# Patient Record
Sex: FEMALE | Race: WHITE | NOT HISPANIC OR LATINO | Employment: FULL TIME
[De-identification: names, ages, dates, MRNs, and addresses within clinical notes are randomized per-mention and may not be internally consistent; named-entity substitution may affect disease eponyms.]

---

## 2017-07-01 ENCOUNTER — HEALTH MAINTENANCE LETTER (OUTPATIENT)
Age: 32
End: 2017-07-01

## 2018-10-01 ENCOUNTER — OFFICE VISIT (OUTPATIENT)
Dept: OBGYN | Facility: CLINIC | Age: 33
End: 2018-10-01
Attending: ADVANCED PRACTICE MIDWIFE
Payer: COMMERCIAL

## 2018-10-01 ENCOUNTER — RADIANT APPOINTMENT (OUTPATIENT)
Dept: ULTRASOUND IMAGING | Facility: CLINIC | Age: 33
End: 2018-10-01
Attending: ADVANCED PRACTICE MIDWIFE
Payer: COMMERCIAL

## 2018-10-01 VITALS
HEART RATE: 80 BPM | WEIGHT: 183 LBS | SYSTOLIC BLOOD PRESSURE: 133 MMHG | BODY MASS INDEX: 30.49 KG/M2 | DIASTOLIC BLOOD PRESSURE: 84 MMHG | HEIGHT: 65 IN

## 2018-10-01 DIAGNOSIS — O30.031 MONOCHORIONIC DIAMNIOTIC TWIN GESTATION IN FIRST TRIMESTER: Primary | ICD-10-CM

## 2018-10-01 DIAGNOSIS — Z12.4 SCREENING FOR MALIGNANT NEOPLASM OF CERVIX: ICD-10-CM

## 2018-10-01 DIAGNOSIS — Z23 NEED FOR PROPHYLACTIC VACCINATION AND INOCULATION AGAINST INFLUENZA: ICD-10-CM

## 2018-10-01 DIAGNOSIS — Z34.00 SUPERVISION OF NORMAL FIRST PREGNANCY, ANTEPARTUM: ICD-10-CM

## 2018-10-01 DIAGNOSIS — E66.811 OBESITY (BMI 30.0-34.9): ICD-10-CM

## 2018-10-01 LAB
ABO + RH BLD: NORMAL
ABO + RH BLD: NORMAL
BLD GP AB SCN SERPL QL: NORMAL
BLOOD BANK CMNT PATIENT-IMP: NORMAL
ERYTHROCYTE [DISTWIDTH] IN BLOOD BY AUTOMATED COUNT: 12.6 % (ref 10–15)
HCT VFR BLD AUTO: 36.7 % (ref 35–47)
HGB BLD-MCNC: 12.1 G/DL (ref 11.7–15.7)
MCH RBC QN AUTO: 30.1 PG (ref 26.5–33)
MCHC RBC AUTO-ENTMCNC: 33 G/DL (ref 31.5–36.5)
MCV RBC AUTO: 91 FL (ref 78–100)
PLATELET # BLD AUTO: 312 10E9/L (ref 150–450)
RBC # BLD AUTO: 4.02 10E12/L (ref 3.8–5.2)
SPECIMEN EXP DATE BLD: NORMAL
WBC # BLD AUTO: 8.3 10E9/L (ref 4–11)

## 2018-10-01 PROCEDURE — 87340 HEPATITIS B SURFACE AG IA: CPT | Performed by: ADVANCED PRACTICE MIDWIFE

## 2018-10-01 PROCEDURE — 86762 RUBELLA ANTIBODY: CPT | Performed by: ADVANCED PRACTICE MIDWIFE

## 2018-10-01 PROCEDURE — 87086 URINE CULTURE/COLONY COUNT: CPT | Performed by: ADVANCED PRACTICE MIDWIFE

## 2018-10-01 PROCEDURE — 87389 HIV-1 AG W/HIV-1&-2 AB AG IA: CPT | Performed by: ADVANCED PRACTICE MIDWIFE

## 2018-10-01 PROCEDURE — 36415 COLL VENOUS BLD VENIPUNCTURE: CPT | Performed by: ADVANCED PRACTICE MIDWIFE

## 2018-10-01 PROCEDURE — 76801 OB US < 14 WKS SINGLE FETUS: CPT

## 2018-10-01 PROCEDURE — 86780 TREPONEMA PALLIDUM: CPT | Performed by: ADVANCED PRACTICE MIDWIFE

## 2018-10-01 PROCEDURE — 85027 COMPLETE CBC AUTOMATED: CPT | Performed by: ADVANCED PRACTICE MIDWIFE

## 2018-10-01 PROCEDURE — 86850 RBC ANTIBODY SCREEN: CPT | Performed by: ADVANCED PRACTICE MIDWIFE

## 2018-10-01 PROCEDURE — 86901 BLOOD TYPING SEROLOGIC RH(D): CPT | Performed by: ADVANCED PRACTICE MIDWIFE

## 2018-10-01 PROCEDURE — G0463 HOSPITAL OUTPT CLINIC VISIT: HCPCS | Mod: 25,ZF

## 2018-10-01 PROCEDURE — 82306 VITAMIN D 25 HYDROXY: CPT | Performed by: ADVANCED PRACTICE MIDWIFE

## 2018-10-01 PROCEDURE — 86900 BLOOD TYPING SEROLOGIC ABO: CPT | Performed by: ADVANCED PRACTICE MIDWIFE

## 2018-10-01 ASSESSMENT — PAIN SCALES - GENERAL: PAINLEVEL: NO PAIN (0)

## 2018-10-01 NOTE — PROGRESS NOTES
[facility] OB Intake note  Subjective   -32 year old woman presents with  to clinic for initiation of OB care.  Patient's last menstrual period was 2018.    at 8w3d by Estimated Date of Delivery: May 10, 2019 based on LMP, US confirms.  Reviewed dating ultrasound. Pregnancy is planned. Surprise diagnosis of mono/di twins, no twins in immediate family.       -Symptoms since LMP include bowel changes, stools are more loose than previously but not bothersome to Gia.  Patient has tried these relief measures: diet modification, small frequent meals and increased fluids.    - Genetic/Infection questionnaire completed, risks include none identified. She believes her father may have a clotting disorder but is unsure of what that diagnosis is, she was tested for the same disorder and was negative. She will bring this information to her next appointment.    Have you traveled during the pregnancy?No  Have your sexual partner(s) travelled during the pregnancy?No    - Current Medications    Current Outpatient Prescriptions   Medication Sig Dispense Refill     Prenatal MV-Min-Fe Fum-FA-DHA (PRENATAL 1 PO) Take 2 tablets by mouth daily          -Co-morbids none   Past Medical History:   Diagnosis Date     NO ACTIVE PROBLEMS      - Risk for GDM -  Personal history of GDM, BMI>30, h/o prediabetes/glucose intolerance, first degree relative with GDM or DM     - The patient does h/o Pre Eclampsia, Current multi fetal gestation, Pre Gestational Diabetes (Type 1 or Type 2), chronic hypertension, renal disease, Autoimmune disease (systematic lupus erythematosus, antiphospholipid syndrome) so WILL start low dose aspirin (81mg) starting between 12 and 28 weeks to prevent preeclampsia.    - The patient  does not have a history of spontaneous  birth so  WILL NOT consider progesterone starting at 16-20 weeks and/or serial transvaginal cervical length ultrasounds from 16-24 weeks.     PERSONAL/SOCIAL HISTORY  ,  lives with their spouse & dog.  Employment: Full time.  Her job involves sedentary activity.    Objective  -VS: reviewed and within normal limits   -General appearance: no acute distress, patient is comfortable   NEUROLOGICAL/PSYCHIATRIC   - Orientated x3,   -Mood and affect: surprised but accepting of diagnosis of twins.     Assessment/Plan  Vale was seen today for prenatal care.    Diagnoses and all orders for this visit:    Monochorionic diamniotic twin gestation in first trimester  -     ABO/Rh type and screen  -     Hepatitis B surface antigen  -     CBC with platelets  -     HIV Antigen Antibody Combo  -     Rubella Antibody IgG Quantitative  -     Treponema Abs w Reflex to RPR and Titer  -     Urine Culture Aerobic Bacterial  -     Vitamin D Deficiency    Need for prophylactic vaccination and inoculation against influenza    Screening for malignant neoplasm of cervix: Had her Pap in Feb, NILM per pt will bring results.    32 year old  8w3d weeks of pregnancy with CHRISTINA of May 10, 2019 by LMP of Patient's last menstrual period was 2018.. Ultrasound confirms.   Outpatient Encounter Prescriptions as of 10/1/2018   Medication Sig Dispense Refill     Prenatal MV-Min-Fe Fum-FA-DHA (PRENATAL 1 PO) Take 2 tablets by mouth daily       [DISCONTINUED] ketoconazole (NIZORAL) 2 % cream Apply topically daily       [DISCONTINUED] ketoconazole (NIZORAL) 2 % shampoo Apply a small amount once a week       No facility-administered encounter medications on file as of 10/1/2018.       Orders Placed This Encounter   Procedures     Hepatitis B surface antigen     CBC with platelets     HIV Antigen Antibody Combo     Rubella Antibody IgG Quantitative     Treponema Abs w Reflex to RPR and Titer     Vitamin D Deficiency     ABO/Rh type and screen     Orders Placed This Encounter   Procedures     Hepatitis B surface antigen     CBC with platelets     HIV Antigen Antibody Combo     Rubella Antibody IgG Quantitative      Treponema Abs w Reflex to RPR and Titer     Vitamin D Deficiency     ABO/Rh type and screen     - Oriented to Practice, types of care, and how to reach a provider. Will see an MD for her NOB appt in order to discuss plan of care for mono/di pregnancy.   - Patient received 1st trimester new OB education packet complete with aide of [employer] Expect[employer] booklet including information on genetic screening test options.    - Patient would like to disucss options further at new OB visit which will be ordered at 1st OB exam.  - Patient was encouraged to continue prenatal vitamins as tolerated.    - Patient was sent to lab for routine OB labs.      - Pt informed of increased risk for GDM due to 30 BMI plan for NOB visit.  - Need to recommend low dose aspirin daily to prevent pre eclampsia (twins and high BMI), follow up at NOB visit.  -Pap in Feb 18 will bring results (NILM per pt).   - Pregnancy concerns to be addressed by provider at new OB exam include: plan of care for twin mono/di pregnancy, genetic screening, GCT, asa therapy.    Pt to RTO for NOB visit in 2 weeks and prn if questions or concerns  KHUSHI Casillas was seen today with both Janine Cat and myself. Janine Cat documented the visit and I agree with the findings and plan of care that is documented above. KIERAN Moran CNM, CNM Ann Louise Page, APRN CNM

## 2018-10-01 NOTE — LETTER
10/1/2018       RE: Vale Contreras  [address] [address]     Dear Colleague,    Thank you for referring your patient, Vale Contreras, to the [facility]. Please see a copy of my visit note below.    [facility] OB Intake note  Subjective   -32 year old woman presents with  to clinic for initiation of OB care.  Patient's last menstrual period was 2018.    at 8w3d by Estimated Date of Delivery: May 10, 2019 based on LMP, US confirms.  Reviewed dating ultrasound. Pregnancy is planned. Surprise diagnosis of mono/di twins, no twins in immediate family.       -Symptoms since LMP include bowel changes, stools are more loose than previously but not bothersome to Gia.  Patient has tried these relief measures: diet modification, small frequent meals and increased fluids.    - Genetic/Infection questionnaire completed, risks include none identified. She believes her father may have a clotting disorder but is unsure of what that diagnosis is, she was tested for the same disorder and was negative. She will bring this information to her next appointment.    Have you traveled during the pregnancy?No  Have your sexual partner(s) travelled during the pregnancy?No    - Current Medications    Current Outpatient Prescriptions   Medication Sig Dispense Refill     Prenatal MV-Min-Fe Fum-FA-DHA (PRENATAL 1 PO) Take 2 tablets by mouth daily          -Co-morbids none   Past Medical History:   Diagnosis Date     NO ACTIVE PROBLEMS      - Risk for GDM -  Personal history of GDM, BMI>30, h/o prediabetes/glucose intolerance, first degree relative with GDM or DM     - The patient does h/o Pre Eclampsia, Current multi fetal gestation, Pre Gestational Diabetes (Type 1 or Type 2), chronic hypertension, renal disease, Autoimmune disease (systematic lupus erythematosus, antiphospholipid syndrome) so WILL start low dose aspirin (81mg)  starting between 12 and 28 weeks to prevent preeclampsia.    - The patient  does not have a history of spontaneous  birth so  WILL NOT consider progesterone starting at 16-20 weeks and/or serial transvaginal cervical length ultrasounds from 16-24 weeks.     PERSONAL/SOCIAL HISTORY  , lives with their spouse & dog.  Employment: Full time.  Her job involves sedentary activity.    Objective  -VS: reviewed and within normal limits   -General appearance: no acute distress, patient is comfortable   NEUROLOGICAL/PSYCHIATRIC   - Orientated x3,   -Mood and affect: surprised but accepting of diagnosis of twins.     Assessment/Plan  Vale was seen today for prenatal care.    Diagnoses and all orders for this visit:    Monochorionic diamniotic twin gestation in first trimester  -     ABO/Rh type and screen  -     Hepatitis B surface antigen  -     CBC with platelets  -     HIV Antigen Antibody Combo  -     Rubella Antibody IgG Quantitative  -     Treponema Abs w Reflex to RPR and Titer  -     Urine Culture Aerobic Bacterial  -     Vitamin D Deficiency    Need for prophylactic vaccination and inoculation against influenza    Screening for malignant neoplasm of cervix: Had her Pap in Feb, NILM per pt will bring results.    32 year old  8w3d weeks of pregnancy with CHRISTINA of May 10, 2019 by LMP of Patient's last menstrual period was 2018.. Ultrasound confirms.   Outpatient Encounter Prescriptions as of 10/1/2018   Medication Sig Dispense Refill     Prenatal MV-Min-Fe Fum-FA-DHA (PRENATAL 1 PO) Take 2 tablets by mouth daily       [DISCONTINUED] ketoconazole (NIZORAL) 2 % cream Apply topically daily       [DISCONTINUED] ketoconazole (NIZORAL) 2 % shampoo Apply a small amount once a week       No facility-administered encounter medications on file as of 10/1/2018.       Orders Placed This Encounter   Procedures     Hepatitis B surface antigen     CBC with platelets     HIV Antigen Antibody Combo     Rubella  Antibody IgG Quantitative     Treponema Abs w Reflex to RPR and Titer     Vitamin D Deficiency     ABO/Rh type and screen     Orders Placed This Encounter   Procedures     Hepatitis B surface antigen     CBC with platelets     HIV Antigen Antibody Combo     Rubella Antibody IgG Quantitative     Treponema Abs w Reflex to RPR and Titer     Vitamin D Deficiency     ABO/Rh type and screen     - Oriented to Practice, types of care, and how to reach a provider. Will see an MD for her NOB appt in order to discuss plan of care for mono/di pregnancy.   - Patient received 1st trimester new OB education packet complete with aide of [employer] Expect[employer] Family booklet including information on genetic screening test options.    - Patient would like to disucss options further at new OB visit which will be ordered at 1st OB exam.  - Patient was encouraged to continue prenatal vitamins as tolerated.    - Patient was sent to lab for routine OB labs.      - Pt informed of increased risk for GDM due to 30 BMI plan for NOB visit.  - Need to recommend low dose aspirin daily to prevent pre eclampsia (twins and high BMI), follow up at NOB visit.  -Pap in Feb 18 will bring results (NILM per pt).   - Pregnancy concerns to be addressed by provider at new OB exam include: plan of care for twin mono/di pregnancy, genetic screening, GCT, asa therapy.    Pt to RTO for NOB visit in 2 weeks and prn if questions or concerns    HIREN Ennis APRN CNM              Again, thank you for allowing me to participate in the care of your patient.      Sincerely,    KIERAN Moran CNM

## 2018-10-01 NOTE — MR AVS SNAPSHOT
After Visit Summary   10/1/2018    Vale Contreras    MRN: [id number]           Patient Information     Date Of Birth          1985        Visit Information        Provider Department      10/1/2018 3:30 PM Hannah Arechiga APRN CNM [facility]        Today's Diagnoses     Monochorionic diamniotic twin gestation in first trimester    -  1    Need for prophylactic vaccination and inoculation against influenza        Screening for malignant neoplasm of cervix        BMI 30           Follow-ups after your visit        Follow-up notes from your care team     Return in about 2 weeks (around 10/15/2018) for NOB with residents, mono-di, NOB, 30min exam.      Your next 10 appointments already scheduled     Oct 18, 2018  3:30 PM CDT   NEW OB with KIERAN Cedeño CNM   [facility] ([facility])    [facility] [address]  [address]  [facility] [address] [phone]              Who to contact     Please call your clinic at [phone] to:    Ask questions about your health    Make or cancel appointments    Discuss your medicines    Learn about your test results    Speak to your doctor            Additional Information About Your Visit        MyChart Information     [employer] gives you secure access to your electronic health record. If you see a primary care provider, you can also send messages to your care team and make appointments. If you have questions, please call your primary care clinic.  If you do not have a primary care provider, please call [phone] and they will assist you.      [employer] is an electronic gateway that provides easy, online access to your medical records. With [employer], you can request a clinic appointment, read your test results, renew a prescription or communicate with your care team.     To access your existing account, please contact your [facility] or call [phone] for assistance.        Care EveryWhere ID     This is your Care EveryWhere ID. This could be used by other organizations to access your [place] medical records  [id number]        Your Vitals Were     Pulse Height Last Period BMI (Body Mass Index)          80 1.657 m (5' 5.24\") 08/03/2018 30.23 kg/m2         Blood Pressure from Last 3 Encounters:   10/01/18 133/84   07/25/16 112/72    Weight from Last 3 Encounters:   10/01/18 83 kg (183 lb)   07/25/16 77.1 kg (170 lb)              We Performed the Following     ABO/Rh type and screen     CBC with platelets     Hepatitis B surface antigen     HIV Antigen Antibody Combo     Rubella Antibody IgG Quantitative     Treponema Abs w Reflex to RPR and Titer     Urine Culture Aerobic Bacterial     Vitamin D Deficiency          Today's Medication Changes          These changes are accurate as of 10/1/18 11:59 PM.  If you have any questions, ask your nurse or doctor.               Stop taking these medicines if you haven't already. Please contact your care team if you have questions.     ketoconazole 2 % cream   Commonly known as:  NIZORAL   Stopped by:  Hannah Arechiga APRN CNM           ketoconazole 2 % shampoo   Commonly known as:  NIZORAL   Stopped by:  Hannah Arechiga APRN CNM                    Primary Care Provider    None Specified       No primary provider on file.        Equal Access to Services     [facility] : Hadii tee duran hadasho Sokiahali, waaxda luqadaha, qaybta kaalmada tabitha, luis manuel hayes. So [facility] [phone].    ATENCIÓN: Si habla español, tiene a reynolds disposición servicios gratuitos de asistencia lingüística. Llame al [phone].    We comply with applicable federal civil rights laws and [place] laws. We do not discriminate on the basis of race, color, national origin, age, disability, sex, sexual orientation, or gender identity.            Thank you!     Thank " you for choosing [facility]  for your care. Our goal is always to provide you with excellent care. Hearing back from our patients is one way we can continue to improve our services. Please take a few minutes to complete the written survey that you may receive in the mail after your visit with us. Thank you!             Your Updated Medication List - Protect others around you: Learn how to safely use, store and throw away your medicines at www.disposemymeds.org.          This list is accurate as of 10/1/18 11:59 PM.  Always use your most recent med list.                   Brand Name Dispense Instructions for use Diagnosis    PRENATAL 1 PO      Take 2 tablets by mouth daily

## 2018-10-01 NOTE — LETTER
Date:October 9, 2018      Patient was self referred, no letter generated. Do not send.        [facility] Information

## 2018-10-02 LAB
BACTERIA SPEC CULT: NORMAL
BACTERIA SPEC CULT: NORMAL
DEPRECATED CALCIDIOL+CALCIFEROL SERPL-MC: 19 UG/L (ref 20–75)
HBV SURFACE AG SERPL QL IA: NONREACTIVE
HIV 1+2 AB+HIV1 P24 AG SERPL QL IA: NONREACTIVE
Lab: NORMAL
RUBV IGG SERPL IA-ACNC: 10 IU/ML
SPECIMEN SOURCE: NORMAL
T PALLIDUM AB SER QL: NONREACTIVE

## 2018-10-04 ASSESSMENT — ENCOUNTER SYMPTOMS
DECREASED CONCENTRATION: 0
DEPRESSION: 0
INSOMNIA: 0
POSTURAL DYSPNEA: 0
COUGH: 1
SNORES LOUDLY: 1
COUGH DISTURBING SLEEP: 1
SHORTNESS OF BREATH: 0
DYSPNEA ON EXERTION: 0
PANIC: 0
NERVOUS/ANXIOUS: 1
SPUTUM PRODUCTION: 0
WHEEZING: 0
HEMOPTYSIS: 0

## 2018-10-04 ASSESSMENT — ANXIETY QUESTIONNAIRES
6. BECOMING EASILY ANNOYED OR IRRITABLE: SEVERAL DAYS
5. BEING SO RESTLESS THAT IT IS HARD TO SIT STILL: NOT AT ALL
4. TROUBLE RELAXING: SEVERAL DAYS
1. FEELING NERVOUS, ANXIOUS, OR ON EDGE: MORE THAN HALF THE DAYS
7. FEELING AFRAID AS IF SOMETHING AWFUL MIGHT HAPPEN: NOT AT ALL
3. WORRYING TOO MUCH ABOUT DIFFERENT THINGS: MORE THAN HALF THE DAYS
2. NOT BEING ABLE TO STOP OR CONTROL WORRYING: MORE THAN HALF THE DAYS
GAD7 TOTAL SCORE: 8
7. FEELING AFRAID AS IF SOMETHING AWFUL MIGHT HAPPEN: NOT AT ALL
GAD7 TOTAL SCORE: 8

## 2018-10-05 ASSESSMENT — ANXIETY QUESTIONNAIRES: GAD7 TOTAL SCORE: 8

## 2018-10-06 PROBLEM — E66.811 OBESITY (BMI 30.0-34.9): Status: ACTIVE | Noted: 2018-10-06

## 2018-10-18 ENCOUNTER — OFFICE VISIT (OUTPATIENT)
Dept: OBGYN | Facility: CLINIC | Age: 33
End: 2018-10-18
Attending: ADVANCED PRACTICE MIDWIFE
Payer: COMMERCIAL

## 2018-10-18 VITALS
SYSTOLIC BLOOD PRESSURE: 128 MMHG | HEART RATE: 102 BPM | WEIGHT: 185.4 LBS | HEIGHT: 66 IN | DIASTOLIC BLOOD PRESSURE: 85 MMHG | BODY MASS INDEX: 29.8 KG/M2

## 2018-10-18 DIAGNOSIS — Z36.82 ENCOUNTER FOR (NT) NUCHAL TRANSLUCENCY SCAN: ICD-10-CM

## 2018-10-18 DIAGNOSIS — O09.90 SUPERVISION OF HIGH RISK PREGNANCY, ANTEPARTUM: Primary | ICD-10-CM

## 2018-10-18 DIAGNOSIS — Z36.89 ENCOUNTER FOR FETAL ANATOMIC SURVEY: ICD-10-CM

## 2018-10-18 DIAGNOSIS — E66.811 OBESITY (BMI 30.0-34.9): ICD-10-CM

## 2018-10-18 DIAGNOSIS — O30.031 MONOCHORIONIC DIAMNIOTIC TWIN GESTATION IN FIRST TRIMESTER: ICD-10-CM

## 2018-10-18 DIAGNOSIS — Z23 NEED FOR PROPHYLACTIC VACCINATION AND INOCULATION AGAINST INFLUENZA: ICD-10-CM

## 2018-10-18 LAB — GLUCOSE 1H P 50 G GLC PO SERPL-MCNC: 87 MG/DL (ref 60–129)

## 2018-10-18 PROCEDURE — 86706 HEP B SURFACE ANTIBODY: CPT | Performed by: ADVANCED PRACTICE MIDWIFE

## 2018-10-18 PROCEDURE — 36415 COLL VENOUS BLD VENIPUNCTURE: CPT | Performed by: ADVANCED PRACTICE MIDWIFE

## 2018-10-18 PROCEDURE — 82950 GLUCOSE TEST: CPT | Performed by: ADVANCED PRACTICE MIDWIFE

## 2018-10-18 PROCEDURE — G0463 HOSPITAL OUTPT CLINIC VISIT: HCPCS | Mod: ZF

## 2018-10-18 PROCEDURE — 87591 N.GONORRHOEAE DNA AMP PROB: CPT | Performed by: ADVANCED PRACTICE MIDWIFE

## 2018-10-18 PROCEDURE — 87491 CHLMYD TRACH DNA AMP PROBE: CPT | Performed by: ADVANCED PRACTICE MIDWIFE

## 2018-10-18 ASSESSMENT — PAIN SCALES - GENERAL: PAINLEVEL: NO PAIN (0)

## 2018-10-18 NOTE — PROGRESS NOTES
SUBJECTIVE:   Vale is a 32 year old female who presents to clinic for a new OB visit with FOB.   at 10w6d with Estimated Date of Delivery: May 10, 2019 based on US confirms. Feels well. Has started started PNV.     She has not had bleeding since her LMP.   She has had mild nausea. Weight loss has not occurred.   This was a planned pregnancy.   FOB is involved,  Present and supportive     OTHER CONCERNS: Want to review their genetic screening options again.     ===========================================   ROS: 10 point ROS neg other than the symptoms noted above in the HPI.      PSYCHIATRIC:  Denies mood changes, difficulty concentrating, depression, anxiety, panic attacks or post partum depression.  Generalized anxiety   PHQ-9 score:  No flowsheet data found.  TIM-7 SCORE 10/4/2018   Total Score 8 (mild anxiety)   Total Score 8         Past History:  Her past medical history   Past Medical History:   Diagnosis Date     NO ACTIVE PROBLEMS    .   This is her first pregnancy  Since her last LMP she denies use of alcohol, tobacco and street drugs.  HISTORY:  Family History   Problem Relation Age of Onset     Hypertension Father      Breast Cancer Cousin      She has the BRCA1 gene. My father was tested and determined not to be a carrier.     Diabetes No family hx of      Social History     Social History     Marital status: Single     Spouse name: Bernardo Julien     Number of children: 0     Years of education: 18     Occupational History     [employer]     office work full time     Social History Main Topics     Smoking status: Never Smoker     Smokeless tobacco: Never Used     Alcohol use 0.0 oz/week      Comment: 8 drinks a week, stopped in pregnancy     Drug use: No     Sexual activity: Yes     Partners: Male     Birth control/ protection: None     Other Topics Concern     Parent/Sibling W/ Cabg, Mi Or Angioplasty Before 65f 55m? No     Social History Narrative     Current  "Outpatient Prescriptions   Medication Sig     VITAMIN D, CHOLECALCIFEROL, PO Take 5,000 Units by mouth daily     Prenatal MV-Min-Fe Fum-FA-DHA (PRENATAL 1 PO) Take 2 tablets by mouth daily     No current facility-administered medications for this visit.      Allergies   Allergen Reactions     Penicillins Unknown     Sulfa Drugs        ============================================  MEDICAL HISTORY  Past Medical History:   Diagnosis Date     NO ACTIVE PROBLEMS      Past Surgical History:   Procedure Laterality Date     ENT SURGERY      Tubes put in my ears as a small child       Obstetric History       T0      L0     SAB0   TAB0   Ectopic0   Multiple0   Live Births0       # Outcome Date GA Lbr Holland/2nd Weight Sex Delivery Anes PTL Lv   1 Current                     GYN History- no Abnormal Pap Smears                        Cervical procedures: no                        History of STI: no    I personally reviewed the past social/family/medical and surgical history on the date of service.   I reviewed lab work done at Intake visit with patient.    EXAM:  /85  Pulse 102  Ht 1.67 m (5' 5.75\")  Wt 84.1 kg (185 lb 6.4 oz)  LMP 2018  BMI 30.15 kg/m2   EXAM:  GENERAL:  Pleasant pregnant female, alert, cooperative and well groomed.  SKIN:  Warm and dry, without lesions or rashes  HEAD: Symmetrical features.  MOUTH:  Buccal mucosa pink, moist without lesions.  Teeth in good repair.    NECK:  Thyroid without enlargement and nodules.  Lymph nodes not palpable.   LUNGS:  Clear to auscultation.  BREAST:    No dominant, fixed or suspicious masses are noted.  No skin or nipple changes or axillary nodes.   Nipples everted.      HEART:  RRR without murmur.  ABDOMEN: Soft without masses , tenderness or organomegaly.  No CVA tenderness.  Uterus palpable at size greater to dates, c/w twin gestation.  No scars noted. +belly button ring well healed.  Fetal heart tones present x2    MUSCULOSKELETAL:  Full range " of motion  EXTREMITIES:  No edema. No significant varicosities.   PELVIC EXAM: No worrisome s/s, Pap up to date. Pt declines pelvic exam     GC/CHLAMYDIA CULTURE OBTAINED:YES    Pap NIL per pt last year no history of abnormal.   Record release in process.        ASSESSMENT:  32 year old , 10w6d weeks of pregnancy with CHRISTINA of May 10, 2019 by US confirms  Intrauterine pregnancy 10w6d size is larger than dates c/w twin gestation  Genetic Screening: First Trimester Screen    ICD-10-CM    1. Supervision of high risk pregnancy, antepartum O09.90 Chlamydia trachomatis PCR     Neisseria gonorrhoeae PCR     Glucose 1 Hour     Hepatitis B Surface Antibody   2. Need for prophylactic vaccination and inoculation against influenza Z23    3. BMI 30.0-30.9,adult Z68.30 Glucose 1 Hour   4. Obesity (BMI 30.0-34.9) E66.9    5. Monochorionic diamniotic twin gestation in first trimester O30.031 [facility]AL [facility] CTR REFERRAL-PREGNANCY   6. Encounter for (NT) nuchal translucency scan Z36.82 [facility] FETAL MED CTR REFERRAL-PREGNANCY   7. Encounter for fetal anatomic survey Z36.89 [facility] FETAL [facility] CTR REFERRAL-PREGNANCY       PLAN:  - Reviewed use of triage nurse line and contacting the on-call provider after hours for an urgent need such as fever, vagina bleeding, bladder or vaginal infection, rupture of membranes,  or term labor.    - Reviewed best evidence for: weight gain for her weight and height for pregnancy:  Based on pre-pregnancy  Body mass index is 30.15 kg/(m^2). RECOMMENDED WEIGHT GAIN: < 15 lbs.  -If BMI>=30, weight gain/exercise handout given and reviewed. BMI entered on problem list with plan for possible referrals and  testing  - Reviewed healthy diet and foods to avoid; exercise and activity during pregnancy; avoiding exposure to toxoplasmosis; and maintenance of a generally healthy lifestyle.   - Discussed the harms, benefits, side effects and alternative therapies for current prescribed and OTC medications.  -  Reviewed high risk for gestational diabetes, early 1 hour done today.  Hepatis B surface ANTIBODY added as not drawn with NOB labs, pt agreeable.   - Reviewed high risk for Pre Eclampsia d/t multiple gestation,  agreeable to starting 81mg aspirin daily after 12 weeks.    - All pt's and partner's  questions discussed and answered.  Pt verbalized understanding of and agreement to plan of care.     - Continue scheduled prenatal care and prn if questions or concerns    KIERAN Hayes CNM

## 2018-10-18 NOTE — MR AVS SNAPSHOT
After Visit Summary   10/18/2018    Vale Contreras    MRN: [id number]           Patient Information     Date Of Birth          1985        Visit Information        Provider Department      10/18/2018 3:30 PM Suad Rogers APRN CNM [facility]        Today's Diagnoses     Supervision of high risk pregnancy, antepartum    -  1    Need for prophylactic vaccination and inoculation against influenza        BMI 30.0-30.9,adult        Obesity (BMI 30.0-34.9)        Monochorionic diamniotic twin gestation in first trimester        Encounter for (NT) nuchal translucency scan        Encounter for fetal anatomic survey           Follow-ups after your visit        Additional Services     MAT FETAL MED CTR REFERRAL-PREGNANCY       Body mass index is 30.15 kg/(m^2).    >> Patient may proceed with recommendations for further testing as directed by the Maternal Fetal Medicine Specialist >>    >> If requesting Fetal Echo: MFM will determine appropriate location for exam due to indication.    >> If requesting Lung Maturity Amnio:  If results indicate fetal lung maturity, induction or C/S is recommended within 36 hours.  Please schedule accordingly.     Dear Patient:   Please be aware that coverage of these services is subject to the terms and limitations of your health insurance plan.  Call member services at your health plan with any benefit or coverage questions.      Please bring the following to your appointment:    >>  Any x-rays, CTs or MRIs which have been performed.  Contact the facility where they were done to arrange for  prior to your scheduled appointment.  Any new CT, MRI or other procedures ordered by your specialist must be performed at a [place] facility or coordinated by your clinic's referral office.  >>  List of current medications   >>  This referral request   >>  Any documents/labs given to you for this referral                  Follow-up notes from your  "care team     Will only call if abnormal Return in about 4 weeks (around 11/15/2018) for MAIA with MD.      Your next 10 appointments already scheduled     Nov 15, 2018  3:00 PM CST   RETURN OB with Alicia Myhre, DO   [facility] ([facility])    [facility] [address]  [address]  [address]  [facility] [id number] [phone]              Who to contact     Please call your clinic at [phone] to:    Ask questions about your health    Make or cancel appointments    Discuss your medicines    Learn about your test results    Speak to your doctor            Additional Information About Your Visit        deltaDNA Information     deltaDNA gives you secure access to your electronic health record. If you see a primary care provider, you can also send messages to your care team and make appointments. If you have questions, please call your primary care clinic.  If you do not have a primary care provider, please call [phone] and they will assist you.      deltaDNA is an electronic gateway that provides easy, online access to your medical records. With deltaDNA, you can request a clinic appointment, read your test results, renew a prescription or communicate with your care team.     To access your existing account, please contact your [facility] or call [phone] for assistance.        Care EveryWhere ID     This is your Care EveryWhere ID. This could be used by other organizations to access your [place] medical records  [id number]        Your Vitals Were     Pulse Height Last Period BMI (Body Mass Index)          102 1.67 m (5' 5.75\") 08/03/2018 30.15 kg/m2         Blood Pressure from Last 3 Encounters:   10/18/18 128/85   10/01/18 133/84   07/25/16 112/72    Weight from Last 3 Encounters:   10/18/18 84.1 kg (185 lb 6.4 oz)   10/01/18 83 kg (183 lb)   07/25/16 77.1 kg (170 lb)              We Performed the Following     Chlamydia " trachomatis PCR     Glucose 1 Hour     Hepatitis B Surface Antibody     MAT FETAL MED CTR REFERRAL-PREGNANCY     Neisseria gonorrhoeae PCR        Primary Care Provider    None Specified       No primary provider on file.        Equal Access to Services     PETER BRADSHAW : Kendra Gonzalez, arpit james, faustinomadhu galindochenchojosh shintuan, luis manuel alliein hayaan kipjerry ivey laColtonnusrat hayes. So [facility] [phone].    ATENCIÓN: Si habla español, tiene a reynolds disposición servicios gratuitos de asistencia lingüística. Llame al [phone].    We comply with applicable federal civil rights laws and [place] laws. We do not discriminate on the basis of race, color, national origin, age, disability, sex, sexual orientation, or gender identity.            Thank you!     Thank you for choosing [facility]  for your care. Our goal is always to provide you with excellent care. Hearing back from our patients is one way we can continue to improve our services. Please take a few minutes to complete the written survey that you may receive in the mail after your visit with us. Thank you!             Your Updated Medication List - Protect others around you: Learn how to safely use, store and throw away your medicines at www.disposemymeds.org.          This list is accurate as of 10/18/18 11:59 PM.  Always use your most recent med list.                   Brand Name Dispense Instructions for use Diagnosis    PRENATAL 1 PO      Take 2 tablets by mouth daily        VITAMIN D (CHOLECALCIFEROL) PO      Take 5,000 Units by mouth daily

## 2018-10-18 NOTE — LETTER
Date:October 23, 2018      Patient was self referred, no letter generated. Do not send.        [facility] Information

## 2018-10-18 NOTE — NURSING NOTE
Chief Complaint   Patient presents with     Prenatal Care   nob exam  10.5 weeks   Twins.    Last pap smear 5-2018  Fax will be coming wnl   [facility]- patient took care of getting records.

## 2018-10-18 NOTE — LETTER
10/18/2018       RE: Vale Contreras  [address] [facility] [facility] [address]     Dear Colleague,    Thank you for referring your patient, Vale Contreras, to the [facility] at [facility]. Please see a copy of my visit note below.      SUBJECTIVE:   Vale is a 32 year old female who presents to clinic for a new OB visit with FOB.   at 10w6d with Estimated Date of Delivery: May 10, 2019 based on US confirms. Feels well. Has started started PNV.     She has not had bleeding since her LMP.   She has had mild nausea. Weight loss has not occurred.   This was a planned pregnancy.   FOB is involved,  Present and supportive     OTHER CONCERNS: Want to review their genetic screening options again.     ===========================================   ROS: 10 point ROS neg other than the symptoms noted above in the HPI.      PSYCHIATRIC:  Denies mood changes, difficulty concentrating, depression, anxiety, panic attacks or post partum depression.  Generalized anxiety   PHQ-9 score:  No flowsheet data found.  TIM-7 SCORE 10/4/2018   Total Score 8 (mild anxiety)   Total Score 8         Past History:  Her past medical history   Past Medical History:   Diagnosis Date     NO ACTIVE PROBLEMS    .   This is her first pregnancy  Since her last LMP she denies use of alcohol, tobacco and street drugs.  HISTORY:  Family History   Problem Relation Age of Onset     Hypertension Father      Breast Cancer Cousin      She has the BRCA1 gene. My father was tested and determined not to be a carrier.     Diabetes No family hx of      Social History     Social History     Marital status: Single     Spouse name: Bernardo Julien     Number of children: 0     Years of education: 18     Occupational History     [employer]     office work full time     Social History Main Topics     Smoking status: Never Smoker     Smokeless tobacco: Never Used      "Alcohol use 0.0 oz/week      Comment: 8 drinks a week, stopped in pregnancy     Drug use: No     Sexual activity: Yes     Partners: Male     Birth control/ protection: None     Other Topics Concern     Parent/Sibling W/ Cabg, Mi Or Angioplasty Before 65f 55m? No     Social History Narrative     Current Outpatient Prescriptions   Medication Sig     VITAMIN D, CHOLECALCIFEROL, PO Take 5,000 Units by mouth daily     Prenatal MV-Min-Fe Fum-FA-DHA (PRENATAL 1 PO) Take 2 tablets by mouth daily     No current facility-administered medications for this visit.      Allergies   Allergen Reactions     Penicillins Unknown     Sulfa Drugs        ============================================  MEDICAL HISTORY  Past Medical History:   Diagnosis Date     NO ACTIVE PROBLEMS      Past Surgical History:   Procedure Laterality Date     ENT SURGERY      Tubes put in my ears as a small child       Obstetric History       T0      L0     SAB0   TAB0   Ectopic0   Multiple0   Live Births0       # Outcome Date GA Lbr Holland/2nd Weight Sex Delivery Anes PTL Lv   1 Current                     GYN History- no Abnormal Pap Smears                        Cervical procedures: no                        History of STI: no    I personally reviewed the past social/family/medical and surgical history on the date of service.   I reviewed lab work done at Intake visit with patient.    EXAM:  /85  Pulse 102  Ht 1.67 m (5' 5.75\")  Wt 84.1 kg (185 lb 6.4 oz)  LMP 2018  BMI 30.15 kg/m2   EXAM:  GENERAL:  Pleasant pregnant female, alert, cooperative and well groomed.  SKIN:  Warm and dry, without lesions or rashes  HEAD: Symmetrical features.  MOUTH:  Buccal mucosa pink, moist without lesions.  Teeth in good repair.    NECK:  Thyroid without enlargement and nodules.  Lymph nodes not palpable.   LUNGS:  Clear to auscultation.  BREAST:    No dominant, fixed or suspicious masses are noted.  No skin or nipple changes or axillary nodes.   " Nipples everted.      HEART:  RRR without murmur.  ABDOMEN: Soft without masses , tenderness or organomegaly.  No CVA tenderness.  Uterus palpable at size greater to dates, c/w twin gestation.  No scars noted. +belly button ring well healed.  Fetal heart tones present x2    MUSCULOSKELETAL:  Full range of motion  EXTREMITIES:  No edema. No significant varicosities.   PELVIC EXAM: No worrisome s/s, Pap up to date. Pt declines pelvic exam     GC/CHLAMYDIA CULTURE OBTAINED:YES    Pap NIL per pt last year no history of abnormal.   Record release in process.        ASSESSMENT:  32 year old , 10w6d weeks of pregnancy with CHRISTINA of May 10, 2019 by US confirms  Intrauterine pregnancy 10w6d size is larger than dates c/w twin gestation  Genetic Screening: First Trimester Screen    ICD-10-CM    1. Supervision of high risk pregnancy, antepartum O09.90 Chlamydia trachomatis PCR     Neisseria gonorrhoeae PCR     Glucose 1 Hour     Hepatitis B Surface Antibody   2. Need for prophylactic vaccination and inoculation against influenza Z23    3. BMI 30.0-30.9,adult Z68.30 Glucose 1 Hour   4. Obesity (BMI 30.0-34.9) E66.9    5. Monochorionic diamniotic twin gestation in first trimester O30.031 MAT FETAL MED CTR REFERRAL-PREGNANCY   6. Encounter for (NT) nuchal translucency scan Z36.82 MAT FETAL MED CTR REFERRAL-PREGNANCY   7. Encounter for fetal anatomic survey Z36.89 [facility] FETAL MED CTR REFERRAL-PREGNANCY       PLAN:  - Reviewed use of triage nurse line and contacting the on-call provider after hours for an urgent need such as fever, vagina bleeding, bladder or vaginal infection, rupture of membranes,  or term labor.    - Reviewed best evidence for: weight gain for her weight and height for pregnancy:  Based on pre-pregnancy  Body mass index is 30.15 kg/(m^2). RECOMMENDED WEIGHT GAIN: < 15 lbs.  -If BMI>=30, weight gain/exercise handout given and reviewed. BMI entered on problem list with plan for possible referrals and   testing  - Reviewed healthy diet and foods to avoid; exercise and activity during pregnancy; avoiding exposure to toxoplasmosis; and maintenance of a generally healthy lifestyle.   - Discussed the harms, benefits, side effects and alternative therapies for current prescribed and OTC medications.  - Reviewed high risk for gestational diabetes, early 1 hour done today.  Hepatis B surface ANTIBODY added as not drawn with NOB labs, pt agreeable.   - Reviewed high risk for Pre Eclampsia d/t multiple gestation,  agreeable to starting 81mg aspirin daily after 12 weeks.    - All pt's and partner's  questions discussed and answered.  Pt verbalized understanding of and agreement to plan of care.     - Continue scheduled prenatal care and prn if questions or concerns    KIERAN Hayes CNM           Again, thank you for allowing me to participate in the care of your patient.      Sincerely,    KIERAN Hayes CNM

## 2018-10-19 PROBLEM — O09.90 SUPERVISION OF HIGH RISK PREGNANCY, ANTEPARTUM: Status: ACTIVE | Noted: 2018-10-19

## 2018-10-19 LAB — HBV SURFACE AB SERPL IA-ACNC: 39 M[IU]/ML

## 2018-10-20 LAB
C TRACH DNA SPEC QL NAA+PROBE: NEGATIVE
N GONORRHOEA DNA SPEC QL NAA+PROBE: NEGATIVE
SPECIMEN SOURCE: NORMAL
SPECIMEN SOURCE: NORMAL

## 2018-10-24 ENCOUNTER — PRE VISIT (OUTPATIENT)
Dept: MATERNAL FETAL MEDICINE | Facility: CLINIC | Age: 33
End: 2018-10-24

## 2018-10-31 ENCOUNTER — OFFICE VISIT (OUTPATIENT)
Dept: MATERNAL FETAL MEDICINE | Facility: CLINIC | Age: 33
End: 2018-10-31
Attending: ADVANCED PRACTICE MIDWIFE
Payer: COMMERCIAL

## 2018-10-31 ENCOUNTER — APPOINTMENT (OUTPATIENT)
Dept: LAB | Facility: CLINIC | Age: 33
End: 2018-10-31
Attending: ADVANCED PRACTICE MIDWIFE
Payer: COMMERCIAL

## 2018-10-31 ENCOUNTER — HOSPITAL ENCOUNTER (OUTPATIENT)
Dept: ULTRASOUND IMAGING | Facility: CLINIC | Age: 33
Discharge: HOME | End: 2018-10-31
Attending: ADVANCED PRACTICE MIDWIFE | Admitting: OBSTETRICS & GYNECOLOGY
Payer: COMMERCIAL

## 2018-10-31 DIAGNOSIS — O26.90 PREGNANCY RELATED CONDITION, ANTEPARTUM: ICD-10-CM

## 2018-10-31 DIAGNOSIS — O09.511 SUPERVISION OF ELDERLY PRIMIGRAVIDA IN FIRST TRIMESTER: Primary | ICD-10-CM

## 2018-10-31 DIAGNOSIS — O09.511 SUPERVISION OF ELDERLY PRIMIGRAVIDA IN FIRST TRIMESTER: ICD-10-CM

## 2018-10-31 DIAGNOSIS — O30.032 MONOCHORIONIC DIAMNIOTIC TWIN GESTATION IN SECOND TRIMESTER: Primary | ICD-10-CM

## 2018-10-31 PROCEDURE — 96040 ZZH GENETIC COUNSELING, EACH 30 MINUTES: CPT | Mod: ZF | Performed by: GENETIC COUNSELOR, MS

## 2018-10-31 PROCEDURE — 36415 COLL VENOUS BLD VENIPUNCTURE: CPT | Performed by: OBSTETRICS & GYNECOLOGY

## 2018-10-31 PROCEDURE — 40000791 ZZHCL STATISTIC VERIFI PRENATAL TRISOMY 21,18,13: Performed by: OBSTETRICS & GYNECOLOGY

## 2018-10-31 PROCEDURE — 76813 OB US NUCHAL MEAS 1 GEST: CPT

## 2018-10-31 NOTE — MR AVS SNAPSHOT
After Visit Summary   10/31/2018    Vale Contreras    MRN: [id number]           Patient Information     Date Of Birth          1985        Visit Information        Provider Department      10/31/2018 8:45 AM Amparo Byrd GC [facility] [place]        Today's Diagnoses     Supervision of elderly primigravida in first trimester    -  1    Pregnancy related condition, antepartum           Follow-ups after your visit        Your next 10 appointments already scheduled     Nov 15, 2018  3:00 PM CST   RETURN OB with Alicia Myhre, DO   [facility] ([facility])    [place] [facility] [address]  [address]  [address]  [facility] [address]   [phone]            Nov 28, 2018  1:30 PM CST   MFM US OB COMPL 2/3 TRI TWINS with [id number]   [facility] [place] ([facility])    [address]  [facility] [id number] [phone]           Wear comfortable clothes and leave your valuables at home.            Nov 28, 2018  2:00 PM CST   Radiology MD with DEANGELO CASTILLO MD   [facility] [place] ([facility])    [address]  [facility] [address]   [phone]           Please arrive at the time given for your first appointment. This visit is used internally to schedule the physician's time during your ultrasound.            Dec 21, 2018  8:00 AM CST   MFM US COMPRE TWINS with [id number]   [facility] [place] ([facility])    [address]  [facility] [address] [phone]           Wear comfortable clothes and leave your valuables at home.            Dec 21, 2018  9:15 AM CST   Radiology MD with DEANGELO CASTILLO MD   [facility] [place] ([facility])     [address]  [place] [address]   [phone]           Please arrive at the time given for your first appointment. This visit is used internally to schedule the physician's time during your ultrasound.              Future tests that were ordered for you today     Open Future Orders        Priority Expected Expires Ordered    MFM US OB Complete 2/3 Tri Twins Routine  8/31/2019 10/31/2018            Who to contact     If you have questions or need follow up information about today's clinic visit or your schedule please contact [facility] directly at [phone].  Normal or non-critical lab and imaging results will be communicated to you by [employer], letter or phone within 4 business days after the clinic has received the results. If you do not hear from us within 7 days, please contact the clinic through [employer] or phone. If you have a critical or abnormal lab result, we will notify you by phone as soon as possible.  Submit refill requests through [employer] or call your pharmacy and they will forward the refill request to us. Please allow 3 business days for your refill to be completed.          Additional Information About Your Visit        MyChart Information     [employer] gives you secure access to your electronic health record. If you see a primary care provider, you can also send messages to your care team and make appointments. If you have questions, please call your primary care clinic.  If you do not have a primary care provider, please call [phone] and they will assist you.        Care EveryWhere ID     This is your Care EveryWhere ID. This could be used by other organizations to access your [place] medical records  [id number]        Your Vitals Were     Last Period                   08/03/2018            Blood Pressure from Last 3 Encounters:   10/18/18 128/85   10/01/18 133/84   07/25/16 112/72    Weight from Last 3 Encounters:   10/18/18 84.1 kg (185 lb 6.4 oz)    10/01/18 83 kg (183 lb)   07/25/16 77.1 kg (170 lb)              We Performed the Following     MFM Genetic Counseling        Primary Care Provider    None Specified       No primary provider on file.        Equal Access to Services     PETER BRADSHAW : Hadii aad ku hadindyamrit Carlos, wacarolynnda lujeanie, faustinota kacarlos lu, luis manuel alliein hayaafelix molinamadelinedeuce hayes. So [facility] [phone].    ATENCIÓN: Si habla español, tiene a reynolds disposición servicios gratuitos de asistencia lingüística. Llame al [phone].    We comply with applicable federal civil rights laws and [place] laws. We do not discriminate on the basis of race, color, national origin, age, disability, sex, sexual orientation, or gender identity.            Thank you!     Thank you for choosing [facility]  for your care. Our goal is always to provide you with excellent care. Hearing back from our patients is one way we can continue to improve our services. Please take a few minutes to complete the written survey that you may receive in the mail after your visit with us. Thank you!             Your Updated Medication List - Protect others around you: Learn how to safely use, store and throw away your medicines at www.disposemymeds.org.          This list is accurate as of 10/31/18 12:42 PM.  Always use your most recent med list.                   Brand Name Dispense Instructions for use Diagnosis    PRENATAL 1 PO      Take 2 tablets by mouth daily        VITAMIN D (CHOLECALCIFEROL) PO      Take 5,000 Units by mouth daily

## 2018-10-31 NOTE — PROGRESS NOTES
[facility]  Genetic Counseling Consult    Patient: Vale Contreras YOB: 1985   Date of Service: 10/31/18      Vale Contreras was seen at [facility] for genetic consultation to discuss the options for screening and testing for fetal chromosome abnormalities.  The indication for genetic counseling is advanced maternal age. Vale is pregnant with mono/di twins. She was unaccompanied to today's visit.       Impression/Plan:   1.  Vale had an ultrasound and blood draw for NIPT (Innatal test through [facility]). Results are expected within 7-10 days, and will be available in [facility].  We will contact her to discuss the results, and a copy will be forwarded to the office of the referring OB provider.    2.  Maternal serum AFP (single marker screen) is recommended after 15 weeks to screen for open neural tube defects. A quad screen should not be performed.    3.  An 18-20 week comprehensive ultrasound is standard of care for all women 35 or older at delivery.    Pregnancy History:   /Parity:    Age at Delivery: 33 year old  CHRISTINA: 5/10/2019, by Last Menstrual Period  Gestational Age: 12w5d    Vale is pregnant with monochorionic/diamniotic twins. No significant complications or exposures were reported in the current pregnancy.    Medical History:   Vale zhou reported medical history is not expected to impact pregnancy management or risks to fetal development.       Family History:   A three-generation pedigree was obtained, and is scanned under the  Media  tab.   The following significant findings were reported by Vale:    Vale's father has a history of several blood clots which lead to a diagnosis of Factor V Leiden. Per Vale's report, she pursued testing for Factor V approximately 5 years ago with normal/negative results.    Vale's paternal first cousin was diagnosed at a young age with Stage IV  breast cancer and was subsequently found to have a mutation in BRCA1. Through familial testing, her cousin's father (Vale's blood relative) was found to have the same BRCA1 mutation, therefore, Vale's father completed testing and was negative. Mutations in BRCA1 are known to cause an increased risk for female and male breast cancer, ovarian cancer, and prostate cancer. Per Vale's report, her father does not carry this familial mutation, therefore, he could not have passed this mutation onto her.     Otherwise, the reported family history is negative for multiple miscarriages, stillbirths, birth defects, mental retardation, known genetic conditions, and consanguinity.       Carrier Screening:   The patient reports that she and the father of the pregnancy have  ancestry:      Cystic fibrosis is an autosomal recessive genetic condition that occurs with increased frequency in individuals of  ancestry and carrier screening for this condition is available.  In addition,  screening in the [facility] includes cystic fibrosis.      Expanded carrier screening for mutations in a large panel of genes associated with autosomal recessive conditions including cystic fibrosis, spinal muscular atrophy, and others, is now available.      The patient was not certain about whether to pursue carrier screening today.  She was provided with a brochure about her testing options, and contact information if she has questions or wishes to pursue screening.       Risk Assessment for Chromosome Conditions:   We explained that the risk for fetal chromosome abnormalities increases with maternal age. We discussed specific features of common chromosome abnormalities, including Down syndrome, trisomy 13, trisomy 18, and sex chromosome trisomies.      - At age 33 at midtrimester, the risk to have a baby with Down syndrome is 1 in 274.     - At age 33 at midtrimester, the risk to have a baby with any chromosome  abnormality is 1 in 135.     Vale was counseled regarding the decreased sensitivity for non-invasive prenatal screening with twin pregnancies compared to berrios pregnancies.     Testing Options:   We discussed the following options:   First trimester screening    First trimester ultrasound with nuchal translucency and nasal bone assessments, maternal plasma hCG, ANA MARIA-A, and AFP measurement    Screens for fetal trisomy 21, trisomy 13, and trisomy 18    Cannot screen for open neural tube defects; maternal serum AFP after 15 weeks is recommended     Non-invasive Prenatal Testing (NIPT)    Maternal plasma cell-free DNA testing; first trimester ultrasound with nuchal translucency and nasal bone assessment is recommended, when appropriate    Screens for fetal trisomy 21, trisomy 13, trisomy 18, and sex chromosome aneuploidy    Cannot screen for open neural tube defects; maternal serum AFP after 15 weeks is recommended     Comprehensive (Level II) ultrasound: Detailed ultrasound performed between 18-22 weeks gestation to screen for major birth defects and markers for aneuploidy.      We reviewed the benefits and limitations of this testing.  Screening tests provide a risk assessment specific to the pregnancy for certain fetal chromosome abnormalities, but cannot definitively diagnose or exclude a fetal chromosome abnormality.  Follow-up genetic counseling and consideration of diagnostic testing is recommended with any abnormal screening result. Diagnostic tests carry inherent risks- including risk of miscarriage- that require careful consideration.  These tests can detect fetal chromosome abnormalities with greater than 99% certainty.  Results can be compromised by maternal cell contamination or mosaicism, and are limited by the resolution of cytogenetic G-banding technology.  There is no screening nor diagnostic test that can detect all forms of birth defects or mental disability.     It was a pleasure to be  involved with Vale zhou care. Face-to-face time of the meeting was 30 minutes.    Marcela Byrd MS, [facility]  Maternal Fetal Medicine  [facility]  Ph: [phone]  [email or web address]

## 2018-10-31 NOTE — MR AVS SNAPSHOT
After Visit Summary   10/31/2018    Vale Contreras    MRN: [id number]           Patient Information     Date Of Birth          1985        Visit Information        Provider Department      10/31/2018 10:00 AM Fady Valadez MD [facility] [place]        Today's Diagnoses     Monochorionic diamniotic twin gestation in second trimester    -  1       Follow-ups after your visit        Your next 10 appointments already scheduled     Oct 31, 2018 11:00 AM CDT   LAB with OP LAB UR   [facility] [place] ([facility])    [address] [place] [address]  [facility] [address]   [phone]           Please do not eat 10-12 hours before your appointment if you are coming in fasting for labs on lipids, cholesterol, or glucose (sugar). This does not apply to pregnant women. Water, hot tea and black coffee (with nothing added) are okay. Do not drink other fluids, diet soda or chew gum.            Nov 15, 2018  3:00 PM CST   RETURN OB with Alicia Myhre, DO   [facility] ([facility])    [place] [facility] [address]  [address]  [facility] [address]   [phone]            Nov 28, 2018  1:30 PM CST   MFM US OB COMPL 2/3 TRI TWINS with [id number]   [facility] [place] ([facility])    [address]  [facility] [address] [phone]           Wear comfortable clothes and leave your valuables at home.            Nov 28, 2018  2:00 PM CST   Radiology MD with UR MFISAÍAS WHITE   [facility] [place] ([facility])    [address]  [facility] [address]   [phone]           Please arrive at the time given for your first appointment. This visit is used internally to schedule the physician's time during your ultrasound.            Dec 21,  2018  8:00 AM CST   MFM US COMPRE TWINS with [id number]   [facility]tras[facility] [place] ([facility])    [address]  [facility] [id number] [phone]           Wear comfortable clothes and leave your valuables at home.            Dec 21, 2018  9:15 AM CST   Radiology MD with UR ANNA WHITE   [facility] - [facility])    [address]  [facility] [address]   [phone]           Please arrive at the time given for your first appointment. This visit is used internally to schedule the physician's time during your ultrasound.              Future tests that were ordered for you today     Open Future Orders        Priority Expected Expires Ordered    MFM US OB Complete 2/3 Tri Twins Routine  8/31/2019 10/31/2018            Who to contact     If you have questions or need follow up information about today's clinic visit or your schedule please contact [facility] - [place] directly at [phone].  Normal or non-critical lab and imaging results will be communicated to you by [employer] Mobilehart, letter or phone within 4 business days after the clinic has received the results. If you do not hear from us within 7 days, please contact the clinic through [email or web address] or phone. If you have a critical or abnormal lab result, we will notify you by phone as soon as possible.  Submit refill requests through [employer] or call your pharmacy and they will forward the refill request to us. Please allow 3 business days for your refill to be completed.          Additional Information About Your Visit        [employer] Mobile[employer] Engine Information     Kior gives you secure access to your electronic health record. If you see a primary care provider, you can also send messages to your care team and make appointments. If you have questions, please call your primary care clinic.  If you do not have a primary  care provider, please call [phone] and they will assist you.        Care EveryWhere ID     This is your Care EveryWhere ID. This could be used by other organizations to access your [facility] medical records  [id number]        Your Vitals Were     Last Period                   08/03/2018            Blood Pressure from Last 3 Encounters:   10/18/18 128/85   10/01/18 133/84   07/25/16 112/72    Weight from Last 3 Encounters:   10/18/18 84.1 kg (185 lb 6.4 oz)   10/01/18 83 kg (183 lb)   07/25/16 77.1 kg (170 lb)               Primary Care Provider    None Specified       No primary provider on file.        Equal Access to Services     PETER BRADSHAW : Kendra Gonzalez, arpit james, mark lu, luis manuel hayes. So [facility] [phone].    ATENCIÓN: Si habla español, tiene a reynolds disposición servicios gratuitos de asistencia lingüística. Llame al [phone].    We comply with applicable federal civil rights laws and [place] laws. We do not discriminate on the basis of race, color, national origin, age, disability, sex, sexual orientation, or gender identity.            Thank you!     Thank you for choosing [facility]  for your care. Our goal is always to provide you with excellent care. Hearing back from our patients is one way we can continue to improve our services. Please take a few minutes to complete the written survey that you may receive in the mail after your visit with us. Thank you!             Your Updated Medication List - Protect others around you: Learn how to safely use, store and throw away your medicines at www.disposemymeds.org.          This list is accurate as of 10/31/18 10:46 AM.  Always use your most recent med list.                   Brand Name Dispense Instructions for use Diagnosis    PRENATAL 1 PO      Take 2 tablets by mouth daily        VITAMIN D (CHOLECALCIFEROL) PO      Take 5,000 Units by mouth  daily

## 2018-11-06 ENCOUNTER — TELEPHONE (OUTPATIENT)
Dept: MATERNAL FETAL MEDICINE | Facility: CLINIC | Age: 33
End: 2018-11-06

## 2018-11-06 LAB — LAB SCANNED RESULT: NORMAL

## 2018-11-06 NOTE — TELEPHONE ENCOUNTER
"11/6/2018    I called Vale to discuss her normal \"Innatal\" cell-free fetal DNA screening results. Vale is pregnant with mono/di twins. She requested that these results be left in a detailed voice message. Results came back negative for chromosome abnormalities in chromosomes 21, 18, & 13, as well as the sex chromosomes.  These normal results suggest the likelihood of fetal Down syndrome, trisomy 13, or trisomy 18 in this pregnancy is very low. Results consistent with the presence of Y chromosome material (See scanned report under lab tab in epic). Due to the mono-di zygosity, it can be assumed that both babies are male. This information was left on Vale's voicemail per her request.    Discussed high detection rates for fetal Down syndrome, trisomies 13 and 18, and fetal sex chromosome abnormalities; however, not 100%. While this test is a highly accurate screen, it does not replace the diagnostic capabilities of standard prenatal diagnostic tests such as amniocentesis and CVS.     Plan:  A 2/3 complete ultrasound has been scheduled at [facility] on 11/28 and a level II ultrasound has been scheduled on 12/21.  MSAFP is the appropriate second trimester screening test for open neural tube defects; the maternal quad screen is not indicated.      Marcela Byrd MS, [facility]  Licensed Genetic Counselor  Phone: [phone]  Pager: [phone]  "

## 2018-11-15 ENCOUNTER — OFFICE VISIT (OUTPATIENT)
Dept: OBGYN | Facility: CLINIC | Age: 33
End: 2018-11-15
Payer: COMMERCIAL

## 2018-11-15 VITALS
HEART RATE: 112 BPM | SYSTOLIC BLOOD PRESSURE: 122 MMHG | HEIGHT: 65 IN | WEIGHT: 194.2 LBS | BODY MASS INDEX: 32.36 KG/M2 | DIASTOLIC BLOOD PRESSURE: 82 MMHG

## 2018-11-15 DIAGNOSIS — O30.032 MONOCHORIONIC DIAMNIOTIC TWIN GESTATION IN SECOND TRIMESTER: Primary | ICD-10-CM

## 2018-11-15 DIAGNOSIS — E66.811 OBESITY (BMI 30.0-34.9): ICD-10-CM

## 2018-11-15 DIAGNOSIS — O09.90 SUPERVISION OF HIGH RISK PREGNANCY, ANTEPARTUM: ICD-10-CM

## 2018-11-15 PROCEDURE — G0463 HOSPITAL OUTPT CLINIC VISIT: HCPCS | Mod: ZF

## 2018-11-15 NOTE — PROGRESS NOTES
"Subjective:      32 year old  at 14w6d presents for a routine prenatal appointment. She is accompanied by her .   Denies vaginal bleeding or leakage of fluid.  Denies contractions or cramping.   She complains of increased HA with pregnancy, occurring 2-3 times per week. They occur in the setting of stress but are not severe. She is staying well hydrated and denies any nausea or vomiting. She continues to walk regularly for exercise.  She is overall feeling well and feels well supported in her pregnancy. She is involved with several online support groups for moms of multiples. She and her  are excited and coping well with the thought of having twins.     She recently had normal Innatal cell free DNA screen. She has MFM appointments scheduled on 18 and Level II scheduled for 18     Objective:  Vitals:    11/15/18 1502   BP: 122/82   BP Location: Left arm   Patient Position: Chair   Pulse: 112   Weight: 88.1 kg (194 lb 3.2 oz)   Height: 1.651 m (5' 5\")     See OB flowsheet    Assessment/Plan:  33yo  at 14w6d with mono/di twin pregnancy presents for routine OB care.      Encounter Diagnoses   Name Primary?     Monochorionic diamniotic twin gestation in second trimester Yes     Supervision of high risk pregnancy, antepartum -  DENISA WHITE. TWINS      BMI 30      - Reviewed total weight gain, encouraged continued healthy diet and exercise.     - Reviewed importance of flu vaccine. Patient is vaccinated. Encouraged  to consider vaccination as well to decrease likelihood of illness in the household.    - Reviewed why/how to contact provider.    Patient education/orders or handouts today:  Adjusting to Second Trimester of Pregnancy handout, discussed upcoming prenatal visits including MFM monitoring for TTTS and Level II ultrasounds. Also discussed future routine OB visits including glucose screening.   Return to clinic in 4 weeks and prn if questions or concerns.     Patient discussed " with Dr. Terrell Alicia Myhre, DO  Obstetrics and Gyncology, PGY-2  November 15, 2018 , 3:42 PM       The Patient was seen in [facility] by MYHRE, ALICIA.  I reviewed the history & exam. Assessment and plan were jointly made.    Geeta Hall MD

## 2018-11-15 NOTE — PATIENT INSTRUCTIONS
It was a pleasure to meet you today!  Please keep scheduled maternal fetal medicine appointments.   Return for routine OB care in 4 weeks.   Adapting to Pregnancy: Second Trimester    Keep up the healthy habits you started in your first trimester. You might be a little more tired than normal. So plan your day wisely. Look at the tips below and choose the ones that suit your lifestyle.  If you have any questions, check with your healthcare provider.   If you work  If you can, adjust your work with your employer to fit your needs. Try these tips:    If you stand for long periods, find ways to do some tasks while sitting. Also, try to stand with 1 foot resting on a low stool or ledge. Shift your weight from foot to foot often. Wear low-heeled shoes.    If you sit, keep your knees level with your hips. Rest your feet on a firm surface. Sit tall with support for your low back.    If you work long hours, ask about adjusting your schedule. Try taking shorter breaks more often.  When you travel  The second trimester may be the best time for any travel. Talk to your healthcare provider about any special plans you may need to make. Always:    Wear a seat belt. Fasten the lap part under your belly. Wear the shoulder part also.    Take breaks often during long trips by car or plane. Move around to stretch your legs.    Drink plenty of fluids on flights. The air in plane cabins is very dry.    Avoid hot climates or high altitudes if you are not used to them.    Avoid places where the food and water might make you sick.    Make sure you are up-to-date on all immunizations, including the flu vaccine. This is especially important when traveling overseas.  Taking time to relax  Find time to rest and relax at work or at home:    Take short time-outs daily. Do relaxation exercises.    Breathe deeply during stressful times.    Try not to take on too much. Plan tasks for times when you have the most energy.    Take naps when you can. Or  just sit and relax.    After week 16, avoid lying on your back for more than a few minutes. Instead, lie on your side. Switch sides often.  Continuing as lovers  Unless your healthcare provider tells you otherwise, there is no reason to stop having sex now. Blood supply increases to the pelvic area in the second trimester. Because of this, sex might be more enjoyable. Try different positions and see what s best. Also, talk to your partner about any changes in desire. Spotting may happen after sex. Be sure to let your healthcare provider know if there is heavy bleeding.  Keeping your environment safe  You can still clean house and use scented products. Just take some simple precautions:    Wear gloves when using cleaning fluids.    Open windows to let in fresh air. Use a fan if you paint.    Avoid secondhand smoke.    Don t breathe fumes from nail polish, hair spray, cleansers, or other chemicals.  Date Last Reviewed: 1/1/2018 2000-2018 [employer] [facility]. [address] [place] [address], [place] 6000067. All rights reserved. This information is not intended as a substitute for professional medical care. Always follow your healthcare professional's instructions.

## 2018-11-15 NOTE — MR AVS SNAPSHOT
After Visit Summary   11/15/2018    Vale Contreras    MRN: [id number]           Patient Information     Date Of Birth          1985        Visit Information        Provider Department      11/15/2018 3:00 PM Myhre, Alicia, DO [facility]        Care Instructions    It was a pleasure to meet you today!  Please keep scheduled maternal fetal medicine appointments.   Return for routine OB care in 4 weeks.   Adapting to Pregnancy: Second Trimester    Keep up the healthy habits you started in your first trimester. You might be a little more tired than normal. So plan your day wisely. Look at the tips below and choose the ones that suit your lifestyle.  If you have any questions, check with your healthcare provider.   If you work  If you can, adjust your work with your employer to fit your needs. Try these tips:    If you stand for long periods, find ways to do some tasks while sitting. Also, try to stand with 1 foot resting on a low stool or ledge. Shift your weight from foot to foot often. Wear low-heeled shoes.    If you sit, keep your knees level with your hips. Rest your feet on a firm surface. Sit tall with support for your low back.    If you work long hours, ask about adjusting your schedule. Try taking shorter breaks more often.  When you travel  The second trimester may be the best time for any travel. Talk to your healthcare provider about any special plans you may need to make. Always:    Wear a seat belt. Fasten the lap part under your belly. Wear the shoulder part also.    Take breaks often during long trips by car or plane. Move around to stretch your legs.    Drink plenty of fluids on flights. The air in plane cabins is very dry.    Avoid hot climates or high altitudes if you are not used to them.    Avoid places where the food and water might make you sick.    Make sure you are up-to-date on all immunizations, including the flu vaccine. This is especially  important when traveling overseas.  Taking time to relax  Find time to rest and relax at work or at home:    Take short time-outs daily. Do relaxation exercises.    Breathe deeply during stressful times.    Try not to take on too much. Plan tasks for times when you have the most energy.    Take naps when you can. Or just sit and relax.    After week 16, avoid lying on your back for more than a few minutes. Instead, lie on your side. Switch sides often.  Continuing as lovers  Unless your healthcare provider tells you otherwise, there is no reason to stop having sex now. Blood supply increases to the pelvic area in the second trimester. Because of this, sex might be more enjoyable. Try different positions and see what s best. Also, talk to your partner about any changes in desire. Spotting may happen after sex. Be sure to let your healthcare provider know if there is heavy bleeding.  Keeping your environment safe  You can still clean house and use scented products. Just take some simple precautions:    Wear gloves when using cleaning fluids.    Open windows to let in fresh air. Use a fan if you paint.    Avoid secondhand smoke.    Don t breathe fumes from nail polish, hair spray, cleansers, or other chemicals.  Date Last Reviewed: 1/1/2018 2000-2018 [employer]. [address] [place] [address], [place] [address]. All rights reserved. This information is not intended as a substitute for professional medical care. Always follow your healthcare professional's instructions.                Follow-ups after your visit        Your next 10 appointments already scheduled     Nov 28, 2018  1:30 PM CST   MFM US OB COMPL 2/3 TRI TWINS with [id number]   [facility] [place] ([facility])    [address]  [facility] [id number] [phone]           Wear comfortable clothes and leave your valuables at home.            Nov 28, 2018  2:00 PM  BOB   Radiology MD with DEANGELO CASTILLO MD   [facility] [place] ([facility])    [address]  [facility] [address]   [phone]           Please arrive at the time given for your first appointment. This visit is used internally to schedule the physician's time during your ultrasound.            Dec 21, 2018  8:00 AM BOB CASTILLO US COMPRE TWINS with [id number]   [facility] [place] ([facility])    [address]  [facility] [address]   [phone]           Wear comfortable clothes and leave your valuables at home.            Dec 21, 2018  9:15 AM BOB   Radiology MD with DEANGELO CASTILLO MD   [facility] Medicine - [facility])    [address]  [facility] [address] [phone]           Please arrive at the time given for your first appointment. This visit is used internally to schedule the physician's time during your ultrasound.              Who to contact     Please call your clinic at [phone] to:    Ask questions about your health    Make or cancel appointments    Discuss your medicines    Learn about your test results    Speak to your doctor            Additional Information About Your Visit        [facility] Information     [facility] gives you secure access to your electronic health record. If you see a primary care provider, you can also send messages to your care team and make appointments. If you have questions, please call your primary care clinic.  If you do not have a primary care provider, please call [phone] and they will assist you.      [facility] is an electronic gateway that provides easy, online access to your medical records. With [facility], you can request a clinic appointment, read your test results, renew a prescription or communicate with your care team.     To access your existing account, please  "contact your [facility] or call [phone] for assistance.        Care EveryWhere ID     This is your Care EveryWhere ID. This could be used by other organizations to access your [place] medical records  [id number]        Your Vitals Were     Pulse Height Last Period BMI (Body Mass Index)          112 1.651 m (5' 5\") 08/03/2018 32.32 kg/m2         Blood Pressure from Last 3 Encounters:   11/15/18 122/82   10/18/18 128/85   10/01/18 133/84    Weight from Last 3 Encounters:   11/15/18 88.1 kg (194 lb 3.2 oz)   10/18/18 84.1 kg (185 lb 6.4 oz)   10/01/18 83 kg (183 lb)              Today, you had the following     No orders found for display       Primary Care Provider    None Specified       No primary provider on file.        Equal Access to Services     [facility]: Hadguillaume Gonzalez, arpit james, mark lu, luis manuel law . So [facility] [phone].    ATENCIÓN: Si habla español, tiene a reynolds disposición servicios gratuitos de asistencia lingüística. Blanco al [phone].    We comply with applicable federal civil rights laws and [place] laws. We do not discriminate on the basis of race, color, national origin, age, disability, sex, sexual orientation, or gender identity.            Thank you!     Thank you for choosing [facility]  for your care. Our goal is always to provide you with excellent care. Hearing back from our patients is one way we can continue to improve our services. Please take a few minutes to complete the written survey that you may receive in the mail after your visit with us. Thank you!             Your Updated Medication List - Protect others around you: Learn how to safely use, store and throw away your medicines at www.disposemymeds.org.          This list is accurate as of 11/15/18  3:26 PM.  Always use your most recent med list.                   Brand Name Dispense " Instructions for use Diagnosis    PRENATAL 1 PO      Take 2 tablets by mouth daily        VITAMIN D (CHOLECALCIFEROL) PO      Take 5,000 Units by mouth daily

## 2018-11-28 ENCOUNTER — OFFICE VISIT (OUTPATIENT)
Dept: MATERNAL FETAL MEDICINE | Facility: CLINIC | Age: 33
End: 2018-11-28
Attending: OBSTETRICS & GYNECOLOGY
Payer: COMMERCIAL

## 2018-11-28 ENCOUNTER — HOSPITAL ENCOUNTER (OUTPATIENT)
Dept: ULTRASOUND IMAGING | Facility: CLINIC | Age: 33
Discharge: HOME | End: 2018-11-28
Attending: OBSTETRICS & GYNECOLOGY | Admitting: OBSTETRICS & GYNECOLOGY
Payer: COMMERCIAL

## 2018-11-28 DIAGNOSIS — O30.032 MONOCHORIONIC DIAMNIOTIC TWIN GESTATION IN SECOND TRIMESTER: ICD-10-CM

## 2018-11-28 DIAGNOSIS — O30.032 MONOCHORIONIC DIAMNIOTIC TWIN GESTATION IN SECOND TRIMESTER: Primary | ICD-10-CM

## 2018-11-28 PROCEDURE — 76810 OB US >/= 14 WKS ADDL FETUS: CPT

## 2018-11-28 PROCEDURE — 76820 UMBILICAL ARTERY ECHO: CPT | Mod: 59 | Performed by: OBSTETRICS & GYNECOLOGY

## 2018-11-28 NOTE — PROGRESS NOTES
"Please see \"Imaging\" tab under \"Chart Review\" for details of today's US.    Ade Clinton, DO    "

## 2018-11-28 NOTE — MR AVS SNAPSHOT
After Visit Summary   11/28/2018    Vale Contreras    MRN: [id number]           Patient Information     Date Of Birth          1985        Visit Information        Provider Department      11/28/2018 2:00 PM Ade Clinton DO [facility] [place]        Today's Diagnoses     Monochorionic diamniotic twin gestation in second trimester    -  1       Follow-ups after your visit        Your next 10 appointments already scheduled     Dec 10, 2018  3:30 PM CST   RETURN OB with Cynthia Rivas MD   [facility] ([facility])    [place] [facility] [address]  [address]  [facility] [address]   [phone]            Dec 26, 2018  1:30 PM CST   MFM US COMPRE TWINS with [id number]   [facility] [place] ([facility])    [address]  [facility] [address] [phone]           Wear comfortable clothes and leave your valuables at home.            Dec 26, 2018  2:45 PM CST   Radiology MD with UR MFM MD   [facility] [place] ([facility])    [address]  [facility] [address]   [phone]           Please arrive at the time given for your first appointment. This visit is used internally to schedule the physician's time during your ultrasound.            Jan 09, 2019  1:15 PM CST   Ech Fetal Complete* with Urmfmusfet   [facility] [place] ([facility])    [address]  [facility] [address]   [phone]           Please check in at the [facility],  [address], [place] [address].  Park in the Red Ramp for easiest access.  Skyway access to the [facility] is located on the second level of the ramp.  Please call [phone] if you have  questions.            Jan 09, 2019  2:15 PM CST   Ech Fetal -Twin B Complete* with Urmfmusfet   [facility] ([facility])    [address]  [place] [address]   [phone]              Future tests that were ordered for you today     Open Future Orders        Priority Expected Expires Ordered    MFM US Comprehensive Twins F/U Routine 12/12/2018 9/28/2019 11/28/2018    Echo Fetal Complete-Peds Cardiology Routine 1/9/2019 11/28/2019 11/28/2018    Echo Fetal - Twin B Complete - Peds Cardiology Routine 1/9/2019 11/28/2019 11/28/2018            Who to contact     If you have questions or need follow up information about today's clinic visit or your schedule please contact [facility] directly at [phone].  Normal or non-critical lab and imaging results will be communicated to you by MyChart, letter or phone within 4 business days after the clinic has received the results. If you do not hear from us within 7 days, please contact the clinic through [employer] or phone. If you have a critical or abnormal lab result, we will notify you by phone as soon as possible.  Submit refill requests through [employer] or call your pharmacy and they will forward the refill request to us. Please allow 3 business days for your refill to be completed.          Additional Information About Your Visit        [employer] Information     [employer] gives you secure access to your electronic health record. If you see a primary care provider, you can also send messages to your care team and make appointments. If you have questions, please call your primary care clinic.  If you do not have a primary care provider, please call [phone] and they will assist you.        Care EveryWhere ID     This is your Care EveryWhere ID. This could be used by other organizations to access your [place] medical records  [id number]        Your Vitals Were      Last Period                   08/03/2018            Blood Pressure from Last 3 Encounters:   11/15/18 122/82   10/18/18 128/85   10/01/18 133/84    Weight from Last 3 Encounters:   11/15/18 88.1 kg (194 lb 3.2 oz)   10/18/18 84.1 kg (185 lb 6.4 oz)   10/01/18 83 kg (183 lb)               Primary Care Provider    None Specified       No primary provider on file.        Equal Access to Services     PETER BRADSHAW : Hadii tee rojaso Sokiahali, waaxda luqadaha, qaybta kaalmada adejerryyada, luis manuel law . So [facility] [phone].    ATENCIÓN: Si brindala janelle, tiene a reynolds disposición servicios gratuitos de asistencia lingüística. Llame al [phone].    We comply with applicable federal civil rights laws and [place] laws. We do not discriminate on the basis of race, color, national origin, age, disability, sex, sexual orientation, or gender identity.            Thank you!     Thank you for choosing [facility]  for your care. Our goal is always to provide you with excellent care. Hearing back from our patients is one way we can continue to improve our services. Please take a few minutes to complete the written survey that you may receive in the mail after your visit with us. Thank you!             Your Updated Medication List - Protect others around you: Learn how to safely use, store and throw away your medicines at www.disposemymeds.org.          This list is accurate as of 11/28/18  5:05 PM.  Always use your most recent med list.                   Brand Name Dispense Instructions for use Diagnosis    PRENATAL 1 PO      Take 2 tablets by mouth daily        VITAMIN D (CHOLECALCIFEROL) PO      Take 5,000 Units by mouth daily

## 2018-11-30 ENCOUNTER — AMBULATORY - HEALTHEAST (OUTPATIENT)
Dept: MATERNAL FETAL MEDICINE | Facility: HOSPITAL | Age: 33
End: 2018-11-30

## 2018-11-30 DIAGNOSIS — Z34.90 PRENATAL CARE: ICD-10-CM

## 2018-12-10 ENCOUNTER — OFFICE VISIT (OUTPATIENT)
Dept: OBGYN | Facility: CLINIC | Age: 33
End: 2018-12-10
Attending: OBSTETRICS & GYNECOLOGY
Payer: COMMERCIAL

## 2018-12-10 VITALS
SYSTOLIC BLOOD PRESSURE: 116 MMHG | BODY MASS INDEX: 33.52 KG/M2 | DIASTOLIC BLOOD PRESSURE: 74 MMHG | HEART RATE: 96 BPM | HEIGHT: 65 IN | WEIGHT: 201.2 LBS

## 2018-12-10 DIAGNOSIS — O30.032 MONOCHORIONIC DIAMNIOTIC TWIN GESTATION IN SECOND TRIMESTER: Primary | ICD-10-CM

## 2018-12-10 PROCEDURE — G0463 HOSPITAL OUTPT CLINIC VISIT: HCPCS | Mod: ZF

## 2018-12-10 ASSESSMENT — MIFFLIN-ST. JEOR: SCORE: 1608.52

## 2018-12-10 NOTE — NURSING NOTE
Chief Complaint   Patient presents with     Prenatal Care     18w3d       See SCOTT Gayle 12/10/2018

## 2018-12-10 NOTE — LETTER
"12/10/2018       RE: Vale Contreras  [address] [facility] [address]     Dear Colleague,    Thank you for referring your patient, Vale Contreras, to the [facility] at [facility]. Please see a copy of my visit note below.    Doing well. Has seen MFM.  Has ultrasound scheduled for TTTS monitoring and level 2/Echo scheduled.     Vitals:    12/10/18 1511   BP: 116/74   BP Location: Right arm   Patient Position: Chair   Pulse: 96   Weight: 91.3 kg (201 lb 3.2 oz)   Height: 1.651 m (5' 5\")     See flow    A/p: 32 yo  at 18+3 wks w/ mono/di twins.  Here for MAIA visit.     PNC: NIPT normal. Has MFM ultrasounds and ECHO scheduled as well as TTTS evals.     Early GCT normal. Plan repeat at 28 wks.     F/u here 4 wks.     Cynthia Rivas MD, FACOG  [facility]  OB/GYN    12/10/2018  7:15 PM          Again, thank you for allowing me to participate in the care of your patient.      Sincerely,    Cynthia Rivas MD      "

## 2018-12-10 NOTE — LETTER
Date:December 11, 2018      Patient was self referred, no letter generated. Do not send.        [facility] Information

## 2018-12-11 NOTE — PROGRESS NOTES
"Doing well. Has seen MFM.  Has ultrasound scheduled for TTTS monitoring and level 2/Echo scheduled.     Vitals:    12/10/18 1511   BP: 116/74   BP Location: Right arm   Patient Position: Chair   Pulse: 96   Weight: 91.3 kg (201 lb 3.2 oz)   Height: 1.651 m (5' 5\")     See flow    A/p: 32 yo  at 18+3 wks w/ mono/di twins.  Here for MAIA visit.     PNC: NIPT normal. Has MFM ultrasounds and ECHO scheduled as well as TTTS evals.     Early GCT normal. Plan repeat at 28 wks.     F/u here 4 wks.     Cynthia Rivas MD, FACOG  [facility]  OB/GYN    12/10/2018  7:15 PM        "

## 2018-12-12 ENCOUNTER — OFFICE VISIT - HEALTHEAST (OUTPATIENT)
Dept: MATERNAL FETAL MEDICINE | Facility: HOSPITAL | Age: 33
End: 2018-12-12

## 2018-12-12 ENCOUNTER — AMBULATORY - HEALTHEAST (OUTPATIENT)
Dept: MATERNAL FETAL MEDICINE | Facility: HOSPITAL | Age: 33
End: 2018-12-12

## 2018-12-12 ENCOUNTER — RECORDS - HEALTHEAST (OUTPATIENT)
Dept: ULTRASOUND IMAGING | Facility: HOSPITAL | Age: 33
End: 2018-12-12

## 2018-12-12 ENCOUNTER — RECORDS - HEALTHEAST (OUTPATIENT)
Dept: ADMINISTRATIVE | Facility: OTHER | Age: 33
End: 2018-12-12

## 2018-12-12 DIAGNOSIS — O30.032 MONOCHORIONIC DIAMNIOTIC TWIN GESTATION IN SECOND TRIMESTER: ICD-10-CM

## 2018-12-12 DIAGNOSIS — Z34.90 ENCOUNTER FOR SUPERVISION OF NORMAL PREGNANCY, UNSPECIFIED, UNSPECIFIED TRIMESTER: ICD-10-CM

## 2018-12-26 ENCOUNTER — AMBULATORY - HEALTHEAST (OUTPATIENT)
Dept: MATERNAL FETAL MEDICINE | Facility: HOSPITAL | Age: 33
End: 2018-12-26

## 2018-12-26 ENCOUNTER — OFFICE VISIT (OUTPATIENT)
Dept: MATERNAL FETAL MEDICINE | Facility: CLINIC | Age: 33
End: 2018-12-26
Attending: ADVANCED PRACTICE MIDWIFE
Payer: COMMERCIAL

## 2018-12-26 ENCOUNTER — HOSPITAL ENCOUNTER (OUTPATIENT)
Dept: ULTRASOUND IMAGING | Facility: CLINIC | Age: 33
Discharge: HOME | End: 2018-12-26
Attending: ADVANCED PRACTICE MIDWIFE | Admitting: OBSTETRICS & GYNECOLOGY
Payer: COMMERCIAL

## 2018-12-26 ENCOUNTER — TELEPHONE (OUTPATIENT)
Dept: MATERNAL FETAL MEDICINE | Facility: CLINIC | Age: 33
End: 2018-12-26

## 2018-12-26 DIAGNOSIS — O44.42 LOW LYING PLACENTA NOS OR WITHOUT HEMORRHAGE, SECOND TRIMESTER: ICD-10-CM

## 2018-12-26 DIAGNOSIS — O26.90 PREGNANCY RELATED CONDITION, ANTEPARTUM: ICD-10-CM

## 2018-12-26 DIAGNOSIS — O30.032 MONOCHORIONIC DIAMNIOTIC TWIN GESTATION IN SECOND TRIMESTER: Primary | ICD-10-CM

## 2018-12-26 DIAGNOSIS — Z34.90 PRENATAL CARE, ANTEPARTUM: ICD-10-CM

## 2018-12-26 DIAGNOSIS — O43.192 MARGINAL INSERTION OF UMBILICAL CORD AFFECTING MANAGEMENT OF MOTHER IN SECOND TRIMESTER: ICD-10-CM

## 2018-12-26 PROCEDURE — 76811 OB US DETAILED SNGL FETUS: CPT

## 2018-12-26 PROCEDURE — 76820 UMBILICAL ARTERY ECHO: CPT | Performed by: OBSTETRICS & GYNECOLOGY

## 2018-12-26 NOTE — PROGRESS NOTES
Please refer to ultrasound report under 'Imaging' Studies of 'Chart Review' tabs.    aFdy Valadez M.D.

## 2018-12-26 NOTE — TELEPHONE ENCOUNTER
Going to [facility] location for L2 F/U Twins.    ANNA Pa

## 2019-01-07 ENCOUNTER — TRANSFERRED RECORDS (OUTPATIENT)
Dept: HEALTH INFORMATION MANAGEMENT | Facility: CLINIC | Age: 34
End: 2019-01-07

## 2019-01-07 ENCOUNTER — RECORDS - HEALTHEAST (OUTPATIENT)
Dept: ADMINISTRATIVE | Facility: OTHER | Age: 34
End: 2019-01-07

## 2019-01-07 ENCOUNTER — RECORDS - HEALTHEAST (OUTPATIENT)
Dept: ULTRASOUND IMAGING | Facility: HOSPITAL | Age: 34
End: 2019-01-07

## 2019-01-07 ENCOUNTER — OFFICE VISIT - HEALTHEAST (OUTPATIENT)
Dept: MATERNAL FETAL MEDICINE | Facility: HOSPITAL | Age: 34
End: 2019-01-07

## 2019-01-07 DIAGNOSIS — O30.032 MONOCHORIONIC DIAMNIOTIC TWIN GESTATION IN SECOND TRIMESTER: ICD-10-CM

## 2019-01-07 DIAGNOSIS — Z34.90 ENCOUNTER FOR SUPERVISION OF NORMAL PREGNANCY, UNSPECIFIED, UNSPECIFIED TRIMESTER: ICD-10-CM

## 2019-01-09 ENCOUNTER — OFFICE VISIT (OUTPATIENT)
Dept: OBGYN | Facility: CLINIC | Age: 34
End: 2019-01-09
Attending: OBSTETRICS & GYNECOLOGY
Payer: COMMERCIAL

## 2019-01-09 ENCOUNTER — HOSPITAL ENCOUNTER (OUTPATIENT)
Dept: CARDIOLOGY | Facility: CLINIC | Age: 34
End: 2019-01-09
Attending: OBSTETRICS & GYNECOLOGY
Payer: COMMERCIAL

## 2019-01-09 ENCOUNTER — HOSPITAL ENCOUNTER (OUTPATIENT)
Dept: CARDIOLOGY | Facility: CLINIC | Age: 34
Discharge: HOME | End: 2019-01-09
Attending: OBSTETRICS & GYNECOLOGY | Admitting: OBSTETRICS & GYNECOLOGY
Payer: COMMERCIAL

## 2019-01-09 VITALS
SYSTOLIC BLOOD PRESSURE: 132 MMHG | DIASTOLIC BLOOD PRESSURE: 77 MMHG | HEART RATE: 86 BPM | WEIGHT: 212.2 LBS | HEIGHT: 65 IN | BODY MASS INDEX: 35.35 KG/M2

## 2019-01-09 DIAGNOSIS — O09.90 SUPERVISION OF HIGH RISK PREGNANCY, ANTEPARTUM: ICD-10-CM

## 2019-01-09 DIAGNOSIS — O30.032 MONOCHORIONIC DIAMNIOTIC TWIN GESTATION IN SECOND TRIMESTER: ICD-10-CM

## 2019-01-09 DIAGNOSIS — O30.032 MONOCHORIONIC DIAMNIOTIC TWIN GESTATION IN SECOND TRIMESTER: Primary | ICD-10-CM

## 2019-01-09 PROBLEM — O30.039 MONOCHORIONIC DIAMNIOTIC TWIN GESTATION: Status: ACTIVE | Noted: 2018-10-01

## 2019-01-09 PROCEDURE — 76825 ECHO EXAM OF FETAL HEART: CPT

## 2019-01-09 PROCEDURE — G0463 HOSPITAL OUTPT CLINIC VISIT: HCPCS | Mod: ZF

## 2019-01-09 RX ORDER — ASPIRIN 81 MG/1
81 TABLET, CHEWABLE ORAL DAILY
Status: ON HOLD | COMMUNITY
End: 2019-04-21

## 2019-01-09 ASSESSMENT — MIFFLIN-ST. JEOR: SCORE: 1658.41

## 2019-01-09 NOTE — LETTER
Date:January 14, 2019      Patient was self referred, no letter generated. Do not send.        [facility] Information

## 2019-01-09 NOTE — LETTER
"2019       RE: Vale Contreras  [address] [address]     Dear Colleague,    Thank you for referring your patient, Vale Contreras, to the [facility] at [facility]. Please see a copy of my visit note below.    SUBJECTIVE   Patient feeling well overall. Complains of mild GERD, relieved by conservative measures such as Tums, elevating head while sleeping, eating meals earlier in the day. Also has mild back pain worse when lying down, relieved with activity. Pain is well controlled without intervention. Patient also noting that multiple coworkers are sick with gastroenteritis, worried about transmission. Denies ctx/LOF/vb    OBJECTIVE   /77 (BP Location: Left arm, Patient Position: Chair)   Pulse 86   Ht 1.651 m (5' 5\")   Wt 96.3 kg (212 lb 3.2 oz)   LMP 2018   BMI 35.31 kg/m       See Ob Flowsheet    ASSESSMENT & PLAN   33 year old  with mono-di twins at 22w5d by LMP c/w 8w4d US, MAIA.    1. PNC: routine ob labs reviewed   Rh pos, Rubella immune. Early GCT normal, repeat planned at 28w  2. Mono-di twins: s/p level II US, fetal echo today within normal limits and no evidence of TTTS. Will return to [facility] in 2 weeks for growth US.  3. Counseled on hand hygiene, symptom management and importance of hydration if vomiting/diarrhea develop. Advised to call clinic or come to triage with fever/chills, contractions, inability to tolerate PO, or other worrisome symptoms.    RTC in 4 weeks    Aleah Quiles MD  Ob/Gyn Resident, PGY-1  19 4:35 PM     Patient was seen by the resident in S Clinic.  I reviewed the history & exam.  The patient's assessment and plan were made jointly.    Kalani Kaye MD MPH    Again, thank you for allowing me to participate in the care of your patient.      Sincerely,    John Kaye MD      "

## 2019-01-09 NOTE — CONSULTS
[facility] Fetal Consult Note    Patient:  Vale Contreras MRN:  [id number]   YOB: 1985 Age:  33 year old   Date of Visit:  2019 PCP:  No primary care provider on file.     Dear Dr. Rogers:    I had the pleasure of seeing Vale Contreras at the [facility] on 2019 in fetal cardiology consultation today. She presented today accompanied by her . As you know, she is a 33 year old  at 22w5d who presented for fetal echocardiogram today because of mono-di twins.    I performed and interpreted the fetal echocardiograms on both fetuses.      Fetus 1: Normal fetal cardiac anatomy, ventricular size and systolic function as well as normal heart rate and rhythm.  Fetus 2: Normal fetal cardiac anatomy, ventricular size and systolic function as well as normal heart rate and rhythm.  Both fetuses had normal Tei indeces and no concerning findings for twin-twin transfusion syndrome at this time.    The  findings on today's exam were discussed. The routine limitations of fetal echocardiography were also reviewed, namely the inability to rule out small defects of the atrial or ventricular septum, coarctation of the aorta, minor valve abnormalities, or partial anomalous pulmonary venous return.     No additional fetal echocardiograms required, unless new concerns arise.    I appreciate the opportunity of participating in Vale Contreras 's care.    This visit was separate from the performance and interpretation of the ultrasound. The majority of the time (>50%) was spent in counseling and coordination of care. I spent 20 minutes in face-to-face time reviewing the above considerations.    Georgina Matthews MD  [facility]  Pediatric Cardiology  [address] [place] [address], 5th floor, [place] [address]  Phone # [phone]  Pager # [phone]  Fax [phone]

## 2019-01-09 NOTE — PROGRESS NOTES
"SUBJECTIVE   Patient feeling well overall. Complains of mild GERD, relieved by conservative measures such as Tums, elevating head while sleeping, eating meals earlier in the day. Also has mild back pain worse when lying down, relieved with activity. Pain is well controlled without intervention. Patient also noting that multiple coworkers are sick with gastroenteritis, worried about transmission. Denies ctx/LOF/vb    OBJECTIVE   /77 (BP Location: Left arm, Patient Position: Chair)   Pulse 86   Ht 1.651 m (5' 5\")   Wt 96.3 kg (212 lb 3.2 oz)   LMP 2018   BMI 35.31 kg/m      See Ob Flowsheet    ASSESSMENT & PLAN   33 year old  with mono-di twins at 22w5d by LMP c/w 8w4d US, MAIA.    1. PNC: routine ob labs reviewed   Rh pos, Rubella immune. Early GCT normal, repeat planned at 28w  2. Mono-di twins: s/p level II US, fetal echo today within normal limits and no evidence of TTTS. Will return to [facility] in 2 weeks for growth US.  3. Counseled on hand hygiene, symptom management and importance of hydration if vomiting/diarrhea develop. Advised to call clinic or come to triage with fever/chills, contractions, inability to tolerate PO, or other worrisome symptoms.    RTC in 4 weeks    Aleah Quiles MD  Ob/Gyn Resident, PGY-1  19 4:35 PM     Patient was seen by the resident in [facility].  I reviewed the history & exam.  The patient's assessment and plan were made jointly.    Kalani Kaye MD MPH  "

## 2019-01-23 ENCOUNTER — RECORDS - HEALTHEAST (OUTPATIENT)
Dept: ADMINISTRATIVE | Facility: OTHER | Age: 34
End: 2019-01-23

## 2019-01-23 ENCOUNTER — RECORDS - HEALTHEAST (OUTPATIENT)
Dept: ULTRASOUND IMAGING | Facility: HOSPITAL | Age: 34
End: 2019-01-23

## 2019-01-23 ENCOUNTER — OFFICE VISIT - HEALTHEAST (OUTPATIENT)
Dept: MATERNAL FETAL MEDICINE | Facility: HOSPITAL | Age: 34
End: 2019-01-23

## 2019-01-23 DIAGNOSIS — O30.032 TWIN PREGNANCY, MONOCHORIONIC/DIAMNIOTIC, SECOND TRIMESTER: ICD-10-CM

## 2019-01-23 DIAGNOSIS — O30.032 MONOCHORIONIC DIAMNIOTIC TWIN GESTATION IN SECOND TRIMESTER: ICD-10-CM

## 2019-02-06 ENCOUNTER — RECORDS - HEALTHEAST (OUTPATIENT)
Dept: ADMINISTRATIVE | Facility: OTHER | Age: 34
End: 2019-02-06

## 2019-02-06 ENCOUNTER — OFFICE VISIT - HEALTHEAST (OUTPATIENT)
Dept: MATERNAL FETAL MEDICINE | Facility: HOSPITAL | Age: 34
End: 2019-02-06

## 2019-02-06 ENCOUNTER — RECORDS - HEALTHEAST (OUTPATIENT)
Dept: ULTRASOUND IMAGING | Facility: HOSPITAL | Age: 34
End: 2019-02-06

## 2019-02-06 DIAGNOSIS — O30.033 MONOCHORIONIC DIAMNIOTIC TWIN GESTATION IN THIRD TRIMESTER: ICD-10-CM

## 2019-02-06 DIAGNOSIS — O30.032 TWIN PREGNANCY, MONOCHORIONIC/DIAMNIOTIC, SECOND TRIMESTER: ICD-10-CM

## 2019-02-12 ENCOUNTER — OFFICE VISIT (OUTPATIENT)
Dept: OBGYN | Facility: CLINIC | Age: 34
End: 2019-02-12
Payer: COMMERCIAL

## 2019-02-12 VITALS
HEIGHT: 65 IN | HEART RATE: 83 BPM | WEIGHT: 225.6 LBS | DIASTOLIC BLOOD PRESSURE: 75 MMHG | SYSTOLIC BLOOD PRESSURE: 112 MMHG | BODY MASS INDEX: 37.59 KG/M2

## 2019-02-12 DIAGNOSIS — Z23 NEED FOR VACCINATION: ICD-10-CM

## 2019-02-12 DIAGNOSIS — O09.93 SUPERVISION OF HIGH RISK PREGNANCY IN THIRD TRIMESTER: Primary | ICD-10-CM

## 2019-02-12 LAB
ERYTHROCYTE [DISTWIDTH] IN BLOOD BY AUTOMATED COUNT: 13.8 % (ref 10–15)
GLUCOSE 1H P 50 G GLC PO SERPL-MCNC: 121 MG/DL (ref 60–129)
HCT VFR BLD AUTO: 27 % (ref 35–47)
HGB BLD-MCNC: 9 G/DL (ref 11.7–15.7)
MCH RBC QN AUTO: 31.5 PG (ref 26.5–33)
MCHC RBC AUTO-ENTMCNC: 33.3 G/DL (ref 31.5–36.5)
MCV RBC AUTO: 94 FL (ref 78–100)
PLATELET # BLD AUTO: 319 10E9/L (ref 150–450)
RBC # BLD AUTO: 2.86 10E12/L (ref 3.8–5.2)
WBC # BLD AUTO: 12 10E9/L (ref 4–11)

## 2019-02-12 PROCEDURE — 25000128 H RX IP 250 OP 636: Mod: ZF

## 2019-02-12 PROCEDURE — 90715 TDAP VACCINE 7 YRS/> IM: CPT | Mod: ZF

## 2019-02-12 PROCEDURE — 36415 COLL VENOUS BLD VENIPUNCTURE: CPT | Performed by: OBSTETRICS & GYNECOLOGY

## 2019-02-12 PROCEDURE — 82950 GLUCOSE TEST: CPT | Performed by: OBSTETRICS & GYNECOLOGY

## 2019-02-12 PROCEDURE — 0064U ANTB TP TOTAL&RPR IA QUAL: CPT | Performed by: OBSTETRICS & GYNECOLOGY

## 2019-02-12 PROCEDURE — G0463 HOSPITAL OUTPT CLINIC VISIT: HCPCS | Mod: 25

## 2019-02-12 PROCEDURE — 85027 COMPLETE CBC AUTOMATED: CPT | Performed by: OBSTETRICS & GYNECOLOGY

## 2019-02-12 PROCEDURE — 90471 IMMUNIZATION ADMIN: CPT | Mod: ZF

## 2019-02-12 ASSESSMENT — MIFFLIN-ST. JEOR: SCORE: 1719.19

## 2019-02-12 NOTE — PATIENT INSTRUCTIONS
Continue US at [facility] clinic   Return to clinic in 2 weeks for OB visit   Go to the lab for blood work

## 2019-02-12 NOTE — PROGRESS NOTES
"[facility]  Return OB Visit    S: Vale notes that she is feeling well.  Denies vaginal bleeding, vaginal discharge, LOF, contractions.  Reports good fetal movement.  Feeling more discomforts of pregnancy recently including back pain and pedal edema.  She does note increasing GERD, but has been managing this with tums.  Denies need for additional treatment.     O: /75 (BP Location: Left arm, Patient Position: Chair)   Pulse 83   Ht 1.651 m (5' 5\")   Wt 102.3 kg (225 lb 9.6 oz)   LMP 2018   BMI 37.54 kg/m    Weight gain: 42 lb    Gen: Well-appearing, NAD  Abd:  Gravid, non-tender, soft   Ext:  Trace LE edema    Fundal Height:  34 cm   FHR: 145, 150 bpm    A/P:  Vlae Contreras is a 33 year old  at 27w4d by LMP c/w 8w4d US, here for EOB visit.    1. Mono/di twin pregnancy:     - Continue TTTS checks, Q 3-4 weeks growth US with MFM, next scheduled 2/15/19. Last US AGA, no TTTS concerns.     - S/p fetal ECHO x2, normal.     - Plan weekly BPPs at 32 weeks or sooner as indicated.     - Delivery by 37 weeks or earlier as clinically indicated.       2. EOB:                - Contraception: likely POPs, considering options                     - Baby boys, considering pediatricians, planning circumcision                     - Mode of delivery: depends on position of twins, reviewed higher risk of  section given twins, open to whatever delivery mode is safest.                    - Counseled regarding postpartum depression, PHQ-9 =5.                       - Offered tour of L&D, information given to patient.     3.  PNC:     - PNL UTD, Rh positive, Rubella immune. Third tri CBC, RPR, GCT ordered today. Okay to receive results on MyChart.     - Immunizations: S/p Flu, Tdap given today.     - Genetics: S/p NIPT, L2US.     - GERD: PRN tums, declined zantac rx today.     - Feed: Breast     - BC: Likely POPs     Return to clinic in 2 weeks.     Staffed with Dr. Joan Crook MD  Ob/Gyn, " PGY-4  2/12/2019, 3:12 PM    I agree with note as above. The patient was seen in continuity clinic by the resident doctor.  Assessment and plan were jointly made.  Rachel Franco MD

## 2019-02-13 LAB — T PALLIDUM AB SER QL: NONREACTIVE

## 2019-02-14 DIAGNOSIS — D64.9 ANEMIA, UNSPECIFIED TYPE: Primary | ICD-10-CM

## 2019-02-14 RX ORDER — FERROUS SULFATE 325(65) MG
325 TABLET ORAL
Qty: 90 TABLET | Refills: 1 | Status: SHIPPED | OUTPATIENT
Start: 2019-02-14 | End: 2019-06-18

## 2019-02-20 ENCOUNTER — RECORDS - HEALTHEAST (OUTPATIENT)
Dept: ULTRASOUND IMAGING | Facility: HOSPITAL | Age: 34
End: 2019-02-20

## 2019-02-20 ENCOUNTER — OFFICE VISIT - HEALTHEAST (OUTPATIENT)
Dept: MATERNAL FETAL MEDICINE | Facility: HOSPITAL | Age: 34
End: 2019-02-20

## 2019-02-20 ENCOUNTER — RECORDS - HEALTHEAST (OUTPATIENT)
Dept: ADMINISTRATIVE | Facility: OTHER | Age: 34
End: 2019-02-20

## 2019-02-20 DIAGNOSIS — O30.039 MONOCHORIONIC DIAMNIOTIC TWIN PREGNANCY, ANTEPARTUM: ICD-10-CM

## 2019-02-20 DIAGNOSIS — O30.033 TWIN PREGNANCY, MONOCHORIONIC/DIAMNIOTIC, THIRD TRIMESTER: ICD-10-CM

## 2019-02-26 ENCOUNTER — OFFICE VISIT (OUTPATIENT)
Dept: OBGYN | Facility: CLINIC | Age: 34
End: 2019-02-26
Payer: COMMERCIAL

## 2019-02-26 VITALS
HEART RATE: 96 BPM | WEIGHT: 230 LBS | BODY MASS INDEX: 38.27 KG/M2 | DIASTOLIC BLOOD PRESSURE: 79 MMHG | SYSTOLIC BLOOD PRESSURE: 135 MMHG

## 2019-02-26 DIAGNOSIS — O09.90 SUPERVISION OF HIGH RISK PREGNANCY, ANTEPARTUM: Primary | ICD-10-CM

## 2019-02-26 PROCEDURE — G0463 HOSPITAL OUTPT CLINIC VISIT: HCPCS | Mod: ZF

## 2019-02-26 ASSESSMENT — PAIN SCALES - GENERAL: PAINLEVEL: NO PAIN (0)

## 2019-02-26 NOTE — NURSING NOTE
Chief Complaint   Patient presents with     Prenatal Care     MAIA 29 weeks 4 days   Hanane Tellez LPN

## 2019-02-26 NOTE — PROGRESS NOTES
[facility]  Return OB Visit    Subjective:   Vale Contreras is a 33 year old  female with a mono/di twin gestation at 29w4d by LMP c/w 8w4d US who is here for return OB visit.  Her pregnancy is complicated by obesity.    She states that she has been doing well. She has noted worsening in her edema and has recently needed to wear her 's shoes to accomodates her feet. The edema is responsive to elevation. She also has noted intermittent heartburn for which she is using tums and changing the timing of meals. She still declines additional medications.  She has noted some cramping pain, but denies any regular painful contractions. Confirms active fetal movement. She denies any headache, changes in vision, nausea/vomiting, chest pain, shortness of breath, vaginal discharge, vaginal bleeding, leakage of fluid, or other systemic symptoms.     Objective:  /79   Pulse 96   Wt 104.3 kg (230 lb)   LMP 2018   Breastfeeding? No   BMI 38.27 kg/m    Gen: Well-appearing, NAD    Fundal Height:  36  FHR: 140s/150s    Asssessment/Plan:  Vale Contreras is a 33 year old  female with a mono/di twin gestation at 29w4d by LMP c/w 8w4d US who is here for return OB visit.  Her pregnancy is complicated by obesity.    # PNC:   - PNL UTD, Rh positive, Rubella immune. Third tri CBC, RPR, GCT within normal limits.  - Immunizations: S/p Flu, Tdap.  - Genetics: S/p NIPT, L2US.   - GERD: PRN tums, declined zantac rx today.   - Labor/Postpartum plans:            - Infants: males, unsure about which pediatrician, planning for circumcisions            - Mode of delivery: Dependent on presentation. S/p review of delivery options depending on presenation, open to whatever delivery mode is safest.             - Feed: Breast, wishes to pump right away.            - Contraception: Considering POPs    # Mono/di twin pregnancy:   - Continue TTTS checks, Q 3-4 weeks growth US with MFM, getting down at [place], last US  on 2/20/19 - with normal amnionitic fluid but with slight increase around twin B. Repeat US on 2/27/19 to further assess.  - S/p fetal ECHO x2, normal.   - Plan weekly BPPs at 32 weeks or sooner as indicated.   - Delivery by 37 weeks or earlier as clinically indicated.     Staffed with Dr. Arik Montano MD  Ob/Gyn, PGY-4  2/26/2019, 3:51 PM    S Staff Note:  Patient was seen by Dr. Montano in Continuity of Care Clinic.  I reviewed the history & exam.  The patient's assessment and plan were made jointly.    Viola Elise MD  2/26/19

## 2019-02-27 ENCOUNTER — RECORDS - HEALTHEAST (OUTPATIENT)
Dept: ULTRASOUND IMAGING | Facility: HOSPITAL | Age: 34
End: 2019-02-27

## 2019-02-27 ENCOUNTER — OFFICE VISIT - HEALTHEAST (OUTPATIENT)
Dept: MATERNAL FETAL MEDICINE | Facility: HOSPITAL | Age: 34
End: 2019-02-27

## 2019-02-27 ENCOUNTER — TRANSCRIBE ORDERS (OUTPATIENT)
Dept: MATERNAL FETAL MEDICINE | Facility: CLINIC | Age: 34
End: 2019-02-27

## 2019-02-27 ENCOUNTER — RECORDS - HEALTHEAST (OUTPATIENT)
Dept: ADMINISTRATIVE | Facility: OTHER | Age: 34
End: 2019-02-27

## 2019-02-27 ENCOUNTER — COMMUNICATION - HEALTHEAST (OUTPATIENT)
Dept: MATERNAL FETAL MEDICINE | Facility: HOSPITAL | Age: 34
End: 2019-02-27

## 2019-02-27 DIAGNOSIS — O30.039 MONOCHORIONIC DIAMNIOTIC TWIN PREGNANCY, ANTEPARTUM: ICD-10-CM

## 2019-02-27 DIAGNOSIS — O26.90 PREGNANCY RELATED CONDITION, ANTEPARTUM: Primary | ICD-10-CM

## 2019-02-27 DIAGNOSIS — O30.039 TWIN PREGNANCY, MONOCHORIONIC/DIAMNIOTIC, UNSPECIFIED TRIMESTER: ICD-10-CM

## 2019-03-08 ENCOUNTER — OFFICE VISIT - HEALTHEAST (OUTPATIENT)
Dept: MATERNAL FETAL MEDICINE | Facility: HOSPITAL | Age: 34
End: 2019-03-08

## 2019-03-08 ENCOUNTER — RECORDS - HEALTHEAST (OUTPATIENT)
Dept: ULTRASOUND IMAGING | Facility: HOSPITAL | Age: 34
End: 2019-03-08

## 2019-03-08 ENCOUNTER — RECORDS - HEALTHEAST (OUTPATIENT)
Dept: ADMINISTRATIVE | Facility: OTHER | Age: 34
End: 2019-03-08

## 2019-03-08 DIAGNOSIS — O30.033 TWIN PREGNANCY, MONOCHORIONIC/DIAMNIOTIC, THIRD TRIMESTER: ICD-10-CM

## 2019-03-08 DIAGNOSIS — O30.039 MONOCHORIONIC DIAMNIOTIC TWIN PREGNANCY, ANTEPARTUM: ICD-10-CM

## 2019-03-14 ENCOUNTER — OFFICE VISIT (OUTPATIENT)
Dept: OBGYN | Facility: CLINIC | Age: 34
End: 2019-03-14
Attending: STUDENT IN AN ORGANIZED HEALTH CARE EDUCATION/TRAINING PROGRAM
Payer: COMMERCIAL

## 2019-03-14 VITALS
WEIGHT: 240.5 LBS | BODY MASS INDEX: 40.07 KG/M2 | SYSTOLIC BLOOD PRESSURE: 133 MMHG | HEIGHT: 65 IN | HEART RATE: 103 BPM | DIASTOLIC BLOOD PRESSURE: 80 MMHG

## 2019-03-14 DIAGNOSIS — K21.9 GASTROESOPHAGEAL REFLUX DISEASE WITHOUT ESOPHAGITIS: ICD-10-CM

## 2019-03-14 DIAGNOSIS — O09.93 SUPERVISION OF HIGH RISK PREGNANCY IN THIRD TRIMESTER: Primary | ICD-10-CM

## 2019-03-14 ASSESSMENT — MIFFLIN-ST. JEOR: SCORE: 1786.78

## 2019-03-14 NOTE — LETTER
Date:March 22, 2019      Patient was self referred, no letter generated. Do not send.        [facility] Health Information

## 2019-03-14 NOTE — PROGRESS NOTES
"[facility]  Return OB Visit    Subjective:   Vale Contreras is a 33 year old  female with a mono/di twin gestation at 31w6d by LMP c/w 8w4d US who is here for return OB visit.  Her pregnancy is complicated by obesity.    She states that she has been doing well. Edema is worse. She is using compression stockings. 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    She has noted some cramping pain, but denies any regular painful contractions. Confirms active fetal movement. She denies any headache, changes in vision, nausea/vomiting, chest pain, vaginal bleeding, leakage of fluid, or other systemic symptoms.     Objective:  /80   Pulse 103   Ht 1.651 m (5' 5\")   Wt 109.1 kg (240 lb 8 oz)   LMP 2018   BMI 40.02 kg/m    Gen: Well-appearing, NAD    FHR: 130s/140s    Asssessment/Plan:  Vale Contreras is a 33 year old  female with a mono/di twin gestation at 31w6d by LMP c/w 8w4d US who is here for return OB visit.  Her pregnancy is complicated by obesity.    # PNC:   - PNL UTD, Rh positive, Rubella immune. Third tri CBC, RPR, GCT within normal limits.  - Immunizations: S/p Flu, Tdap.  - Genetics: S/p NIPT, L2US.   - GERD: PRN tums, zantac rx sent today   - Labor/Postpartum plans:            - Infants: males, unsure about which pediatrician, planning for circumcisions            - Mode of delivery: Dependent on presentation. S/p review of delivery options depending on presenation, open to whatever delivery mode is safest.             - Feed: Breast, wishes to pump right away.            - Contraception: Considering POPs    # Mono/di twin pregnancy:   - Continue TTTS checks, Q 3-4 weeks growth US with MFM, getting down at [place], last US on 3/8/19 - slight increase in fluid Repeat US on 3/15/19 to further assess.  - S/p fetal ECHO x2, normal.   - Plan weekly BPPs  - Delivery by 37 weeks or earlier as clinically indicated.     Staffed with " Dr. Gurmeet Rich MD  Ob/Gyn, PGY-2    The Patient was seen in Resident Continuity Clinic by JILLIAN RICH.  I reviewed the history & exam. Assessment and plan were jointly made.    Rachel Levi MD

## 2019-03-14 NOTE — LETTER
"3/14/2019       RE: Vale Contreras  [address] [address]     Dear Colleague,    Thank you for referring your patient, Vale Contreras, to the [facility] at [facility]. Please see a copy of my visit note below.    [facility]  Return OB Visit    Subjective:   Vale Contreras is a 33 year old  female with a mono/di twin gestation at 31w6d by LMP c/w 8w4d US who is here for return OB visit.  Her pregnancy is complicated by obesity.    She states that she has been doing well. Edema is worse. She is using compression stockings. 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    She has noted some cramping pain, but denies any regular painful contractions. Confirms active fetal movement. She denies any headache, changes in vision, nausea/vomiting, chest pain, vaginal bleeding, leakage of fluid, or other systemic symptoms.     Objective:  /80   Pulse 103   Ht 1.651 m (5' 5\")   Wt 109.1 kg (240 lb 8 oz)   LMP 2018   BMI 40.02 kg/m     Gen: Well-appearing, NAD    FHR: 130s/140s    Asssessment/Plan:  Vale Contreras is a 33 year old  female with a mono/di twin gestation at 31w6d by LMP c/w 8w4d US who is here for return OB visit.  Her pregnancy is complicated by obesity.    # PNC:   - PNL UTD, Rh positive, Rubella immune. Third tri CBC, RPR, GCT within normal limits.  - Immunizations: S/p Flu, Tdap.  - Genetics: S/p NIPT, L2US.   - GERD: PRN tums, zantac rx sent today   - Labor/Postpartum plans:            - Infants: males, unsure about which pediatrician, planning for circumcisions            - Mode of delivery: Dependent on presentation. S/p review of delivery options depending on presenation, open to whatever delivery mode is safest.             - Feed: Breast, wishes to pump right away.            - Contraception: Considering POPs    # Mono/di twin " "pregnancy:   - Continue TTTS checks, Q 3-4 weeks growth US with MFM, getting down at [place], last US on 3/8/19 - slight increase in fluid Repeat US on 3/15/19 to further assess.  - S/p fetal ECHO x2, normal.   - Plan weekly BPPs  - Delivery by 37 weeks or earlier as clinically indicated.     Staffed with Dr. Gurmeet Rich MD  Ob/Gyn, PGY-2      [facility]  Return OB Visit    Subjective:   Vale Contreras is a 33 year old  female with a mono/di twin gestation at 31w6d by LMP c/w 8w4d US who is here for return OB visit.  Her pregnancy is complicated by obesity.    She states that she has been doing well. Edema is worse. She is using compression stockings. 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    She has noted some cramping pain, but denies any regular painful contractions. Confirms active fetal movement. She denies any headache, changes in vision, nausea/vomiting, chest pain, vaginal bleeding, leakage of fluid, or other systemic symptoms.     Objective:  /80   Pulse 103   Ht 1.651 m (5' 5\")   Wt 109.1 kg (240 lb 8 oz)   LMP 2018   BMI 40.02 kg/m     Gen: Well-appearing, NAD    FHR: 130s/140s    Asssessment/Plan:  Vale Contreras is a 33 year old  female with a mono/di twin gestation at 31w6d by LMP c/w 8w4d US who is here for return OB visit.  Her pregnancy is complicated by obesity.    # PNC:   - PNL UTD, Rh positive, Rubella immune. Third tri CBC, RPR, GCT within normal limits.  - Immunizations: S/p Flu, Tdap.  - Genetics: S/p NIPT, L2US.   - GERD: PRN tums, zantac rx sent today   - Labor/Postpartum plans:            - Infants: males, unsure about which pediatrician, planning for circumcisions            - Mode of delivery: Dependent on presentation. S/p review of delivery options depending on presenation, open to whatever delivery mode is safest.             - Feed: Breast, wishes to pump right away.            " - Contraception: Considering POPs    # Mono/di twin pregnancy:   - Continue TTTS checks, Q 3-4 weeks growth US with MFM, getting down at [place], last US on 3/8/19 - slight increase in fluid Repeat US on 3/15/19 to further assess.  - S/p fetal ECHO x2, normal.   - Plan weekly BPPs  - Delivery by 37 weeks or earlier as clinically indicated.     Staffed with Dr. Gurmeet Rich MD  Ob/Gyn, PGY-2    The Patient was seen in Resident Continuity Clinic by LUCI RICH.  I reviewed the history & exam. Assessment and plan were jointly made.    Rachel Levi MD        Again, thank you for allowing me to participate in the care of your patient.      Sincerely,    Luci Rich MD

## 2019-03-15 ENCOUNTER — OFFICE VISIT (OUTPATIENT)
Dept: MATERNAL FETAL MEDICINE | Facility: CLINIC | Age: 34
End: 2019-03-15
Attending: OBSTETRICS & GYNECOLOGY
Payer: COMMERCIAL

## 2019-03-15 ENCOUNTER — HOSPITAL ENCOUNTER (OUTPATIENT)
Dept: ULTRASOUND IMAGING | Facility: CLINIC | Age: 34
Discharge: HOME | End: 2019-03-15
Attending: OBSTETRICS & GYNECOLOGY | Admitting: OBSTETRICS & GYNECOLOGY
Payer: COMMERCIAL

## 2019-03-15 DIAGNOSIS — O26.90 PREGNANCY RELATED CONDITION, ANTEPARTUM: ICD-10-CM

## 2019-03-15 DIAGNOSIS — O30.033 MONOCHORIONIC DIAMNIOTIC TWIN GESTATION IN THIRD TRIMESTER: Primary | ICD-10-CM

## 2019-03-15 PROCEDURE — 76816 OB US FOLLOW-UP PER FETUS: CPT | Mod: 59

## 2019-03-15 PROCEDURE — 76819 FETAL BIOPHYS PROFIL W/O NST: CPT | Performed by: OBSTETRICS & GYNECOLOGY

## 2019-03-15 NOTE — PROGRESS NOTES
"Please see \"Imaging\" tab under \"Chart Review\" for details of today's visit.    Audrey Xavier    "

## 2019-03-22 ENCOUNTER — RECORDS - HEALTHEAST (OUTPATIENT)
Dept: ULTRASOUND IMAGING | Facility: HOSPITAL | Age: 34
End: 2019-03-22

## 2019-03-22 ENCOUNTER — OFFICE VISIT - HEALTHEAST (OUTPATIENT)
Dept: MATERNAL FETAL MEDICINE | Facility: HOSPITAL | Age: 34
End: 2019-03-22

## 2019-03-22 ENCOUNTER — RECORDS - HEALTHEAST (OUTPATIENT)
Dept: ADMINISTRATIVE | Facility: OTHER | Age: 34
End: 2019-03-22

## 2019-03-22 DIAGNOSIS — O30.039 TWIN PREGNANCY, MONOCHORIONIC/DIAMNIOTIC, UNSPECIFIED TRIMESTER: ICD-10-CM

## 2019-03-22 DIAGNOSIS — O30.033 MONOCHORIONIC DIAMNIOTIC TWIN GESTATION IN THIRD TRIMESTER: ICD-10-CM

## 2019-03-28 ENCOUNTER — OFFICE VISIT (OUTPATIENT)
Dept: OBGYN | Facility: CLINIC | Age: 34
End: 2019-03-28
Payer: COMMERCIAL

## 2019-03-28 VITALS
SYSTOLIC BLOOD PRESSURE: 129 MMHG | HEART RATE: 103 BPM | DIASTOLIC BLOOD PRESSURE: 83 MMHG | WEIGHT: 243 LBS | HEIGHT: 65 IN | BODY MASS INDEX: 40.48 KG/M2

## 2019-03-28 DIAGNOSIS — O30.033 MONOCHORIONIC DIAMNIOTIC TWIN GESTATION IN THIRD TRIMESTER: Primary | ICD-10-CM

## 2019-03-28 PROCEDURE — 87653 STREP B DNA AMP PROBE: CPT | Performed by: OBSTETRICS & GYNECOLOGY

## 2019-03-28 PROCEDURE — G0463 HOSPITAL OUTPT CLINIC VISIT: HCPCS | Mod: ZF

## 2019-03-28 ASSESSMENT — MIFFLIN-ST. JEOR: SCORE: 1798.12

## 2019-03-28 NOTE — PROGRESS NOTES
"[facility]  Return OB Visit    Subjective:   Vale Contreras is a 33 year old  female with a mono/di twin gestation at 33w6d by LMP c/w 8w4d US who is here for return OB visit.  Her pregnancy is complicated by obesity.    Vale is doing really well today, notes that her heartburn is much improved with zantac and tums since last time. Her edema is still bad, she is trying to wear her compression socks, but doesn't like them at work. She tries to wear them all weekend and tries to keep her feet up.    She has noted minimalcramping pain, but denies any regular painful contractions. Confirms active fetal movement. She denies any headache, changes in vision, nausea/vomiting, chest pain, vaginal bleeding, leakage of fluid, or other systemic symptoms.     Objective:  /83 (BP Location: Right arm, Patient Position: Sitting, Cuff Size: Adult Large)   Pulse 103   Ht 1.651 m (5' 5\")   Wt 110.2 kg (243 lb)   LMP 2018   BMI 40.44 kg/m    Gen: Well-appearing, NAD    FHR: 131-137bpm (maternal LUQ) / 142-145bpm (maternal RLQ)    Asssessment/Plan:  Vale Contreras is a 33 year old  female with a mono/di twin gestation at 33w6d by LMP c/w 8w4d US who is here for return OB visit.  Her pregnancy is complicated by obesity.    # PNC:   - PNL UTD, Rh positive, Rubella immune. Third tri CBC, RPR, GCT within normal limits.  - Immunizations: S/p Flu, Tdap.  - Genetics: S/p NIPT, L2US.   - GERD: PRN tums, zantac rx sent today   - Labor/Postpartum plans:            - Infants: males, unsure about which pediatrician, planning for circumcisions            - Mode of delivery: Dependent on presentation. S/p review of delivery options depending on presenation, open to whatever delivery mode is safest.             - Feed: Breast, wishes to pump right away.            - Contraception: Considering POPs    # Mono/di twin pregnancy:   - Continue TTTS checks, Q 3-4 weeks growth US with MF, getting done at [place]" US on 3/2/19 - BPP x2 8/8, ASH, UA dopplers wnl x2, EFW with minimal twin discordance  - S/p fetal ECHO x2, normal.   - Plan weekly BPPs  - Delivery by 37 weeks or earlier as clinically indicated.     Staffed with Dr. Hall. Plan for MFM follow up weekly until delivery    Sabrina Camargo MD  Obstetrics and Gynecology, PGY-2  March 28, 2019 , 3:12 PM       The Patient was seen in Resident Continuity Clinic by SABRINA CAMARGO.  I reviewed the history & exam. Assessment and plan were jointly made.    Geeta Hall MD

## 2019-03-29 ENCOUNTER — RECORDS - HEALTHEAST (OUTPATIENT)
Dept: ADMINISTRATIVE | Facility: OTHER | Age: 34
End: 2019-03-29

## 2019-03-29 ENCOUNTER — OFFICE VISIT - HEALTHEAST (OUTPATIENT)
Dept: MATERNAL FETAL MEDICINE | Facility: HOSPITAL | Age: 34
End: 2019-03-29

## 2019-03-29 ENCOUNTER — RECORDS - HEALTHEAST (OUTPATIENT)
Dept: ULTRASOUND IMAGING | Facility: HOSPITAL | Age: 34
End: 2019-03-29

## 2019-03-29 DIAGNOSIS — O30.033 MONOCHORIONIC DIAMNIOTIC TWIN GESTATION IN THIRD TRIMESTER: ICD-10-CM

## 2019-03-29 DIAGNOSIS — O30.039 TWIN PREGNANCY, MONOCHORIONIC/DIAMNIOTIC, UNSPECIFIED TRIMESTER: ICD-10-CM

## 2019-03-29 LAB
GP B STREP DNA SPEC QL NAA+PROBE: NEGATIVE
SPECIMEN SOURCE: NORMAL

## 2019-04-03 ENCOUNTER — OFFICE VISIT (OUTPATIENT)
Dept: OBGYN | Facility: CLINIC | Age: 34
End: 2019-04-03
Attending: OBSTETRICS & GYNECOLOGY
Payer: COMMERCIAL

## 2019-04-03 VITALS
WEIGHT: 250.1 LBS | HEART RATE: 108 BPM | DIASTOLIC BLOOD PRESSURE: 83 MMHG | SYSTOLIC BLOOD PRESSURE: 128 MMHG | BODY MASS INDEX: 41.67 KG/M2 | HEIGHT: 65 IN

## 2019-04-03 DIAGNOSIS — O09.93 HIGH-RISK PREGNANCY IN THIRD TRIMESTER: Primary | ICD-10-CM

## 2019-04-03 DIAGNOSIS — O30.033 MONOCHORIONIC DIAMNIOTIC TWIN GESTATION IN THIRD TRIMESTER: ICD-10-CM

## 2019-04-03 PROCEDURE — G0463 HOSPITAL OUTPT CLINIC VISIT: HCPCS | Mod: ZF

## 2019-04-03 RX ORDER — LIDOCAINE 40 MG/G
CREAM TOPICAL
Status: CANCELLED | OUTPATIENT
Start: 2019-04-03

## 2019-04-03 RX ORDER — CEFAZOLIN SODIUM 1 G/3ML
1 INJECTION, POWDER, FOR SOLUTION INTRAMUSCULAR; INTRAVENOUS SEE ADMIN INSTRUCTIONS
Status: CANCELLED | OUTPATIENT
Start: 2019-04-03

## 2019-04-03 RX ORDER — SODIUM CHLORIDE, SODIUM LACTATE, POTASSIUM CHLORIDE, CALCIUM CHLORIDE 600; 310; 30; 20 MG/100ML; MG/100ML; MG/100ML; MG/100ML
INJECTION, SOLUTION INTRAVENOUS CONTINUOUS
Status: CANCELLED | OUTPATIENT
Start: 2019-04-03

## 2019-04-03 RX ORDER — CITRIC ACID/SODIUM CITRATE 334-500MG
30 SOLUTION, ORAL ORAL
Status: CANCELLED | OUTPATIENT
Start: 2019-04-03

## 2019-04-03 RX ORDER — CEFAZOLIN SODIUM 2 G/100ML
2 INJECTION, SOLUTION INTRAVENOUS
Status: CANCELLED | OUTPATIENT
Start: 2019-04-03

## 2019-04-03 ASSESSMENT — MIFFLIN-ST. JEOR: SCORE: 1830.33

## 2019-04-03 NOTE — LETTER
"4/3/2019       RE: Vale Contreras  [address] [address]     Dear Colleague,    Thank you for referring your patient, Vale Contreras, to the [facility] at [facility]. Please see a copy of my visit note below.    Doing well. Occasional cramping, low back pain. No leaking fluid, vaginal bleeding. Good movement x 2.     O: /83 (BP Location: Left arm, Patient Position: Chair)   Pulse 108   Ht 1.651 m (5' 5\")   Wt 113.4 kg (250 lb 1.6 oz)   LMP 2018   BMI 41.62 kg/m     Cvx: Closed, no presenting part    A/P: 33 year old  @34w5d    ctx: No cervical change, reviewed PTL precautions  Non cephalic twin A: Recommend scheduling primary CS, agrees- plan for 37wks  Continue weekly BPP  RTC 1 week    Carie Arevalo MD          Again, thank you for allowing me to participate in the care of your patient.      Sincerely,    Carie Arevalo MD      "

## 2019-04-03 NOTE — LETTER
Date:April 8, 2019      Provider requested that no letter be sent. Do not send.       [facility] Health Information

## 2019-04-04 ENCOUNTER — TELEPHONE (OUTPATIENT)
Dept: OBGYN | Facility: CLINIC | Age: 34
End: 2019-04-04

## 2019-04-04 NOTE — TELEPHONE ENCOUNTER
Confirmed c/section date, time and location with patient  4/19/19 with arrival time at 8:30a.m with nothing to eat eight hours before scheduled surgery time and clear liquids up to two hours before scheduled c/section time, informed patient that letter will be mailed out.     to complete the following fields:            CHECKLIST     Google Calendar : Yes     Resident notified:Not Applicable     Clinic schedule blocked:  Not Applicable    Patient notified:Yes      Pre op information sent: Yes     Given to patient over the phone.Yes    Comments:

## 2019-04-08 ENCOUNTER — OFFICE VISIT - HEALTHEAST (OUTPATIENT)
Dept: MATERNAL FETAL MEDICINE | Facility: HOSPITAL | Age: 34
End: 2019-04-08

## 2019-04-08 ENCOUNTER — RECORDS - HEALTHEAST (OUTPATIENT)
Dept: ULTRASOUND IMAGING | Facility: HOSPITAL | Age: 34
End: 2019-04-08

## 2019-04-08 ENCOUNTER — RECORDS - HEALTHEAST (OUTPATIENT)
Dept: ADMINISTRATIVE | Facility: OTHER | Age: 34
End: 2019-04-08

## 2019-04-08 DIAGNOSIS — O30.033 TWIN PREGNANCY, MONOCHORIONIC/DIAMNIOTIC, THIRD TRIMESTER: ICD-10-CM

## 2019-04-08 DIAGNOSIS — O30.033 MONOCHORIONIC DIAMNIOTIC TWIN GESTATION IN THIRD TRIMESTER: ICD-10-CM

## 2019-04-08 NOTE — PROGRESS NOTES
"Doing well. Occasional cramping, low back pain. No leaking fluid, vaginal bleeding. Good movement x 2.     O: /83 (BP Location: Left arm, Patient Position: Chair)   Pulse 108   Ht 1.651 m (5' 5\")   Wt 113.4 kg (250 lb 1.6 oz)   LMP 2018   BMI 41.62 kg/m    Cvx: Closed, no presenting part    A/P: 33 year old  @34w5d    ctx: No cervical change, reviewed PTL precautions  Non cephalic twin A: Recommend scheduling primary CS, agrees- plan for 37wks  Continue weekly BPP  RTC 1 week    Carie Arevalo MD        "

## 2019-04-11 ENCOUNTER — OFFICE VISIT (OUTPATIENT)
Dept: OBGYN | Facility: CLINIC | Age: 34
End: 2019-04-11
Attending: OBSTETRICS & GYNECOLOGY
Payer: COMMERCIAL

## 2019-04-11 VITALS
HEART RATE: 82 BPM | DIASTOLIC BLOOD PRESSURE: 84 MMHG | BODY MASS INDEX: 42.99 KG/M2 | WEIGHT: 258 LBS | HEIGHT: 65 IN | SYSTOLIC BLOOD PRESSURE: 124 MMHG

## 2019-04-11 DIAGNOSIS — K21.9 GASTROESOPHAGEAL REFLUX DISEASE WITHOUT ESOPHAGITIS: ICD-10-CM

## 2019-04-11 ASSESSMENT — PAIN SCALES - GENERAL: PAINLEVEL: NO PAIN (0)

## 2019-04-11 ASSESSMENT — MIFFLIN-ST. JEOR: SCORE: 1866.16

## 2019-04-11 NOTE — PROGRESS NOTES
preg complicated by:  Patient Active Problem List   Diagnosis     Monochorionic diamniotic twin gestation     BMI 30     Supervision of high risk pregnancy, antepartum -  DENISA WHITE. TWINS     Feels well. Ready for delivery next week via . Reviewed preop plans. Recommend take Zantac in am of surgery hold all other meds.   Geeta Hall

## 2019-04-11 NOTE — NURSING NOTE
Chief Complaint   Patient presents with     Prenatal Care     MAIA 35 weeks and 6 days   Hanane Tellez LPN

## 2019-04-11 NOTE — LETTER
2019       RE: Vale Contreras  [address] [address]     Dear Colleague,    Thank you for referring your patient, Vale Contreras, to the [facility] at [facility]. Please see a copy of my visit note below.    preg complicated by:  Patient Active Problem List   Diagnosis     Monochorionic diamniotic twin gestation     BMI 30     Supervision of high risk pregnancy, antepartum -  WHSHEA WHITE. TWINS     Feels well. Ready for delivery next week via . Reviewed preop plans. Recommend take Zantac in am of surgery hold all other meds.       Geeta Hall

## 2019-04-15 ENCOUNTER — RECORDS - HEALTHEAST (OUTPATIENT)
Dept: ULTRASOUND IMAGING | Facility: HOSPITAL | Age: 34
End: 2019-04-15

## 2019-04-15 ENCOUNTER — OFFICE VISIT - HEALTHEAST (OUTPATIENT)
Dept: MATERNAL FETAL MEDICINE | Facility: HOSPITAL | Age: 34
End: 2019-04-15

## 2019-04-15 ENCOUNTER — RECORDS - HEALTHEAST (OUTPATIENT)
Dept: ADMINISTRATIVE | Facility: OTHER | Age: 34
End: 2019-04-15

## 2019-04-15 DIAGNOSIS — O30.033 MONOCHORIONIC DIAMNIOTIC TWIN GESTATION IN THIRD TRIMESTER: ICD-10-CM

## 2019-04-15 DIAGNOSIS — O30.033 TWIN PREGNANCY, MONOCHORIONIC/DIAMNIOTIC, THIRD TRIMESTER: ICD-10-CM

## 2019-04-17 ENCOUNTER — RECORDS - HEALTHEAST (OUTPATIENT)
Dept: ULTRASOUND IMAGING | Facility: HOSPITAL | Age: 34
End: 2019-04-17

## 2019-04-17 ENCOUNTER — RECORDS - HEALTHEAST (OUTPATIENT)
Dept: ADMINISTRATIVE | Facility: OTHER | Age: 34
End: 2019-04-17

## 2019-04-17 ENCOUNTER — OFFICE VISIT - HEALTHEAST (OUTPATIENT)
Dept: MATERNAL FETAL MEDICINE | Facility: HOSPITAL | Age: 34
End: 2019-04-17

## 2019-04-17 DIAGNOSIS — O30.033 MONOCHORIONIC DIAMNIOTIC TWIN GESTATION IN THIRD TRIMESTER: ICD-10-CM

## 2019-04-17 DIAGNOSIS — O30.033 TWIN PREGNANCY, MONOCHORIONIC/DIAMNIOTIC, THIRD TRIMESTER: ICD-10-CM

## 2019-04-18 ENCOUNTER — OFFICE VISIT (OUTPATIENT)
Dept: OBGYN | Facility: CLINIC | Age: 34
End: 2019-04-18
Attending: OBSTETRICS & GYNECOLOGY
Payer: COMMERCIAL

## 2019-04-18 ENCOUNTER — ANESTHESIA EVENT (OUTPATIENT)
Dept: OBGYN | Facility: CLINIC | Age: 34
End: 2019-04-18
Payer: COMMERCIAL

## 2019-04-18 ENCOUNTER — TELEPHONE (OUTPATIENT)
Dept: OBGYN | Facility: CLINIC | Age: 34
End: 2019-04-18

## 2019-04-18 VITALS
SYSTOLIC BLOOD PRESSURE: 136 MMHG | HEIGHT: 65 IN | HEART RATE: 106 BPM | DIASTOLIC BLOOD PRESSURE: 85 MMHG | BODY MASS INDEX: 43.05 KG/M2 | WEIGHT: 258.4 LBS

## 2019-04-18 DIAGNOSIS — Z34.90 PRENATAL CARE: Primary | ICD-10-CM

## 2019-04-18 DIAGNOSIS — Z34.90 PRENATAL CARE: ICD-10-CM

## 2019-04-18 DIAGNOSIS — O09.93 HIGH-RISK PREGNANCY IN THIRD TRIMESTER: Primary | ICD-10-CM

## 2019-04-18 DIAGNOSIS — O30.033 MONOCHORIONIC DIAMNIOTIC TWIN GESTATION IN THIRD TRIMESTER: ICD-10-CM

## 2019-04-18 LAB
AMPHETAMINES UR QL SCN: NEGATIVE
CANNABINOIDS UR QL: NEGATIVE
COCAINE UR QL: NEGATIVE
ERYTHROCYTE [DISTWIDTH] IN BLOOD BY AUTOMATED COUNT: 15.2 % (ref 10–15)
HCT VFR BLD AUTO: 27.8 % (ref 35–47)
HGB BLD-MCNC: 9.3 G/DL (ref 11.7–15.7)
MCH RBC QN AUTO: 31.8 PG (ref 26.5–33)
MCHC RBC AUTO-ENTMCNC: 33.5 G/DL (ref 31.5–36.5)
MCV RBC AUTO: 95 FL (ref 78–100)
OPIATES UR QL SCN: NEGATIVE
PCP UR QL SCN: NEGATIVE
PLATELET # BLD AUTO: 245 10E9/L (ref 150–450)
RBC # BLD AUTO: 2.92 10E12/L (ref 3.8–5.2)
WBC # BLD AUTO: 8.7 10E9/L (ref 4–11)

## 2019-04-18 PROCEDURE — 86901 BLOOD TYPING SEROLOGIC RH(D): CPT | Performed by: OBSTETRICS & GYNECOLOGY

## 2019-04-18 PROCEDURE — 36415 COLL VENOUS BLD VENIPUNCTURE: CPT | Performed by: OBSTETRICS & GYNECOLOGY

## 2019-04-18 PROCEDURE — 86923 COMPATIBILITY TEST ELECTRIC: CPT | Performed by: OBSTETRICS & GYNECOLOGY

## 2019-04-18 PROCEDURE — 86900 BLOOD TYPING SEROLOGIC ABO: CPT | Performed by: OBSTETRICS & GYNECOLOGY

## 2019-04-18 PROCEDURE — G0463 HOSPITAL OUTPT CLINIC VISIT: HCPCS

## 2019-04-18 PROCEDURE — 86850 RBC ANTIBODY SCREEN: CPT | Performed by: OBSTETRICS & GYNECOLOGY

## 2019-04-18 PROCEDURE — 85027 COMPLETE CBC AUTOMATED: CPT | Performed by: OBSTETRICS & GYNECOLOGY

## 2019-04-18 PROCEDURE — 80307 DRUG TEST PRSMV CHEM ANLYZR: CPT | Performed by: OBSTETRICS & GYNECOLOGY

## 2019-04-18 ASSESSMENT — MIFFLIN-ST. JEOR: SCORE: 1867.97

## 2019-04-18 NOTE — LETTER
Date:April 22, 2019      Patient was self referred, no letter generated. Do not send.        [facility] Health Information

## 2019-04-18 NOTE — LETTER
"2019       RE: Vale Contreras  [address] [address]     Dear Colleague,    Thank you for referring your patient, Vale Contreras, to the [facility] at [facility]. Please see a copy of my visit note below.    Subjective:     Vale is a 33 year old female,  at 36w6d, with monochorionic diamniotic twin gestation, who presents for a routine prenatal appointment.  scheduled for tomorrow at 10:30 AM. Denies vaginal bleeding,  leakage of fluid, or change in vaginal discharge. Denies contractions. + fetal movement x 2. No HA, visual changes, RUQ or epigastric pain.     Patient concerns: No specific concerns. Feeling well overall. Ready for  tomorrow morning    Current Outpatient Medications   Medication     aspirin (ASA) 81 MG chewable tablet     ferrous sulfate (FEROSUL) 325 (65 Fe) MG tablet     Prenatal MV-Min-Fe Fum-FA-DHA (PRENATAL 1 PO)     ranitidine (ZANTAC) 150 MG tablet     VITAMIN D, CHOLECALCIFEROL, PO     No current facility-administered medications for this visit.        Objective:  Vitals:    19 1326   BP: 136/85   Pulse: 106   Weight: 117.2 kg (258 lb 6.4 oz)   Height: 1.651 m (5' 5\")   See OB flowsheet    Assessment/Plan  Vale is a 33 year old female,  at 36w6d, with monochorionic diamniotic twin gestation, who presents for a routine prenatal appointment.  scheduled for tomorrow at 10:30 AM.       - GBS screening: Previously obtained.  - Reviewed CS details for am, labs today  - Reviewed why/how to contact provider if headache/visual changes/RUQ or epigastric pain, decreased fetal movement, vaginal bleeding, leakage of fluid.  -  scheduled for tomorrow morning at 10:30 AM  - Discussed discontinuing all medications other than Ranitidine. Can take Ranitidine tomorrow morning prior to surgery.       Renee ATKINS, MS3, am acting as a scribe for Dr." Irina.     Renee Mathews, MS3    I, Dr. Arevalo, personally performed the services described in this documentation, as scribed by Luci Mathews in my presence, and it is both accurate and complete.   Carie Arevalo MD         Again, thank you for allowing me to participate in the care of your patient.      Sincerely,    Carie Arevalo MD

## 2019-04-18 NOTE — PROGRESS NOTES
"Subjective:     Vale is a 33 year old female,  at 36w6d, with monochorionic diamniotic twin gestation, who presents for a routine prenatal appointment.  scheduled for tomorrow at 10:30 AM. Denies vaginal bleeding,  leakage of fluid, or change in vaginal discharge. Denies contractions. + fetal movement x 2. No HA, visual changes, RUQ or epigastric pain.     Patient concerns: No specific concerns. Feeling well overall. Ready for  tomorrow morning    Current Outpatient Medications   Medication     aspirin (ASA) 81 MG chewable tablet     ferrous sulfate (FEROSUL) 325 (65 Fe) MG tablet     Prenatal MV-Min-Fe Fum-FA-DHA (PRENATAL 1 PO)     ranitidine (ZANTAC) 150 MG tablet     VITAMIN D, CHOLECALCIFEROL, PO     No current facility-administered medications for this visit.        Objective:  Vitals:    19 1326   BP: 136/85   Pulse: 106   Weight: 117.2 kg (258 lb 6.4 oz)   Height: 1.651 m (5' 5\")   See OB flowsheet    Assessment/Plan  Vale is a 33 year old female,  at 36w6d, with monochorionic diamniotic twin gestation, who presents for a routine prenatal appointment.  scheduled for tomorrow at 10:30 AM.       - GBS screening: Previously obtained.  - Reviewed CS details for am, labs today  - Reviewed why/how to contact provider if headache/visual changes/RUQ or epigastric pain, decreased fetal movement, vaginal bleeding, leakage of fluid.  -  scheduled for tomorrow morning at 10:30 AM  - Discussed discontinuing all medications other than Ranitidine. Can take Ranitidine tomorrow morning prior to surgery.       I, Renee Mathews, MS3, am acting as a scribe for Dr. Arevalo.     Renee Mathews MS3    IDr. Arevalo, personally performed the services described in this documentation, as scribed by Luci Mathews in my presence, and it is both accurate and complete.   Carie Arevalo MD       "

## 2019-04-19 ENCOUNTER — HOSPITAL ENCOUNTER (INPATIENT)
Facility: CLINIC | Age: 34
LOS: 3 days | Discharge: HOME HEALTH | End: 2019-04-22
Attending: OBSTETRICS & GYNECOLOGY | Admitting: OBSTETRICS & GYNECOLOGY
Payer: COMMERCIAL

## 2019-04-19 ENCOUNTER — ANESTHESIA (OUTPATIENT)
Dept: OBGYN | Facility: CLINIC | Age: 34
End: 2019-04-19
Payer: COMMERCIAL

## 2019-04-19 LAB
ABO + RH BLD: NORMAL
ABO + RH BLD: NORMAL
BLD GP AB SCN SERPL QL: NORMAL
BLD PROD TYP BPU: NORMAL
BLOOD BANK CMNT PATIENT-IMP: NORMAL
NUM BPU REQUESTED: 2
SPECIMEN EXP DATE BLD: NORMAL

## 2019-04-19 PROCEDURE — 88307 TISSUE EXAM BY PATHOLOGIST: CPT | Performed by: STUDENT IN AN ORGANIZED HEALTH CARE EDUCATION/TRAINING PROGRAM

## 2019-04-19 PROCEDURE — 25000128 H RX IP 250 OP 636: Performed by: ANESTHESIOLOGY

## 2019-04-19 PROCEDURE — C1765 ADHESION BARRIER: HCPCS | Performed by: OBSTETRICS & GYNECOLOGY

## 2019-04-19 PROCEDURE — 71000015 ZZH RECOVERY PHASE 1 LEVEL 2 EA ADDTL HR: Performed by: OBSTETRICS & GYNECOLOGY

## 2019-04-19 PROCEDURE — 12000001 ZZH R&B MED SURG/OB UMMC

## 2019-04-19 PROCEDURE — 25000128 H RX IP 250 OP 636: Performed by: STUDENT IN AN ORGANIZED HEALTH CARE EDUCATION/TRAINING PROGRAM

## 2019-04-19 PROCEDURE — 40000170 ZZH STATISTIC PRE-PROCEDURE ASSESSMENT II: Performed by: OBSTETRICS & GYNECOLOGY

## 2019-04-19 PROCEDURE — 36415 COLL VENOUS BLD VENIPUNCTURE: CPT | Performed by: STUDENT IN AN ORGANIZED HEALTH CARE EDUCATION/TRAINING PROGRAM

## 2019-04-19 PROCEDURE — 84460 ALANINE AMINO (ALT) (SGPT): CPT | Performed by: STUDENT IN AN ORGANIZED HEALTH CARE EDUCATION/TRAINING PROGRAM

## 2019-04-19 PROCEDURE — 71000014 ZZH RECOVERY PHASE 1 LEVEL 2 FIRST HR: Performed by: OBSTETRICS & GYNECOLOGY

## 2019-04-19 PROCEDURE — C9290 INJ, BUPIVACAINE LIPOSOME: HCPCS | Performed by: ANESTHESIOLOGY

## 2019-04-19 PROCEDURE — 82565 ASSAY OF CREATININE: CPT | Performed by: STUDENT IN AN ORGANIZED HEALTH CARE EDUCATION/TRAINING PROGRAM

## 2019-04-19 PROCEDURE — 37000009 ZZH ANESTHESIA TECHNICAL FEE, EACH ADDTL 15 MIN: Performed by: OBSTETRICS & GYNECOLOGY

## 2019-04-19 PROCEDURE — 37000008 ZZH ANESTHESIA TECHNICAL FEE, 1ST 30 MIN: Performed by: OBSTETRICS & GYNECOLOGY

## 2019-04-19 PROCEDURE — 25000125 ZZHC RX 250: Performed by: STUDENT IN AN ORGANIZED HEALTH CARE EDUCATION/TRAINING PROGRAM

## 2019-04-19 PROCEDURE — 85027 COMPLETE CBC AUTOMATED: CPT | Performed by: STUDENT IN AN ORGANIZED HEALTH CARE EDUCATION/TRAINING PROGRAM

## 2019-04-19 PROCEDURE — 36000059 ZZH SURGERY LEVEL 3 EA 15 ADDTL MIN UMMC: Performed by: OBSTETRICS & GYNECOLOGY

## 2019-04-19 PROCEDURE — 25800030 ZZH RX IP 258 OP 636: Performed by: STUDENT IN AN ORGANIZED HEALTH CARE EDUCATION/TRAINING PROGRAM

## 2019-04-19 PROCEDURE — 84450 TRANSFERASE (AST) (SGOT): CPT | Performed by: STUDENT IN AN ORGANIZED HEALTH CARE EDUCATION/TRAINING PROGRAM

## 2019-04-19 PROCEDURE — 36000057 ZZH SURGERY LEVEL 3 1ST 30 MIN - UMMC: Performed by: OBSTETRICS & GYNECOLOGY

## 2019-04-19 PROCEDURE — 27210794 ZZH OR GENERAL SUPPLY STERILE: Performed by: OBSTETRICS & GYNECOLOGY

## 2019-04-19 PROCEDURE — 88307 TISSUE EXAM BY PATHOLOGIST: CPT | Mod: 26,76 | Performed by: STUDENT IN AN ORGANIZED HEALTH CARE EDUCATION/TRAINING PROGRAM

## 2019-04-19 PROCEDURE — 25000125 ZZHC RX 250

## 2019-04-19 PROCEDURE — 25000132 ZZH RX MED GY IP 250 OP 250 PS 637: Performed by: STUDENT IN AN ORGANIZED HEALTH CARE EDUCATION/TRAINING PROGRAM

## 2019-04-19 RX ORDER — BISACODYL 10 MG
10 SUPPOSITORY, RECTAL RECTAL DAILY PRN
Status: DISCONTINUED | OUTPATIENT
Start: 2019-04-21 | End: 2019-04-22 | Stop reason: HOSPADM

## 2019-04-19 RX ORDER — ONDANSETRON 2 MG/ML
INJECTION INTRAMUSCULAR; INTRAVENOUS PRN
Status: DISCONTINUED | OUTPATIENT
Start: 2019-04-19 | End: 2019-04-19

## 2019-04-19 RX ORDER — OXYTOCIN/0.9 % SODIUM CHLORIDE 30/500 ML
340 PLASTIC BAG, INJECTION (ML) INTRAVENOUS CONTINUOUS PRN
Status: DISCONTINUED | OUTPATIENT
Start: 2019-04-19 | End: 2019-04-22 | Stop reason: HOSPADM

## 2019-04-19 RX ORDER — CITRIC ACID/SODIUM CITRATE 334-500MG
30 SOLUTION, ORAL ORAL
Status: DISCONTINUED | OUTPATIENT
Start: 2019-04-19 | End: 2019-04-19 | Stop reason: HOSPADM

## 2019-04-19 RX ORDER — CLINDAMYCIN PHOSPHATE 900 MG/50ML
INJECTION, SOLUTION INTRAVENOUS
Status: COMPLETED
Start: 2019-04-19 | End: 2019-04-19

## 2019-04-19 RX ORDER — KETOROLAC TROMETHAMINE 30 MG/ML
30 INJECTION, SOLUTION INTRAMUSCULAR; INTRAVENOUS EVERY 6 HOURS
Status: DISPENSED | OUTPATIENT
Start: 2019-04-19 | End: 2019-04-20

## 2019-04-19 RX ORDER — LIDOCAINE 40 MG/G
CREAM TOPICAL
Status: DISCONTINUED | OUTPATIENT
Start: 2019-04-19 | End: 2019-04-19 | Stop reason: HOSPADM

## 2019-04-19 RX ORDER — OXYTOCIN 10 [USP'U]/ML
10 INJECTION, SOLUTION INTRAMUSCULAR; INTRAVENOUS
Status: DISCONTINUED | OUTPATIENT
Start: 2019-04-19 | End: 2019-04-22 | Stop reason: HOSPADM

## 2019-04-19 RX ORDER — HYDROCORTISONE 2.5 %
CREAM (GRAM) TOPICAL 3 TIMES DAILY PRN
Status: DISCONTINUED | OUTPATIENT
Start: 2019-04-19 | End: 2019-04-22 | Stop reason: HOSPADM

## 2019-04-19 RX ORDER — OXYTOCIN/0.9 % SODIUM CHLORIDE 30/500 ML
100 PLASTIC BAG, INJECTION (ML) INTRAVENOUS CONTINUOUS
Status: DISCONTINUED | OUTPATIENT
Start: 2019-04-19 | End: 2019-04-22 | Stop reason: HOSPADM

## 2019-04-19 RX ORDER — DIPHENHYDRAMINE HCL 25 MG
25 CAPSULE ORAL EVERY 6 HOURS PRN
Status: DISCONTINUED | OUTPATIENT
Start: 2019-04-19 | End: 2019-04-22 | Stop reason: HOSPADM

## 2019-04-19 RX ORDER — FENTANYL CITRATE 50 UG/ML
INJECTION, SOLUTION INTRAMUSCULAR; INTRAVENOUS PRN
Status: DISCONTINUED | OUTPATIENT
Start: 2019-04-19 | End: 2019-04-19

## 2019-04-19 RX ORDER — SODIUM CHLORIDE, SODIUM LACTATE, POTASSIUM CHLORIDE, CALCIUM CHLORIDE 600; 310; 30; 20 MG/100ML; MG/100ML; MG/100ML; MG/100ML
INJECTION, SOLUTION INTRAVENOUS CONTINUOUS PRN
Status: DISCONTINUED | OUTPATIENT
Start: 2019-04-19 | End: 2019-04-19

## 2019-04-19 RX ORDER — MORPHINE SULFATE 1 MG/ML
150 INJECTION, SOLUTION EPIDURAL; INTRATHECAL; INTRAVENOUS ONCE
Status: DISCONTINUED | OUTPATIENT
Start: 2019-04-19 | End: 2019-04-22 | Stop reason: HOSPADM

## 2019-04-19 RX ORDER — NALOXONE HYDROCHLORIDE 0.4 MG/ML
.1-.4 INJECTION, SOLUTION INTRAMUSCULAR; INTRAVENOUS; SUBCUTANEOUS
Status: DISCONTINUED | OUTPATIENT
Start: 2019-04-19 | End: 2019-04-22 | Stop reason: HOSPADM

## 2019-04-19 RX ORDER — ONDANSETRON 2 MG/ML
4 INJECTION INTRAMUSCULAR; INTRAVENOUS EVERY 6 HOURS PRN
Status: DISCONTINUED | OUTPATIENT
Start: 2019-04-19 | End: 2019-04-22 | Stop reason: HOSPADM

## 2019-04-19 RX ORDER — BUPIVACAINE HYDROCHLORIDE 2.5 MG/ML
INJECTION, SOLUTION EPIDURAL; INFILTRATION; INTRACAUDAL PRN
Status: DISCONTINUED | OUTPATIENT
Start: 2019-04-19 | End: 2019-04-19

## 2019-04-19 RX ORDER — METHYLERGONOVINE MALEATE 0.2 MG/ML
200 INJECTION INTRAVENOUS
Status: DISCONTINUED | OUTPATIENT
Start: 2019-04-19 | End: 2019-04-22 | Stop reason: HOSPADM

## 2019-04-19 RX ORDER — SIMETHICONE 80 MG
80 TABLET,CHEWABLE ORAL 3 TIMES DAILY
Status: DISCONTINUED | OUTPATIENT
Start: 2019-04-19 | End: 2019-04-22 | Stop reason: HOSPADM

## 2019-04-19 RX ORDER — LANOLIN 100 %
OINTMENT (GRAM) TOPICAL
Status: DISCONTINUED | OUTPATIENT
Start: 2019-04-19 | End: 2019-04-22 | Stop reason: HOSPADM

## 2019-04-19 RX ORDER — CITRIC ACID/SODIUM CITRATE 334-500MG
SOLUTION, ORAL ORAL
Status: DISCONTINUED
Start: 2019-04-19 | End: 2019-04-19 | Stop reason: HOSPADM

## 2019-04-19 RX ORDER — OXYTOCIN/0.9 % SODIUM CHLORIDE 30/500 ML
PLASTIC BAG, INJECTION (ML) INTRAVENOUS
Status: DISCONTINUED
Start: 2019-04-19 | End: 2019-04-19 | Stop reason: HOSPADM

## 2019-04-19 RX ORDER — SODIUM CHLORIDE, SODIUM LACTATE, POTASSIUM CHLORIDE, CALCIUM CHLORIDE 600; 310; 30; 20 MG/100ML; MG/100ML; MG/100ML; MG/100ML
INJECTION, SOLUTION INTRAVENOUS CONTINUOUS
Status: DISCONTINUED | OUTPATIENT
Start: 2019-04-19 | End: 2019-04-19 | Stop reason: HOSPADM

## 2019-04-19 RX ORDER — ACETAMINOPHEN 325 MG/1
650 TABLET ORAL EVERY 4 HOURS PRN
Status: DISCONTINUED | OUTPATIENT
Start: 2019-04-22 | End: 2019-04-22 | Stop reason: HOSPADM

## 2019-04-19 RX ORDER — OXYCODONE HYDROCHLORIDE 5 MG/1
5-10 TABLET ORAL
Status: DISCONTINUED | OUTPATIENT
Start: 2019-04-19 | End: 2019-04-22 | Stop reason: HOSPADM

## 2019-04-19 RX ORDER — OXYTOCIN/0.9 % SODIUM CHLORIDE 30/500 ML
PLASTIC BAG, INJECTION (ML) INTRAVENOUS CONTINUOUS PRN
Status: DISCONTINUED | OUTPATIENT
Start: 2019-04-19 | End: 2019-04-19

## 2019-04-19 RX ORDER — CEFAZOLIN SODIUM 1 G/3ML
1 INJECTION, POWDER, FOR SOLUTION INTRAMUSCULAR; INTRAVENOUS SEE ADMIN INSTRUCTIONS
Status: DISCONTINUED | OUTPATIENT
Start: 2019-04-19 | End: 2019-04-19 | Stop reason: HOSPADM

## 2019-04-19 RX ORDER — DEXTROSE, SODIUM CHLORIDE, SODIUM LACTATE, POTASSIUM CHLORIDE, AND CALCIUM CHLORIDE 5; .6; .31; .03; .02 G/100ML; G/100ML; G/100ML; G/100ML; G/100ML
INJECTION, SOLUTION INTRAVENOUS CONTINUOUS
Status: DISCONTINUED | OUTPATIENT
Start: 2019-04-19 | End: 2019-04-22 | Stop reason: HOSPADM

## 2019-04-19 RX ORDER — AMOXICILLIN 250 MG
2 CAPSULE ORAL 2 TIMES DAILY
Status: DISCONTINUED | OUTPATIENT
Start: 2019-04-19 | End: 2019-04-22 | Stop reason: HOSPADM

## 2019-04-19 RX ORDER — MISOPROSTOL 200 UG/1
400 TABLET ORAL
Status: DISCONTINUED | OUTPATIENT
Start: 2019-04-19 | End: 2019-04-22 | Stop reason: HOSPADM

## 2019-04-19 RX ORDER — EPHEDRINE SULFATE 50 MG/ML
5 INJECTION, SOLUTION INTRAMUSCULAR; INTRAVENOUS; SUBCUTANEOUS
Status: DISCONTINUED | OUTPATIENT
Start: 2019-04-19 | End: 2019-04-22 | Stop reason: HOSPADM

## 2019-04-19 RX ORDER — MISOPROSTOL 100 UG/1
TABLET ORAL PRN
Status: DISCONTINUED | OUTPATIENT
Start: 2019-04-19 | End: 2019-04-19

## 2019-04-19 RX ORDER — METHYLERGONOVINE MALEATE 0.2 MG/ML
INJECTION INTRAVENOUS PRN
Status: DISCONTINUED | OUTPATIENT
Start: 2019-04-19 | End: 2019-04-19

## 2019-04-19 RX ORDER — LIDOCAINE 40 MG/G
CREAM TOPICAL
Status: DISCONTINUED | OUTPATIENT
Start: 2019-04-19 | End: 2019-04-22 | Stop reason: HOSPADM

## 2019-04-19 RX ORDER — PROCHLORPERAZINE 25 MG
25 SUPPOSITORY, RECTAL RECTAL EVERY 12 HOURS PRN
Status: DISCONTINUED | OUTPATIENT
Start: 2019-04-19 | End: 2019-04-22 | Stop reason: HOSPADM

## 2019-04-19 RX ORDER — CARBOPROST TROMETHAMINE 250 UG/ML
250 INJECTION, SOLUTION INTRAMUSCULAR
Status: DISCONTINUED | OUTPATIENT
Start: 2019-04-19 | End: 2019-04-22 | Stop reason: HOSPADM

## 2019-04-19 RX ORDER — KETOROLAC TROMETHAMINE 30 MG/ML
INJECTION, SOLUTION INTRAMUSCULAR; INTRAVENOUS PRN
Status: DISCONTINUED | OUTPATIENT
Start: 2019-04-19 | End: 2019-04-19

## 2019-04-19 RX ORDER — SODIUM CHLORIDE 9 MG/ML
INJECTION, SOLUTION INTRAVENOUS CONTINUOUS PRN
Status: DISCONTINUED | OUTPATIENT
Start: 2019-04-19 | End: 2019-04-19

## 2019-04-19 RX ORDER — NALBUPHINE HYDROCHLORIDE 10 MG/ML
2.5-5 INJECTION, SOLUTION INTRAMUSCULAR; INTRAVENOUS; SUBCUTANEOUS EVERY 6 HOURS PRN
Status: DISCONTINUED | OUTPATIENT
Start: 2019-04-19 | End: 2019-04-22 | Stop reason: HOSPADM

## 2019-04-19 RX ORDER — METHYLERGONOVINE MALEATE 0.2 MG/ML
INJECTION INTRAVENOUS
Status: DISCONTINUED
Start: 2019-04-19 | End: 2019-04-19 | Stop reason: HOSPADM

## 2019-04-19 RX ORDER — ACETAMINOPHEN 325 MG/1
975 TABLET ORAL EVERY 8 HOURS
Status: DISCONTINUED | OUTPATIENT
Start: 2019-04-19 | End: 2019-04-22 | Stop reason: HOSPADM

## 2019-04-19 RX ORDER — AMOXICILLIN 250 MG
1 CAPSULE ORAL 2 TIMES DAILY
Status: DISCONTINUED | OUTPATIENT
Start: 2019-04-19 | End: 2019-04-22 | Stop reason: HOSPADM

## 2019-04-19 RX ORDER — CEFAZOLIN SODIUM 2 G/100ML
2 INJECTION, SOLUTION INTRAVENOUS
Status: DISCONTINUED | OUTPATIENT
Start: 2019-04-19 | End: 2019-04-19 | Stop reason: HOSPADM

## 2019-04-19 RX ORDER — MORPHINE SULFATE 1 MG/ML
INJECTION, SOLUTION EPIDURAL; INTRATHECAL; INTRAVENOUS PRN
Status: DISCONTINUED | OUTPATIENT
Start: 2019-04-19 | End: 2019-04-19

## 2019-04-19 RX ORDER — CLINDAMYCIN PHOSPHATE 900 MG/50ML
900 INJECTION, SOLUTION INTRAVENOUS ONCE
Status: COMPLETED | OUTPATIENT
Start: 2019-04-19 | End: 2019-04-19

## 2019-04-19 RX ORDER — OXYTOCIN/0.9 % SODIUM CHLORIDE 30/500 ML
PLASTIC BAG, INJECTION (ML) INTRAVENOUS
Status: COMPLETED
Start: 2019-04-19 | End: 2019-04-19

## 2019-04-19 RX ORDER — METOCLOPRAMIDE HYDROCHLORIDE 5 MG/ML
10 INJECTION INTRAMUSCULAR; INTRAVENOUS EVERY 6 HOURS PRN
Status: DISCONTINUED | OUTPATIENT
Start: 2019-04-19 | End: 2019-04-22 | Stop reason: HOSPADM

## 2019-04-19 RX ORDER — CARBOPROST TROMETHAMINE 250 UG/ML
INJECTION, SOLUTION INTRAMUSCULAR
Status: DISCONTINUED
Start: 2019-04-19 | End: 2019-04-19 | Stop reason: HOSPADM

## 2019-04-19 RX ORDER — DIPHENHYDRAMINE HYDROCHLORIDE 50 MG/ML
25 INJECTION INTRAMUSCULAR; INTRAVENOUS EVERY 6 HOURS PRN
Status: DISCONTINUED | OUTPATIENT
Start: 2019-04-19 | End: 2019-04-22 | Stop reason: HOSPADM

## 2019-04-19 RX ORDER — BUPIVACAINE HYDROCHLORIDE 7.5 MG/ML
INJECTION, SOLUTION INTRASPINAL PRN
Status: DISCONTINUED | OUTPATIENT
Start: 2019-04-19 | End: 2019-04-19

## 2019-04-19 RX ORDER — CARBOPROST TROMETHAMINE 250 UG/ML
INJECTION, SOLUTION INTRAMUSCULAR PRN
Status: DISCONTINUED | OUTPATIENT
Start: 2019-04-19 | End: 2019-04-19

## 2019-04-19 RX ADMIN — MISOPROSTOL 400 MCG: 100 TABLET ORAL at 16:55

## 2019-04-19 RX ADMIN — OXYTOCIN-SODIUM CHLORIDE 0.9% IV SOLN 30 UNIT/500ML 30 UNITS: 30-0.9/5 SOLUTION at 19:19

## 2019-04-19 RX ADMIN — METHYLERGONOVINE MALEATE 200 MCG: 0.2 INJECTION INTRAMUSCULAR; INTRAVENOUS at 16:55

## 2019-04-19 RX ADMIN — CLINDAMYCIN PHOSPHATE 900 MG: 18 INJECTION, SOLUTION INTRAVENOUS at 16:39

## 2019-04-19 RX ADMIN — SODIUM CHLORIDE, POTASSIUM CHLORIDE, SODIUM LACTATE AND CALCIUM CHLORIDE: 600; 310; 30; 20 INJECTION, SOLUTION INTRAVENOUS at 16:05

## 2019-04-19 RX ADMIN — PHENYLEPHRINE HYDROCHLORIDE 200 MCG: 10 INJECTION, SOLUTION INTRAMUSCULAR; INTRAVENOUS; SUBCUTANEOUS at 16:21

## 2019-04-19 RX ADMIN — GENTAMICIN SULFATE 400 MG: 40 INJECTION, SOLUTION INTRAMUSCULAR; INTRAVENOUS at 16:50

## 2019-04-19 RX ADMIN — ONDANSETRON 4 MG: 2 INJECTION INTRAMUSCULAR; INTRAVENOUS at 16:49

## 2019-04-19 RX ADMIN — FENTANYL CITRATE 10 MCG: 50 INJECTION, SOLUTION INTRAMUSCULAR; INTRAVENOUS at 16:23

## 2019-04-19 RX ADMIN — KETOROLAC TROMETHAMINE 30 MG: 30 INJECTION, SOLUTION INTRAMUSCULAR at 17:16

## 2019-04-19 RX ADMIN — CARBOPROST TROMETHAMINE 250 MCG: 250 INJECTION, SOLUTION INTRAMUSCULAR at 16:55

## 2019-04-19 RX ADMIN — SODIUM CHLORIDE, SODIUM LACTATE, POTASSIUM CHLORIDE, CALCIUM CHLORIDE, AND DEXTROSE MONOHYDRATE: 600; 310; 30; 20; 5 INJECTION, SOLUTION INTRAVENOUS at 23:51

## 2019-04-19 RX ADMIN — BUPIVACAINE HYDROCHLORIDE 20 ML: 2.5 INJECTION, SOLUTION EPIDURAL; INFILTRATION; INTRACAUDAL at 17:44

## 2019-04-19 RX ADMIN — BUPIVACAINE HYDROCHLORIDE IN DEXTROSE 1.6 ML: 7.5 INJECTION, SOLUTION SUBARACHNOID at 16:20

## 2019-04-19 RX ADMIN — PHENYLEPHRINE HYDROCHLORIDE 200 MCG: 10 INJECTION, SOLUTION INTRAMUSCULAR; INTRAVENOUS; SUBCUTANEOUS at 16:34

## 2019-04-19 RX ADMIN — ONDANSETRON 4 MG: 2 INJECTION INTRAMUSCULAR; INTRAVENOUS at 17:02

## 2019-04-19 RX ADMIN — ONDANSETRON 4 MG: 2 INJECTION INTRAMUSCULAR; INTRAVENOUS at 22:45

## 2019-04-19 RX ADMIN — MORPHINE SULFATE 0.15 MG: 1 INJECTION, SOLUTION EPIDURAL; INTRATHECAL; INTRAVENOUS at 16:20

## 2019-04-19 RX ADMIN — SODIUM CHLORIDE, POTASSIUM CHLORIDE, SODIUM LACTATE AND CALCIUM CHLORIDE 1000 ML: 600; 310; 30; 20 INJECTION, SOLUTION INTRAVENOUS at 10:47

## 2019-04-19 RX ADMIN — OXYTOCIN-SODIUM CHLORIDE 0.9% IV SOLN 30 UNIT/500ML 600 ML/HR: 30-0.9/5 SOLUTION at 16:48

## 2019-04-19 RX ADMIN — PHENYLEPHRINE HYDROCHLORIDE 0.5 MCG/KG/MIN: 10 INJECTION, SOLUTION INTRAMUSCULAR; INTRAVENOUS; SUBCUTANEOUS at 16:18

## 2019-04-19 RX ADMIN — SODIUM CHLORIDE: 9 INJECTION, SOLUTION INTRAVENOUS at 17:16

## 2019-04-19 RX ADMIN — BUPIVACAINE 20 ML: 13.3 INJECTION, SUSPENSION, LIPOSOMAL INFILTRATION at 17:42

## 2019-04-19 ASSESSMENT — MIFFLIN-ST. JEOR: SCORE: 1866.16

## 2019-04-19 ASSESSMENT — ENCOUNTER SYMPTOMS: SEIZURES: 0

## 2019-04-19 NOTE — ANESTHESIA PROCEDURE NOTES
Spinal/LP Procedure Note    Spinal Block  Staff:     Anesthesiologist:  Quirino Aguilar MD    Resident/CRNA:  Juan Arizmendi MD    Spinal/LP performed by resident/CRNA in presence of a teaching physician.    Location: OB and In OR BEFORE Induction  Procedure Start/Stop Times:      4/19/2019 4:17 PM     4/19/2019 4:20 PM    patient identified, IV checked, site marked, risks and benefits discussed, informed consent, monitors and equipment checked, pre-op evaluation, at physician/surgeon's request and post-op pain management      Correct Patient: Yes      Correct Position: Yes      Correct Site: Yes      Correct Procedure: Yes      Correct Laterality:  N/A    Site Marked:  N/A  Procedure:     Procedure:  Intrathecal    ASA:  2    Diagnosis:  Twin Pregnancy    Position:  Sitting    Sterile Prep: chloraprep      Insertion site:  L4-5    Approach:  Midline    Needle Type:  Enrico    Needle gauge (G):  25    Local Skin Infiltration:  1% lidocaine    amount (ml):  2    Needle Length (in):  3.5    Introducer used: Yes      Introducer gauge:  20 G    Attempts:  1    Redirects:  1    CSF:  Clear    Paresthesias:  No    Time injected:  16:20  Assessment/Narrative:     Sensory Level:  T4

## 2019-04-19 NOTE — ANESTHESIA POSTPROCEDURE EVALUATION
Anesthesia POST Procedure Evaluation    Patient: Vale Contreras   MRN:     [id number] Gender:   female   Age:    33 year old :      1985        Preoperative Diagnosis: Twins   Procedure(s):  Primary  Section   Postop Comments: No value filed.       Anesthesia Type:  Regional, MAC  No value filed.    Reportable Event: NO     PAIN: Uncomplicated   Sign Out status: Comfortable, Well controlled pain     PONV: No PONV   Sign Out status:  No Nausea or Vomiting     Neuro/Psych: Uneventful perioperative course   Sign Out Status: Preoperative baseline; Age appropriate mentation     Airway/Resp.: Uneventful perioperative course   Sign Out Status: Non labored breathing, age appropriate RR; Resp. Status within EXPECTED Parameters     CV: Uneventful perioperative course   Sign Out status: Appropriate BP and perfusion indices; Appropriate HR/Rhythm     Disposition:   Sign Out in:  Labor and Delivery  Disposition:  Labor and Delivery  Recovery Course: Uneventful  Follow-Up: Not required     Comments/Narrative:  Patient doing well post-operatively.  No significant issues.  Hemodynamically stable, pain well controlled, nausea well controlled.  Stable for discharge from the PACU             Last Anesthesia Record Vitals:  CRNA VITALS  2019 1656 - 2019 1756      2019             Pulse:  76    SpO2:  97 %          Last PACU Vitals:  Vitals Value Taken Time   /82 2019  6:55 PM   Temp 36.7  C (98  F) 2019  5:39 PM   Pulse 77 2019  6:55 PM   Resp 19 2019  6:57 PM   SpO2 100 % 2019  6:57 PM   Temp src     NIBP     Pulse     SpO2     Resp     Temp     Ht Rate     Temp 2     Vitals shown include unvalidated device data.      Electronically Signed By: Quirino Aguilar MD, 2019, 6:57 PM

## 2019-04-19 NOTE — ANESTHESIA PROCEDURE NOTES
Peripheral Nerve Block Procedure Note    Location: PACU  Procedure Start/Stop TImes:      2019 5:40 PM     2019 5:47 PM    patient identified, IV checked, site marked, risks and benefits discussed, informed consent, monitors and equipment checked, pre-op evaluation, at physician/surgeon's request and post-op pain management      Correct Patient: Yes      Correct Position: Yes      Correct Site: Yes      Correct Procedure: Yes      Correct Laterality:  N/A    Site Marked:  N/A  Procedure details:     Procedure:  TAP    ASA:  1    Diagnosis:  Post     Laterality:  Bilateral    Position:  Supine    Sterile Prep: chloraprep, mask and sterile gloves      Needle:  Other    Needle gauge:  22    Needle length (mm):  110    Ultrasound: Yes      Ultrasound used to identify targeted nerve, plexus, or vascular structure and placed a needle adjacent to it      Permanent Image entered into patiient's record      Abnormal pain on injection: No      Blood Aspirated: No      Paresthesias:  No    Bleeding at site: No      Bolus via:  Needle    Infusion Method:  Single Shot    Blood aspirated via catheter: No    Assessment/Narrative:      Bilateral TAP block, uncomplicated

## 2019-04-19 NOTE — H&P
History and Physical     Vale Contreras MRN# [id number]   YOB: 1985 Age: 33 year old      Date of Admission: 2019    Primary care provider: No Ref-Primary, Physician      Assessment and Plan:     33 year old  with monochorionic/diamniotic twin gestation at 37w0d by 8w4d US, here for scheduled  section for fetal malpresentation. Pregnancy is notable for advanced maternal age, obesity, and anemia.     # PNC  Primary  indicated for mono/di twin gestation with fetal malpresentation. She did have one isolated severe range blood pressure at a 33w6d clinic visit, but otherwise no evidence suggesting pre-eclampsia. Has been on a daily baby aspirin.  - Scheduled  section; no indication for emergent delivery  - Rh +, Rubella immune, GCT normal, GBS negative, Hgb 9.3, Plts 245  - Other prenatal labs wnl  - Imaging: BSUS breech/transverse presentation, 3/15/19 US - transverse/transverse presentation, low-lying posterior placenta (no previa), normal volume amniotic fluid  - Up to date Tdap vaccine  - Pap last 2018 wnl  - Contraception: TBD, wants to eventually restart OCP  - Feeding: Plans to pump     # FWB:   Fetus 1: Cat 1 tracing, breech by BSUS. Fetus 2: Cat 1 tracing, transverse by BSUS. No indication for urgent/emergenct delivery.   - Continuous Fetal Monitoring  - Intrauterine resuscitative measures prn    # Anemia  Likely iron deficiency in the setting of pregnancy, though formal iron studies have not been obtained. Taking iron supplement. Asymptomatic. Hgb 9.3.  - Closely monitor blood loss and postop Hgb  - Continue iron  - Type and screen prior to surgery          HPI:     Vale Contreras is a 33 year old  with monochorionic/diamniotic twin gestation at 37w0d by 8w4d US, here for scheduled  section for fetal malpresentation.    From Vale's perspective, this pregnancy has been going well besides lower extremity swelling, wrist pain, back pain, and  heartburn which have all been unchanged recently. She has not had any significant fever, chest pain, shortness of breath, nausea, constipation, and has recently been in her usual state of health, though her seasonal allergies are flaring.     She reports good fetal movement from both fetuses. Denies gush of fluid, vaginal bleeding, vaginal discharge or discomfort. She has had sporadic mild lower abdominal cramping, this occurs 2-3 times a day and typically lasts only seconds. Also reports some mild RUQ discomfort, but states it is felt very superficially in her ribs, and attributes it to fetus #2 moving around frequently in that area. Otherwise denies any headaches, vision changes, or epigastric pain.    She has had anemia with this pregnancy, which she does not believe was ever a problem for her prior. She has been taking an iron supplement for this. Last Hgb was 9.3 on .    Patient has been NPO since 1830 on .    OB History:    OB History    Para Term  AB Living   1 0 0 0 0 0   SAB TAB Ectopic Multiple Live Births   0 0 0 0 0      # Outcome Date GA Lbr Holland/2nd Weight Sex Delivery Anes PTL Lv   1 Current               Prenatal Lab Results:  Lab Results   Component Value Date    ABO A 2019    RH Pos 2019    AS Neg 2019    HEPBANG Nonreactive 10/01/2018    CHPCRT Negative 10/18/2018    GCPCRT Negative 10/18/2018    HGB 9.3 (L) 2019     GBS Status:   Lab Results   Component Value Date    GBS Negative 2019                Past Medical History:     Past Medical History:   Diagnosis Date     NO ACTIVE PROBLEMS           Past Surgical History:     Past Surgical History:   Procedure Laterality Date     ENT SURGERY      Tubes put in my ears as a small child          Social History:     Social History     Tobacco Use     Smoking status: Never Smoker     Smokeless tobacco: Never Used   Substance Use Topics     Alcohol use: Yes     Alcohol/week: 0.0 oz     Comment: 8  "drinks a week, stopped in pregnancy          Family History:     Family History   Problem Relation Age of Onset     Hypertension Father      Breast Cancer Cousin         She has the BRCA1 gene. My father was tested and determined not to be a carrier.     Diabetes No family hx of           Immunizations:     Immunization History   Administered Date(s) Administered     Historical DTP/aP 01/01/1984, 05/01/1984, 07/01/1984, 07/01/1985, 07/11/1988     MMR 05/01/1985, 08/13/1996     Polio, Unspecified  01/01/1984, 05/01/1984, 07/01/1984, 07/11/1989     TDAP Vaccine (Boostrix) 02/12/2019     Td (Adult), Adsorbed 08/13/1996          Allergies:     Allergies   Allergen Reactions     Penicillins Unknown     Sulfa Drugs           Medications:     Medications Prior to Admission   Medication Sig Dispense Refill Last Dose     aspirin (ASA) 81 MG chewable tablet Take 81 mg by mouth daily   Taking     ferrous sulfate (FEROSUL) 325 (65 Fe) MG tablet Take 1 tablet (325 mg) by mouth daily (with breakfast) 90 tablet 1 Taking     Prenatal MV-Min-Fe Fum-FA-DHA (PRENATAL 1 PO) Take 2 tablets by mouth daily   Taking     ranitidine (ZANTAC) 150 MG tablet Take 1 tablet (150 mg) by mouth 2 times daily 30 tablet 3      VITAMIN D, CHOLECALCIFEROL, PO Take 5,000 Units by mouth daily   Taking          Review of Systems & Physical Exam:     The Review of Systems is negative other than noted in the HPI      Ht 1.651 m (5' 5\")   Wt 117 kg (258 lb)   LMP 08/03/2018   BMI 42.93 kg/m    Gen: Awake, alert, comfortably resting in bed. NAD.  CV: RRR, nl S1/S2, no murmurs appreciated.  Lungs: CTAB, non-labored breathing on room air.  Abd: Gravid, non-tender, non-distended.  Ext: Trace, non-pitting peripheral extremity edema; warm and well perfused.    Presentation: Breech and transverse by BSUS.    FHT:  Monitoring External  FHT:    Fetus 1: Baseline  140 bpm; moderatee variability; + accels present; no decelerations   Fetus 2: Baseline  130 bpm; " moderate variability; + accels present; no decelerations  TOCO 0 contractions in 10 minutes         Data:     CBC RESULTS:   Recent Labs   Lab Test 04/18/19  1302   WBC 8.7   RBC 2.92*   HGB 9.3*   HCT 27.8*   MCV 95   MCH 31.8   MCHC 33.5   RDW 15.2*          Quirino Muhammad, MS3    Resident/Fellow Attestation   I, Bandar Flores, was present with the medical student who participated in the service and in the documentation of the note.  I have verified the history and personally performed the physical exam and medical decision making.  I agree with the assessment and plan of care as documented in the note.  I have reviewed the note and made changes as necessary.    Bandar Flores MD  Obstetrics & Gynecology, PGY2    [facility] staff:  Appreciate note by Dr. Flores.  I have seen and examined the patient without the resident. I have reviewed, edited, and agree with the note.        Cynthia Rivas MD, FACOG  4/19/2019  3:15 PM

## 2019-04-19 NOTE — DISCHARGE SUMMARY
[facility] Discharge Summary    Vale Contreras MRN# [id number]   Age: 33 year old YOB: 1985     Date of Admission:  2019  Date of Discharge::  2019  Admitting Physician:  Cynthia Rivas MD  Discharge Physician:  Viola Elise MD          Admission Diagnoses:   - Monochorionic/diamniotic twin gestation at 37w0d  - Fetal malpresentation  - Anemia  - AMA  - Obesity          Discharge Diagnosis:     - Postpartum s/p PLTCS  - Same as above  - Gestational Hypertension          Procedures:     Procedure(s): Primary low transverse  section with double layer closure via Pfannenstiel skin incision  Spinal Anesthesia            Medications Prior to Admission:     Medications Prior to Admission   Medication Sig Dispense Refill Last Dose     ferrous sulfate (FEROSUL) 325 (65 Fe) MG tablet Take 1 tablet (325 mg) by mouth daily (with breakfast) 90 tablet 1 Taking     Prenatal MV-Min-Fe Fum-FA-DHA (PRENATAL 1 PO) Take 2 tablets by mouth daily   Taking     ranitidine (ZANTAC) 150 MG tablet Take 1 tablet (150 mg) by mouth 2 times daily 30 tablet 3      VITAMIN D, CHOLECALCIFEROL, PO Take 5,000 Units by mouth daily   Taking     [DISCONTINUED] aspirin (ASA) 81 MG chewable tablet Take 81 mg by mouth daily   Taking          Discharge Medications:        Review of your medicines      START taking      Dose / Directions   acetaminophen 325 MG tablet  Commonly known as:  TYLENOL      Dose:  650 mg  Take 2 tablets (650 mg) by mouth every 6 hours as needed for mild pain Start after Delivery.  Quantity:  100 tablet  Refills:  0     ibuprofen 600 MG tablet  Commonly known as:  ADVIL/MOTRIN      Dose:  600 mg  Take 1 tablet (600 mg) by mouth every 6 hours as needed for moderate pain Start after delivery  Quantity:  60 tablet  Refills:  0     oxyCODONE 5 MG tablet  Commonly known as:  ROXICODONE      Dose:  5 mg  Take 1 tablet (5 mg) by mouth every 6 hours as needed for pain  Quantity:  5  tablet  Refills:  0     senna-docusate 8.6-50 MG tablet  Commonly known as:  SENOKOT-S/PERICOLACE      Dose:  1 tablet  Take 1 tablet by mouth daily Start after delivery.  Quantity:  100 tablet  Refills:  0        CONTINUE these medicines which have NOT CHANGED      Dose / Directions   ferrous sulfate 325 (65 Fe) MG tablet  Commonly known as:  FEROSUL  Used for:  Anemia, unspecified type      Dose:  325 mg  Take 1 tablet (325 mg) by mouth daily (with breakfast)  Quantity:  90 tablet  Refills:  1     PRENATAL 1 PO      Dose:  2 tablet  Take 2 tablets by mouth daily  Refills:  0     ranitidine 150 MG tablet  Commonly known as:  ZANTAC  Used for:  Gastroesophageal reflux disease without esophagitis      Dose:  150 mg  Take 1 tablet (150 mg) by mouth 2 times daily  Quantity:  30 tablet  Refills:  3     VITAMIN D (CHOLECALCIFEROL) PO      Dose:  5000 Units  Take 5,000 Units by mouth daily  Refills:  0        STOP taking    aspirin 81 MG chewable tablet  Commonly known as:  ASA              Where to get your medicines      [facility] [address] [place] [address] AVE [place] AT [place] [address]  [address] [place] [address] AVE [facility] [address]    Phone:  [phone]     acetaminophen 325 MG tablet    ibuprofen 600 MG tablet    senna-docusate 8.6-50 MG tablet     Some of these will need a paper prescription and others can be bought over the counter. Ask your nurse if you have questions.    Bring a paper prescription for each of these medications    oxyCODONE 5 MG tablet            Consultations:   Anesthesiology          Brief Admission History:   Vale Contreras is a 33 year old now , admitted on 19 for scheduled  section for monochorionic/diamniotic twin gestation at 37w0d with fetal malpresentation (breech/transverse). Her pregnancy was complicated by obesity, advanced maternal age, and anemia. On admission she reports good fetal movement from both fetuses.  Denies gush of fluid, vaginal bleeding, vaginal discharge or discomfort. She has had sporadic mild lower abdominal cramping, this occurs 2-3 times a day and typically lasts only seconds. Also reports some mild RUQ discomfort, but states it is felt very superficially in her ribs, and attributes it to fetus #2 moving around frequently in that area. Otherwise denies any headaches, vision changes, or epigastric pain.    Please see admission H&P for further details.       Intraoperative course   The procedure was uncomplicated.   mL.  See operative report for details.    Operative Findings:  - Viable male infant A delivered at 16:44 on April 19, 2019 with APGARs 9 and 8. Weight 6lb 15oz.   - Viable male infant B delivered at 16:46 on April 19, 2019 with APGARs 9 and 8. Weight 6lb 6oz.  - Cord gasses: Not sent  - Clear amniotic fluid, placenta normal appearing and delivered intact.  - Uterine atony requiring hemabate, methergine, and increased rate of pitocin  - Normal uterus, tubes, and ovaries.   - No adhesions.   - Surgical sites noted to be hemostatic at the end of case.   - Mild vulvar lichen sclerosis        Postpartum Course   The patient's hospital course was unremarkable.  She recovered as anticipated and experienced no post-operative complications. On discharge, her pain was well controlled. Vaginal bleeding was appropriate for postpartum period.  Voiding without difficulty.  Ambulating well and tolerating a normal diet without nausea or emesis. No fever or significant wound drainage. Breastfeeding well. Infant is stable. Passing flatus. She was discharged on post-partum day #3. She planned to use POPs for contraception. Rhogam was not indicated. She was asymptomatic from her hemoglobin of 7.3.    Post-partum hemoglobin:   Hemoglobin   Date Value Ref Range Status   04/20/2019 7.3 (L) 11.7 - 15.7 g/dL Final          Discharge Instructions and Follow-Up:     Discharge diet: Regular   Discharge activity: No  lifting greater than 20 lbs, pushing, pulling, or other strenuous activity for 6 weeks. Pelvic rest for 6 weeks including no sexual intercourse, tampons, or douching. No driving until you can slam on the brakes without pain or while on narcotic pain medications.    Discharge follow-up: Follow up with primary OB for routine postpartum visit in 6 weeks and blood pressure follow-up in 1 week.   Wound care: Keep incision clean and dry           Discharge Disposition:     Discharged to home      Bandar Flores MD  Obstetrics & Gynecology, PGY2      Viola Elise MD  4/22/2019

## 2019-04-19 NOTE — ANESTHESIA CARE TRANSFER NOTE
Patient: Vale Contreras    Procedure(s):  Primary  Section    Diagnosis: Twins  Diagnosis Additional Information: No value filed.    Anesthesia Type:   No value filed.     Note:  Airway :Room Air  Patient transferred to:PACU  Handoff Report: Identifed the Patient, Identified the Reponsible Provider, Reviewed the pertinent medical history, Discussed the surgical course, Reviewed Intra-OP anesthesia mangement and issues during anesthesia, Set expectations for post-procedure period and Allowed opportunity for questions and acknowledgement of understanding      Vitals: (Last set prior to Anesthesia Care Transfer)    CRNA VITALS  2019 1656 - 2019 1729      2019             Pulse:  76    SpO2:  97 %                Electronically Signed By: Juan Arizmendi MD  2019  5:29 PM

## 2019-04-19 NOTE — ANESTHESIA PREPROCEDURE EVALUATION
Anesthesia Pre-Procedure Evaluation    Patient: Vale Contreras   MRN:     [id number] Gender:   female   Age:    33 year old :      1985        Preoperative Diagnosis: Twins   Procedure(s):  Primary  Section     Past Medical History:   Diagnosis Date     NO ACTIVE PROBLEMS       Past Surgical History:   Procedure Laterality Date     ENT SURGERY      Tubes put in my ears as a small child          Anesthesia Evaluation     . Pt has had prior anesthetic. Type: General (ear tubes)    History of anesthetic complications   - PONV  nausea with nitrous at dentist      ROS/MED HX    ENT/Pulmonary: Comment: Allergies, seasonal     (-) asthma and recent URI   Neurologic:  - neg neurologic ROS    (-) seizures   Cardiovascular:  - neg cardiovascular ROS       METS/Exercise Tolerance:  >4 METS   Hematologic:     (+) Anemia, -     (-) history of blood clots   Musculoskeletal:  - neg musculoskeletal ROS       GI/Hepatic:     (+) GERD Asymptomatic on medication,       Renal/Genitourinary:  - ROS Renal section negative       Endo:     (+) Obesity, .      Psychiatric:  - neg psychiatric ROS       Infectious Disease:  - neg infectious disease ROS       Malignancy:      - no malignancy   Other: Comment:  at 37w0d, twins   (+) Possibly pregnant                        PHYSICAL EXAM:   Mental Status/Neuro: A/A/O   Airway: Facies: Feasible  Mallampati: III  Mouth/Opening: Full  TM distance: > 6 cm  Neck ROM: Full   Respiratory: Auscultation: CTAB     Resp. Rate: Normal     Resp. Effort: Normal      CV: Rhythm: Regular  Rate: Age appropriate  Heart: Normal Sounds   Comments:      Dental: Normal                  Lab Results   Component Value Date    WBC 8.7 2019    HGB 9.3 (L) 2019    HCT 27.8 (L) 2019     2019       Preop Vitals  BP Readings from Last 3 Encounters:   19 136/85   19 124/84   19 128/83    Pulse Readings from Last 3 Encounters:   19 106   19 82  "  04/03/19 108      Resp Readings from Last 3 Encounters:   07/25/16 16    SpO2 Readings from Last 3 Encounters:   No data found for SpO2      Temp Readings from Last 1 Encounters:   07/25/16 36.7  C (98.1  F) (Oral)    Ht Readings from Last 1 Encounters:   04/18/19 1.651 m (5' 5\")      Wt Readings from Last 1 Encounters:   04/18/19 117.2 kg (258 lb 6.4 oz)    Estimated body mass index is 43 kg/m  as calculated from the following:    Height as of 4/18/19: 1.651 m (5' 5\").    Weight as of 4/18/19: 117.2 kg (258 lb 6.4 oz).     LDA:            Assessment:   ASA SCORE: 2    NPO Status: Increased aspiration risk   Documentation: H&P complete; Preop Testing complete; Consents complete   Proceeding: Proceed without further delay  Tobacco Use:  NO Active use of Tobacco/UNKNOWN Tobacco use status     Plan:   Anes. Type:  Regional; MAC     RA Details:  Labor/OB Procedure; SS     RA-Location/Type: Spinal   Pre-Induction: None     Drips/Meds-Preparation: Oxytocin   Induction:  Not applicable   Airway: Native Airway   Access/Monitoring: PIV   Maintenance: N/a   Emergence: Not Applicable   Logistics: Observation/Admission     Postop Pain/Sedation Strategy:  Standard-Options: Block SS; Exparel     PONV Management:  Adult Risk Factors: Female, Non-Smoker  Prevention: NO Volatile     CONSENT: Direct conversation   Plan and risks discussed with: Patient; Spouse   Blood Products: Consented (ALL Blood Products)       Comments for Plan/Consent:  Discussed risks of anesthesia including nausea, vomiting, sore throat, dental damage, neurologic complications, itching, headache, cardiopulmonary complications, blood transfusion, and serious complications.                           Juan Arizmendi MD  "

## 2019-04-19 NOTE — BRIEF OP NOTE
Brief Operative Note    Name: Vale Contreras  MRN: [id number]  : 1985  Date of Surgery: 2019    Pre-operative Diagnosis:  IUP @ 37w0d  Monochorionic/diamniotic twin gestation  Advanced maternal age  Obesity  Anemia    Post-operative Diagnosis:  Delivery s/p PLTCS  Same as above    Procedure(s):   Primary low transverse  section with double layer uterine closure via Pfannenstiel skin incision.    Surgeon:  Cynthia Rivas MD    Assistants:  Bandar Flores MD, PGY2  Quirino Muhammad, MS3     Anesthesia: Spinal  QBL: 746 cc  UOP: 50 mL clear urine  Fluids: 1,000 mL crystalloid  Additional Medications: 900mg clindamycin, 40mg gentamicin given intraoperatively  Specimens: Placenta, cord blood  Complications: None apparent    Findings:   - Viable male infant A delivered at 16:44 on 2019 with APGARs 9 and 8. Weight 6lb 15oz.   - Viable male infant B delivered at 16:46 on 2019 with APGARs 9 and 8. Weight 6lb 6oz.  - Cord gasses: Not sent  - Clear amniotic fluid, placenta normal appearing and delivered intact.  - Normal uterus, tubes, and ovaries.   - No adhesions.   - Surgical sites noted to be hemostatic at the end of case.   - Mild vulvar lichen sclerosis     Quirino Muhammad, MS3    Bandar Flores MD  Obstetrics & Gynecology, PGY2

## 2019-04-20 LAB
ALT SERPL W P-5'-P-CCNC: 19 U/L (ref 0–50)
AST SERPL W P-5'-P-CCNC: 30 U/L (ref 0–45)
CREAT SERPL-MCNC: 0.84 MG/DL (ref 0.52–1.04)
ERYTHROCYTE [DISTWIDTH] IN BLOOD BY AUTOMATED COUNT: 15.6 % (ref 10–15)
GFR SERPL CREATININE-BSD FRML MDRD: 90 ML/MIN/{1.73_M2}
HCT VFR BLD AUTO: 24.7 % (ref 35–47)
HGB BLD-MCNC: 7.3 G/DL (ref 11.7–15.7)
HGB BLD-MCNC: 8.1 G/DL (ref 11.7–15.7)
MCH RBC QN AUTO: 32.3 PG (ref 26.5–33)
MCHC RBC AUTO-ENTMCNC: 32.8 G/DL (ref 31.5–36.5)
MCV RBC AUTO: 98 FL (ref 78–100)
PLATELET # BLD AUTO: 208 10E9/L (ref 150–450)
RBC # BLD AUTO: 2.51 10E12/L (ref 3.8–5.2)
WBC # BLD AUTO: 13.5 10E9/L (ref 4–11)

## 2019-04-20 PROCEDURE — 85018 HEMOGLOBIN: CPT | Performed by: STUDENT IN AN ORGANIZED HEALTH CARE EDUCATION/TRAINING PROGRAM

## 2019-04-20 PROCEDURE — 25000128 H RX IP 250 OP 636: Performed by: STUDENT IN AN ORGANIZED HEALTH CARE EDUCATION/TRAINING PROGRAM

## 2019-04-20 PROCEDURE — 12000001 ZZH R&B MED SURG/OB UMMC

## 2019-04-20 PROCEDURE — 36415 COLL VENOUS BLD VENIPUNCTURE: CPT | Performed by: STUDENT IN AN ORGANIZED HEALTH CARE EDUCATION/TRAINING PROGRAM

## 2019-04-20 PROCEDURE — 25000132 ZZH RX MED GY IP 250 OP 250 PS 637: Performed by: STUDENT IN AN ORGANIZED HEALTH CARE EDUCATION/TRAINING PROGRAM

## 2019-04-20 RX ORDER — IBUPROFEN 600 MG/1
600 TABLET, FILM COATED ORAL EVERY 6 HOURS PRN
Status: DISCONTINUED | OUTPATIENT
Start: 2019-04-20 | End: 2019-04-22 | Stop reason: HOSPADM

## 2019-04-20 RX ADMIN — RANITIDINE 150 MG: 150 TABLET ORAL at 21:42

## 2019-04-20 RX ADMIN — KETOROLAC TROMETHAMINE 30 MG: 30 INJECTION, SOLUTION INTRAMUSCULAR at 08:30

## 2019-04-20 RX ADMIN — SIMETHICONE CHEW TAB 80 MG 80 MG: 80 TABLET ORAL at 21:42

## 2019-04-20 RX ADMIN — IBUPROFEN 600 MG: 600 TABLET ORAL at 21:42

## 2019-04-20 RX ADMIN — ACETAMINOPHEN 975 MG: 325 TABLET, FILM COATED ORAL at 01:41

## 2019-04-20 RX ADMIN — KETOROLAC TROMETHAMINE 30 MG: 30 INJECTION, SOLUTION INTRAMUSCULAR at 14:00

## 2019-04-20 RX ADMIN — SIMETHICONE CHEW TAB 80 MG 80 MG: 80 TABLET ORAL at 14:58

## 2019-04-20 RX ADMIN — SENNOSIDES AND DOCUSATE SODIUM 2 TABLET: 8.6; 5 TABLET ORAL at 09:14

## 2019-04-20 RX ADMIN — SIMETHICONE CHEW TAB 80 MG 80 MG: 80 TABLET ORAL at 08:30

## 2019-04-20 RX ADMIN — SENNOSIDES AND DOCUSATE SODIUM 2 TABLET: 8.6; 5 TABLET ORAL at 21:42

## 2019-04-20 RX ADMIN — ACETAMINOPHEN 975 MG: 325 TABLET, FILM COATED ORAL at 10:06

## 2019-04-20 RX ADMIN — KETOROLAC TROMETHAMINE 30 MG: 30 INJECTION, SOLUTION INTRAMUSCULAR at 01:41

## 2019-04-20 RX ADMIN — SODIUM CHLORIDE, SODIUM LACTATE, POTASSIUM CHLORIDE, CALCIUM CHLORIDE, AND DEXTROSE MONOHYDRATE: 600; 310; 30; 20; 5 INJECTION, SOLUTION INTRAVENOUS at 08:30

## 2019-04-20 RX ADMIN — RANITIDINE 150 MG: 150 TABLET ORAL at 09:14

## 2019-04-20 RX ADMIN — ACETAMINOPHEN 975 MG: 325 TABLET, FILM COATED ORAL at 17:54

## 2019-04-20 NOTE — PLAN OF CARE
VSS. BPs remain in moderate range. Denies HA, vision changes or epigastric pain. Pt c/o intermittent nausea overnight - gave zofran with some stated relief. Delayed tylenol r/t nausea/vomiting. Pt tolerating water/juice. IV infusing D5LR. Adequate urine output in rodriguez after starting D5LR. Plan to march at bedside this AM. Pt denying pain. Pumping for newborns in NICU. Will continue to monitor.

## 2019-04-20 NOTE — LACTATION NOTE
"D:  I met with Whitney; Josafat and Rogelio are their 1st babies.  She is normally in good health, takes no medications, and has no history of breast/chest surgery or trauma.  She has already started to pump.   I:  I gave her a folder of introductory materials and went over pumping guidelines.  I reviewed physiology of colostrum and milk production, pumping guidelines, and I gave her a log and encouraged her to use it.   I explained how to access the videos \"Hand Expression\" and \"Maximizing Milk Production\"; as well as other helpful books and websites.   We discussed hands-on pumping techniques and usefulness of a hands-free pumping bra.  We discussed skin to skin holding and how to reach your breastfeeding goals.  We talked about medications during breastfeeding.  She verbalized understanding via teachback.  I advised her to call her insurance company about pump coverage.    A:  Mom has information she needs to initiate her supply.   P:  Will continue to provide lactation support.  Holli Hong, RNC, IBCLC         "

## 2019-04-20 NOTE — OP NOTE
[facility]  Operative Note    Name: Vale Contreras  MRN: [id number]  : 1985  Date of Surgery: 2019     Pre-operative Diagnosis:  IUP @ 37w0d  Monochorionic/diamniotic twin gestation  Advanced maternal age  Obesity  Anemia  AMA     Post-operative Diagnosis:  Delivery s/p PLTCS  Same as above     Procedure(s):   Primary low transverse  section with double layer uterine closure via Pfannenstiel skin incision.     Surgeon:  Cynthia Rivas MD     Assistants:  Bandar Flores MD, PGY2  Quirino Muhammad MS3      Anesthesia: Spinal  QBL: 746 cc  UOP: 50 mL clear urine  Fluids: 1,000 mL crystalloid  Additional Medications: 900mg clindamycin, 40mg gentamicin given intraoperatively  Specimens: Placenta, cord blood  Complications: None apparent     Findings:   - Viable male infant A delivered at 16:44 on 2019 with APGARs 9 and 8. Weight 6lb 15oz.   - Viable male infant B delivered at 16:46 on 2019 with APGARs 9 and 8. Weight 6lb 6oz.  - Cord gasses: Not sent  - Clear amniotic fluid, placenta normal appearing and delivered intact.  - Uterine atony requiring hemabate, methergine, and increased rate of pitocin  - Normal uterus, tubes, and ovaries.   - No adhesions.   - Surgical sites noted to be hemostatic at the end of case.   - Mild vulvar lichen sclerosis appearance of vulva    Indications: Vale Contreras is a 33 year old now  who presented at 37w0d for scheduled primary  section due to monochorionic diamniotic twin pregnancy. Risks and benefits were reviewed and the patient consented for the procedure.    Procedure Details: The patient was taken back to the operating room where she underwent spinal anesthesia. She was prepped and draped in the usual sterile fashion in the dorsal supine position with a leftward tilt. A safety patient time out was performed. Clindamycin and Gentamycin were administered due to penicillin allergy.  A Pfannenstiel skin incision was made with the scalpel and carried through the underlying layer to the fascia. The fascia was incised in the midline and extended laterally with Carlos scissors. The superior aspect of the fascial incision was grasped with kocher clamps, elevated and the underlying rectus muscles dissected off with a combination of blunt and sharp dissection. Attention was then turned to the inferior aspect of the incision, which in a similar fashion was grasped, tented up and the rectus muscle dissected off the fascia. The rectus muscles were  in the midline. The peritoneum was identified and entered bluntly. This was extended with blunt dissection and electrocautery was used to take down peritoneal bands. The bladder blade was inserted. The lower uterine segment was incised in a transverse fashion with the scalpel. The incision was extended digitally. The bladder blade was removed. Twin A was noted to be breech and was delivered atraumatically with standard breech maneuvers. After delayed cord clamping, the infant was handed off to the awaiting NICU staff. Twin B was noted to be cephalic and was delivered atraumatically. After delayed cord clamping, the infant was handed off to the awaiting NICU staff  A segment of both cords was taken for cord gases. The placenta was removed with gentle traction on the cord and fundal message. The uterus was exteriorized and cleared of all clots and debris. The uterine incision was repaired with 0-Vicryl in a running locked fashion. A second layer of 0-monocryl was used to imbricate the incision. Uterine atony was controlled with fundal massage, pitocin, methergine, and hemabate. The posterior cul-de-sac was cleared of clots and debris. The uterus was returned to the abdomen and noted to be hemostatic. The gutters were cleared of clots. A kocher clamp was used to elevated the fascia superiorly and inferiorly and good hemostasis was noted. The fascia was  closed with 0-vicryl in a running fashion. The subcutaneous tissue was irrigated and areas of bleeding were controlled with cautery. The skin was closed with 4-0 monocryl. The patient tolerated the procedure well and was taken to the recovery room in stable condition. All lap, instrument, and sharps counts were correct times two. Dr. Rivas was scrubbed and present for the procedure.     Av Flores MD  4/19/2019 9:10 PM     Staff:  I was scrubbed and present for entire case and agree w/ above note.    Cynthia Rivas MD

## 2019-04-20 NOTE — PROVIDER NOTIFICATION
04/19/19 1973   Provider Notification   Provider Name/Title Dr. Chatman   Method of Notification Phone     Notified provider of elevated BPs (in moderate range - 140s/80s). Provider to order labwork.

## 2019-04-20 NOTE — PLAN OF CARE
Data: Vital signs within normal limits. Postpartum checks within normal limits - see flow record. Patient eating and drinking normally. Patient able to empty bladder independently and is due to void after Quinn removal. No apparent signs of infection. Incision healing well, covered with dressing. Patient performing self cares and is visiting twins in NICU.  Action: Patient medicated during the shift for pain. See MAR. Patient reassessed within 1 hour after each medication and pain was improved - patient stated she was comfortable. Patient education done about postpartum cares, pumping, hand expression, discharge paperwork. See flow record.  Response: Positive attachment behaviors observed with twins. Support persons partner Bernardo present.   Plan: Anticipate discharge on POD 2-3.

## 2019-04-20 NOTE — PLAN OF CARE
Afebrile. VSS. Lungs clear. Fundus firm with scant lochia. Quinn patent.  Pt is comfortable and denies pain or discomfort at this time. Pt was able to pump and is excited to visit her babies in the NICU.

## 2019-04-20 NOTE — PLAN OF CARE
Patient arrived to [facility] unit via zoom cart at 2055 ,with belongings, accompanied by spouse/ significant other. Stopped at NICU on way to unit. Received report from ROMERO Sharp . Unit and room orientation started. Call light given; no concerns present at this time. Continue with plan of care.

## 2019-04-20 NOTE — PROGRESS NOTES
[facility]   Post-partum Note    Name:  Vale Contreras  MRN: [id number]    S: Patient is doing well this morning.  Pain is controlled. Lochia minimal. Voiding spontaneously. Passing flatus. Tolerating PO without nausea or vomiting. Ambulating without dizziness. She is working on pumping/breastfeeding. Plans POPs for contraception. Denies HA, vision changes, chest pain, or shortness of breath. She states the twins are still in NICU.     O:   Patient Vitals for the past 24 hrs:   BP Temp Temp src Pulse Heart Rate Resp SpO2   19 0615 142/87 98  F (36.7  C) Oral -- 110 -- --   19 2315 131/77 -- -- 82 -- 18 --   19 1700 147/87 98  F (36.7  C) Oral 84 -- 18 99 %   19 1400 132/89 97.8  F (36.6  C) Oral 78 78 18 98 %     Gen:  Resting comfortably, NAD  CV:  Regular rate, well perfused  Pulm:  Breathing comfortably on room air  Abd:  Soft, appropriately ttp, slightly distended. Fundus 1 cm below umbilicus, firm and non-tender.  Incision: Steri strips in place, small amount of dried blood. No surrounding erythema or drainage.  Ext:  non-tender, 2+ LE edema b/l    I/O last 3 completed shifts:  In: 2801.25 [P.O.:1520; I.V.:1281.25]  Out: 1100 [Urine:1100]    Hgb:   Hemoglobin   Date Value Ref Range Status   2019 7.3 (L) 11.7 - 15.7 g/dL Final     Assessment/Plan:  Vale Contreras is a 33 year old  on POD#2 s/p PLTCS for Mono/Di Twins. Pregnancy complicated by Gestational HTN. Doing well post-operatively.     #Gestational HTN  - No signs/symptoms of worsening preeclampsia  - Blood pressures normal to mild range overnight    # Routine postpartum management  Pain: Well-controlled will Tylenol and Ibuprofen. Oxycodone PRN.  Hgb: 9.3 >  > 7.3, currently asymptomatic. Vital signs stable. Discharge home with PO Fe.  GI:  Scheduled bowel regimen ordered.   :  S/p Quinn, voiding spontaneously. No issues   Rh: Positive, Rhogam not indicated  Rubella:  Immune  Feed: Breastfeeding  BC: POP    Dispo: Anticipate discharge home POD#3.    Jacque Anderson MD  Ob/Gyn, PGY-1  04/21/19 8:56 AM    [facility] staff:  Appreciate note by Dr. Anderson.  I have seen and examined the patient without the resident. I have reviewed, edited, and agree with the note.        Cynthia Rivas MD, FACOG  4/21/2019  10:18 AM

## 2019-04-20 NOTE — PLAN OF CARE
Data: Vale Contreras transferred to 7123 via zoom cart at 2010. She first visited her twins in the NICU and arrived to [facility] at 2055.   Action: Receiving unit notified of transfer: Yes. Patient and family notified of room change. Report given to Katelin at 2100. Belongings sent to receiving unit. Accompanied by Registered Nurse. Oriented patient to surroundings. Call light within reach. Response: Patient tolerated transfer and is stable.

## 2019-04-20 NOTE — PROGRESS NOTES
"[facility]   Post-partum Note    Name:  Vale Contreras  MRN: [id number]    S: Patient is doing well this morning.  Pain is controlled. She is tolerating water, she has not tried eating yet. She did have some nausea yesterday, but this has improved. She has ordered breakfast this morning. She has stood on the side of bed, and did not have dizziness. Has not tried ambulating. Quinn is still in place.  Passing flatus. Lochia moderate.  Breast feeding. Plans POPs for contraception. Denies HA, vision changes, chest pain, or shortness of breath.    O:   Patient Vitals for the past 24 hrs:   BP Temp Temp src Pulse Heart Rate Resp SpO2 Height Weight   04/20/19 0545 128/78 98.4  F (36.9  C) Oral 85 -- 18 100 % -- --   04/20/19 0140 143/80 98.3  F (36.8  C) Oral 90 -- 18 99 % -- --   04/20/19 0030 140/83 98.4  F (36.9  C) Oral 80 -- 18 99 % -- --   04/19/19 2344 149/81 98.1  F (36.7  C) Oral 86 -- 18 98 % -- --   04/19/19 2230 149/85 98.6  F (37  C) Oral 85 -- 18 99 % -- --   04/19/19 2130 141/79 98.7  F (37.1  C) Oral 90 -- 18 99 % -- --   04/19/19 2100 140/83 98.4  F (36.9  C) Oral 95 -- 18 98 % -- --   04/19/19 1955 116/61 -- -- 86 92 18 100 % -- --   04/19/19 1940 114/77 99.1  F (37.3  C) Oral 92 -- 17 100 % -- --   04/19/19 1925 125/72 -- -- 88 82 17 100 % -- --   04/19/19 1910 135/77 -- -- 81 78 14 100 % -- --   04/19/19 1900 130/82 -- -- -- 82 (!) 37 100 % -- --   04/19/19 1840 109/75 -- -- 82 85 18 100 % -- --   04/19/19 1837 (!) 106/92 -- -- 88 85 15 100 % -- --   04/19/19 1830 -- -- -- -- 94 (!) 32 100 % -- --   04/19/19 1815 (!) 108/91 -- -- -- 90 13 100 % -- --   04/19/19 1810 (!) 108/91 -- -- 84 84 27 100 % -- --   04/19/19 1800 121/77 -- -- -- 79 16 100 % -- --   04/19/19 1755 121/77 -- -- 77 84 17 100 % -- --   04/19/19 1745 (!) 114/98 -- -- -- 82 13 99 % -- --   04/19/19 1739 (!) 114/98 98  F (36.7  C) Oral -- -- -- -- -- --   04/19/19 0900 -- -- -- -- -- -- -- 1.651 m (5' 5\") " 117 kg (258 lb)     Gen:  Resting comfortably, NAD  CV:  Regular rate, well perfused  Pulm:  Breathing comfortably on room air  Abd:  Soft, appropriately ttp, slightly distended. Fundus at umbilicus, firm and non-tender.  Incision: Bandage removed this morning. Steri strips in place, small amount of dried blood. No surrounding erythema or drainage.  Ext:  non-tender, 2+ LE edema b/l    I/O last 3 completed shifts:  In: 1685 [P.O.:120; I.V.:1565]  Out: 1789 [Urine:475; Emesis/NG output:300; Blood:1014]    Hgb:   Hemoglobin   Date Value Ref Range Status   2019 7.3 (L) 11.7 - 15.7 g/dL Final     Assessment/Plan:  Vale Contreras is a 33 year old  on POD#1 s/p PLTCS for Mono/Di Twins. Pregnancy complicated by Gestational HTN. Doing well post-operatively.     #Gestational HTN  - No signs/symptoms of worsening preeclampsia  - Blood pressures normal to low mild range overnight  - UOP approximately 29 ml/hr. Continue to monitor    # Routine postpartum management  Pain: Well-controlled this morning. Transition to PO medications today.  Hgb: 9.3 >  > 7.3, currently asymptomatic. Vital signs stable. Discharge home with PO Fe.  GI:  Scheduled bowel regimen ordered.   :  Quinn in place, remove today.   Rh: Positive, Rhogam not indicated  Rubella: Immune  Feed: Breastfeeding  BC: POP    Dispo: Anticipate discharge home POD#2-3.    Jacque Anderson MD  Ob/Gyn, PGY-1  2019, 8:23 AM      [facility] staff:  Appreciate note by Dr. Anderson.  I have seen and examined the patient without the resident. I have reviewed, edited, and agree with the note.    My findings are:  Patient feeling better. Ate and tolerated breakfast well. Pain controlled. Quinn still in place, plan to discontinue and work on voiding trial and ambulation today.   Viola Elise MD  2019  10:01 AM

## 2019-04-21 PROCEDURE — 12000001 ZZH R&B MED SURG/OB UMMC

## 2019-04-21 PROCEDURE — 25000132 ZZH RX MED GY IP 250 OP 250 PS 637: Performed by: STUDENT IN AN ORGANIZED HEALTH CARE EDUCATION/TRAINING PROGRAM

## 2019-04-21 RX ORDER — AMOXICILLIN 250 MG
1 CAPSULE ORAL DAILY
Qty: 100 TABLET | Refills: 0 | Status: SHIPPED | OUTPATIENT
Start: 2019-04-21 | End: 2019-04-22

## 2019-04-21 RX ORDER — OXYCODONE HYDROCHLORIDE 5 MG/1
5 TABLET ORAL EVERY 6 HOURS PRN
Qty: 5 TABLET | Refills: 0 | Status: SHIPPED | OUTPATIENT
Start: 2019-04-21 | End: 2019-06-18

## 2019-04-21 RX ORDER — IBUPROFEN 600 MG/1
600 TABLET, FILM COATED ORAL EVERY 6 HOURS PRN
Qty: 60 TABLET | Refills: 0 | Status: SHIPPED | OUTPATIENT
Start: 2019-04-21 | End: 2019-04-22

## 2019-04-21 RX ORDER — ACETAMINOPHEN 325 MG/1
650 TABLET ORAL EVERY 6 HOURS PRN
Qty: 100 TABLET | Refills: 0 | Status: SHIPPED | OUTPATIENT
Start: 2019-04-21 | End: 2019-04-22

## 2019-04-21 RX ADMIN — IBUPROFEN 600 MG: 600 TABLET ORAL at 23:40

## 2019-04-21 RX ADMIN — ACETAMINOPHEN 975 MG: 325 TABLET, FILM COATED ORAL at 10:41

## 2019-04-21 RX ADMIN — SIMETHICONE CHEW TAB 80 MG 80 MG: 80 TABLET ORAL at 10:41

## 2019-04-21 RX ADMIN — RANITIDINE 150 MG: 150 TABLET ORAL at 10:40

## 2019-04-21 RX ADMIN — ACETAMINOPHEN 975 MG: 325 TABLET, FILM COATED ORAL at 02:11

## 2019-04-21 RX ADMIN — IBUPROFEN 600 MG: 600 TABLET ORAL at 06:03

## 2019-04-21 RX ADMIN — SENNOSIDES AND DOCUSATE SODIUM 2 TABLET: 8.6; 5 TABLET ORAL at 19:43

## 2019-04-21 RX ADMIN — IBUPROFEN 600 MG: 600 TABLET ORAL at 17:26

## 2019-04-21 RX ADMIN — SENNOSIDES AND DOCUSATE SODIUM 2 TABLET: 8.6; 5 TABLET ORAL at 10:40

## 2019-04-21 RX ADMIN — RANITIDINE 150 MG: 150 TABLET ORAL at 19:42

## 2019-04-21 RX ADMIN — IBUPROFEN 600 MG: 600 TABLET ORAL at 11:51

## 2019-04-21 RX ADMIN — SIMETHICONE CHEW TAB 80 MG 80 MG: 80 TABLET ORAL at 19:45

## 2019-04-21 RX ADMIN — ACETAMINOPHEN 975 MG: 325 TABLET, FILM COATED ORAL at 19:40

## 2019-04-21 NOTE — LACTATION NOTE
This note was copied from a baby's chart.  D:  I met with Vale and Rogelio; she was kangaroo'ing both babies at the same time.  I:  I asked if she had any questions or concerns with pumping and she did not; she stated the nurses have helped with hand expression and she's starting to get some gtts.  Will discharge probably tomorrow and she will call insurance company about pump coverage.  A:  Working on supply.  P:  Will continue to provide lactation support.    Holli Hong, RNC, IBCLC

## 2019-04-21 NOTE — PLAN OF CARE
VS stable. Fundus firm, slightly off to the right.  Scant amount of rubra. Voiding normally, BM yesterday x 2. Pain well controled by Tylenol and ibuprofen. She is pumping independently.

## 2019-04-21 NOTE — PLAN OF CARE
Patient's blood pressures continue at baseline of 140/80. Patient rested well throughout the night. Patient refusing to pump through the night but did pump frequently throughout the day. No concerns at this time

## 2019-04-21 NOTE — PROVIDER NOTIFICATION
04/20/19 2123   Provider Notification   Provider Name/Title Dr. Beltran   Method of Notification Electronic Page   Request Evaluate - Remote   Notification Reason Other (Comment)     Requested an order for some ibuprofen since toradol is now complete

## 2019-04-22 VITALS
TEMPERATURE: 97.6 F | OXYGEN SATURATION: 99 % | WEIGHT: 258 LBS | HEART RATE: 90 BPM | RESPIRATION RATE: 16 BRPM | SYSTOLIC BLOOD PRESSURE: 137 MMHG | DIASTOLIC BLOOD PRESSURE: 80 MMHG | HEIGHT: 65 IN | BODY MASS INDEX: 42.99 KG/M2

## 2019-04-22 LAB
BLD PROD TYP BPU: NORMAL
BLD PROD TYP BPU: NORMAL
BLD UNIT ID BPU: 0
BLD UNIT ID BPU: 0
BLOOD PRODUCT CODE: NORMAL
BLOOD PRODUCT CODE: NORMAL
BPU ID: NORMAL
BPU ID: NORMAL
TRANSFUSION STATUS PATIENT QL: NORMAL

## 2019-04-22 PROCEDURE — 25000132 ZZH RX MED GY IP 250 OP 250 PS 637: Performed by: STUDENT IN AN ORGANIZED HEALTH CARE EDUCATION/TRAINING PROGRAM

## 2019-04-22 RX ORDER — AMOXICILLIN 250 MG
1 CAPSULE ORAL DAILY
Qty: 100 TABLET | Refills: 0 | Status: SHIPPED | OUTPATIENT
Start: 2019-04-22 | End: 2019-06-18

## 2019-04-22 RX ORDER — ACETAMINOPHEN 325 MG/1
650 TABLET ORAL EVERY 6 HOURS PRN
Qty: 100 TABLET | Refills: 0 | Status: SHIPPED | OUTPATIENT
Start: 2019-04-22 | End: 2020-03-02

## 2019-04-22 RX ORDER — IBUPROFEN 600 MG/1
600 TABLET, FILM COATED ORAL EVERY 6 HOURS PRN
Qty: 60 TABLET | Refills: 0 | Status: SHIPPED | OUTPATIENT
Start: 2019-04-22 | End: 2020-03-02

## 2019-04-22 RX ADMIN — ACETAMINOPHEN 975 MG: 325 TABLET, FILM COATED ORAL at 13:23

## 2019-04-22 RX ADMIN — RANITIDINE 150 MG: 150 TABLET ORAL at 07:33

## 2019-04-22 RX ADMIN — IBUPROFEN 600 MG: 600 TABLET ORAL at 04:30

## 2019-04-22 RX ADMIN — SIMETHICONE CHEW TAB 80 MG 80 MG: 80 TABLET ORAL at 07:32

## 2019-04-22 RX ADMIN — ACETAMINOPHEN 975 MG: 325 TABLET, FILM COATED ORAL at 04:31

## 2019-04-22 RX ADMIN — SENNOSIDES AND DOCUSATE SODIUM 2 TABLET: 8.6; 5 TABLET ORAL at 07:33

## 2019-04-22 RX ADMIN — IBUPROFEN 600 MG: 600 TABLET ORAL at 10:50

## 2019-04-22 RX ADMIN — SIMETHICONE CHEW TAB 80 MG 80 MG: 80 TABLET ORAL at 15:02

## 2019-04-22 NOTE — LACTATION NOTE
Resource RN was checking in on pt. Vale expressed that she was having pain with pumping. RN asked to assist with pumping. Pt has scabbed damage at the base of both nipples. The left side scab is off center. Gave Vale hydrogels and did education. Pt asked about not being able to pump breast milk. Reassured pt that she needs to keep pumping or hand expressing 8-12 times in 24 hours, around every three hours. Vale did not tolerate pumping at 1830 or 2130, but RN assisted to hand express. HE education, demonstration and teach back done. Pt had been using 28mm flanges, which were exchanged for the 24mm. Pt will now pump with the 24mm flanges as she is able to. Talked with pt about not increasing suction to discomfort. Spoke with bedside RN about plan going forward. NICU lactation should check in in the AM. Continue to monitor/assess and assist as needed.

## 2019-04-22 NOTE — PLAN OF CARE
Vss, postpartum assessment WDL. Taking tylenol and ibuprofen for pain control. Ambulating in hallway. Using breast pump and doing hand expression. Using hydrogel pads on nipples for redness and being tender, no visible cracks at this time. Visiting infant's in NICU. Discharge and home med instructions discussed with patient, all questions answered. Patient knows to follow up with provider in one week for BP check and 6 week for pp check. Patient will be discharged to [facility] status this afternoon.

## 2019-04-22 NOTE — PLAN OF CARE
Patient vital signs stable excluding one elevated blood pressure of 150/84. Denies symptoms of preeclampsia. Most recent /80.  Patient has moderate +3 dependent edema. Patient pain controlled with ibuprofen and tylenol. Patient reports gas pain and taking simethicone. Had a bowel movement today and taking senna. Voiding without difficulty. Bleeding scant, fundus firm. Incision open to air with no drainage. Lactation consulted during shift to address sore nipples and pumping plan. Hydrogels, hand expression, and gentle pumping plan continued over shift. Supported by significant other at bedside.

## 2019-04-22 NOTE — PROGRESS NOTES
"[facility]   Post-partum Note    Name:  Vale Contreras  MRN: [id number]    S: Patient is doing well this morning.  Pain is controlled. Lochia minimal. Voiding spontaneously. Passing flatus. Tolerating PO without nausea or vomiting. Ambulating without dizziness. She is working on pumping/breastfeeding. Plans POPs for contraception. Denies HA, vision changes, chest pain, or shortness of breath. She states the twins are still in NICU.     O:   /80   Pulse 90   Temp 97.9  F (36.6  C) (Oral)   Resp 16   Ht 1.651 m (5' 5\")   Wt 117 kg (258 lb)   LMP 2018   SpO2 99%   Breastfeeding? Unknown   BMI 42.93 kg/m      Gen:  Resting comfortably, NAD  CV:  Regular rate, well perfused  Pulm:  Breathing comfortably on room air  Abd:  Soft, appropriately ttp, slightly distended. Fundus 1 cm below umbilicus, firm and non-tender.  Incision: Steri strips in place, small amount of dried blood. No surrounding erythema or drainage.  Ext:  non-tender, 2+ LE edema b/l    No intake/output data recorded.    Hgb:   Hemoglobin   Date Value Ref Range Status   2019 7.3 (L) 11.7 - 15.7 g/dL Final     Assessment/Plan:  Vale Contreras is a 33 year old  on POD#3 s/p PLTCS for Mono/Di Twins. Pregnancy complicated by Gestational HTN. Doing well post-operatively.     #Gestational HTN  - No signs/symptoms of worsening preeclampsia  - Blood pressures normal to mild range overnight  - Patient to check BPs at home once discharged    # Routine postpartum management  Pain: Well-controlled will Tylenol and Ibuprofen. Oxycodone PRN.  Hgb: 9.3 >  > 7.3, currently asymptomatic. Vital signs stable. Discharge home with PO Fe.  GI:  Scheduled bowel regimen ordered.   :  S/p Quinn, voiding spontaneously. No issues   Rh: Positive, Rhogam not indicated  Rubella: Immune  Feed: Breastfeeding  BC: POP    Dispo: Anticipate discharge home POD#3.    Bandar Flores MD  Obstetrics & Gynecology, " PGY2    [facility] staff:  Appreciate note by Dr. Flores.  I have seen and examined the patient without the resident. I have reviewed, edited, and agree with the note.    My findings are:  Patient feeling well, meeting goals for discharge and hopeful to transition today to boarding room as infant still in NICU. Asx, BPs normal to mild range, discussed precautions and recommended f/u in 1 week for BP check. Patient reports also has BP cuff at home and will check at home. Parameters given to RTC or call if SBP >160 or DBP >110 or symptoms.  Viola Elise MD  4/22/2019  8:16 AM

## 2019-04-27 ENCOUNTER — LACTATION ENCOUNTER (OUTPATIENT)
Age: 34
End: 2019-04-27

## 2019-04-27 NOTE — LACTATION NOTE
"This note was copied from a baby's chart.  Discharge Instructions for Rogelio Maurice, and Vale:    Make sure each baby is eating at least 8 times a day, has at least 6-8 wet diapers in 24 hours, and 4 stools in 24 hours, to show adequate intake.      Birth Control and Other Medications: Avoid hormonal birth control for as long as possible and until your milk supply is well established, as it may impact your supply.  Some women also find decongestants and antihistamines may impact supply.  Always get a second opinion from a lactation consultant if told to \"pump and dump\" when starting a new medication; most medications are compatible.    Paced Bottlefeeding: Continue to use paced bottlefeeding techniques, even when your baby is bigger and stronger, to prevent overeating. A bottlefeeding should take 20-30 minutes, just like an adult's meal. You may need to show caregivers (, grandparents, etc) this technique.  Please let us know if you'd like information on direct breastfeeding in the future; remember this is always an option if you decide to resume it.    Outpatient lactation resources:   [facility]   [phone]  Breastfeeding Connection at [facility]  [phone]   Breastfeeding Connection at [facility]   [phone]  [facility] Lactation Services    [phone]  [facility]       [phone]  [facility]      [phone]  [facility]      [phone]  [facility]      [phone]    [facility]       [phone]    Baby[facility]afes (www.babycafeusa.org):  [facility] [place] (Wed 12:30-2:30)     [phone].  [facility] [place] (Thurs 12:30-2:30)    [phone].  [facility] [place] (Tuesday 9:30-11:30)   [phone].  [facility] [place] (Wednesdays (1:30-3p)    [phone].  [facility] [place] (Mondays 12n-2p) [phone].  [facility]/ " Emir (Wed 12:30p-2:30p)   [phone].  [facility] [place] (Wednesdays 10a-12n    [phone].  [facility] (Mondays 10a-12n)    [phone].  [facility] [place] (Tuesday 10a-12n)    [phone].    Other Walk-In Lactaton Help:  [facility]/ Destiny Hickman (Tues/Wed)   [phone]  [facility] (Thurs 2:30-3:30)   [phone]  [employer] Baby Weigh In (various times and locations)  [email or web address]            [facility] Lactation Support:  [facility] Outpatient Lactation Clinics Phone: [phone]  Locations: [facility], [facility], [facility] clinics  Clinic hours: Monday - Friday 8 am to 4 pm - Closed all major holidays.  Phone calls answered: Monday - Friday between 9 am and 2 pm.  Phone calls after hours: Leave a message and your call will be returned the next business  day. You can also talk with a [facility] Care Connection Triage Nurse by calling [phone].   [facility]: home nurse visit for mother band baby: [phone]    Other Resources:  WIC (call for eligibility information)     [phone]    La Leche [employer] International   www.Veedali.org  4-072-6-LA-LECHE (890-717-5864)    Office on Women's Health National Breastfeeding Help Line  8am to 5pm, English and Singaporean 1-381.899.4363 option 1  https://www.womenshealth.gov/breastfeeding/   International Breastfeeding [place] ([place])-- http://ibconline.ca/  [employer]-- up to date lactation information: www.Comenta.TV ([place]  Drugs and lactation database:  https://toxnet.nlm.nih.gov/newtoxnet/lactmed.htm   The [facility] Call Center is available to answer questions about the use of medications during pregnancy and while breastfeeding. [phone] www.AdWired     Madiha Kay RNC, IBCLC/ Devi Winters RNC, IBCLC/ Holli Hong RNC, IBCLC [phone]

## 2019-05-01 ENCOUNTER — OFFICE VISIT (OUTPATIENT)
Dept: OBGYN | Facility: CLINIC | Age: 34
End: 2019-05-01
Attending: OBSTETRICS & GYNECOLOGY
Payer: COMMERCIAL

## 2019-05-01 VITALS
SYSTOLIC BLOOD PRESSURE: 142 MMHG | WEIGHT: 220.6 LBS | HEART RATE: 77 BPM | HEIGHT: 65 IN | BODY MASS INDEX: 36.75 KG/M2 | DIASTOLIC BLOOD PRESSURE: 93 MMHG

## 2019-05-01 DIAGNOSIS — I10 BENIGN ESSENTIAL HYPERTENSION: Primary | ICD-10-CM

## 2019-05-01 PROCEDURE — 40000269 ZZH STATISTIC NO CHARGE FACILITY FEE: Mod: ZF

## 2019-05-01 ASSESSMENT — MIFFLIN-ST. JEOR: SCORE: 1696.52

## 2019-05-01 NOTE — LETTER
5/1/2019       RE: Vale Contreras  [address] [address]     Dear Colleague,    Thank you for referring your patient, Vale Contreras, to the [facility]. Please see a copy of my visit note below.    Chief Complaint   Patient presents with     Allied Health Visit       Again, thank you for allowing me to participate in the care of your patient.      Sincerely,    S Nurse

## 2019-05-01 NOTE — LETTER
Date:June 6, 2019      Patient was self referred, no letter generated. Do not send.        [facility] Health Information

## 2019-05-01 NOTE — LETTER
5/1/2019    RE: Vale Contreras  [address] [facility] [address] [facility] [address]       Chief Complaint   Patient presents with     Allied Health Visit       [facility] Nurse

## 2019-05-03 LAB — COPATH REPORT: NORMAL

## 2019-05-17 ENCOUNTER — OFFICE VISIT (OUTPATIENT)
Dept: MIDWIFE SERVICES | Facility: CLINIC | Age: 34
End: 2019-05-17
Payer: COMMERCIAL

## 2019-05-17 VITALS
DIASTOLIC BLOOD PRESSURE: 92 MMHG | BODY MASS INDEX: 34.61 KG/M2 | WEIGHT: 208 LBS | SYSTOLIC BLOOD PRESSURE: 140 MMHG | HEART RATE: 89 BPM

## 2019-05-17 DIAGNOSIS — Z30.011 ENCOUNTER FOR INITIAL PRESCRIPTION OF CONTRACEPTIVE PILLS: Primary | ICD-10-CM

## 2019-05-17 PROCEDURE — 99202 OFFICE O/P NEW SF 15 MIN: CPT | Performed by: ADVANCED PRACTICE MIDWIFE

## 2019-05-17 RX ORDER — NORGESTIMATE AND ETHINYL ESTRADIOL 7DAYSX3 28
1 KIT ORAL DAILY
Qty: 84 TABLET | Refills: 3 | Status: SHIPPED | OUTPATIENT
Start: 2019-05-17 | End: 2020-04-15

## 2019-05-17 NOTE — PROGRESS NOTES
S;  Patient here for birth control consultation.  Would like to return to birth control pills.   Patient had   Section delivery of twin boys on 19 and is now 4 weeks postpartum.  The Twins were in NICU x 7 days due to feeding and breathing issues but recovered nicely.  Patient did not breastfeed and is not breastfeeding.  Patient would like to return to trinessa/orthotricyclen due to liking it several  Years ago. Patient denies any issues with this birth control pill.    O; BP (!) 140/92   Pulse 89   Wt 94.3 kg (208 lb)   LMP 2018   BMI 34.61 kg/m    Upon clinic BP was elevated patient states this happens often and repeat BP was 114/72    A; birth control counseling  4 weeks postpartum from C/S for twins    P;  Patient requests birth control.  Birth control instructions, knows when to take if missed pills and ACHES reviewed.  Birth control  pills faxed to preferred pharmacy.  Total time in face to face discussion with patient that was more than 50% spent on counseling and coordination of care of birth control was 20 minutes.  Prescription for ortho tricyclen sent to preferred pharmacy.  Patient has had  home helping her with twins and enc. To take naps daily now that he is returning to work.  Patient should return to clinic in 4 weeks for a 8 week postpartum visit, pt may see CNm at this clinic if she prefers but would llike to see her in 4 weeks to assess BP. Pt will start birth control pills this , May 19, 2019.    Bridgette Pringle CNM

## 2019-06-18 ENCOUNTER — PRENATAL OFFICE VISIT (OUTPATIENT)
Dept: MIDWIFE SERVICES | Facility: CLINIC | Age: 34
End: 2019-06-18
Payer: COMMERCIAL

## 2019-06-18 VITALS
HEART RATE: 91 BPM | HEIGHT: 64 IN | BODY MASS INDEX: 35 KG/M2 | DIASTOLIC BLOOD PRESSURE: 85 MMHG | SYSTOLIC BLOOD PRESSURE: 135 MMHG | WEIGHT: 205 LBS | OXYGEN SATURATION: 98 %

## 2019-06-18 RX ORDER — SERTRALINE HYDROCHLORIDE 25 MG/1
25 TABLET, FILM COATED ORAL DAILY
Qty: 30 TABLET | Refills: 0 | Status: SHIPPED | OUTPATIENT
Start: 2019-06-18 | End: 2019-07-10

## 2019-06-18 ASSESSMENT — PATIENT HEALTH QUESTIONNAIRE - PHQ9
5. POOR APPETITE OR OVEREATING: SEVERAL DAYS
SUM OF ALL RESPONSES TO PHQ QUESTIONS 1-9: 9

## 2019-06-18 ASSESSMENT — MIFFLIN-ST. JEOR: SCORE: 1609.87

## 2019-06-18 ASSESSMENT — ANXIETY QUESTIONNAIRES
6. BECOMING EASILY ANNOYED OR IRRITABLE: SEVERAL DAYS
3. WORRYING TOO MUCH ABOUT DIFFERENT THINGS: SEVERAL DAYS
5. BEING SO RESTLESS THAT IT IS HARD TO SIT STILL: NOT AT ALL
7. FEELING AFRAID AS IF SOMETHING AWFUL MIGHT HAPPEN: NOT AT ALL
2. NOT BEING ABLE TO STOP OR CONTROL WORRYING: SEVERAL DAYS
GAD7 TOTAL SCORE: 5
1. FEELING NERVOUS, ANXIOUS, OR ON EDGE: SEVERAL DAYS

## 2019-06-18 NOTE — PROGRESS NOTES
"SUBJECTIVE:   Vale Contreras is here for her 6-week postpartum checkup.     HPI: Vale had a C/S with the [facility] MD's for twins about 8 weeks ago. She feels that her postpartum recovery has been good, with the exception of her mood. She felt ok during pregnancy, immediate PP felt ok, but around 4 weeks PP started to feel more depression symptoms including feeling like shes \"in a fog, having a hard time accessing my happy\", feeling like \"I'm not bonding with them\" and does not feel like herself most of the time. Denies anxiety, sleeping difficulty except for frequent waking to care for babies, thoughts of self harm or hurting the babies, not wanting to get out of bed, etc. She is able to care for herself and her babies, just does not feel like herself. She saw Bridgette Pringle CNM a few weeks ago to start OCP's. She had her first period with the birth control pills last week, and had a bit of spotting again this week. She denies fever, foul smelling discharge, abdominal tenderness, and feels that her incision has healed well.     DELIVERY DATE: 4/19/19  Planned C/S for twins of a viable twin boys, weight 6 pounds 6 oz. And 6 pounds 15oz, with no complications.  FEEDING METHOD:Bottlefed    CONTRACEPTION PLANNED: oral contraceptives  She  has had intercourse since delivery and complains of No discomfort.  HX OF DEPRESSION: No, but currently with some symptoms (see HPI)  HX OF ABUSE:No  OTHER HPI: She has no signs of infection, bleeding or other complications. Bleeding stopped, abdominal incision healing well.         EXAM:  /85   Pulse 91   Ht 1.626 m (5' 4\")   Wt 93 kg (205 lb)   LMP 06/11/2019   SpO2 98%   Breastfeeding? No   BMI 35.19 kg/m    GENERAL APPEARANCE: healthy, alert and no distress  NECK: thyroid normal to palpation  RESP: lungs clear to auscultation - no rales, rhonchi or wheezes  CV: regular rates and rhythm, normal S1 S2, no S3 or S4 and no murmur, click or rub -  ABDOMEN: soft, " nontender, diastasis recti closing-about 2 fingerbreadths  PSYCH: mentation appears normal and affect normal/bright    ASSESSMENT:   Normal postpartum exam after C/S for twins.  (Z39.2) Routine postpartum follow-up  (primary encounter diagnosis)    (O99.345,  F53.0) Postpartum depression  Plan: sertraline (ZOLOFT) 25 MG tablet  Vale will start with dose of 25mg, if no improvement in a week, she will increase dose to 50mg.  return to care in 2 weeks, or sooner if symptoms worsen.  Gave her [facility] [place] card, and encouraged her to utilize that resource. They can help her find a therapist, or help for immediate concerns. She is welcome to call our clinic 24 hours a day, and access our midwifery team.  Call 911 or go to ED if she has thoughts of self harm or hurting her children.     PLAN:  Birth Control as ordered. Fertility reviewed.   Encourage Kegals and abdominal exercise. Slow, steady weight loss.  Continue a multi vitamin supplement, especially if breastfeeding.  Pap smear was not obtained.  GC/CHLAMYDIA CULTURE OBTAINED:NO   Post partum Hgb was not obtained.    Nader Hartley CNM

## 2019-06-19 ASSESSMENT — ANXIETY QUESTIONNAIRES: GAD7 TOTAL SCORE: 5

## 2019-07-10 ENCOUNTER — OFFICE VISIT (OUTPATIENT)
Dept: MIDWIFE SERVICES | Facility: CLINIC | Age: 34
End: 2019-07-10
Payer: COMMERCIAL

## 2019-07-10 VITALS
SYSTOLIC BLOOD PRESSURE: 120 MMHG | WEIGHT: 206.4 LBS | BODY MASS INDEX: 35.24 KG/M2 | HEART RATE: 80 BPM | HEIGHT: 64 IN | DIASTOLIC BLOOD PRESSURE: 78 MMHG | OXYGEN SATURATION: 97 %

## 2019-07-10 DIAGNOSIS — Z87.59 HISTORY OF POSTPARTUM DEPRESSION: Primary | ICD-10-CM

## 2019-07-10 DIAGNOSIS — Z86.59 HISTORY OF POSTPARTUM DEPRESSION: Primary | ICD-10-CM

## 2019-07-10 PROCEDURE — 99212 OFFICE O/P EST SF 10 MIN: CPT | Performed by: ADVANCED PRACTICE MIDWIFE

## 2019-07-10 ASSESSMENT — ANXIETY QUESTIONNAIRES
1. FEELING NERVOUS, ANXIOUS, OR ON EDGE: NOT AT ALL
5. BEING SO RESTLESS THAT IT IS HARD TO SIT STILL: NOT AT ALL
GAD7 TOTAL SCORE: 1
2. NOT BEING ABLE TO STOP OR CONTROL WORRYING: NOT AT ALL
3. WORRYING TOO MUCH ABOUT DIFFERENT THINGS: NOT AT ALL
6. BECOMING EASILY ANNOYED OR IRRITABLE: SEVERAL DAYS
7. FEELING AFRAID AS IF SOMETHING AWFUL MIGHT HAPPEN: NOT AT ALL

## 2019-07-10 ASSESSMENT — PATIENT HEALTH QUESTIONNAIRE - PHQ9
SUM OF ALL RESPONSES TO PHQ QUESTIONS 1-9: 2
5. POOR APPETITE OR OVEREATING: NOT AT ALL

## 2019-07-10 ASSESSMENT — MIFFLIN-ST. JEOR: SCORE: 1616.22

## 2019-07-11 ASSESSMENT — ANXIETY QUESTIONNAIRES: GAD7 TOTAL SCORE: 1

## 2019-10-04 ENCOUNTER — HEALTH MAINTENANCE LETTER (OUTPATIENT)
Age: 34
End: 2019-10-04

## 2020-02-28 ASSESSMENT — ANXIETY QUESTIONNAIRES
7. FEELING AFRAID AS IF SOMETHING AWFUL MIGHT HAPPEN: NOT AT ALL
5. BEING SO RESTLESS THAT IT IS HARD TO SIT STILL: NOT AT ALL
7. FEELING AFRAID AS IF SOMETHING AWFUL MIGHT HAPPEN: NOT AT ALL
GAD7 TOTAL SCORE: 4
1. FEELING NERVOUS, ANXIOUS, OR ON EDGE: NOT AT ALL
3. WORRYING TOO MUCH ABOUT DIFFERENT THINGS: SEVERAL DAYS
6. BECOMING EASILY ANNOYED OR IRRITABLE: SEVERAL DAYS
4. TROUBLE RELAXING: SEVERAL DAYS
2. NOT BEING ABLE TO STOP OR CONTROL WORRYING: SEVERAL DAYS
GAD7 TOTAL SCORE: 4

## 2020-02-28 ASSESSMENT — ENCOUNTER SYMPTOMS
MUSCLE CRAMPS: 1
NECK PAIN: 0
ARTHRALGIAS: 0
MYALGIAS: 1
JOINT SWELLING: 0
BACK PAIN: 0
STIFFNESS: 0
MUSCLE WEAKNESS: 0

## 2020-02-29 ASSESSMENT — ANXIETY QUESTIONNAIRES: GAD7 TOTAL SCORE: 4

## 2020-03-02 ENCOUNTER — OFFICE VISIT (OUTPATIENT)
Dept: OBGYN | Facility: CLINIC | Age: 35
End: 2020-03-02
Attending: OBSTETRICS & GYNECOLOGY
Payer: COMMERCIAL

## 2020-03-02 VITALS
WEIGHT: 196.4 LBS | HEART RATE: 79 BPM | DIASTOLIC BLOOD PRESSURE: 92 MMHG | BODY MASS INDEX: 33.53 KG/M2 | HEIGHT: 64 IN | SYSTOLIC BLOOD PRESSURE: 139 MMHG

## 2020-03-02 DIAGNOSIS — Z00.00 VISIT FOR PREVENTIVE HEALTH EXAMINATION: Primary | ICD-10-CM

## 2020-03-02 PROCEDURE — G0463 HOSPITAL OUTPT CLINIC VISIT: HCPCS | Mod: ZF

## 2020-03-02 ASSESSMENT — ENCOUNTER SYMPTOMS
POLYDIPSIA: 0
SINUS CONGESTION: 0
TINGLING: 0
JOINT SWELLING: 0
CHILLS: 0
RECTAL BLEEDING: 0
FATIGUE: 0
WEIGHT LOSS: 0
POOR WOUND HEALING: 0
NECK MASS: 0
DISTURBANCES IN COORDINATION: 0
SLEEP DISTURBANCES DUE TO BREATHING: 0
MYALGIAS: 1
WHEEZING: 0
PARALYSIS: 0
NERVOUS/ANXIOUS: 0
EYE REDNESS: 0
DYSURIA: 0
CLAUDICATION: 0
INCREASED ENERGY: 0
SMELL DISTURBANCE: 0
DOUBLE VISION: 0
HEMATURIA: 0
HEADACHES: 0
SPUTUM PRODUCTION: 0
DIFFICULTY URINATING: 0
DIARRHEA: 0
HEMOPTYSIS: 0
DIZZINESS: 0
INSOMNIA: 0
BLOOD IN STOOL: 0
HALLUCINATIONS: 0
SWOLLEN GLANDS: 0
EYE IRRITATION: 0
TASTE DISTURBANCE: 0
HOT FLASHES: 0
SORE THROAT: 0
LEG SWELLING: 0
SNORES LOUDLY: 0
JAUNDICE: 0
FLANK PAIN: 0
ORTHOPNEA: 0
FEVER: 0
ARTHRALGIAS: 0
COUGH: 0
STIFFNESS: 0
NIGHT SWEATS: 0
NUMBNESS: 0
RECTAL PAIN: 0
CONSTIPATION: 0
SINUS PAIN: 0
BREAST PAIN: 0
TROUBLE SWALLOWING: 0
LEG PAIN: 0
POLYPHAGIA: 0
ABDOMINAL PAIN: 0
ALTERED TEMPERATURE REGULATION: 0
SYNCOPE: 0
HEARTBURN: 0
MEMORY LOSS: 0
BACK PAIN: 0
POSTURAL DYSPNEA: 0
SKIN CHANGES: 0
WEIGHT GAIN: 0
LIGHT-HEADEDNESS: 0
HYPOTENSION: 0
LOSS OF CONSCIOUSNESS: 0
EXTREMITY NUMBNESS: 0
DECREASED APPETITE: 0
HOARSE VOICE: 0
NAIL CHANGES: 0
VOMITING: 0
TREMORS: 0
DECREASED CONCENTRATION: 0
BOWEL INCONTINENCE: 0
BREAST MASS: 0
DECREASED LIBIDO: 0
MUSCLE WEAKNESS: 0
BLOATING: 0
WEAKNESS: 0
HYPERTENSION: 0
PALPITATIONS: 0
EYE PAIN: 0
COUGH DISTURBING SLEEP: 0
EYE WATERING: 0
RESPIRATORY PAIN: 0
PANIC: 0
SPEECH CHANGE: 0
SHORTNESS OF BREATH: 0
EXERCISE INTOLERANCE: 0
SEIZURES: 0
BRUISES/BLEEDS EASILY: 0
TACHYCARDIA: 0
NAUSEA: 0
MUSCLE CRAMPS: 1
DEPRESSION: 0
NECK PAIN: 0
DYSPNEA ON EXERTION: 0

## 2020-03-02 ASSESSMENT — ANXIETY QUESTIONNAIRES
1. FEELING NERVOUS, ANXIOUS, OR ON EDGE: SEVERAL DAYS
6. BECOMING EASILY ANNOYED OR IRRITABLE: SEVERAL DAYS
GAD7 TOTAL SCORE: 4
3. WORRYING TOO MUCH ABOUT DIFFERENT THINGS: SEVERAL DAYS
5. BEING SO RESTLESS THAT IT IS HARD TO SIT STILL: NOT AT ALL
7. FEELING AFRAID AS IF SOMETHING AWFUL MIGHT HAPPEN: NOT AT ALL
2. NOT BEING ABLE TO STOP OR CONTROL WORRYING: SEVERAL DAYS

## 2020-03-02 ASSESSMENT — PATIENT HEALTH QUESTIONNAIRE - PHQ9
SUM OF ALL RESPONSES TO PHQ QUESTIONS 1-9: 2
5. POOR APPETITE OR OVEREATING: NOT AT ALL

## 2020-03-02 ASSESSMENT — MIFFLIN-ST. JEOR: SCORE: 1565.86

## 2020-03-02 NOTE — PATIENT INSTRUCTIONS

## 2020-03-02 NOTE — LETTER
3/2/2020       RE: Vale Contreras  [address] [facility] [address]     Dear Colleague,    Thank you for referring your patient, Vale Contreras, to [facility] Please see a copy of my visit note below.      Progress Note    SUBJECTIVE:  Vale Contreras is an 34 year old, , who requests an Annual Preventive Exam. Concerns today include 1 month of lower abdomen cramping. She describes it as a dull constant pain that feels like period cramps. It is localized to her lower abdomen and at times wraps around to her back side. They are bothersome but not bad enough to take medication for it. She doesn't recall anything that makes the pain better or worse. Denies flank pain, constipation, dysuria, and hematuria. Patient thinks it might be associated to her back pain from picking up her twins all day. She has also tried to be more physically active.  She is on Ortho Tri-Cyclen BCP's.   She has a cousin wit (+)BRCA. Her father and Vale were tested and are negative carriers.     Menstrual History:  Menstrual History 2019 3/2/2020 3/2/2020   LAST MENSTRUAL PERIOD 2019 -   Menarche Age - - -   Period Cycle (Days) - - 28   Period Duration (Days) - - 3-4   Method of Contraception - - Combined oral contraceptive   Period Pattern - - Regular   Menstrual Flow - - Light   Dysmenorrhea - - -   PMS Symptoms - - -   Reviewed Today - - Yes       Last PAP: 2018, patient had it done at [facility] and does not recall if co-testing with HPV was done. Will work on getting those records.   History of abnormal Pap smear: NO - age 30- 65 PAP every 3 years recommended    Mammogram current: not applicable    HISTORY:  norgestim-eth estrad triphasic (ORTHO TRI-CYCLEN, 28,) 0.18/0.215/0.25 MG-35 MCG tablet, Take 1 tablet by mouth daily  VITAMIN D, CHOLECALCIFEROL, PO, Take 5,000 Units by mouth  daily    No current facility-administered medications on file prior to visit.     Allergies   Allergen Reactions     Penicillins Unknown     Sulfa Drugs      Immunization History   Administered Date(s) Administered     Historical DTP/aP 1984, 1984, 1984, 1985, 1988     MMR 1985, 1996     Polio, Unspecified  1984, 1984, 1984, 1989     TDAP Vaccine (Boostrix) 2019     Td (Adult), Adsorbed 1996       OB History    Para Term  AB Living   1 1 1 0 0 2   SAB TAB Ectopic Multiple Live Births   0 0 0 1 2     Past Medical History:   Diagnosis Date     NO ACTIVE PROBLEMS      Past Surgical History:   Procedure Laterality Date      SECTION N/A 2019    Procedure: Primary  Section;  Surgeon: Cynthia Rivas MD;  Location:  [facility]     ENT SURGERY      Tubes put in my ears as a small child     Family History   Problem Relation Age of Onset     Hypertension Father         Factor V, I have been tested (negative)     Breast Cancer Cousin         She has the BRCA1 gene. My father was tested and determined not to be a carrier.     Diabetes No family hx of      Social History     Socioeconomic History     Marital status: Single     Spouse name: Bernardo Julien     Number of children: 0     Years of education: 18     Highest education level: None   Occupational History     Occupation:      Employer: [employer]     Comment: office work full time   Social Needs     Financial resource strain: None     Food insecurity:     Worry: None     Inability: None     Transportation needs:     Medical: None     Non-medical: None   Tobacco Use     Smoking status: Never Smoker     Smokeless tobacco: Never Used   Substance and Sexual Activity     Alcohol use: Yes     Alcohol/week: 0.0 standard drinks     Comment: 8 drinks a week, stopped in pregnancy     Drug use: No     Sexual activity: Yes     Partners: Male      Birth control/protection: Pill   Lifestyle     Physical activity:     Days per week: None     Minutes per session: None     Stress: None   Relationships     Social connections:     Talks on phone: None     Gets together: None     Attends Sabianism service: None     Active member of club or organization: None     Attends meetings of clubs or organizations: None     Relationship status: None     Intimate partner violence:     Fear of current or ex partner: None     Emotionally abused: None     Physically abused: None     Forced sexual activity: None   Other Topics Concern     Parent/sibling w/ CABG, MI or angioplasty before 65F 55M? No   Social History Narrative     None       Review of Systems     Constitutional:  Negative for fever, chills, weight loss, weight gain, fatigue, decreased appetite, night sweats, recent stressors, height gain, height loss, post-operative complications, incisional pain, hallucinations, increased energy, hyperactivity and confused.   HENT:  Negative for ear pain, hearing loss, tinnitus, nosebleeds, trouble swallowing, hoarse voice, mouth sores, sore throat, ear discharge, tooth pain, gum tenderness, taste disturbance, smell disturbance, hearing aid, bleeding gums, dry mouth, sinus pain, sinus congestion and neck mass.    Eyes:  Negative for double vision, pain, redness, eye pain, decreased vision, eye watering, eye bulging, eye dryness, flashing lights, spots, floaters, strabismus, tunnel vision, jaundice and eye irritation.   Respiratory:   Negative for cough, hemoptysis, sputum production, shortness of breath, wheezing, sleep disturbances due to breathing, snores loudly, respiratory pain, dyspnea on exertion, cough disturbing sleep and postural dyspnea.    Cardiovascular:  Negative for chest pain, dyspnea on exertion, palpitations, orthopnea, claudication, leg swelling, fingers/toes turn blue, hypertension, hypotension, syncope, history of heart murmur, chest pain on exertion, chest  pain at rest, pacemaker, few scattered varicosities, leg pain, sleep disturbances due to breathing, tachycardia, light-headedness, exercise intolerance and edema.   Gastrointestinal:  Negative for heartburn, nausea, vomiting, abdominal pain, diarrhea, constipation, blood in stool, melena, rectal pain, bloating, hemorrhoids, bowel incontinence, jaundice, rectal bleeding, coffee ground emesis and change in stool.   Genitourinary:  Negative for bladder incontinence, dysuria, urgency, hematuria, flank pain, vaginal discharge, difficulty urinating, genital sores, dyspareunia, decreased libido, nocturia, voiding less frequently, arousal difficulty, abnormal vaginal bleeding, excessive menstruation, menstrual changes, hot flashes, vaginal dryness and postmenopausal bleeding.   Musculoskeletal:  Positive for myalgias and muscle cramps. Negative for back pain, joint swelling, arthralgias, stiffness, neck pain, bone pain, muscle weakness and fracture.   Skin:  Negative for nail changes, itching, poor wound healing, rash, hair changes, skin changes, acne, warts, poor wound healing, scarring, flaky skin, Raynaud's phenomenon, sensitivity to sunlight and skin thickening.   Neurological:  Negative for dizziness, tingling, tremors, speech change, seizures, loss of consciousness, weakness, light-headedness, numbness, headaches, disturbances in coordination, extremity numbness, memory loss, difficulty walking and paralysis.   Endo/Heme:  Negative for anemia, swollen glands and bruises/bleeds easily.   Psychiatric/Behavioral:  Negative for depression, hallucinations, memory loss, decreased concentration, mood swings and panic attacks.    Breast:  Negative for breast discharge, breast mass, breast pain and nipple retraction.   Endocrine:  Negative for altered temperature regulation, polyphagia, polydipsia, unwanted hair growth and change in facial hair.      Answers for HPI/ROS submitted by the patient on 2/28/2020   TIM 7 TOTAL  "SCORE: 4  PHQ-9 SCORE 2019 7/10/2019 3/2/2020   PHQ-9 Total Score 9 2 2     TIM-7 SCORE 7/10/2019 2020 3/2/2020   Total Score - 4 (minimal anxiety) -   Total Score 1 4 4       EXAM:  Blood pressure (!) 139/92, pulse 79, height 1.626 m (5' 4\"), weight 89.1 kg (196 lb 6.4 oz), last menstrual period 2020, not currently breastfeeding. Body mass index is 33.71 kg/m .  General - pleasant female in no acute distress.  Skin - no suspicious lesions or rashes  EENT-  euthyroid with out palpable nodules  Neck - supple without lymphadenopathy.  Lungs - clear to auscultation bilaterally.  Heart - regular rate and rhythm without murmur.  Abdomen - soft, nontender, nondistended, no masses or organomegaly noted. No flank tenderness.  incision is healing well.   Musculoskeletal - no gross deformities. Moving all extremities appropriately.   Neurological - normal mental status.    Breast Exam:  Breast: Without visible skin changes. No dimpling or lesions seen.   Breasts supple, non-tender with palpation, no dominant mass, nodularity, or nipple discharge noted bilaterally.     Pelvic Exam:  EG/BUS: Normal genital architecture without lesions, erythema or abnormal secretions. Normal genital architecture without lesions, erythema or abnormal secretions Bartholin's, Urethra, [place]'s normal   Urethral meatus: normal   Urethra: no masses, tenderness, or scarring   Bladder: no masses or tenderness   Vagina: moist, mild erythema around cervical os opening, rugae with creamy, white and odorless  secretions  Cervix: no lesions, closed, moist  Uterus: anteverted,  and small, smooth, firm, mobile w/o pain  Adnexa: Within normal limits and No masses, nodularity, tenderness  Rectum:anus normal     ASSESSMENT & PLAN:  Vale Contreras is an 34 year old,  who presents for an annual exam and with concerns for lower abdomen cramping. Patient reports that it could be associated with her back pain and increased " exercise/acitivty. Her exam is benign.  Likely a musculoskeletal etiology as patient reports no associated symptoms, and pain is associated with back pain. Advised patient on ways she can minimize back strain with her kids, and during exercise. Will have her follow-up with PCP if the pain continues or it gets worse.  Continue BCP's.   Additional teaching done at this visit regarding self breast exam and exercise. Return to clinic in one year.          Will Olivera, MS3  3/2/2020    I have seen and examined the patient with the Medical Student. I have reviewed, edited, and agree with the note.   My findings are: as above    Henry Cabral M.D.

## 2020-03-02 NOTE — PROGRESS NOTES
Progress Note    SUBJECTIVE:  Vale Contreras is an 34 year old, , who requests an Annual Preventive Exam. Concerns today include 1 month of lower abdomen cramping. She describes it as a dull constant pain that feels like period cramps. It is localized to her lower abdomen and at times wraps around to her back side. They are bothersome but not bad enough to take medication for it. She doesn't recall anything that makes the pain better or worse. Denies flank pain, constipation, dysuria, and hematuria. Patient thinks it might be associated to her back pain from picking up her twins all day. She has also tried to be more physically active.  She is on Ortho Tri-Cyclen BCP's.   She has a cousin wit (+)BRCA. Her father and Vale were tested and are negative carriers.     Menstrual History:  Menstrual History 2019 3/2/2020 3/2/2020   LAST MENSTRUAL PERIOD 2019 -   Menarche Age - - -   Period Cycle (Days) - - 28   Period Duration (Days) - - 3-4   Method of Contraception - - Combined oral contraceptive   Period Pattern - - Regular   Menstrual Flow - - Light   Dysmenorrhea - - -   PMS Symptoms - - -   Reviewed Today - - Yes       Last PAP: 2018, patient had it done at [facility] and does not recall if co-testing with HPV was done. Will work on getting those records.   History of abnormal Pap smear: NO - age 30- 65 PAP every 3 years recommended    Mammogram current: not applicable    HISTORY:  norgestim-eth estrad triphasic (ORTHO TRI-CYCLEN, 28,) 0.18/0.215/0.25 MG-35 MCG tablet, Take 1 tablet by mouth daily  VITAMIN D, CHOLECALCIFEROL, PO, Take 5,000 Units by mouth daily    No current facility-administered medications on file prior to visit.     Allergies   Allergen Reactions     Penicillins Unknown     Sulfa Drugs      Immunization History   Administered Date(s) Administered     Historical DTP/aP 1984, 1984, 1984, 1985, 1988     MMR  1985, 1996     Polio, Unspecified  1984, 1984, 1984, 1989     TDAP Vaccine (Boostrix) 2019     Td (Adult), Adsorbed 1996       OB History    Para Term  AB Living   1 1 1 0 0 2   SAB TAB Ectopic Multiple Live Births   0 0 0 1 2     Past Medical History:   Diagnosis Date     NO ACTIVE PROBLEMS      Past Surgical History:   Procedure Laterality Date      SECTION N/A 2019    Procedure: Primary  Section;  Surgeon: Cynthia Rivas MD;  Location:  [facility]     ENT SURGERY      Tubes put in my ears as a small child     Family History   Problem Relation Age of Onset     Hypertension Father         Factor V, I have been tested (negative)     Breast Cancer Cousin         She has the BRCA1 gene. My father was tested and determined not to be a carrier.     Diabetes No family hx of      Social History     Socioeconomic History     Marital status: Single     Spouse name: Bernardo Julien     Number of children: 0     Years of education: 18     Highest education level: None   Occupational History     Occupation:      Employer: [employer]     Comment: office work full time   Social Needs     Financial resource strain: None     Food insecurity:     Worry: None     Inability: None     Transportation needs:     Medical: None     Non-medical: None   Tobacco Use     Smoking status: Never Smoker     Smokeless tobacco: Never Used   Substance and Sexual Activity     Alcohol use: Yes     Alcohol/week: 0.0 standard drinks     Comment: 8 drinks a week, stopped in pregnancy     Drug use: No     Sexual activity: Yes     Partners: Male     Birth control/protection: Pill   Lifestyle     Physical activity:     Days per week: None     Minutes per session: None     Stress: None   Relationships     Social connections:     Talks on phone: None     Gets together: None     Attends Restoration service: None     Active member of club or  organization: None     Attends meetings of clubs or organizations: None     Relationship status: None     Intimate partner violence:     Fear of current or ex partner: None     Emotionally abused: None     Physically abused: None     Forced sexual activity: None   Other Topics Concern     Parent/sibling w/ CABG, MI or angioplasty before 65F 55M? No   Social History Narrative     None       Review of Systems     Constitutional:  Negative for fever, chills, weight loss, weight gain, fatigue, decreased appetite, night sweats, recent stressors, height gain, height loss, post-operative complications, incisional pain, hallucinations, increased energy, hyperactivity and confused.   HENT:  Negative for ear pain, hearing loss, tinnitus, nosebleeds, trouble swallowing, hoarse voice, mouth sores, sore throat, ear discharge, tooth pain, gum tenderness, taste disturbance, smell disturbance, hearing aid, bleeding gums, dry mouth, sinus pain, sinus congestion and neck mass.    Eyes:  Negative for double vision, pain, redness, eye pain, decreased vision, eye watering, eye bulging, eye dryness, flashing lights, spots, floaters, strabismus, tunnel vision, jaundice and eye irritation.   Respiratory:   Negative for cough, hemoptysis, sputum production, shortness of breath, wheezing, sleep disturbances due to breathing, snores loudly, respiratory pain, dyspnea on exertion, cough disturbing sleep and postural dyspnea.    Cardiovascular:  Negative for chest pain, dyspnea on exertion, palpitations, orthopnea, claudication, leg swelling, fingers/toes turn blue, hypertension, hypotension, syncope, history of heart murmur, chest pain on exertion, chest pain at rest, pacemaker, few scattered varicosities, leg pain, sleep disturbances due to breathing, tachycardia, light-headedness, exercise intolerance and edema.   Gastrointestinal:  Negative for heartburn, nausea, vomiting, abdominal pain, diarrhea, constipation, blood in stool, melena,  "rectal pain, bloating, hemorrhoids, bowel incontinence, jaundice, rectal bleeding, coffee ground emesis and change in stool.   Genitourinary:  Negative for bladder incontinence, dysuria, urgency, hematuria, flank pain, vaginal discharge, difficulty urinating, genital sores, dyspareunia, decreased libido, nocturia, voiding less frequently, arousal difficulty, abnormal vaginal bleeding, excessive menstruation, menstrual changes, hot flashes, vaginal dryness and postmenopausal bleeding.   Musculoskeletal:  Positive for myalgias and muscle cramps. Negative for back pain, joint swelling, arthralgias, stiffness, neck pain, bone pain, muscle weakness and fracture.   Skin:  Negative for nail changes, itching, poor wound healing, rash, hair changes, skin changes, acne, warts, poor wound healing, scarring, flaky skin, Raynaud's phenomenon, sensitivity to sunlight and skin thickening.   Neurological:  Negative for dizziness, tingling, tremors, speech change, seizures, loss of consciousness, weakness, light-headedness, numbness, headaches, disturbances in coordination, extremity numbness, memory loss, difficulty walking and paralysis.   Endo/Heme:  Negative for anemia, swollen glands and bruises/bleeds easily.   Psychiatric/Behavioral:  Negative for depression, hallucinations, memory loss, decreased concentration, mood swings and panic attacks.    Breast:  Negative for breast discharge, breast mass, breast pain and nipple retraction.   Endocrine:  Negative for altered temperature regulation, polyphagia, polydipsia, unwanted hair growth and change in facial hair.      Answers for HPI/ROS submitted by the patient on 2/28/2020   TIM 7 TOTAL SCORE: 4  PHQ-9 SCORE 6/18/2019 7/10/2019 3/2/2020   PHQ-9 Total Score 9 2 2     TIM-7 SCORE 7/10/2019 2/28/2020 3/2/2020   Total Score - 4 (minimal anxiety) -   Total Score 1 4 4       EXAM:  Blood pressure (!) 139/92, pulse 79, height 1.626 m (5' 4\"), weight 89.1 kg (196 lb 6.4 oz), last " menstrual period 2020, not currently breastfeeding. Body mass index is 33.71 kg/m .  General - pleasant female in no acute distress.  Skin - no suspicious lesions or rashes  EENT-  euthyroid with out palpable nodules  Neck - supple without lymphadenopathy.  Lungs - clear to auscultation bilaterally.  Heart - regular rate and rhythm without murmur.  Abdomen - soft, nontender, nondistended, no masses or organomegaly noted. No flank tenderness.  incision is healing well.   Musculoskeletal - no gross deformities. Moving all extremities appropriately.   Neurological - normal mental status.    Breast Exam:  Breast: Without visible skin changes. No dimpling or lesions seen.   Breasts supple, non-tender with palpation, no dominant mass, nodularity, or nipple discharge noted bilaterally.     Pelvic Exam:  EG/BUS: Normal genital architecture without lesions, erythema or abnormal secretions. Normal genital architecture without lesions, erythema or abnormal secretions Bartholin's, Urethra, [place]'s normal   Urethral meatus: normal   Urethra: no masses, tenderness, or scarring   Bladder: no masses or tenderness   Vagina: moist, mild erythema around cervical os opening, rugae with creamy, white and odorless  secretions  Cervix: no lesions, closed, moist  Uterus: anteverted,  and small, smooth, firm, mobile w/o pain  Adnexa: Within normal limits and No masses, nodularity, tenderness  Rectum:anus normal     ASSESSMENT & PLAN:  Vale Contreras is an 34 year old,  who presents for an annual exam and with concerns for lower abdomen cramping. Patient reports that it could be associated with her back pain and increased exercise/acitivty. Her exam is benign.  Likely a musculoskeletal etiology as patient reports no associated symptoms, and pain is associated with back pain. Advised patient on ways she can minimize back strain with her kids, and during exercise. Will have her follow-up with PCP if the pain continues  or it gets worse.  Continue BCP's.   Additional teaching done at this visit regarding self breast exam and exercise. Return to clinic in one year.          Will Olivera, MS3  3/2/2020    I have seen and examined the patient with the Medical Student. I have reviewed, edited, and agree with the note.   My findings are: as above  Henry Cabral M.D.

## 2020-04-14 ENCOUNTER — MYC MEDICAL ADVICE (OUTPATIENT)
Dept: OBGYN | Facility: CLINIC | Age: 35
End: 2020-04-14

## 2020-04-14 DIAGNOSIS — Z30.011 ENCOUNTER FOR INITIAL PRESCRIPTION OF CONTRACEPTIVE PILLS: ICD-10-CM

## 2020-04-14 RX ORDER — NORGESTIMATE AND ETHINYL ESTRADIOL 7DAYSX3 28
KIT ORAL
Qty: 84 TABLET | Refills: 3 | OUTPATIENT
Start: 2020-04-14

## 2020-04-14 NOTE — TELEPHONE ENCOUNTER
Patient was last seen at [facility]. Request should be sent to Henry Cabral MD. Denied request.   Lakisha Orellana RN-BSN

## 2020-04-15 RX ORDER — NORGESTIMATE AND ETHINYL ESTRADIOL 7DAYSX3 28
1 KIT ORAL DAILY
Qty: 84 TABLET | Refills: 3 | Status: SHIPPED | OUTPATIENT
Start: 2020-04-15 | End: 2021-03-07

## 2020-11-08 ENCOUNTER — HEALTH MAINTENANCE LETTER (OUTPATIENT)
Age: 35
End: 2020-11-08

## 2021-02-18 ASSESSMENT — ANXIETY QUESTIONNAIRES
2. NOT BEING ABLE TO STOP OR CONTROL WORRYING: SEVERAL DAYS
6. BECOMING EASILY ANNOYED OR IRRITABLE: SEVERAL DAYS
3. WORRYING TOO MUCH ABOUT DIFFERENT THINGS: SEVERAL DAYS
5. BEING SO RESTLESS THAT IT IS HARD TO SIT STILL: NOT AT ALL
4. TROUBLE RELAXING: SEVERAL DAYS
7. FEELING AFRAID AS IF SOMETHING AWFUL MIGHT HAPPEN: NOT AT ALL
GAD7 TOTAL SCORE: 6
1. FEELING NERVOUS, ANXIOUS, OR ON EDGE: MORE THAN HALF THE DAYS

## 2021-02-25 ASSESSMENT — ANXIETY QUESTIONNAIRES
3. WORRYING TOO MUCH ABOUT DIFFERENT THINGS: SEVERAL DAYS
6. BECOMING EASILY ANNOYED OR IRRITABLE: SEVERAL DAYS
7. FEELING AFRAID AS IF SOMETHING AWFUL MIGHT HAPPEN: NOT AT ALL
2. NOT BEING ABLE TO STOP OR CONTROL WORRYING: SEVERAL DAYS
1. FEELING NERVOUS, ANXIOUS, OR ON EDGE: MORE THAN HALF THE DAYS
4. TROUBLE RELAXING: SEVERAL DAYS
5. BEING SO RESTLESS THAT IT IS HARD TO SIT STILL: NOT AT ALL
GAD7 TOTAL SCORE: 6
GAD7 TOTAL SCORE: 6
7. FEELING AFRAID AS IF SOMETHING AWFUL MIGHT HAPPEN: NOT AT ALL

## 2021-03-04 ENCOUNTER — OFFICE VISIT (OUTPATIENT)
Dept: OBGYN | Facility: CLINIC | Age: 36
End: 2021-03-04
Attending: ADVANCED PRACTICE MIDWIFE
Payer: COMMERCIAL

## 2021-03-04 VITALS
DIASTOLIC BLOOD PRESSURE: 84 MMHG | HEIGHT: 67 IN | SYSTOLIC BLOOD PRESSURE: 112 MMHG | BODY MASS INDEX: 23.46 KG/M2 | HEART RATE: 67 BPM | WEIGHT: 149.5 LBS

## 2021-03-04 DIAGNOSIS — Z30.011 ENCOUNTER FOR INITIAL PRESCRIPTION OF CONTRACEPTIVE PILLS: ICD-10-CM

## 2021-03-04 DIAGNOSIS — Z00.00 VISIT FOR PREVENTIVE HEALTH EXAMINATION: Primary | ICD-10-CM

## 2021-03-04 DIAGNOSIS — E66.811 OBESITY (BMI 30.0-34.9): ICD-10-CM

## 2021-03-04 PROCEDURE — 99395 PREV VISIT EST AGE 18-39: CPT | Performed by: ADVANCED PRACTICE MIDWIFE

## 2021-03-04 PROCEDURE — G0463 HOSPITAL OUTPT CLINIC VISIT: HCPCS

## 2021-03-04 ASSESSMENT — PATIENT HEALTH QUESTIONNAIRE - PHQ9
SUM OF ALL RESPONSES TO PHQ QUESTIONS 1-9: 2
5. POOR APPETITE OR OVEREATING: NOT AT ALL

## 2021-03-04 ASSESSMENT — ANXIETY QUESTIONNAIRES
2. NOT BEING ABLE TO STOP OR CONTROL WORRYING: SEVERAL DAYS
6. BECOMING EASILY ANNOYED OR IRRITABLE: SEVERAL DAYS
3. WORRYING TOO MUCH ABOUT DIFFERENT THINGS: SEVERAL DAYS
1. FEELING NERVOUS, ANXIOUS, OR ON EDGE: SEVERAL DAYS
5. BEING SO RESTLESS THAT IT IS HARD TO SIT STILL: NOT AT ALL

## 2021-03-04 ASSESSMENT — MIFFLIN-ST. JEOR
SCORE: 1395.88
SCORE: 1560.66

## 2021-03-04 ASSESSMENT — PAIN SCALES - GENERAL
PAINLEVEL: NO PAIN (0)
PAINLEVEL: NO PAIN (0)

## 2021-03-04 NOTE — PATIENT INSTRUCTIONS

## 2021-03-04 NOTE — PROGRESS NOTES
Progress Note    SUBJECTIVE:  Vale Contreras is an 35 year old, , who requests an Annual Preventive Exam, and birth control refill.  Patient is alert and oriented mood is normal. Denies any pain, no vaginal bleeding, discharge. No headache reported. Overall patient feels healthy, no concerns with this visit. Patient has twin boys who are 2 years old, and reports having good support system at home to help with balancing work and kids. Patient works from home.        Concerns today include:     Menstrual History:  Menstrual History 3/4/2021 3/4/2021 3/4/2021   LAST MENSTRUAL PERIOD 2021 - -   Menarche Age - [age]   Period Cycle (Days) - 28 28   Period Duration (Days) - 3-4 5   Method of Contraception - Combined oral contraceptive -   Period Pattern - Regular Regular   Menstrual Flow - Moderate Moderate   Dysmenorrhea - Mild Severe   PMS Symptoms - Cramping Cramping;Other (Comment);Nausea;Mood Changes   Reviewed Today - Yes Yes       Last  PAP smear: 2018  History of abnormal Pap smear: NO - age 30- 65 PAP every 3 years recommended    Last No results found for: HPV16  Last No results found for: HPV18  Last No results found for: HRHPV    Mammogram current: not applicable  Last Mammogram:   No results found.     Last Colonoscopy:  No results found for this or any previous visit.      HISTORY:  VITAMIN D, CHOLECALCIFEROL, PO, Take 5,000 Units by mouth daily    No current facility-administered medications on file prior to visit.     Allergies   Allergen Reactions     Penicillins Unknown     Sulfa Drugs      Immunization History   Administered Date(s) Administered     Historical DTP/aP 1984, 1984, 1984, 1985, 1988     MMR 1985, 1996     Polio, Unspecified  1984, 1984, 1984, 1989     TDAP Vaccine (Boostrix) 2019     Td (Adult), Adsorbed 1996       OB History    Para Term  AB Living   1 1 1 0 0 2   SAB TAB Ectopic  Multiple Live Births   0 0 0 1 2     Past Medical History:   Diagnosis Date     Gestational hypertension 2019    developed postpartum, no meds     NO ACTIVE PROBLEMS      Past Surgical History:   Procedure Laterality Date      SECTION N/A 2019    Procedure: Primary  Section;  Surgeon: Cynthia Rivas MD;  Location:  [facility]D     ENT SURGERY      Tubes put in my ears as a small child     Family History   Problem Relation Age of Onset     Hypertension Father         Factor V, I have been tested (negative)     Breast Cancer Cousin         She has the BRCA1 gene. My father was tested and determined not to be a carrier.     Diabetes No family hx of      Social History     Socioeconomic History     Marital status: Single     Spouse name: Bernardo Julien     Number of children: 0     Years of education: 18     Highest education level: None   Occupational History     Occupation:      Employer: [employer]     Comment: office work full time   Social Needs     Financial resource strain: None     Food insecurity     Worry: None     Inability: None     Transportation needs     Medical: None     Non-medical: None   Tobacco Use     Smoking status: Never Smoker     Smokeless tobacco: Never Used   Substance and Sexual Activity     Alcohol use: Yes     Alcohol/week: 0.0 standard drinks     Comment: 8 drinks a week, stopped in pregnancy     Drug use: No     Sexual activity: Yes     Partners: Male     Birth control/protection: Pill   Lifestyle     Physical activity     Days per week: None     Minutes per session: None     Stress: None   Relationships     Social connections     Talks on phone: None     Gets together: None     Attends Roman Catholic service: None     Active member of club or organization: None     Attends meetings of clubs or organizations: None     Relationship status: None     Intimate partner violence     Fear of current or ex partner: None     Emotionally abused:  "None     Physically abused: None     Forced sexual activity: None   Other Topics Concern     Parent/sibling w/ CABG, MI or angioplasty before 65F 55M? No   Social History Narrative    How much exercise per week? 3-6 days a week    How much calcium per day? In foods dairy       How much caffeine per day? 2 cups    How much vitamin D per day? Foods and sunlight    Do you/your family wear seatbelts?  Yes    Do you/your family use safety helmets? yes    Do you/your family use sunscreen? Yes    Do you/your family keep firearms in the home? No    Do you/your family have a smoke detector(s)? Yes    [facility]       ROS  [unfilled]  PHQ-9 SCORE 7/10/2019 3/2/2020 3/4/2021   PHQ-9 Total Score 2 2 2     TIM-7 SCORE 2/18/2021 2/18/2021 2/25/2021   Total Score 6 (mild anxiety) 6 (mild anxiety) 6 (mild anxiety)   Total Score 6 6 6         EXAM:  Blood pressure 112/84, pulse 67, height 1.702 m (5' 7.01\"), weight 67.8 kg (149 lb 8 oz), last menstrual period 02/17/2021, not currently breastfeeding. Body mass index is 23.41 kg/m .  General - pleasant female in no acute distress.  Skin - no suspicious lesions or rashes  Neck - supple without lymphadenopathy, no tenderness, pain, or enlargement noted.  Lungs - clear to auscultation bilaterally,  No CVA.  Heart - regular rate and rhythm without murmur.  Abdomen - soft, nontender, nondistended, no masses or organomegaly noted.  Breast Exam:  Breast: Without visible skin changes. No dimpling or lesions seen.   Breasts supple, non-tender with palpation, no dominant mass, nodularity, or nipple discharge noted bilaterally. Axillary nodes negative.        ASSESSMENT:  Encounter Diagnoses   Name Primary?     Encounter for initial prescription of contraceptive pills      Visit for preventive health examination Yes     BMI 30         PLAN:   No orders of the defined types were placed in this encounter.    Orders Placed This Encounter   Medications     norgestim-eth estrad triphasic " (ORTHO TRI-CYCLEN, 28,) 0.18/0.215/0.25 MG-35 MCG tablet     Sig: Take 1 tablet by mouth daily     Dispense:  84 tablet     Refill:  3       Additional teaching done at this visit regarding self breast exam, exercise, birth control, mental health and weight/diet. Discussed with the patient about her  normal pap-smear, and the patient is not presenting with any symptoms that will require pelvic and bimanual exam, this visit the exam will be deferred.    Return to clinic in one year.  Follow-up as needed.      I, ANOOP Ruggiero -Student am serving as a scribe; to document services personally performed by  Hannah Arechiga CNM, based on data collection and the provider's statements to me.     ANOOP Ruggiero Student.    I agree with the PFSH and ROS as completed by the student, except for changes made by me. The remainder of the encounter was performed by me and scribed by the student. The scribed note accurately reflects my personal services and decisions made by me.  Hannah Arechiga DNP, HIREN, APRN  Answers for HPI/ROS submitted by the patient on 2/25/2021   TIM 7 TOTAL SCORE: 6

## 2021-03-04 NOTE — LETTER
3/4/2021       RE: Vale Contreras  [address] [facility] [address]     Dear Colleague,    Thank you for referring your patient, Vale Contreras, to the [facility] [place] at [facility]. Please see a copy of my visit note below.        Progress Note    SUBJECTIVE:  Vale Contreras is an 35 year old, , who requests an Annual Preventive Exam, and birth control refill.  Patient is alert and oriented mood is normal. Denies any pain, no vaginal bleeding, discharge. No headache reported. Overall patient feels healthy, no concerns with this visit. Patient has twin boys who are 2 years old, and reports having good support system at home to help with balancing work and kids. Patient works from home.        Concerns today include:     Menstrual History:  Menstrual History 3/4/2021 3/4/2021 3/4/2021   LAST MENSTRUAL PERIOD 2021 - -   Menarche Age - [age]   Period Cycle (Days) - 28 28   Period Duration (Days) - 3-4 5   Method of Contraception - Combined oral contraceptive -   Period Pattern - Regular Regular   Menstrual Flow - Moderate Moderate   Dysmenorrhea - Mild Severe   PMS Symptoms - Cramping Cramping;Other (Comment);Nausea;Mood Changes   Reviewed Today - Yes Yes       Last  PAP smear: 2018  History of abnormal Pap smear: NO - age 30- 65 PAP every 3 years recommended    Last No results found for: HPV16  Last No results found for: HPV18  Last No results found for: HRHPV    Mammogram current: not applicable  Last Mammogram:   No results found.     Last Colonoscopy:  No results found for this or any previous visit.      HISTORY:  VITAMIN D, CHOLECALCIFEROL, PO, Take 5,000 Units by mouth daily    No current facility-administered medications on file prior to visit.     Allergies   Allergen Reactions     Penicillins Unknown     Sulfa Drugs      Immunization History   Administered Date(s) Administered     Historical DTP/aP  1984, 1984, 1984, 1985, 1988     MMR 1985, 1996     Polio, Unspecified  1984, 1984, 1984, 1989     TDAP Vaccine (Boostrix) 2019     Td (Adult), Adsorbed 1996       OB History    Para Term  AB Living   1 1 1 0 0 2   SAB TAB Ectopic Multiple Live Births   0 0 0 1 2     Past Medical History:   Diagnosis Date     Gestational hypertension 2019    developed postpartum, no meds     NO ACTIVE PROBLEMS      Past Surgical History:   Procedure Laterality Date      SECTION N/A 2019    Procedure: Primary  Section;  Surgeon: Cynthia Rivas MD;  [facility]     ENT SURGERY      Tubes put in my ears as a small child     Family History   Problem Relation Age of Onset     Hypertension Father         Factor V, I have been tested (negative)     Breast Cancer Cousin         She has the BRCA1 gene. My father was tested and determined not to be a carrier.     Diabetes No family hx of      Social History     Socioeconomic History     Marital status: Single     Spouse name: Bernardo Julien     Number of children: 0     Years of education: 18     Highest education level: None   Occupational History     Occupation:      Employer: [employer]     Comment: office work full time   Social Needs     Financial resource strain: None     Food insecurity     Worry: None     Inability: None     Transportation needs     Medical: None     Non-medical: None   Tobacco Use     Smoking status: Never Smoker     Smokeless tobacco: Never Used   Substance and Sexual Activity     Alcohol use: Yes     Alcohol/week: 0.0 standard drinks     Comment: 8 drinks a week, stopped in pregnancy     Drug use: No     Sexual activity: Yes     Partners: Male     Birth control/protection: Pill   Lifestyle     Physical activity     Days per week: None     Minutes per session: None     Stress: None   Relationships     Social  "connections     Talks on phone: None     Gets together: None     Attends [employer]ist service: None     Active member of club or organization: None     Attends meetings of clubs or organizations: None     Relationship status: None     Intimate partner violence     Fear of current or ex partner: None     Emotionally abused: None     Physically abused: None     Forced sexual activity: None   Other Topics Concern     Parent/sibling w/ CABG, MI or angioplasty before 65F 55M? No   Social History Narrative    How much exercise per week? 3-6 days a week    How much calcium per day? In foods dairy       How much caffeine per day? 2 cups    How much vitamin D per day? Foods and sunlight    Do you/your family wear seatbelts?  Yes    Do you/your family use safety helmets? yes    Do you/your family use sunscreen? Yes    Do you/your family keep firearms in the home? No    Do you/your family have a smoke detector(s)? Yes    [facility]       ROS  [unfilled]  PHQ-9 SCORE 7/10/2019 3/2/2020 3/4/2021   PHQ-9 Total Score 2 2 2     TIM-7 SCORE 2/18/2021 2/18/2021 2/25/2021   Total Score 6 (mild anxiety) 6 (mild anxiety) 6 (mild anxiety)   Total Score 6 6 6         EXAM:  Blood pressure 112/84, pulse 67, height 1.702 m (5' 7.01\"), weight 67.8 kg (149 lb 8 oz), last menstrual period 02/17/2021, not currently breastfeeding. Body mass index is 23.41 kg/m .  General - pleasant female in no acute distress.  Skin - no suspicious lesions or rashes  Neck - supple without lymphadenopathy, no tenderness, pain, or enlargement noted.  Lungs - clear to auscultation bilaterally,  No CVA.  Heart - regular rate and rhythm without murmur.  Abdomen - soft, nontender, nondistended, no masses or organomegaly noted.  Breast Exam:  Breast: Without visible skin changes. No dimpling or lesions seen.   Breasts supple, non-tender with palpation, no dominant mass, nodularity, or nipple discharge noted bilaterally. Axillary nodes negative.  "       ASSESSMENT:  Encounter Diagnoses   Name Primary?     Encounter for initial prescription of contraceptive pills      Visit for preventive health examination Yes     BMI 30         PLAN:   No orders of the defined types were placed in this encounter.    Orders Placed This Encounter   Medications     norgestim-eth estrad triphasic (ORTHO TRI-CYCLEN, 28,) 0.18/0.215/0.25 MG-35 MCG tablet     Sig: Take 1 tablet by mouth daily     Dispense:  84 tablet     Refill:  3       Additional teaching done at this visit regarding self breast exam, exercise, birth control, mental health and weight/diet. Discussed with the patient about her  normal pap-smear, and the patient is not presenting with any symptoms that will require pelvic and bimanual exam, this visit the exam will be deferred.    Return to clinic in one year.  Follow-up as needed.      I, ANOOP Ruggiero -Student am serving as a scribe; to document services personally performed by  Hannah Arechiga CNM, based on data collection and the provider's statements to me.     ANOOP Ruggiero Student.    I agree with the PFSH and ROS as completed by the student, except for changes made by me. The remainder of the encounter was performed by me and scribed by the student. The scribed note accurately reflects my personal services and decisions made by me.  Hannah Arechiga DNP, HIREN, APRN  Answers for HPI/ROS submitted by the patient on 2/25/2021   TIM 7 TOTAL SCORE: 6

## 2021-03-07 RX ORDER — NORGESTIMATE AND ETHINYL ESTRADIOL 7DAYSX3 28
1 KIT ORAL DAILY
Qty: 84 TABLET | Refills: 3 | Status: SHIPPED | OUTPATIENT
Start: 2021-03-07 | End: 2022-02-11

## 2021-05-26 NOTE — PROGRESS NOTES
"Please see \"Imaging\" tab under \"Chart Review\" for details of today's visit.    Audrey aXvier        "

## 2021-05-27 NOTE — PROGRESS NOTES
"Please see \"Imaging\" tab under Chart Review for full report.  This ultrasound was performed in the [facility], and may be located under Care Everywhere.    Karla Knight MD  Maternal Fetal Medicine    "

## 2021-05-27 NOTE — PROGRESS NOTES
"Please see \"Imaging\" tab under Chart Review for full report.  This ultrasound was performed in the [facility], and may be located under Care Everywhere.    Karla Knight MD  Maternal Fetal Medicine    "

## 2021-05-27 NOTE — PROGRESS NOTES
"Please see \"Imaging\" tab under Chart Review for full report.  This ultrasound was performed in the [facility], and may be located under Care Everywhere.    Karla Knight MD  Maternal Fetal Medicine    "

## 2021-06-22 NOTE — PROGRESS NOTES
"Please see \"Imaging\" tab under Chart Review for full report.  This ultrasound was performed in the [facility], and may be located under Care Everywhere.    Karla Knight MD  Maternal Fetal Medicine    "

## 2021-06-23 NOTE — PROGRESS NOTES
"Please see \"Imaging\" tab under Chart Review for full report.  This ultrasound was performed in the [facility], and may be located under Care Everywhere.    Karla Knight MD  Maternal Fetal Medicine    "

## 2021-06-24 NOTE — PROGRESS NOTES
"Please see \"Imaging\" tab under Chart Review for full report.  This ultrasound was performed in the [facility], and may be located under Care Everywhere.    Karla Knight MD  Maternal Fetal Medicine    "

## 2021-06-24 NOTE — TELEPHONE ENCOUNTER
Patient going to [facility] location for L2 F/U on 3/15.    ANNA Cr

## 2021-09-11 ENCOUNTER — HEALTH MAINTENANCE LETTER (OUTPATIENT)
Age: 36
End: 2021-09-11

## 2022-02-11 ENCOUNTER — TELEPHONE (OUTPATIENT)
Dept: OBGYN | Facility: CLINIC | Age: 37
End: 2022-02-11
Payer: COMMERCIAL

## 2022-02-11 DIAGNOSIS — Z30.011 ENCOUNTER FOR INITIAL PRESCRIPTION OF CONTRACEPTIVE PILLS: ICD-10-CM

## 2022-02-11 RX ORDER — NORGESTIMATE AND ETHINYL ESTRADIOL 7DAYSX3 28
1 KIT ORAL DAILY
Qty: 84 TABLET | Refills: 1 | Status: SHIPPED | OUTPATIENT
Start: 2022-02-11 | End: 2022-03-04

## 2022-02-11 NOTE — TELEPHONE ENCOUNTER
[facility] Call Center    Phone Message    May a detailed message be left on voicemail: yes     Reason for Call: Medication Refill Request    Has the patient contacted the pharmacy for the refill? Yes   Name of medication being requested: norgestim-eth estrad triphasic (ORTHO TRI-CYCLEN, 28,) 0.18/0.215/0.25 MG-35 MCG tablet   Provider who prescribed the medication: Hannah Arechiga  Pharmacy: [id number] - [place], [place] - [address] AT [facility]  Date medication is needed: ASAP   Pt states that she will be out of her birth control before her appointment on 3/4/22 and is wondering if she can get it refilled prior to her appointment. Please call pt once order is sent to pharmacy. Thanks!      Action Taken: Message routed to:  Other: [facility]    Travel Screening: Not Applicable

## 2022-02-17 PROBLEM — O09.90 SUPERVISION OF HIGH RISK PREGNANCY, ANTEPARTUM: Status: RESOLVED | Noted: 2018-10-19 | Resolved: 2021-03-04

## 2022-02-17 PROBLEM — O30.039 MONOCHORIONIC DIAMNIOTIC TWIN GESTATION: Status: RESOLVED | Noted: 2018-10-01 | Resolved: 2021-03-04

## 2022-03-01 ASSESSMENT — ANXIETY QUESTIONNAIRES
4. TROUBLE RELAXING: NOT AT ALL
5. BEING SO RESTLESS THAT IT IS HARD TO SIT STILL: SEVERAL DAYS
GAD7 TOTAL SCORE: 3
7. FEELING AFRAID AS IF SOMETHING AWFUL MIGHT HAPPEN: NOT AT ALL
3. WORRYING TOO MUCH ABOUT DIFFERENT THINGS: SEVERAL DAYS
7. FEELING AFRAID AS IF SOMETHING AWFUL MIGHT HAPPEN: NOT AT ALL
1. FEELING NERVOUS, ANXIOUS, OR ON EDGE: NOT AT ALL
6. BECOMING EASILY ANNOYED OR IRRITABLE: SEVERAL DAYS
2. NOT BEING ABLE TO STOP OR CONTROL WORRYING: NOT AT ALL
GAD7 TOTAL SCORE: 3

## 2022-03-02 ASSESSMENT — ANXIETY QUESTIONNAIRES: GAD7 TOTAL SCORE: 3

## 2022-03-03 NOTE — PROGRESS NOTES
Progress Note  SUBJECTIVE:  Vale Contreras is an 36 year old, , who requests an Annual Preventive Exam.     Concerns today include:   1. Refill on MABEL:  Would like to stick with current contraceptive method. Was given a different pill type when she recently picked up her prescription from the pharmacy, so hasn't taken pills for the last week.  Desires reassurance that it is the same medication.  Aware that she has had intermittently elevated blood pressures and that the pill can increase blood pressure.   is planning on getting vasectomy this year so will be stopping after he completes his follow-up. Does not desire future pregnancy. No SE from pills.     2. Wanting a pap and estimates last pap was 2018, patient reports no history of abnormal pap.     Social History: Twin boys - Rogelio and Arben - are 3 years old and will be going to  in the fall. Works for [employer].     Mental Health: Feeling supported at home by mom and . Mental health has been good.     Exercise: no formal exercise, active with kids.     Diet: not following any specific diet; healthy relationship with food, and no changes in diet. Intake of calcium and vegetables with meals.     Menstrual History: No issues with periods, light cramping. Light to moderate and manageable bleeding.    Menstrual History 3/4/2021 3/4/2021 3/4/2022   LAST MENSTRUAL PERIOD - - 2022   Menarche Age [age] -   Period Cycle (Days) 28 28 -   Period Duration (Days) 3-4 5 -   Method of Contraception Combined oral contraceptive - -   Period Pattern Regular Regular -   Menstrual Flow Moderate Moderate -   Dysmenorrhea Mild Severe -   PMS Symptoms Cramping Cramping;Other (Comment);Nausea;Mood Changes -   Reviewed Today Yes Yes -     Last: 2018   History of abnormal Pap smear: No history of abnormal pap smear.     Mammogram current: not applicable  - Cousin (dads brother's daughter)  was diagnosed with BRACA 1 in  30s, pt did genetic testing and is BRCA negative    Last Colonoscopy: not applicable     Lipid screening: pt completed the biometric screening for [facility], results not available yet.     HISTORY:  VITAMIN D, CHOLECALCIFEROL, PO, Take 5,000 Units by mouth daily    No current facility-administered medications on file prior to visit.    Allergies   Allergen Reactions     Penicillins Unknown     Sulfa Drugs Unknown     Immunization History   Administered Date(s) Administered     Historical DTP/aP 1984, 1984, 1984, 1985, 1988     MMR 1985, 1996     Polio, Unspecified  1984, 1984, 1984, 1989     TDAP Vaccine (Boostrix) 2019     Td (Adult), Adsorbed 1996       OB History    Para Term  AB Living   1 1 1 0 0 2   SAB IAB Ectopic Multiple Live Births   0 0 0 1 2     Past Medical History:   Diagnosis Date     Gestational hypertension 2019    developed postpartum, no meds     NO ACTIVE PROBLEMS      Past Surgical History:   Procedure Laterality Date      SECTION N/A 2019    Procedure: Primary  Section;  Surgeon: Cynthia Rivas MD;  [facility]     ENT SURGERY      Tubes put in my ears as a small child     Family History   Problem Relation Age of Onset     Hypertension Father         Factor V, I have been tested (negative)     Breast Cancer Cousin         She has the BRCA1 gene. My father was tested and determined not to be a carrier.     Diabetes No family hx of      Social History     Socioeconomic History     Marital status: Single     Spouse name: Bernardo Julien     Number of children: 0     Years of education: 18     Highest education level: Not on file   Occupational History     Occupation:      Employer: [employer]     Comment: office work full time   Tobacco Use     Smoking status: Never Smoker     Smokeless tobacco: Never Used   Substance and Sexual Activity      "Alcohol use: Yes     Alcohol/week: 0.0 standard drinks     Comment: 8 drinks a week, stopped in pregnancy     Drug use: No     Sexual activity: Yes     Partners: Male     Birth control/protection: Pill   Other Topics Concern     Parent/sibling w/ CABG, MI or angioplasty before 65F 55M? No   Social History Narrative    How much exercise per week? 3-6 days a week    How much calcium per day? In foods dairy       How much caffeine per day? 2 cups    How much vitamin D per day? Foods and sunlight    Do you/your family wear seatbelts?  Yes    Do you/your family use safety helmets? yes    Do you/your family use sunscreen? Yes    Do you/your family keep firearms in the home? No    Do you/your family have a smoke detector(s)? Yes    Reviewed [facility] 3-4-21     ROS  ROS: 10 point ROS neg other than the symptoms noted above in the HPI.    PHQ-9 SCORE 7/10/2019 3/2/2020 3/4/2021   PHQ-9 Total Score 2 2 2     TIM-7 SCORE 2/18/2021 2/25/2021 3/1/2022   Total Score 6 (mild anxiety) 6 (mild anxiety) 3 (minimal anxiety)   Total Score 6 6 3     Answers for HPI/ROS submitted by the patient on 3/1/2022  TIM 7 TOTAL SCORE: 3    EXAM:  /84   Pulse 77   Ht 1.638 m (5' 4.5\")   Wt 89.4 kg (197 lb)   LMP 02/23/2022   Breastfeeding No   BMI 33.29 kg/m    General - pleasant female in no acute distress.  Skin - no suspicious lesions or rashes  EENT-   euthyroid with out palpable nodules  Neck - supple without lymphadenopathy.  Lungs - clear to auscultation bilaterally.  Heart - regular rate and rhythm without murmur.  Abdomen - soft, nontender, nondistended, no masses or organomegaly noted.  Musculoskeletal - no gross deformities.  Neurological - normal strength, sensation, and mental status.    Breast Exam:  Breast: Without visible skin changes. No dimpling or lesions seen.  Breasts supple, non-tender with palpation, no dominant mass, nodularity, or nipple discharge noted bilaterally. Axillary nodes negative.     Pelvic " Exam:  EG/BUS: Normal genital architecture without lesions, erythema or abnormal secretions; Bartholin's, Urethra, Trenton's normal   Urethral meatus: normal   Urethra: no masses, tenderness, or scarring   Bladder: no masses or tenderness   Vagina: moist, pink, rugae with creamy, white and odorless secretions  Cervix: no lesions and pink, moist, closed, reddened tissue noted surrounding cervical os consistent with ectropion of cervix.   Rectum: anus normal     ASSESSMENT:     Encounter Diagnoses   Name Primary?     Encounter for initial prescription of contraceptive pills      Well woman exam with routine gynecological exam Yes     Screening for human papillomavirus      Screening for cervical cancer      PLAN:   1. Pap collected today and patient counseled that results will come to [facility].  2. Refill of MABEL given and patient education provided to return to clinic if side effects are bothersome. Provided reassurance that the new pill type she picked up was a different type of generic than the one she previously was taking, but that they have the same hormonal make-up.  Educated on elevated BP today and healthy lifestyle measures. Recommended stopping COCs after  has vasectomy and completes follow-up.   3. Education on preventitive screening provided and patient verbalizes understanding.  Colonoscopy at age 43; Mammogram at age 38. Pt completed preventive health labs today as part of biometric screening; recommended she reach out to provider with results if they are abnormal.     Additional teaching done at this visit regarding self breast exam, exercise, birth control, mental health and weight/diet.    Return to clinic in one year.  Follow-up as needed.  IDominick, completed the PFSH and ROS. I then acted as a scribe for ANOOP Rivera, for the remainder of the visit.  BSN, RN, NP DNP Student     I agree with the PFSH and ROS as completed by the ANOOP Student, except for changes made by me.  The  remainder of the encounter was performed by me and scribed by the ANOOP Student.  The scribed note accurately reflects my personal services and decisions made by me.  Rachel Chowdary, DNP, APRN, CINTIANP

## 2022-03-04 ENCOUNTER — OFFICE VISIT (OUTPATIENT)
Dept: OBGYN | Facility: CLINIC | Age: 37
End: 2022-03-04
Attending: NURSE PRACTITIONER
Payer: COMMERCIAL

## 2022-03-04 VITALS
WEIGHT: 197 LBS | BODY MASS INDEX: 32.82 KG/M2 | HEIGHT: 65 IN | SYSTOLIC BLOOD PRESSURE: 135 MMHG | DIASTOLIC BLOOD PRESSURE: 84 MMHG | HEART RATE: 77 BPM

## 2022-03-04 DIAGNOSIS — Z12.4 SCREENING FOR CERVICAL CANCER: ICD-10-CM

## 2022-03-04 DIAGNOSIS — Z01.419 WELL WOMAN EXAM WITH ROUTINE GYNECOLOGICAL EXAM: Primary | ICD-10-CM

## 2022-03-04 DIAGNOSIS — Z30.011 ENCOUNTER FOR INITIAL PRESCRIPTION OF CONTRACEPTIVE PILLS: ICD-10-CM

## 2022-03-04 DIAGNOSIS — Z11.51 SCREENING FOR HUMAN PAPILLOMAVIRUS: ICD-10-CM

## 2022-03-04 PROCEDURE — G0463 HOSPITAL OUTPT CLINIC VISIT: HCPCS

## 2022-03-04 PROCEDURE — 87624 HPV HI-RISK TYP POOLED RSLT: CPT | Performed by: NURSE PRACTITIONER

## 2022-03-04 PROCEDURE — 99395 PREV VISIT EST AGE 18-39: CPT | Performed by: NURSE PRACTITIONER

## 2022-03-04 PROCEDURE — G0145 SCR C/V CYTO,THINLAYER,RESCR: HCPCS | Performed by: NURSE PRACTITIONER

## 2022-03-04 PROCEDURE — G0124 SCREEN C/V THIN LAYER BY MD: HCPCS | Performed by: PATHOLOGY

## 2022-03-04 RX ORDER — NORGESTIMATE AND ETHINYL ESTRADIOL 7DAYSX3 28
1 KIT ORAL DAILY
Qty: 84 TABLET | Refills: 3 | Status: SHIPPED | OUTPATIENT
Start: 2022-03-04 | End: 2023-02-21

## 2022-03-04 ASSESSMENT — PAIN SCALES - GENERAL: PAINLEVEL: NO PAIN (0)

## 2022-03-04 NOTE — LETTER
Date:March 4, 2022      Provider requested that no letter be sent. Do not send.       [facility]

## 2022-03-04 NOTE — LETTER
3/4/2022       RE: Vale Cnotreras  [address] [facility] [address]     Dear Colleague,    Thank you for referring your patient, Vale Contreras, to the [facility] [place] at [facility]. Please see a copy of my visit note below.     Progress Note  SUBJECTIVE:  Vale Contreras is an 36 year old, , who requests an Annual Preventive Exam.     Concerns today include:   1. Refill on MABEL:  Would like to stick with current contraceptive method. Was given a different pill type when she recently picked up her prescription from the pharmacy, so hasn't taken pills for the last week.  Desires reassurance that it is the same medication.  Aware that she has had intermittently elevated blood pressures and that the pill can increase blood pressure.   is planning on getting vasectomy this year so will be stopping after he completes his follow-up. Does not desire future pregnancy. No SE from pills.     2. Wanting a pap and estimates last pap was , patient reports no history of abnormal pap.     Social History: Twin boys - Rogelio and Arben - are 3 years old and will be going to  in the fall. Works for [employer].     Mental Health: Feeling supported at home by mom and . Mental health has been good.     Exercise: no formal exercise, active with kids.     Diet: not following any specific diet; healthy relationship with food, and no changes in diet. Intake of calcium and vegetables with meals.     Menstrual History: No issues with periods, light cramping. Light to moderate and manageable bleeding.    Menstrual History 3/4/2021 3/4/2021 3/4/2022   LAST MENSTRUAL PERIOD - - 2022   Menarche Age [age] -   Period Cycle (Days) 28 28 -   Period Duration (Days) 3-4 5 -   Method of Contraception Combined oral contraceptive - -   Period Pattern Regular Regular -   Menstrual Flow Moderate Moderate -    Dysmenorrhea Mild Severe -   PMS Symptoms Cramping Cramping;Other (Comment);Nausea;Mood Changes -   Reviewed Today Yes Yes -     Last: 2018   History of abnormal Pap smear: No history of abnormal pap smear.     Mammogram current: not applicable  - Cousin (dads brother's daughter)  was diagnosed with BRACA 1 in 30s, pt did genetic testing and is BRCA negative    Last Colonoscopy: not applicable     Lipid screening: pt completed the biometric screening for [facility], results not available yet.     HISTORY:  VITAMIN D, CHOLECALCIFEROL, PO, Take 5,000 Units by mouth daily    No current facility-administered medications on file prior to visit.    Allergies   Allergen Reactions     Penicillins Unknown     Sulfa Drugs Unknown     Immunization History   Administered Date(s) Administered     Historical DTP/aP 1984, 1984, 1984, 1985, 1988     MMR 1985, 1996     Polio, Unspecified  1984, 1984, 1984, 1989     TDAP Vaccine (Boostrix) 2019     Td (Adult), Adsorbed 1996       OB History    Para Term  AB Living   1 1 1 0 0 2   SAB IAB Ectopic Multiple Live Births   0 0 0 1 2     Past Medical History:   Diagnosis Date     Gestational hypertension 2019    developed postpartum, no meds     NO ACTIVE PROBLEMS      Past Surgical History:   Procedure Laterality Date      SECTION N/A 2019    Procedure: Primary  Section;  Surgeon: Cynthia Rivas MD;  Location: [facility]     ENT SURGERY      Tubes put in my ears as a small child     Family History   Problem Relation Age of Onset     Hypertension Father         Factor V, I have been tested (negative)     Breast Cancer Cousin         She has the BRCA1 gene. My father was tested and determined not to be a carrier.     Diabetes No family hx of      Social History     Socioeconomic History     Marital status: Single     Spouse name: Bernardo Julien     Number of children: 0  "    Years of education: 18     Highest education level: Not on file   Occupational History     Occupation:      Employer: [employer]     Comment: office work full time   Tobacco Use     Smoking status: Never Smoker     Smokeless tobacco: Never Used   Substance and Sexual Activity     Alcohol use: Yes     Alcohol/week: 0.0 standard drinks     Comment: 8 drinks a week, stopped in pregnancy     Drug use: No     Sexual activity: Yes     Partners: Male     Birth control/protection: Pill   Other Topics Concern     Parent/sibling w/ CABG, MI or angioplasty before 65F 55M? No   Social History Narrative    How much exercise per week? 3-6 days a week    How much calcium per day? In foods dairy       How much caffeine per day? 2 cups    How much vitamin D per day? Foods and sunlight    Do you/your family wear seatbelts?  Yes    Do you/your family use safety helmets? yes    Do you/your family use sunscreen? Yes    Do you/your family keep firearms in the home? No    Do you/your family have a smoke detector(s)? Yes    [facility]     ROS  ROS: 10 point ROS neg other than the symptoms noted above in the HPI.    PHQ-9 SCORE 7/10/2019 3/2/2020 3/4/2021   PHQ-9 Total Score 2 2 2     TIM-7 SCORE 2/18/2021 2/25/2021 3/1/2022   Total Score 6 (mild anxiety) 6 (mild anxiety) 3 (minimal anxiety)   Total Score 6 6 3     Answers for HPI/ROS submitted by the patient on 3/1/2022  TIM 7 TOTAL SCORE: 3    EXAM:  /84   Pulse 77   Ht 1.638 m (5' 4.5\")   Wt 89.4 kg (197 lb)   LMP 02/23/2022   Breastfeeding No   BMI 33.29 kg/m    General - pleasant female in no acute distress.  Skin - no suspicious lesions or rashes  EENT-   euthyroid with out palpable nodules  Neck - supple without lymphadenopathy.  Lungs - clear to auscultation bilaterally.  Heart - regular rate and rhythm without murmur.  Abdomen - soft, nontender, nondistended, no masses or organomegaly noted.  Musculoskeletal - no gross " deformities.  Neurological - normal strength, sensation, and mental status.    Breast Exam:  Breast: Without visible skin changes. No dimpling or lesions seen.  Breasts supple, non-tender with palpation, no dominant mass, nodularity, or nipple discharge noted bilaterally. Axillary nodes negative.     Pelvic Exam:  EG/BUS: Normal genital architecture without lesions, erythema or abnormal secretions; Bartholin's, Urethra, Fannett's normal   Urethral meatus: normal   Urethra: no masses, tenderness, or scarring   Bladder: no masses or tenderness   Vagina: moist, pink, rugae with creamy, white and odorless secretions  Cervix: no lesions and pink, moist, closed, reddened tissue noted surrounding cervical os consistent with ectropion of cervix.   Rectum: anus normal     ASSESSMENT:     Encounter Diagnoses   Name Primary?     Encounter for initial prescription of contraceptive pills      Well woman exam with routine gynecological exam Yes     Screening for human papillomavirus      Screening for cervical cancer      PLAN:   1. Pap collected today and patient [facility]ed [facility]  2. Refill of MABEL given and patient education provided to return to clinic if side effects are bothersome. Provided reassurance that the new pill type she picked up was a different type of generic than the one she previously was taking, but that they have the same hormonal make-up.  Educated on elevated BP today and healthy lifestyle measures. Recommended stopping COCs after  has vasectomy and completes follow-up.   3. Education on preventitive screening provided and patient verbalizes understanding.  Colonoscopy at age 43; Mammogram at age 38. Pt completed preventive health labs today as part of biometric screening; recommended she reach out to provider with results if they are abnormal.     Additional teaching done at this visit regarding self breast exam, exercise, birth control, mental health and  weight/diet.    Return to clinic in one year.  Follow-up as needed.  I, Dominick Maradiaga, completed the PFSH and ROS. I then acted as a scribe for ANOOP Rivera, for the remainder of the visit.  JAVIER, RN, ANOOP DNP Student     I agree with the PFSH and ROS as completed by the ANOOP Student, except for changes made by me.  The remainder of the encounter was performed by me and scribed by the ANOOP Student.  The scribed note accurately reflects my personal services and decisions made by me.  Rachel Chowdary DNP, KIERAN, ANOOP              Again, thank you for allowing me to participate in the care of your patient.      Sincerely,    KIERAN Calderon CNP

## 2022-03-08 LAB
BKR LAB AP GYN ADEQUACY: ABNORMAL
BKR LAB AP GYN INTERPRETATION: ABNORMAL
BKR LAB AP HPV REFLEX: ABNORMAL
BKR LAB AP LMP: ABNORMAL
BKR LAB AP PREVIOUS ABNORMAL: ABNORMAL
PATH REPORT.COMMENTS IMP SPEC: ABNORMAL
PATH REPORT.COMMENTS IMP SPEC: ABNORMAL
PATH REPORT.RELEVANT HX SPEC: ABNORMAL

## 2022-03-09 LAB
HUMAN PAPILLOMA VIRUS 16 DNA: NEGATIVE
HUMAN PAPILLOMA VIRUS 18 DNA: NEGATIVE
HUMAN PAPILLOMA VIRUS FINAL DIAGNOSIS: NORMAL
HUMAN PAPILLOMA VIRUS OTHER HR: NEGATIVE

## 2022-03-11 PROBLEM — R87.612 LGSIL ON PAP SMEAR OF CERVIX: Status: ACTIVE | Noted: 2022-03-11

## 2022-10-30 ENCOUNTER — HEALTH MAINTENANCE LETTER (OUTPATIENT)
Age: 37
End: 2022-10-30

## 2023-02-20 ASSESSMENT — ANXIETY QUESTIONNAIRES
GAD7 TOTAL SCORE: 12
IF YOU CHECKED OFF ANY PROBLEMS ON THIS QUESTIONNAIRE, HOW DIFFICULT HAVE THESE PROBLEMS MADE IT FOR YOU TO DO YOUR WORK, TAKE CARE OF THINGS AT HOME, OR GET ALONG WITH OTHER PEOPLE: NOT DIFFICULT AT ALL
8. IF YOU CHECKED OFF ANY PROBLEMS, HOW DIFFICULT HAVE THESE MADE IT FOR YOU TO DO YOUR WORK, TAKE CARE OF THINGS AT HOME, OR GET ALONG WITH OTHER PEOPLE?: NOT DIFFICULT AT ALL
GAD7 TOTAL SCORE: 12
5. BEING SO RESTLESS THAT IT IS HARD TO SIT STILL: SEVERAL DAYS
3. WORRYING TOO MUCH ABOUT DIFFERENT THINGS: MORE THAN HALF THE DAYS
4. TROUBLE RELAXING: SEVERAL DAYS
GAD7 TOTAL SCORE: 12
2. NOT BEING ABLE TO STOP OR CONTROL WORRYING: NEARLY EVERY DAY
7. FEELING AFRAID AS IF SOMETHING AWFUL MIGHT HAPPEN: MORE THAN HALF THE DAYS
1. FEELING NERVOUS, ANXIOUS, OR ON EDGE: MORE THAN HALF THE DAYS
7. FEELING AFRAID AS IF SOMETHING AWFUL MIGHT HAPPEN: MORE THAN HALF THE DAYS
6. BECOMING EASILY ANNOYED OR IRRITABLE: SEVERAL DAYS

## 2023-02-21 ENCOUNTER — OFFICE VISIT (OUTPATIENT)
Dept: FAMILY MEDICINE | Facility: CLINIC | Age: 38
End: 2023-02-21
Payer: COMMERCIAL

## 2023-02-21 VITALS
SYSTOLIC BLOOD PRESSURE: 142 MMHG | WEIGHT: 201 LBS | OXYGEN SATURATION: 99 % | RESPIRATION RATE: 16 BRPM | DIASTOLIC BLOOD PRESSURE: 92 MMHG | HEART RATE: 76 BPM | HEIGHT: 66 IN | TEMPERATURE: 97.2 F | BODY MASS INDEX: 32.3 KG/M2

## 2023-02-21 DIAGNOSIS — Z30.41 ENCOUNTER FOR SURVEILLANCE OF CONTRACEPTIVE PILLS: ICD-10-CM

## 2023-02-21 DIAGNOSIS — I10 ESSENTIAL HYPERTENSION: Primary | ICD-10-CM

## 2023-02-21 LAB
ANION GAP SERPL CALCULATED.3IONS-SCNC: 5 MMOL/L (ref 3–14)
BASOPHILS # BLD AUTO: 0 10E3/UL (ref 0–0.2)
BASOPHILS NFR BLD AUTO: 0 %
BUN SERPL-MCNC: 15 MG/DL (ref 7–30)
CALCIUM SERPL-MCNC: 8.6 MG/DL (ref 8.5–10.1)
CHLORIDE BLD-SCNC: 107 MMOL/L (ref 94–109)
CO2 SERPL-SCNC: 25 MMOL/L (ref 20–32)
CREAT SERPL-MCNC: 0.62 MG/DL (ref 0.52–1.04)
EOSINOPHIL # BLD AUTO: 0.1 10E3/UL (ref 0–0.7)
EOSINOPHIL NFR BLD AUTO: 1 %
ERYTHROCYTE [DISTWIDTH] IN BLOOD BY AUTOMATED COUNT: 13.5 % (ref 10–15)
GFR SERPL CREATININE-BSD FRML MDRD: >90 ML/MIN/1.73M2
GLUCOSE BLD-MCNC: 92 MG/DL (ref 70–99)
HCT VFR BLD AUTO: 39 % (ref 35–47)
HGB BLD-MCNC: 12.9 G/DL (ref 11.7–15.7)
LYMPHOCYTES # BLD AUTO: 2.3 10E3/UL (ref 0.8–5.3)
LYMPHOCYTES NFR BLD AUTO: 25 %
MCH RBC QN AUTO: 30.4 PG (ref 26.5–33)
MCHC RBC AUTO-ENTMCNC: 33.1 G/DL (ref 31.5–36.5)
MCV RBC AUTO: 92 FL (ref 78–100)
MONOCYTES # BLD AUTO: 0.5 10E3/UL (ref 0–1.3)
MONOCYTES NFR BLD AUTO: 6 %
NEUTROPHILS # BLD AUTO: 6.3 10E3/UL (ref 1.6–8.3)
NEUTROPHILS NFR BLD AUTO: 68 %
PLATELET # BLD AUTO: 335 10E3/UL (ref 150–450)
POTASSIUM BLD-SCNC: 4 MMOL/L (ref 3.4–5.3)
RBC # BLD AUTO: 4.24 10E6/UL (ref 3.8–5.2)
SODIUM SERPL-SCNC: 137 MMOL/L (ref 133–144)
WBC # BLD AUTO: 9.2 10E3/UL (ref 4–11)

## 2023-02-21 PROCEDURE — 80048 BASIC METABOLIC PNL TOTAL CA: CPT | Performed by: PHYSICIAN ASSISTANT

## 2023-02-21 PROCEDURE — 85025 COMPLETE CBC W/AUTO DIFF WBC: CPT | Performed by: PHYSICIAN ASSISTANT

## 2023-02-21 PROCEDURE — 36415 COLL VENOUS BLD VENIPUNCTURE: CPT | Performed by: PHYSICIAN ASSISTANT

## 2023-02-21 PROCEDURE — 99203 OFFICE O/P NEW LOW 30 MIN: CPT | Performed by: PHYSICIAN ASSISTANT

## 2023-02-21 RX ORDER — MAGNESIUM 200 MG
TABLET ORAL
COMMUNITY

## 2023-02-21 RX ORDER — NORGESTIMATE AND ETHINYL ESTRADIOL 7DAYSX3 28
1 KIT ORAL DAILY
Qty: 84 TABLET | Refills: 3 | Status: SHIPPED | OUTPATIENT
Start: 2023-02-21 | End: 2023-10-16

## 2023-02-21 RX ORDER — METOPROLOL SUCCINATE 25 MG/1
25 TABLET, EXTENDED RELEASE ORAL DAILY
Qty: 30 TABLET | Refills: 1 | Status: SHIPPED | OUTPATIENT
Start: 2023-02-21 | End: 2023-04-11 | Stop reason: ALTCHOICE

## 2023-02-21 ASSESSMENT — ANXIETY QUESTIONNAIRES: GAD7 TOTAL SCORE: 12

## 2023-02-21 ASSESSMENT — PAIN SCALES - GENERAL: PAINLEVEL: NO PAIN (0)

## 2023-02-21 NOTE — PROGRESS NOTES
"  Assessment & Plan     Essential hypertension  BP recheck still high  Reviewed home readings.   Start medication today. Side effects and how to take the medication discussed.  Continue to monitor at home.   Continue to make healthy changes  Recheck in the clinic in 1 month.   - metoprolol succinate ER (TOPROL XL) 25 MG 24 hr tablet; Take 1 tablet (25 mg) by mouth daily  - Basic metabolic panel  (Ca, Cl, CO2, Creat, Gluc, K, Na, BUN); Future  - CBC with platelets and differential; Future  - Basic metabolic panel  (Ca, Cl, CO2, Creat, Gluc, K, Na, BUN)  - CBC with platelets and differential    Encounter for surveillance of contraceptive pills  Stable, refills given  - norgestim-eth estrad triphasic (ORTHO TRI-CYCLEN, 28,) 0.18/0.215/0.25 MG-35 MCG tablet; Take 1 tablet by mouth daily             BMI:   Estimated body mass index is 32.69 kg/m  as calculated from the following:    Height as of this encounter: 1.67 m (5' 5.75\").    Weight as of this encounter: 91.2 kg (201 lb).   Weight management plan: Discussed healthy diet and exercise guidelines        Return in about 1 month (around 3/21/2023) for BP Recheck.    Kristen M. Kehr, PA-C  M [facility] REDD Collazo is a 37 year old, presenting for the following health issues:  Hypertension      She has been monitoring her blood pressure at home for months.   Blood pressure is high. She has been trying to make lifestyle changes with diet and exercise and blood pressure continues to be elevated.     She has started to have anxiety about her blood pressure numbers.     History of Present Illness       Mental Health Follow-up:  Patient presents to follow-up on Anxiety.    Patient's anxiety since last visit has been:  Medium  The patient is having other symptoms associated with anxiety.  Any significant life events: health concerns  Patient is feeling anxious or having panic attacks.  Patient has no concerns about alcohol or drug use.    Reason " "for visit:  Blood pressure concerns  Symptom onset:  More than a month  Symptoms include:  Consistent blood pressure readings averaging at 141/90  Symptom intensity:  Moderate  Symptom progression:  Improving  Had these symptoms before:  No  What makes it worse:  Anxiety at a problematic level developing over this issue  What makes it better:  Relaxation, deep breathing, yoga, good sleep habits    She eats 0-1 servings of fruits and vegetables daily.She consumes 0 sweetened beverage(s) daily.She exercises with enough effort to increase her heart rate 20 to 29 minutes per day.  She exercises with enough effort to increase her heart rate 4 days per week.   She is taking medications regularly.  Today's TIM-7 Score: 12             Review of Systems   Constitutional, HEENT, cardiovascular, pulmonary, GI, , musculoskeletal, neuro, skin, endocrine and psych systems are negative, except as otherwise noted.      Objective    BP (!) 148/94   Pulse 76   Temp 97.2  F (36.2  C) (Tympanic)   Resp 16   Ht 1.67 m (5' 5.75\")   Wt 91.2 kg (201 lb)   SpO2 99%   BMI 32.69 kg/m    Body mass index is 32.69 kg/m .  Physical Exam   GENERAL: healthy, alert and no distress  RESP: lungs clear to auscultation - no rales, rhonchi or wheezes  CV: regular rate and rhythm, normal S1 S2, no S3 or S4, no murmur, click or rub, no peripheral edema and peripheral pulses strong  MS: no gross musculoskeletal defects noted, no edema  SKIN: no suspicious lesions or rashes  PSYCH: mentation appears normal, affect normal/bright    Results for orders placed or performed in visit on 02/21/23 (from the past 24 hour(s))   Basic metabolic panel  (Ca, Cl, CO2, Creat, Gluc, K, Na, BUN)   Result Value Ref Range    Sodium 137 133 - 144 mmol/L    Potassium 4.0 3.4 - 5.3 mmol/L    Chloride 107 94 - 109 mmol/L    Carbon Dioxide (CO2) 25 20 - 32 mmol/L    Anion Gap 5 3 - 14 mmol/L    Urea Nitrogen 15 7 - 30 mg/dL    Creatinine 0.62 0.52 - 1.04 mg/dL    Calcium " 8.6 8.5 - 10.1 mg/dL    Glucose 92 70 - 99 mg/dL    GFR Estimate >90 >60 mL/min/1.73m2   CBC with platelets and differential    Narrative    The following orders were created for panel order CBC with platelets and differential.  Procedure                               Abnormality         Status                     ---------                               -----------         ------                     CBC with platelets and d...[[id number]]                      Final result                 Please view results for these tests on the individual orders.   CBC with platelets and differential   Result Value Ref Range    WBC Count 9.2 4.0 - 11.0 10e3/uL    RBC Count 4.24 3.80 - 5.20 10e6/uL    Hemoglobin 12.9 11.7 - 15.7 g/dL    Hematocrit 39.0 35.0 - 47.0 %    MCV 92 78 - 100 fL    MCH 30.4 26.5 - 33.0 pg    MCHC 33.1 31.5 - 36.5 g/dL    RDW 13.5 10.0 - 15.0 %    Platelet Count 335 150 - 450 10e3/uL    % Neutrophils 68 %    % Lymphocytes 25 %    % Monocytes 6 %    % Eosinophils 1 %    % Basophils 0 %    Absolute Neutrophils 6.3 1.6 - 8.3 10e3/uL    Absolute Lymphocytes 2.3 0.8 - 5.3 10e3/uL    Absolute Monocytes 0.5 0.0 - 1.3 10e3/uL    Absolute Eosinophils 0.1 0.0 - 0.7 10e3/uL    Absolute Basophils 0.0 0.0 - 0.2 10e3/uL

## 2023-02-21 NOTE — NURSING NOTE
"Chief Complaint   Patient presents with     Hypertension       Initial BP (!) 148/94   Pulse 76   Temp 97.2  F (36.2  C) (Tympanic)   Resp 16   Ht 1.67 m (5' 5.75\")   Wt 91.2 kg (201 lb)   SpO2 99%   BMI 32.69 kg/m   Estimated body mass index is 32.69 kg/m  as calculated from the following:    Height as of this encounter: 1.67 m (5' 5.75\").    Weight as of this encounter: 91.2 kg (201 lb).  Medication Reconciliation: complete    BEATRIZ Amado MA    "

## 2023-04-08 ENCOUNTER — HEALTH MAINTENANCE LETTER (OUTPATIENT)
Age: 38
End: 2023-04-08

## 2023-04-11 ENCOUNTER — OFFICE VISIT (OUTPATIENT)
Dept: FAMILY MEDICINE | Facility: CLINIC | Age: 38
End: 2023-04-11
Payer: COMMERCIAL

## 2023-04-11 VITALS
SYSTOLIC BLOOD PRESSURE: 139 MMHG | HEART RATE: 68 BPM | TEMPERATURE: 98.1 F | RESPIRATION RATE: 16 BRPM | OXYGEN SATURATION: 97 % | DIASTOLIC BLOOD PRESSURE: 89 MMHG | BODY MASS INDEX: 31.5 KG/M2 | WEIGHT: 196 LBS | HEIGHT: 66 IN

## 2023-04-11 DIAGNOSIS — I10 ESSENTIAL HYPERTENSION: ICD-10-CM

## 2023-04-11 PROCEDURE — 99213 OFFICE O/P EST LOW 20 MIN: CPT | Performed by: PHYSICIAN ASSISTANT

## 2023-04-11 RX ORDER — AMLODIPINE BESYLATE 5 MG/1
5 TABLET ORAL DAILY
Qty: 30 TABLET | Refills: 1 | Status: SHIPPED | OUTPATIENT
Start: 2023-04-11 | End: 2023-05-23

## 2023-04-11 RX ORDER — METOPROLOL SUCCINATE 25 MG/1
25 TABLET, EXTENDED RELEASE ORAL DAILY
Qty: 30 TABLET | Refills: 1 | Status: CANCELLED | OUTPATIENT
Start: 2023-04-11

## 2023-04-11 ASSESSMENT — PAIN SCALES - GENERAL: PAINLEVEL: NO PAIN (0)

## 2023-04-11 NOTE — PROGRESS NOTES
"  Assessment & Plan     Essential hypertension  She is going to change to amlodipine 5 mg   Side effects and how to take the medication discussed.  Continue to monitor blood pressure at home.   Recheck in the clinic in 1-2 months.   - amLODIPine (NORVASC) 5 MG tablet; Take 1 tablet (5 mg) by mouth daily                 Kristen M. Kehr, PA-C M [facility] REDD Collazo is a 37 year old, presenting for the following health issues:  Hypertension    She has been taking the toprol 25 mg daily   BP is still high.         4/11/2023     7:18 AM   Additional Questions   Roomed by rafal         4/11/2023     7:18 AM   Patient Reported Additional Medications   Patient reports taking the following new medications none     History of Present Illness       Hypertension: She presents for follow up of hypertension.  She does check blood pressure  regularly outside of the clinic. Outside blood pressures have been over 140/90. She follows a low salt diet.     She eats 0-1 servings of fruits and vegetables daily.She consumes 0 sweetened beverage(s) daily.She exercises with enough effort to increase her heart rate 10 to 19 minutes per day.  She exercises with enough effort to increase her heart rate 3 or less days per week.   She is taking medications regularly.               Review of Systems   Constitutional, HEENT, cardiovascular, pulmonary, GI, , musculoskeletal, neuro, skin, endocrine and psych systems are negative, except as otherwise noted.      Objective    /89   Pulse 68   Temp 98.1  F (36.7  C) (Oral)   Resp 16   Ht 1.67 m (5' 5.75\")   Wt 88.9 kg (196 lb)   LMP 03/29/2023   SpO2 97%   BMI 31.88 kg/m    Body mass index is 31.88 kg/m .  Physical Exam   GENERAL: healthy, alert and no distress  PSYCH: mentation appears normal, affect normal/bright                    "

## 2023-04-20 NOTE — PROGRESS NOTES
Progress Note    SUBJECTIVE:  Vale Greenberg is an 37 year old, , who requests an Annual Preventive Exam.     Concerns today include:     1. Diagnosed with HTN: reports that she was recently diagnosed with HTN and started on Amlodipine with primary care. Is wondering if it is safe to continue MABEL. No history of VTE, smoking, liver disease, or estrogen sensitive cancers.    2. Weight loss: is trying to lose weight. Using apps to track food. Uninterested in Lifestyle Program at this time. Feels she is doing well on her own. She has lost 10 lbs. Eating healthier and tracking calories.   Exercising more in a moderate way, more walking. In the past has done more intense cardio but is working up to it so it will be sustainable.     3. Mental health: reports some anxiety and would like referral for therapy.    Menstrual history: on MABEL. Light, manageable period. Cramps for a couple hours before it happens. Denies intermenstrual bleeding.     Sexual history: Sexually active with partner. No concern for STI. Declines testing today. No sexual concerns.     Social history:  for [employer]. Likes her job.    Healthcare Maintenance:  Pap:  LSIL, HPV neg. Due today.  Lipids: Slightly elevated in . Total cholesterol 215,triglycerides 171, VLDL 35. Recheck in 3-5 years. Had biometric testing done last month and reports only triglycerides were slightly elevated. She brought this to her appointment with primary care and they recommended lifestyle measures.   Diabetes screening: WNL   Mammogram: Due starting next year, age 38  Colonoscopy: N/A    Menstrual History:      2023     7:30 AM 2023     7:55 AM 2023     8:00 AM   Menstrual History   LAST MENSTRUAL PERIOD 3/29/2023  2023   Period Cycle (Days)  28    Period Duration (Days)  2-3    Method of Contraception  None    Period Pattern  Regular    Menstrual Flow  Light    Reviewed Today  Yes          HISTORY:  amLODIPine  (NORVASC) 5 MG tablet, Take 1 tablet (5 mg) by mouth daily  magnesium 200 MG TABS,   norgestim-eth estrad triphasic (ORTHO TRI-CYCLEN, 28,) 0.18/0.215/0.25 MG-35 MCG tablet, Take 1 tablet by mouth daily  VITAMIN D, CHOLECALCIFEROL, PO, Take 5,000 Units by mouth daily    No current facility-administered medications on file prior to visit.    Allergies   Allergen Reactions     Penicillins Unknown     Sulfa Antibiotics Unknown     Immunization History   Administered Date(s) Administered     COVID-19 Vaccine 12+ (Pfizer) 2021, 2021, 2021     COVID-19 Vaccine Bivalent Booster 18+ (Moderna) 2022     Flu, Unspecified 2019, 2020     Historical DTP/aP 1984, 1984, 1984, 1985, 1988     Influenza Vaccine >6 months (Alfuria,Fluzone) 10/05/2021, 2022     MMR 1985, 1996     Polio, Unspecified  1984, 1984, 1984, 1989     TDAP Vaccine (Boostrix) 2019     Td (Adult), Adsorbed 1996       OB History    Para Term  AB Living   1 1 1 0 0 2   SAB IAB Ectopic Multiple Live Births   0 0 0 1 2     Past Medical History:   Diagnosis Date     Abnormal Pap smear of cervix 2022    Follow up needed this year     Gestational hypertension 2019    developed postpartum, no meds     NO ACTIVE PROBLEMS      Past Surgical History:   Procedure Laterality Date      SECTION N/A 2019    Procedure: Primary  Section;  Surgeon: Cynthia Rivas MD;  Location: [facility]     ENT SURGERY      Tubes put in my ears as a small child     Family History   Problem Relation Age of Onset     Hypertension Father         Factor V, I have been tested (negative)     Breast Cancer Cousin         She has the BRCA1 gene. My father was tested and determined not to be a carrier.     Other Cancer Cousin         Ovarian cancer. Cousin - sister of cousin with BRCA1     Diabetes No family hx of      Social History  "    Socioeconomic History     Marital status:      Spouse name: Bernardo Julien     Number of children: 0     Years of education: 18     Highest education level: None   Occupational History     Occupation:      Employer: [employer]     Comment: office work full time   Tobacco Use     Smoking status: Never     Smokeless tobacco: Never   Vaping Use     Vaping status: Never Used   Substance and Sexual Activity     Alcohol use: Yes     Comment: 1-2x  drinks a week, stopped in pregnancy     Drug use: No     Sexual activity: Yes     Partners: Male     Birth control/protection: Pill   Other Topics Concern     Parent/sibling w/ CABG, MI or angioplasty before 65F 55M? No   Social History Narrative    How much exercise per week? 3-6 days a week    How much calcium per day? In foods dairy       How much caffeine per day? 2 cups    How much vitamin D per day? Foods and sunlight    Do you/your family wear seatbelts?  Yes    Do you/your family use safety helmets? yes    Do you/your family use sunscreen? Yes    Do you/your family keep firearms in the home? No    Do you/your family have a smoke detector(s)? Yes    Reviewed [facility] 3-4-21       ROS  Answers for HPI/ROS submitted by the patient on 4/26/2023  TIM 7 TOTAL SCORE: 3      7/10/2019     3:56 PM 3/2/2020     8:30 AM 3/4/2021     2:32 PM   PHQ-9 SCORE   PHQ-9 Total Score 2 2 2         3/1/2022    10:35 AM 2/20/2023     9:54 AM 4/26/2023     7:57 AM   TIM-7 SCORE   Total Score 3 (minimal anxiety) 12 (moderate anxiety) 3 (minimal anxiety)   Total Score 3 12 3         EXAM:  Blood pressure 131/88, pulse 77, height 1.67 m (5' 5.75\"), weight 87.9 kg (193 lb 11.2 oz), last menstrual period 04/20/2023, not currently breastfeeding. Body mass index is 31.5 kg/m .  General - pleasant female in no acute distress.  Skin - no suspicious lesions or rashes  EENT-   euthyroid with out palpable nodules  Neck - supple without lymphadenopathy.  Lungs - " clear to auscultation bilaterally.  Heart - regular rate and rhythm without murmur.  Abdomen - soft, nontender, nondistended, no masses or organomegaly noted.  Musculoskeletal - no gross deformities.  Neurological - normal strength, sensation, and mental status.    Breast Exam:  Breast: Without visible skin changes. No dimpling or lesions seen.   Breasts supple, non-tender with palpation, no dominant mass, nodularity, or nipple discharge noted bilaterally. Axillary nodes negative.      Pelvic Exam:  EG/BUS: Normal genital architecture without lesions, erythema or abnormal secretions; Bartholin's, Urethra, Sigurd's normal. Thinning and whitening of tissue below clitoris and above urethra and vaginal opening, appearance consistent with lichen sclerosis.   Urethral meatus: normal   Urethra: no masses, tenderness, or scarring   Vagina: moist, pink, rugae with creamy, white and odorless secretions  Cervix: no lesions, pink, closed and friable with pap    ASSESSMENT:  Encounter Diagnoses   Name Primary?     LGSIL on Pap smear of cervix      Screening for cervical cancer      Well woman exam with routine gynecological exam Yes     Anxiety      Lichen sclerosus      Hypertension, unspecified type      Encounter for initial prescription of contraceptive pills         PLAN:   Orders Placed This Encounter   Procedures     Obtaining, preparing and conveyance of cervical or vaginal smear to laboratory.     Adult Mental Health  Referral     1. Diagnosed with HTN/ Contraception: Reviewed CDC MEC and safety of contraception with HTN diagnosis. Estrogen containing options are category 3.  Recommended change of birth control to progesterone only option at this time. Pt desires rx for POP. Reviewed need for back-up method x 7 days after starting. Counseled on common side effects, how to take the pills, and what to do if late by >3 hours.      2. Weight loss: Continue to weight loss through lifestyle modifications. Declines  Lifestyle Program referral but will reach out if she desires at later time.    3. Mental health: referral for therapy placed    4. Suspected Lichen sclerosus: clobetasol prescribed. Use daily; Follow up in 12 weeks. Counseled pt that definitive diagnosis can only be made with vulvar biopsy.     Additional teaching done at this visit regarding exercise, birth control, mental health and weight/diet.    Return to clinic in 12 weeks to follow up possible lichen sclerosis.      I, Inna Bingham, completed the PFSH and ROS. I then acted as a scribe for KIERAN Rivera, CNP, WHNP for the remainder of the visit.  Inna HERRERA, RN  [facility] DNP, ANOOP student     I agree with the PFSH and ROS as completed by the NP Student, except for changes made by me.  The remainder of the encounter was performed by me and scribed by the NP Student.  The scribed note accurately reflects my personal services and decisions made by me.  Rachel Chowdary DNP, KIERAN, WHNP

## 2023-04-26 ASSESSMENT — ANXIETY QUESTIONNAIRES
7. FEELING AFRAID AS IF SOMETHING AWFUL MIGHT HAPPEN: NOT AT ALL
2. NOT BEING ABLE TO STOP OR CONTROL WORRYING: SEVERAL DAYS
GAD7 TOTAL SCORE: 3
6. BECOMING EASILY ANNOYED OR IRRITABLE: NOT AT ALL
8. IF YOU CHECKED OFF ANY PROBLEMS, HOW DIFFICULT HAVE THESE MADE IT FOR YOU TO DO YOUR WORK, TAKE CARE OF THINGS AT HOME, OR GET ALONG WITH OTHER PEOPLE?: NOT DIFFICULT AT ALL
GAD7 TOTAL SCORE: 3
5. BEING SO RESTLESS THAT IT IS HARD TO SIT STILL: NOT AT ALL
7. FEELING AFRAID AS IF SOMETHING AWFUL MIGHT HAPPEN: NOT AT ALL
1. FEELING NERVOUS, ANXIOUS, OR ON EDGE: SEVERAL DAYS
4. TROUBLE RELAXING: NOT AT ALL
IF YOU CHECKED OFF ANY PROBLEMS ON THIS QUESTIONNAIRE, HOW DIFFICULT HAVE THESE PROBLEMS MADE IT FOR YOU TO DO YOUR WORK, TAKE CARE OF THINGS AT HOME, OR GET ALONG WITH OTHER PEOPLE: NOT DIFFICULT AT ALL
3. WORRYING TOO MUCH ABOUT DIFFERENT THINGS: SEVERAL DAYS

## 2023-04-27 ENCOUNTER — OFFICE VISIT (OUTPATIENT)
Dept: OBGYN | Facility: CLINIC | Age: 38
End: 2023-04-27
Attending: NURSE PRACTITIONER
Payer: COMMERCIAL

## 2023-04-27 VITALS
HEIGHT: 66 IN | BODY MASS INDEX: 31.13 KG/M2 | HEART RATE: 77 BPM | DIASTOLIC BLOOD PRESSURE: 88 MMHG | WEIGHT: 193.7 LBS | SYSTOLIC BLOOD PRESSURE: 131 MMHG

## 2023-04-27 DIAGNOSIS — Z30.011 ENCOUNTER FOR INITIAL PRESCRIPTION OF CONTRACEPTIVE PILLS: ICD-10-CM

## 2023-04-27 DIAGNOSIS — F41.9 ANXIETY: ICD-10-CM

## 2023-04-27 DIAGNOSIS — R87.612 LGSIL ON PAP SMEAR OF CERVIX: ICD-10-CM

## 2023-04-27 DIAGNOSIS — Z12.4 SCREENING FOR CERVICAL CANCER: ICD-10-CM

## 2023-04-27 DIAGNOSIS — Z01.419 WELL WOMAN EXAM WITH ROUTINE GYNECOLOGICAL EXAM: Primary | ICD-10-CM

## 2023-04-27 DIAGNOSIS — I10 HYPERTENSION, UNSPECIFIED TYPE: ICD-10-CM

## 2023-04-27 DIAGNOSIS — L90.0 LICHEN SCLEROSUS: ICD-10-CM

## 2023-04-27 PROCEDURE — G0463 HOSPITAL OUTPT CLINIC VISIT: HCPCS | Mod: 25 | Performed by: NURSE PRACTITIONER

## 2023-04-27 PROCEDURE — 87624 HPV HI-RISK TYP POOLED RSLT: CPT | Performed by: NURSE PRACTITIONER

## 2023-04-27 PROCEDURE — G0145 SCR C/V CYTO,THINLAYER,RESCR: HCPCS | Performed by: NURSE PRACTITIONER

## 2023-04-27 PROCEDURE — 99395 PREV VISIT EST AGE 18-39: CPT | Performed by: NURSE PRACTITIONER

## 2023-04-27 RX ORDER — ACETAMINOPHEN AND CODEINE PHOSPHATE 120; 12 MG/5ML; MG/5ML
0.35 SOLUTION ORAL DAILY
Qty: 90 TABLET | Refills: 3 | Status: SHIPPED | OUTPATIENT
Start: 2023-04-27 | End: 2024-03-26

## 2023-04-27 RX ORDER — CLOBETASOL PROPIONATE 0.5 MG/G
OINTMENT TOPICAL DAILY
Qty: 45 G | Refills: 0 | Status: SHIPPED | OUTPATIENT
Start: 2023-04-27 | End: 2024-05-31

## 2023-04-27 NOTE — PATIENT INSTRUCTIONS
Thank you for trusting us with your care!     If you need to contact us for questions about:  Symptoms, Scheduling & Medical Questions; Non-urgent (2-3 day response) [place] message, Urgent (needing response today) [phone] (if after 3:30pm next day response)   Prescriptions: Please call your Pharmacy   Billing: Angel[facility] [phone] or [facility]:[phone]

## 2023-04-27 NOTE — LETTER
2023       RE: Vale Greenberg  [address] [facility] [address]     Dear Colleague,    Thank you for referring your patient, Vale Greenberg, to the [facility] [place] at [facility]. Please see a copy of my visit note below.    Progress Note    SUBJECTIVE:  Vale Greenberg is an 37 year old, , who requests an Annual Preventive Exam.     Concerns today include:     1. Diagnosed with HTN: reports that she was recently diagnosed with HTN and started on Amlodipine with primary care. Is wondering if it is safe to continue MABEL. No history of VTE, smoking, liver disease, or estrogen sensitive cancers.    2. Weight loss: is trying to lose weight. Using apps to track food. Uninterested in Lifestyle Program at this time. Feels she is doing well on her own. She has lost 10 lbs. Eating healthier and tracking calories.   Exercising more in a moderate way, more walking. In the past has done more intense cardio but is working up to it so it will be sustainable.     3. Mental health: reports some anxiety and would like referral for therapy.    Menstrual history: on MABEL. Light, manageable period. Cramps for a couple hours before it happens. Denies intermenstrual bleeding.     Sexual history: Sexually active with partner. No concern for STI. Declines testing today. No sexual concerns.     Social history:  for [employer]. Likes her job.    Healthcare Maintenance:  Pap:  LSIL, HPV neg. Due today.  Lipids: Slightly elevated in . Total cholesterol 215,triglycerides 171, VLDL 35. Recheck in 3-5 years. Had biometric testing done last month and reports only triglycerides were slightly elevated. She brought this to her appointment with primary care and they recommended lifestyle measures.   Diabetes screening: WNL   Mammogram: Due starting next year, age 38  Colonoscopy: N/A    Menstrual History:      2023      7:30 AM 2023     7:55 AM 2023     8:00 AM   Menstrual History   LAST MENSTRUAL PERIOD 3/29/2023  2023   Period Cycle (Days)  28    Period Duration (Days)  2-3    Method of Contraception  None    Period Pattern  Regular    Menstrual Flow  Light    Reviewed Today  Yes          HISTORY:  amLODIPine (NORVASC) 5 MG tablet, Take 1 tablet (5 mg) by mouth daily  magnesium 200 MG TABS,   norgestim-eth estrad triphasic (ORTHO TRI-CYCLEN, 28,) 0.18/0.215/0.25 MG-35 MCG tablet, Take 1 tablet by mouth daily  VITAMIN D, CHOLECALCIFEROL, PO, Take 5,000 Units by mouth daily    No current facility-administered medications on file prior to visit.    Allergies   Allergen Reactions    Penicillins Unknown    Sulfa Antibiotics Unknown     Immunization History   Administered Date(s) Administered    COVID-19 Vaccine 12+ (Pfizer) 2021, 2021, 2021    COVID-19 Vaccine Bivalent Booster 18+ (Moderna) 2022    Flu, Unspecified 2019, 2020    Historical DTP/aP 1984, 1984, 1984, 1985, 1988    Influenza Vaccine >6 months (Alfuria,Fluzone) 10/05/2021, 2022    MMR 1985, 1996    Polio, Unspecified  1984, 1984, 1984, 1989    TDAP Vaccine (Boostrix) 2019    Td (Adult), Adsorbed 1996       OB History    Para Term  AB Living   1 1 1 0 0 2   SAB IAB Ectopic Multiple Live Births   0 0 0 1 2     Past Medical History:   Diagnosis Date    Abnormal Pap smear of cervix 2022    Follow up needed this year    Gestational hypertension 2019    developed postpartum, no meds    NO ACTIVE PROBLEMS      Past Surgical History:   Procedure Laterality Date     SECTION N/A 2019    Procedure: Primary  Section;  Surgeon: Cynthia Rivas MD;  [facility]    ENT SURGERY      Tubes put in my ears as a small child     Family History   Problem Relation Age of Onset    Hypertension Father      "    Factor V, I have been tested (negative)    Breast Cancer Cousin         She has the BRCA1 gene. My father was tested and determined not to be a carrier.    Other Cancer Cousin         Ovarian cancer. Cousin - sister of cousin with BRCA1    Diabetes No family hx of      Social History     Socioeconomic History    Marital status:      Spouse name: Bernardo Julien    Number of children: 0    Years of education: 18    Highest education level: None   Occupational History    Occupation:      Employer: [employer]     Comment: office work full time   Tobacco Use    Smoking status: Never    Smokeless tobacco: Never   Vaping Use    Vaping status: Never Used   Substance and Sexual Activity    Alcohol use: Yes     Comment: 1-2x  drinks a week, stopped in pregnancy    Drug use: No    Sexual activity: Yes     Partners: Male     Birth control/protection: Pill   Other Topics Concern    Parent/sibling w/ CABG, MI or angioplasty before 65F 55M? No   Social History Narrative    How much exercise per week? 3-6 days a week    How much calcium per day? In foods dairy       How much caffeine per day? 2 cups    How much vitamin D per day? Foods and sunlight    Do you/your family wear seatbelts?  Yes    Do you/your family use safety helmets? yes    Do you/your family use sunscreen? Yes    Do you/your family keep firearms in the home? No    Do you/your family have a smoke detector(s)? Yes    [facility]       ROS  Answers for HPI/ROS submitted by the patient on 4/26/2023  TIM 7 TOTAL SCORE: 3      7/10/2019     3:56 PM 3/2/2020     8:30 AM 3/4/2021     2:32 PM   PHQ-9 SCORE   PHQ-9 Total Score 2 2 2         3/1/2022    10:35 AM 2/20/2023     9:54 AM 4/26/2023     7:57 AM   TIM-7 SCORE   Total Score 3 (minimal anxiety) 12 (moderate anxiety) 3 (minimal anxiety)   Total Score 3 12 3         EXAM:  Blood pressure 131/88, pulse 77, height 1.67 m (5' 5.75\"), weight 87.9 kg (193 lb 11.2 oz), last " menstrual period 04/20/2023, not currently breastfeeding. Body mass index is 31.5 kg/m .  General - pleasant female in no acute distress.  Skin - no suspicious lesions or rashes  EENT-   euthyroid with out palpable nodules  Neck - supple without lymphadenopathy.  Lungs - clear to auscultation bilaterally.  Heart - regular rate and rhythm without murmur.  Abdomen - soft, nontender, nondistended, no masses or organomegaly noted.  Musculoskeletal - no gross deformities.  Neurological - normal strength, sensation, and mental status.    Breast Exam:  Breast: Without visible skin changes. No dimpling or lesions seen.   Breasts supple, non-tender with palpation, no dominant mass, nodularity, or nipple discharge noted bilaterally. Axillary nodes negative.      Pelvic Exam:  EG/BUS: Normal genital architecture without lesions, erythema or abnormal secretions; Bartholin's, Urethra, Belle's normal. Thinning and whitening of tissue below clitoris and above urethra and vaginal opening, appearance consistent with lichen sclerosis.   Urethral meatus: normal   Urethra: no masses, tenderness, or scarring   Vagina: moist, pink, rugae with creamy, white and odorless secretions  Cervix: no lesions, pink, closed and friable with pap    ASSESSMENT:  Encounter Diagnoses   Name Primary?    LGSIL on Pap smear of cervix     Screening for cervical cancer     Well woman exam with routine gynecological exam Yes    Anxiety     Lichen sclerosus     Hypertension, unspecified type     Encounter for initial prescription of contraceptive pills         PLAN:   Orders Placed This Encounter   Procedures    Obtaining, preparing and conveyance of cervical or vaginal smear to laboratory.    Adult Mental Health  Referral     1. Diagnosed with HTN/ Contraception: Reviewed [facility] MEC and safety of contraception with HTN diagnosis. Estrogen containing options are category 3.  Recommended change of birth control to progesterone only option at this time.  Pt desires rx for POP. Reviewed need for back-up method x 7 days after starting. Counseled on common side effects, how to take the pills, and what to do if late by >3 hours.      2. Weight loss: Continue to weight loss through lifestyle modifications. Declines Lifestyle Program referral but will reach out if she desires at later time.    3. Mental health: referral for therapy placed    4. Suspected Lichen sclerosus: clobetasol prescribed. Use daily; Follow up in 12 weeks. Counseled pt that definitive diagnosis can only be made with vulvar biopsy.     Additional teaching done at this visit regarding exercise, birth control, mental health and weight/diet.    Return to clinic in 12 weeks to follow up possible lichen sclerosis.      I, Inna Bingham, completed the PFSH and ROS. I then acted as a scribe for KIERAN Rivera, CNP, ANOOP for the remainder of the visit.  Inna HERRERA, RN  [facility] JEANETH, ANOOP student     I agree with the PFSH and ROS as completed by the CINTIANP Student, except for changes made by me.  The remainder of the encounter was performed by me and scribed by the WHNP Student.  The scribed note accurately reflects my personal services and decisions made by me.  Rachel Chowdary, JEANETH, APRN, WHNP

## 2023-05-01 LAB
BKR LAB AP GYN ADEQUACY: NORMAL
BKR LAB AP GYN INTERPRETATION: NORMAL
BKR LAB AP HPV REFLEX: NORMAL
BKR LAB AP PREVIOUS ABNL DX: NORMAL
BKR LAB AP PREVIOUS ABNORMAL: NORMAL
PATH REPORT.COMMENTS IMP SPEC: NORMAL
PATH REPORT.COMMENTS IMP SPEC: NORMAL
PATH REPORT.RELEVANT HX SPEC: NORMAL

## 2023-05-23 ENCOUNTER — OFFICE VISIT (OUTPATIENT)
Dept: FAMILY MEDICINE | Facility: CLINIC | Age: 38
End: 2023-05-23
Payer: COMMERCIAL

## 2023-05-23 VITALS
TEMPERATURE: 98.3 F | HEIGHT: 66 IN | SYSTOLIC BLOOD PRESSURE: 137 MMHG | HEART RATE: 80 BPM | RESPIRATION RATE: 17 BRPM | DIASTOLIC BLOOD PRESSURE: 85 MMHG | WEIGHT: 191 LBS | BODY MASS INDEX: 30.7 KG/M2 | OXYGEN SATURATION: 98 %

## 2023-05-23 DIAGNOSIS — I10 ESSENTIAL HYPERTENSION: ICD-10-CM

## 2023-05-23 PROCEDURE — 99213 OFFICE O/P EST LOW 20 MIN: CPT | Performed by: PHYSICIAN ASSISTANT

## 2023-05-23 RX ORDER — AMLODIPINE BESYLATE 5 MG/1
5 TABLET ORAL DAILY
Qty: 90 TABLET | Refills: 3 | Status: SHIPPED | OUTPATIENT
Start: 2023-05-23 | End: 2024-05-23

## 2023-05-23 ASSESSMENT — PAIN SCALES - GENERAL: PAINLEVEL: NO PAIN (0)

## 2023-05-23 NOTE — PROGRESS NOTES
"  Assessment & Plan     Essential hypertension  Blood pressure is at goal  Continue with the amlodipine 5 mg  Recheck in 1 year.   - amLODIPine (NORVASC) 5 MG tablet; Take 1 tablet (5 mg) by mouth daily                 Kristen M. Kehr, PA-C [facility] REDD Collazo is a 37 year old, presenting for the following health issues:  Hypertension    She has been taking the amlodipine 5 mg daily and doing well.         5/23/2023     7:16 AM   Additional Questions   Roomed by Shavonne     History of Present Illness       Hypertension: She presents for follow up of hypertension.  She does check blood pressure  regularly outside of the clinic. Outside blood pressures have been over 140/90. She follows a low salt diet.     She eats 0-1 servings of fruits and vegetables daily.She consumes 0 sweetened beverage(s) daily.She exercises with enough effort to increase her heart rate 20 to 29 minutes per day.  She exercises with enough effort to increase her heart rate 4 days per week.   She is taking medications regularly.               Review of Systems   Constitutional, HEENT, cardiovascular, pulmonary, GI, , musculoskeletal, neuro, skin, endocrine and psych systems are negative, except as otherwise noted.      Objective    /85   Pulse 80   Temp 98.3  F (36.8  C) (Tympanic)   Resp 17   Ht 1.67 m (5' 5.75\")   Wt 86.6 kg (191 lb)   LMP 04/20/2023   SpO2 98%   BMI 31.06 kg/m    Body mass index is 31.06 kg/m .  Physical Exam   GENERAL: healthy, alert and no distress  PSYCH: mentation appears normal, affect normal/bright                    "

## 2023-06-07 ENCOUNTER — VIRTUAL VISIT (OUTPATIENT)
Dept: PSYCHOLOGY | Facility: CLINIC | Age: 38
End: 2023-06-07
Payer: COMMERCIAL

## 2023-06-07 DIAGNOSIS — F43.23 ADJUSTMENT DISORDER WITH MIXED ANXIETY AND DEPRESSED MOOD: Primary | ICD-10-CM

## 2023-06-07 PROCEDURE — 90791 PSYCH DIAGNOSTIC EVALUATION: CPT | Mod: VID

## 2023-06-07 ASSESSMENT — PATIENT HEALTH QUESTIONNAIRE - PHQ9
SUM OF ALL RESPONSES TO PHQ QUESTIONS 1-9: 3
SUM OF ALL RESPONSES TO PHQ QUESTIONS 1-9: 3
10. IF YOU CHECKED OFF ANY PROBLEMS, HOW DIFFICULT HAVE THESE PROBLEMS MADE IT FOR YOU TO DO YOUR WORK, TAKE CARE OF THINGS AT HOME, OR GET ALONG WITH OTHER PEOPLE: NOT DIFFICULT AT ALL

## 2023-06-07 NOTE — PROGRESS NOTES
"[facility]      PATIENT'S NAME: Vale Greenberg  PREFERRED NAME: Vale  PRONOUNS: she/her/hers     MRN: [id number]  : 1985  ADDRESS: [address] [place] [address]  [facility]  [id number]  DATE OF SERVICE: 23  START TIME: 12:30 PM  END TIME: 1: 30 PM  PREFERRED PHONE: [phone]  May we leave a program related message: Yes  SERVICE MODALITY:  Video Visit:      Provider verified identity through the following two step process.  Patient provided:  Patient  and Patient address    Telemedicine Visit: The patient's condition can be safely assessed and treated via synchronous audio and visual telemedicine encounter.      Reason for Telemedicine Visit: Patient has requested telehealth visit    Originating Site (Patient Location): Patient's place of employment    Distant Site (Provider Location): Provider Remote Setting- Home Office    Consent:  The patient/guardian has verbally consented to: the potential risks and benefits of telemedicine (video visit) versus in person care; bill my insurance or make self-payment for services provided; and responsibility for payment of non-covered services.     Patient would like the video invitation sent by:  My Chart    Mode of Communication:  Video Conference via [employer]    Distant Location (Provider):  Off-site    As the provider I attest to compliance with applicable laws and regulations related to telemedicine.    UNIVERSAL ADULT Mental Health DIAGNOSTIC ASSESSMENT    Identifying Information:  Patient is a 37 year old,   individual.  Patient was referred for an assessment by referring provider.  Patient attended the session alone.    Chief Complaint:   The reason for seeking services at this time is: \"Anxiety, tools for managing stress, talk through current stress\".  The problem(s) began 20.    Patient has not attempted to resolve these concerns in the past.    Social/Family History:  Patient reported they grew up " "in [facility]  .  They were raised by biological parents  .  Parents were always together and an older half sister, that is 11 years older.  Patient reported that their childhood was \"pretty normal, I liked school because I did well and my parents were proud of me, I was very close with my parents.\" She reflected on how her parents are still  but there is no romantic relationship and has not been for years.  Patient described their current relationships with family of origin as great relationship with her father, okay and at time strained with her mother, and a fine relationship with her sister but notes it will vary due to her sister's substance use.      The patient describes their cultural background as .  Cultural influences and impact on patient's life structure, values, norms, and healthcare: Grew up in a non-Pentec[employer]alism family, grew [place] in an urban area.  Contextual influences on patient's health include: none.    These factors will be addressed in the Preliminary Treatment plan. Patient identified their preferred language to be English. Patient reported they does not need the assistance of an  or other support involved in therapy.     Patient reported had no significant delays in developmental tasks.   Patient's highest education level was graduate school  .  Patient identified the following learning problems: none reported.  Modifications will not be used to assist communication in therapy.  Patient reports they are  able to understand written materials.    Patient reported the following relationship history 4.  Patient's current relationship status is  for 1 year.   Patient identified their sexual orientation as heterosexual.  Patient reported having 2 child(vince). Patient identified father; friends; spouse as part of their support system.  Patient identified the quality of these relationships as inconsistent,  .      Patient's current living/housing situation involves " staying in own home/apartment.  The immediate members of family and household include Mervat Chang, and their two sons and they report that housing is stable.    Patient is currently employed fulltime.  Patient reports their finances are obtained through employment; spouse. Patient does identify finances as a current stressor.      Patient reported that they have not been involved with the legal system. Patient does not report being under probation/ parole/ jurisdiction. They are not under any current court jurisdiction. .    Patient's Strengths and Limitations:  Patient identified the following strengths or resources that will help them succeed in treatment: commitment to health and well being, friends / good social support, family support, insight, intelligence, positive work environment, motivation, strong social skills and work ethic. Things that may interfere with the patient's success in treatment include: none identified.     Assessments:  The following assessments were completed by patient for this visit:  PHQ2:       4/26/2023     7:57 AM 2/20/2023     9:54 AM 2/20/2023     9:47 AM 3/4/2021     2:32 PM 3/2/2020     8:30 AM 10/4/2018     7:18 AM 7/25/2016     8:24 AM   PHQ-2 ( 1999 [employer])   Q1: Little interest or pleasure in doing things 0 0 0 0 0 0 0   Q2: Feeling down, depressed or hopeless 0 1 1 1 0 0 0   PHQ-2 Score 0 1 1 1 0 0 0   PHQ-2 Total Score (12-17 Years)- Positive if 3 or more points; Administer PHQ-A if positive    1 0     Q1: Little interest or pleasure in doing things Not at all Not at all Not at all   Not at all    Q2: Feeling down, depressed or hopeless Not at all Several days Several days   Not at all    PHQ-2 Score 0 1 1   0      PHQ9:       6/18/2019     3:26 PM 7/10/2019     3:56 PM 3/2/2020     8:30 AM 3/4/2021     2:32 PM 6/7/2023    12:01 PM   PHQ-9 SCORE   PHQ-9 Total Score MyChart     3 (Minimal depression)   PHQ-9 Total Score 9 2 2 2 3     GAD2:       5/31/2023     9:10 AM    TIM-2   Feeling nervous, anxious, or on edge 1   Not being able to stop or control worrying 1   TIM-2 Total Score 2     GAD7:       2/28/2020     8:06 AM 3/2/2020     8:30 AM 2/18/2021     7:29 AM 2/25/2021    10:15 AM 3/1/2022    10:35 AM 2/20/2023     9:54 AM 4/26/2023     7:57 AM   TIM-7 SCORE   Total Score 4 (minimal anxiety)  6 (mild anxiety) 6 (mild anxiety) 3 (minimal anxiety) 12 (moderate anxiety) 3 (minimal anxiety)   Total Score 4 4 6    6 6 3 12 3     CAGE-AID:       5/31/2023     9:18 AM   CAGE-AID Total Score   Total Score 0   Total Score MyChart 0 (A total score of 2 or greater is considered clinically significant)     PROMIS 10-Global Health (all questions and answers displayed):       5/31/2023     9:18 AM   PROMIS 10   In general, would you say your health is: Good   In general, would you say your quality of life is: Very good   In general, how would you rate your physical health? Good   In general, how would you rate your mental health, including your mood and your ability to think? Good   In general, how would you rate your satisfaction with your social activities and relationships? Excellent   In general, please rate how well you carry out your usual social activities and roles Very good   To what extent are you able to carry out your everyday physical activities such as walking, climbing stairs, carrying groceries, or moving a chair? Completely   In the past 7 days, how often have you been bothered by emotional problems such as feeling anxious, depressed, or irritable? Often   In the past 7 days, how would you rate your fatigue on average? Moderate   In the past 7 days, how would you rate your pain on average, where 0 means no pain, and 10 means worst imaginable pain? 0   In general, would you say your health is: 3   In general, would you say your quality of life is: 4   In general, how would you rate your physical health? 3   In general, how would you rate your mental health, including your  mood and your ability to think? 3   In general, how would you rate your satisfaction with your social activities and relationships? 5   In general, please rate how well you carry out your usual social activities and roles. (This includes activities at home, at work and in your community, and responsibilities as a parent, child, spouse, employee, friend, etc.) 4   To what extent are you able to carry out your everyday physical activities such as walking, climbing stairs, carrying groceries, or moving a chair? 5   In the past 7 days, how often have you been bothered by emotional problems such as feeling anxious, depressed, or irritable? 4   In the past 7 days, how would you rate your fatigue on average? 3   In the past 7 days, how would you rate your pain on average, where 0 means no pain, and 10 means worst imaginable pain? 0   Global Mental Health Score 14   Global Physical Health Score 16   PROMIS TOTAL - SUBSCORES 30     Chaves Suicide Severity Rating Scale (Lifetime/Recent)      4/19/2019     9:35 AM 6/13/2023     8:37 AM   Chaves Suicide Severity Rating (Lifetime/Recent)   Q1 Wished to be Dead (Past Month) no    Q2 Suicidal Thoughts (Past Month) no    Q6 Suicide Behavior (Lifetime) no    1. Wish to be Dead (Lifetime)  N   2. Non-Specific Active Suicidal Thoughts (Lifetime)  N   Actual Attempt (Lifetime)  N   Has subject engaged in non-suicidal self-injurious behavior? (Lifetime)  N   Interrupted Attempts (Lifetime)  N   Aborted or Self-Interrupted Attempt (Lifetime)  N   Preparatory Acts or Behavior (Lifetime)  N   Calculated C-SSRS Risk Score (Lifetime/Recent)  No Risk Indicated       Personal and Family Medical History:  Patient does report a family history of mental health concerns.  Patient reports family history includes Breast Cancer in her cousin; Hypertension in her father; Other Cancer in her cousin..     Patient does not report Mental Health Diagnosis or Treatment.      Patient has had a physical  exam to rule out medical causes for current symptoms.  Date of last physical exam was within the past year. Client was encouraged to follow up with PCP if symptoms were to develop. The patient has a [facility] Care Provider, who is named No Ref-Primary, Physician..  Patient reports the following current medical concerns: high blood pressure and no current dental concerns.  Patient denies any issues with pain..   There are not significant appetite / nutritional concerns / weight changes.   Patient does not report a history of head injury / trauma / cognitive impairment.     Patient reports not taking any current psychiatric medications.    Medication Adherence:  Patient reports taking.  .    Patient Allergies:    Allergies   Allergen Reactions     Penicillins Unknown     Sulfa Antibiotics Unknown       Medical History:    Past Medical History:   Diagnosis Date     Abnormal Pap smear of cervix 03/2022    Follow up needed this year     Gestational hypertension 04/19/2019    developed postpartum, no meds     NO ACTIVE PROBLEMS          Current Mental Status Exam:   Appearance:  Appropriate    Eye Contact:  Good   Psychomotor:  Normal       Gait / station:  no problem  Attitude / Demeanor: Cooperative  Friendly  Speech      Rate / Production: Normal/ Responsive      Volume:  Normal  volume      Language:  intact and no problems  Mood:   Anxious  Depressed   Affect:   Appropriate    Thought Content: Clear   Thought Process: Coherent  Goal Directed  Logical       Associations: No loosening of associations  Insight:   Good   Judgment:  Intact   Orientation:  All  Attention/concentration: Good      Substance Use:  Patient did not report a family history of substance use concerns; see medical history section for details.  Patient has not received chemical dependency treatment in the past.  Patient has not ever been to detox.      Patient is not currently receiving any chemical dependency treatment.           Substance  History of use Age of first use Date of last use     Pattern and duration of use (include amounts and frequency)   Alcohol currently use   18 05/27/23 REPORTS SUBSTANCE USE: reports using substance 2 times per week and has 2-4 beers at a time.   Patient reports heaviest use is current use.    Cannabis   currently use 32 03/01/23 REPORTS SUBSTANCE USE: reports using substance 4 times per year and has 1 gummie at a time.   Patient reports heaviest use is current use.   Amphetamines   never used        Cocaine/crack    never used          Hallucinogens never used            Inhalants never used            Heroin never used            Other Opiates never used        Benzodiazepine   never used        Barbiturates never used        Over the counter meds never used        Caffeine currently use 8   REPORTS SUBSTANCE USE: reports using substance 1 times per day and has 2 cups of coffee at a time.   Patient reports heaviest use is current use.    Nicotine  never used        Other substances not listed above:  Identify:  never used          Patient reported the following problems as a result of their substance use: no problems, not applicable.    Substance Use: No symptoms    Based on the negative CAGE score and clinical interview there  are not indications of drug or alcohol abuse.      Significant Losses / Trauma / Abuse / Neglect Issues:   Patient did not serve in the .  There are indications or report of significant loss, trauma, abuse or neglect issues related to: are no indications and client denies any losses, trauma, abuse, or neglect concerns.  Concerns for possible neglect are not present.     Safety Assessment:   Patient denies current homicidal ideation and behaviors.  Patient denies current self-injurious ideation and behaviors.    Patient denied risk behaviors associated with substance use.  Patient denies any high risk behaviors associated with mental health symptoms.  Patient reports the following  current concerns for their personal safety: None.  Patient reports there are not firearms in the house.           History of Safety Concerns:  Patient denied a history of homicidal ideation.     Patient denied a history of personal safety concerns.    Patient denied a history of assaultive behaviors.    Patient denied a history of sexual assault behaviors.     Patient denied a history of risk behaviors associated with substance use.  Patient denies any history of high risk behaviors associated with mental health symptoms.  Patient reports the following protective factors: forward or future oriented thinking; dedication to family or friends; safe and stable environment; regular sleep; purpose; secure attachment; structured day; effective problem solving skills; commitment to well being; healthy fear of risky behaviors or pain; sense of personal control or determination    Risk Plan:  See Recommendations for Safety and Risk Management Plan    Review of Symptoms per patient report:   Depression: Excessive or inappropriate guilt, Change in energy level, Difficulties concentrating, Low self-worth, Ruminations, Irritability and Feeling sad, down, or depressed  [place]:  No Symptoms  Psychosis: No Symptoms  Anxiety: Excessive worry, Physical complaints, such as headaches, stomachaches, muscle tension, Ruminations, Poor concentration and Irritability  Panic:  No symptoms  Post Traumatic Stress Disorder:  No Symptoms   Eating Disorder: No Symptoms  ADD / ADHD:  No symptoms  Conduct Disorder: No symptoms  Autism Spectrum Disorder: No symptoms  Obsessive Compulsive Disorder: No Symptoms    Patient reports the following compulsive behaviors and treatment history: None.      Diagnostic Criteria:   Adjustment Disorder  A. The development of emotional or behavioral symptoms in response to an identifiable stressor(s) occurring within 3 months of the onset of the stressor(s)  B. These symptoms or behaviors are clinically significant,  as evidenced by one or both of the following:       - Significant impairment in social, occupational, or other important areas of functioning  C. The stress-related disturbance does not meet criteria for another disorder & is not not an exacerbation of another mental disorder  D. The symptoms do not represent normal bereavement  E. Once the stressor or its consequences have terminated, the symptoms do not persist for more than an additional 6 months       * Adjustment Disorder with Mixed Anxiety and Depressed Mood: The predominant manifestation is a combination of depression and anxiety    Functional Status:  Patient reports the following functional impairments:  home life with family, relationship(s) and self-care.     Nonprogrammatic care:  Patient is requesting basic services to address current mental health concerns.    Clinical Summary:  1. Reason for assessment: increased anxiety, low mood and irritation  .  2. Psychosocial, Cultural and Contextual Factors: works full time, has twin sons, good support system.  3. Principal DSM5 Diagnoses  (Sustained by DSM5 Criteria Listed Above):   Adjustment Disorders  309.28 (F43.23) With mixed anxiety and depressed mood.  4. Other Diagnoses that is relevant to services:   none.  5. Provisional Diagnosis:  none.  6. Prognosis: Return to Baseline Functioning and Expect Improvement.  7. Likely consequences of symptoms if not treated: decline in functioning if not treated.  8. Client strengths include:  caring, creative, educated, empathetic, employed, goal-focused, good listener, insightful, intelligent, motivated, open to learning, open to suggestions / feedback, responsible parent, support of family, friends and providers, supportive, wants to learn, willing to ask questions and willing to relate to others .     Recommendations:     1. Plan for Safety and Risk Management:   Safety and Risk: Recommended that patient call 911 or go to the local ED should there be a change in  any of these risk factors..          Report to child / adult protection services was NA.     2. Patient's identified mental health concerns with a cultural influence will be addressed by exploring and acknowledging the biopsychosocial factors that have directly impacted every aspect of their life and their mental health and incorporating the values they hold into treatment.     3. Initial Treatment will focus on:    Depressed Mood - to decrease the frequency and duration of their low mood and increase coping skills  Anxiety - to decrease the frequency and duration of their anxious thoughts and feelings and increase coping skills.     4. Resources/Service Plan:    services are not indicated.   Modifications to assist communication are not indicated.   Additional disability accommodations are not indicated.      5. Collaboration:   Collaboration / coordination of treatment will be initiated with the following  support professionals: none.      6.  Referrals:   The following referral(s) will be initiated: none. Next Scheduled Appointment: 6/20/2023.      A Release of Information has been obtained for the following: none.     Emergency Contact  was not obtained.      Clinical Substantiation/medical necessity for the above recommendations:  n/a.    7. MOMO:    MOMO:  Discussed the general effects of drugs and alcohol on health and well-being. Provider gave patient printed information about the  effects of chemical use on their health and well being. Recommendations:  Continue to limit use .     8. Records:   These were not available for review at time of assessment.   Information in this assessment was obtained from the medical record and  provided by patient who is a good historian.    Patient will have open access to their mental health medical record.    9.   Interactive Complexity: No      Provider Name/ Credentials:  NIK Perry  June 7, 2023

## 2023-06-13 ASSESSMENT — COLUMBIA-SUICIDE SEVERITY RATING SCALE - C-SSRS
ATTEMPT LIFETIME: NO
TOTAL  NUMBER OF ABORTED OR SELF INTERRUPTED ATTEMPTS LIFETIME: NO
6. HAVE YOU EVER DONE ANYTHING, STARTED TO DO ANYTHING, OR PREPARED TO DO ANYTHING TO END YOUR LIFE?: NO
2. HAVE YOU ACTUALLY HAD ANY THOUGHTS OF KILLING YOURSELF?: NO
1. HAVE YOU WISHED YOU WERE DEAD OR WISHED YOU COULD GO TO SLEEP AND NOT WAKE UP?: NO
TOTAL  NUMBER OF INTERRUPTED ATTEMPTS LIFETIME: NO

## 2023-06-20 ENCOUNTER — VIRTUAL VISIT (OUTPATIENT)
Dept: PSYCHOLOGY | Facility: CLINIC | Age: 38
End: 2023-06-20
Payer: COMMERCIAL

## 2023-06-20 DIAGNOSIS — F43.23 ADJUSTMENT DISORDER WITH MIXED ANXIETY AND DEPRESSED MOOD: Primary | ICD-10-CM

## 2023-06-20 PROCEDURE — 90834 PSYTX W PT 45 MINUTES: CPT | Mod: VID

## 2023-06-20 NOTE — PROGRESS NOTES
[facility]                                     Progress Note    Patient Name: Vale Greenberg  Date: 6/20/2023         Service Type: Individual      Session Start Time: 7:34 AM  Session End Time: 8:25 AM     Session Length: 51 Minutes    Session #: 2    Attendees: Client attended alone    Service Modality:  Video Visit:      Provider verified identity through the following two step process.  Patient provided:  Patient is known previously to provider    Telemedicine Visit: The patient's condition can be safely assessed and treated via synchronous audio and visual telemedicine encounter.      Reason for Telemedicine Visit: Patient has requested telehealth visit    Originating Site (Patient Location): Patient's place of employment    Distant Site (Provider Location): Provider Remote Setting- Home Office    Consent:  The patient/guardian has verbally consented to: the potential risks and benefits of telemedicine (video visit) versus in person care; bill my insurance or make self-payment for services provided; and responsibility for payment of non-covered services.     Patient would like the video invitation sent by:  My Chart    Mode of Communication:  Video Conference via [employer]    Distant Location (Provider):  Off-site    As the provider I attest to compliance with applicable laws and regulations related to telemedicine.    DATA  Interactive Complexity: No  Crisis: No        Progress Since Last Session (Related to Symptoms / Goals / Homework):   Symptoms: Worsening irritation and stress, continued anxiety    Homework: Newly Established      Episode of Care Goals: No improvement - PREPARATION (Decided to change - considering how); Intervened by negotiating a change plan and determining options / strategies for behavior change, identifying triggers, exploring social supports, and working towards setting a date to begin behavior change     Current / Ongoing Stressors and Concerns:   Vale holley  "coming to therapy to learn skills to decrease her anxiety and depression. She reports increased fear of possible health concerns and worries for her future have contributed to significant distress in all aspects of her life and results in high blood pressure, irritation, avoidance and withdrawing behavior. She reports increased relationship conflicts with her family and pressure to be \"it all for everyone.\"     Treatment Objective(s) Addressed in This Session:   practice deep breathing at least once a day       Intervention:   Therapist met with patient to establishe goals and interventions. Therapist utilized reflected listening as patient gave brief reflection of the past few weeks. Patient reported continued anxiety and increase irritation and stress. Therapist supported patient as she processed and validated patient. Therapist introduced mindfulness, window of tolerance and 4-7-8 breathing.     Assessments completed prior to visit:  The following assessments were completed by patient for this visit:  PHQ2:       4/26/2023     7:57 AM 2/20/2023     9:54 AM 2/20/2023     9:47 AM 3/4/2021     2:32 PM 3/2/2020     8:30 AM 10/4/2018     7:18 AM 7/25/2016     8:24 AM   PHQ-2 ( 1999 [employer])   Q1: Little interest or pleasure in doing things 0 0 0 0 0 0 0   Q2: Feeling down, depressed or hopeless 0 1 1 1 0 0 0   PHQ-2 Score 0 1 1 1 0 0 0   PHQ-2 Total Score (12-17 Years)- Positive if 3 or more points; Administer PHQ-A if positive    1 0     Q1: Little interest or pleasure in doing things Not at all Not at all Not at all   Not at all    Q2: Feeling down, depressed or hopeless Not at all Several days Several days   Not at all    PHQ-2 Score 0 1 1   0      PHQ9:       6/18/2019     3:26 PM 7/10/2019     3:56 PM 3/2/2020     8:30 AM 3/4/2021     2:32 PM 6/7/2023    12:01 PM   PHQ-9 SCORE   PHQ-9 Total Score MyChart     3 (Minimal depression)   PHQ-9 Total Score 9 2 2 2 3     GAD2:       5/31/2023     9:10 AM 6/20/2023     " 6:25 AM   TIM-2   Feeling nervous, anxious, or on edge 1 1   Not being able to stop or control worrying 1 0   TIM-2 Total Score 2 1     GAD7:       2/28/2020     8:06 AM 3/2/2020     8:30 AM 2/18/2021     7:29 AM 2/25/2021    10:15 AM 3/1/2022    10:35 AM 2/20/2023     9:54 AM 4/26/2023     7:57 AM   TIM-7 SCORE   Total Score 4 (minimal anxiety)  6 (mild anxiety) 6 (mild anxiety) 3 (minimal anxiety) 12 (moderate anxiety) 3 (minimal anxiety)   Total Score 4 4 6    6 6 3 12 3     PROMIS 10-Global Health (all questions and answers displayed):       5/31/2023     9:18 AM 6/20/2023     6:26 AM   PROMIS 10   In general, would you say your health is: Good Good   In general, would you say your quality of life is: Very good Very good   In general, how would you rate your physical health? Good Good   In general, how would you rate your mental health, including your mood and your ability to think? Good Very good   In general, how would you rate your satisfaction with your social activities and relationships? Excellent Very good   In general, please rate how well you carry out your usual social activities and roles Very good Very good   To what extent are you able to carry out your everyday physical activities such as walking, climbing stairs, carrying groceries, or moving a chair? Completely Completely   In the past 7 days, how often have you been bothered by emotional problems such as feeling anxious, depressed, or irritable? Often Sometimes   In the past 7 days, how would you rate your fatigue on average? Moderate Mild   In the past 7 days, how would you rate your pain on average, where 0 means no pain, and 10 means worst imaginable pain? 0 0   In general, would you say your health is: 3 3   In general, would you say your quality of life is: 4 4   In general, how would you rate your physical health? 3 3   In general, how would you rate your mental health, including your mood and your ability to think? 3 4   In general, how  would you rate your satisfaction with your social activities and relationships? 5 4   In general, please rate how well you carry out your usual social activities and roles. (This includes activities at home, at work and in your community, and responsibilities as a parent, child, spouse, employee, friend, etc.) 4 4   To what extent are you able to carry out your everyday physical activities such as walking, climbing stairs, carrying groceries, or moving a chair? 5 5   In the past 7 days, how often have you been bothered by emotional problems such as feeling anxious, depressed, or irritable? 4 3   In the past 7 days, how would you rate your fatigue on average? 3 2   In the past 7 days, how would you rate your pain on average, where 0 means no pain, and 10 means worst imaginable pain? 0 0   Global Mental Health Score 14 15   Global Physical Health Score 16 17   PROMIS TOTAL - SUBSCORES 30 32         ASSESSMENT: Current Emotional / Mental Status (status of significant symptoms):   Risk status (Self / Other harm or suicidal ideation)   Patient denies current fears or concerns for personal safety.   Patient denies current or recent suicidal ideation or behaviors.   Patient denies current or recent homicidal ideation or behaviors.   Patient denies current or recent self injurious behavior or ideation.   Patient denies other safety concerns.   Patient reports there has been no change in risk factors since their last session.     Patient reports there has been no change in protective factors since their last session.     Recommended that patient call 911 or go to the local ED should there be a change in any of these risk factors.     Appearance:   Appropriate    Eye Contact:   Good    Psychomotor Behavior: Normal    Attitude:   Cooperative    Orientation:   All   Speech    Rate / Production: Normal     Volume:  Normal    Mood:    Anxious  Depressed    Affect:    Subdued    Thought Content:  Clear    Thought Form:  Coherent   Logical    Insight:    Good      Medication Review:   No current psychiatric medications prescribed     Medication Compliance:   NA     Changes in Health Issues:   None reported     Chemical Use Review:   Substance Use: Chemical use reviewed, no active concerns identified      Tobacco Use: No current tobacco use.      Diagnosis:  1. Adjustment disorder with mixed anxiety and depressed mood        Collateral Reports Completed:   Not Applicable    PLAN: (Patient Tasks / Therapist Tasks / Other)  Vale will practice 4-7-8 breathing daily.       Tarah Tashi, LICSW                                                         ______________________________________________________________________    Individual Treatment Plan    Patient's Name: Vale Greenberg  YOB: 1985    Date of Creation: 6/20/2023  Date Treatment Plan Last Reviewed/Revised: n/a    DSM5 Diagnoses: Adjustment Disorders  309.28 (F43.23) With mixed anxiety and depressed mood  Psychosocial / Contextual Factors: works full time, has twin sons, good support system.  PROMIS (reviewed every 90 days):     Referral / Collaboration:  Referral to another professional/service is not indicated at this time..    Anticipated number of session for this episode of care: 9-12 sessions  Anticipation frequency of session: Every other week  Anticipated Duration of each session: 38-52 minutes  Treatment plan will be reviewed in 90 days or when goals have been changed.       MeasurableTreatment Goal(s) related to diagnosis / functional impairment(s)  Goal 1: Patient will become more aware of their anxiety and utilize the skills taught to decrease their anxiety symptoms.    I will know I've met my goal when I understand more about my anxiety and have skills to cope with it.      Objective #A (Patient Action)    Patient will use at least 4 coping skills for anxiety management in the next 12 weeks..  Status: New - Date: 6/20/2023     Intervention(s)  Therapist  will teach coping skills and assign homework of practicing these.    Objective #B  Patient will use cognitive strategies identified in therapy to challenge anxious thoughts. Patient will engage in activities that are anxiety producing for them, slowly increasing their tolerance for new activities. Patient will identify and recognize past negative experiences and subsequent beliefs they hold that contribute to their anxiety..  Status: New - Date: 6/20/2023     Intervention(s)  Therapist will will teach cognitive behavioral skills and engage client in Socratic dialogue around distorted thinking.  Therapist will provide educational materials on CBT. .    Objective #C  Patient will implement self-care into their routine to decrease anxiety, focusing on sleep hygiene, nutrition, movement, regular engagement in mindful activity, and regular socialization. .  Status: New - Date: 6/20/2023     Intervention(s)  Therapist will provide psychoeducation around the benefit of self-care and help client identify mindfulness based activities that may work for their life..    Goal 2: Patiient will improve her ability to be effective in relationships as evidenced by client reporting use of three new communication skills over the next three months.    I will know I've met my goal when I can communicate with my family better.      Objective #A (Patient Action)    Status: New - Date: 6/20/2023     Patient will learn & utilize at least 2 assertive communication skills weekly.    Intervention(s)  Therapist will teach assertiveness, effective interpersonal skills and about healthy boundaries in relationships.    Objective #B  Patient will identify his values regarding interpersonal relationships and how to uphold her values while navigating interpersonal relationships.    Status: New - Date: 6/20/2023     Intervention(s)  Therapist will teach how to uphold values with DBT and provide support and feedback and teach about healthy boundaries  in relationships.    Objective #C  Patient will uphold her values while navigating interpersonal relationships.  Status: New - Date: 6/20/2023     Intervention(s)  Therapist will provide support and feedback and teach about healthy boundaries in relationships..      Patient has reviewed and agreed to the above plan.      NIK Montero  June 20, 2023

## 2023-07-13 ENCOUNTER — VIRTUAL VISIT (OUTPATIENT)
Dept: PSYCHOLOGY | Facility: CLINIC | Age: 38
End: 2023-07-13
Payer: COMMERCIAL

## 2023-07-13 DIAGNOSIS — F43.23 ADJUSTMENT DISORDER WITH MIXED ANXIETY AND DEPRESSED MOOD: Primary | ICD-10-CM

## 2023-07-13 PROCEDURE — 90834 PSYTX W PT 45 MINUTES: CPT | Mod: VID

## 2023-07-17 NOTE — PROGRESS NOTES
[facility]                                     Progress Note    Patient Name: Vale Greenberg  Date: 7/13/2023         Service Type: Individual      Session Start Time: 10:00 AM  Session End Time: 10:50 AM     Session Length: 50 Minutes    Session #: 3    Attendees: Client attended alone    Service Modality:  Video Visit:      Provider verified identity through the following two step process.  Patient provided:  Patient is known previously to provider    Telemedicine Visit: The patient's condition can be safely assessed and treated via synchronous audio and visual telemedicine encounter.      Reason for Telemedicine Visit: Patient has requested telehealth visit    Originating Site (Patient Location): Patient's place of employment    Distant Site (Provider Location): Provider Remote Setting- Home Office    Consent:  The patient/guardian has verbally consented to: the potential risks and benefits of telemedicine (video visit) versus in person care; bill my insurance or make self-payment for services provided; and responsibility for payment of non-covered services.     Patient would like the video invitation sent by:  My Chart    Mode of Communication:  Video Conference via [employer]    Distant Location (Provider):  Off-site    As the provider I attest to compliance with applicable laws and regulations related to telemedicine.    DATA  Interactive Complexity: No  Crisis: No        Progress Since Last Session (Related to Symptoms / Goals / Homework):   Symptoms: Improving anxiety and utilization of skills    Homework: Achieved / completed to satisfaction      Episode of Care Goals: Minimal progress - ACTION (Actively working towards change); Intervened by reinforcing change plan / affirming steps taken     Current / Ongoing Stressors and Concerns:   Vale is coming to therapy to learn skills to decrease her anxiety and depression. She reports increased fear of possible health concerns and  "worries for her future have contributed to significant distress in all aspects of her life and results in high blood pressure, irritation, avoidance and withdrawing behavior. She reports increased relationship conflicts with her family and pressure to be \"it all for everyone.\"     Treatment Objective(s) Addressed in This Session:   use cognitive strategies identified in therapy to challenge anxious thoughts   practice deep breathing at least once a day     Intervention:   Therapist utilized reflected listening as patient gave brief reflection of the past few weeks. Patient reported continued anxiety but the duration has shortened due to her daily grounding exercises. Therapist supported patient as she processed and validated patient. Therapist introduced and practiced body scan. Therapist introduced what is CBT: How you think determines how you feel and how you behave. Provided psychoeducation about how an event occurs, leads to having thought about what occurred, to emotions based upon those thoughts and responding to those thoughts and feelings with behaviors. Discussed how identifying patterns in their thoughts, emotions and behaviors will help them better understand how they impact their life in a significant way.    Assessments completed prior to visit:  The following assessments were completed by patient for this visit:  PHQ2:       4/26/2023     7:57 AM 2/20/2023     9:54 AM 2/20/2023     9:47 AM 3/4/2021     2:32 PM 3/2/2020     8:30 AM 10/4/2018     7:18 AM 7/25/2016     8:24 AM   PHQ-2 ( 1999 [employer])   Q1: Little interest or pleasure in doing things 0 0 0 0 0 0 0   Q2: Feeling down, depressed or hopeless 0 1 1 1 0 0 0   PHQ-2 Score 0 1 1 1 0 0 0   PHQ-2 Total Score (12-17 Years)- Positive if 3 or more points; Administer PHQ-A if positive    1 0     Q1: Little interest or pleasure in doing things Not at all Not at all Not at all   Not at all    Q2: Feeling down, depressed or hopeless Not at all Several days " Several days   Not at all    PHQ-2 Score 0 1 1   0      PHQ9:       6/18/2019     3:26 PM 7/10/2019     3:56 PM 3/2/2020     8:30 AM 3/4/2021     2:32 PM 6/7/2023    12:01 PM   PHQ-9 SCORE   PHQ-9 Total Score MyChart     3 (Minimal depression)   PHQ-9 Total Score 9 2 2 2 3     GAD2:       5/31/2023     9:10 AM 6/20/2023     6:25 AM   TIM-2   Feeling nervous, anxious, or on edge 1 1   Not being able to stop or control worrying 1 0   TIM-2 Total Score 2 1     GAD7:       2/28/2020     8:06 AM 3/2/2020     8:30 AM 2/18/2021     7:29 AM 2/25/2021    10:15 AM 3/1/2022    10:35 AM 2/20/2023     9:54 AM 4/26/2023     7:57 AM   TIM-7 SCORE   Total Score 4 (minimal anxiety)  6 (mild anxiety) 6 (mild anxiety) 3 (minimal anxiety) 12 (moderate anxiety) 3 (minimal anxiety)   Total Score 4 4 6    6 6 3 12 3     PROMIS 10-Global Health (all questions and answers displayed):       5/31/2023     9:18 AM 6/20/2023     6:26 AM   PROMIS 10   In general, would you say your health is: Good Good   In general, would you say your quality of life is: Very good Very good   In general, how would you rate your physical health? Good Good   In general, how would you rate your mental health, including your mood and your ability to think? Good Very good   In general, how would you rate your satisfaction with your social activities and relationships? Excellent Very good   In general, please rate how well you carry out your usual social activities and roles Very good Very good   To what extent are you able to carry out your everyday physical activities such as walking, climbing stairs, carrying groceries, or moving a chair? Completely Completely   In the past 7 days, how often have you been bothered by emotional problems such as feeling anxious, depressed, or irritable? Often Sometimes   In the past 7 days, how would you rate your fatigue on average? Moderate Mild   In the past 7 days, how would you rate your pain on average, where 0 means no pain,  and 10 means worst imaginable pain? 0 0   In general, would you say your health is: 3 3   In general, would you say your quality of life is: 4 4   In general, how would you rate your physical health? 3 3   In general, how would you rate your mental health, including your mood and your ability to think? 3 4   In general, how would you rate your satisfaction with your social activities and relationships? 5 4   In general, please rate how well you carry out your usual social activities and roles. (This includes activities at home, at work and in your community, and responsibilities as a parent, child, spouse, employee, friend, etc.) 4 4   To what extent are you able to carry out your everyday physical activities such as walking, climbing stairs, carrying groceries, or moving a chair? 5 5   In the past 7 days, how often have you been bothered by emotional problems such as feeling anxious, depressed, or irritable? 4 3   In the past 7 days, how would you rate your fatigue on average? 3 2   In the past 7 days, how would you rate your pain on average, where 0 means no pain, and 10 means worst imaginable pain? 0 0   Global Mental Health Score 14 15   Global Physical Health Score 16 17   PROMIS TOTAL - SUBSCORES 30 32         ASSESSMENT: Current Emotional / Mental Status (status of significant symptoms):   Risk status (Self / Other harm or suicidal ideation)   Patient denies current fears or concerns for personal safety.   Patient denies current or recent suicidal ideation or behaviors.   Patient denies current or recent homicidal ideation or behaviors.   Patient denies current or recent self injurious behavior or ideation.   Patient denies other safety concerns.   Patient reports there has been no change in risk factors since their last session.     Patient reports there has been no change in protective factors since their last session.     Recommended that patient call 911 or go to the local ED should there be a change in  any of these risk factors.     Appearance:   Appropriate    Eye Contact:   Good    Psychomotor Behavior: Normal    Attitude:   Cooperative    Orientation:   All   Speech    Rate / Production: Normal     Volume:  Normal    Mood:    Anxious    Affect:    Appropriate    Thought Content:  Clear    Thought Form:  Coherent  Logical    Insight:    Good      Medication Review:   No current psychiatric medications prescribed     Medication Compliance:   NA     Changes in Health Issues:   None reported     Chemical Use Review:   Substance Use: Chemical use reviewed, no active concerns identified      Tobacco Use: No current tobacco use.      Diagnosis:  1. Adjustment disorder with mixed anxiety and depressed mood        Collateral Reports Completed:   Not Applicable    PLAN: (Patient Tasks / Therapist Tasks / Other)  Vale will practice body scan daily. She will build awareness of how their thoughts, feelings and actions influence one another and bring an example of this to our next session.      NIK Montero                                                         ______________________________________________________________________    Individual Treatment Plan    Patient's Name: Vale Greenberg  YOB: 1985    Date of Creation: 6/20/2023  Date Treatment Plan Last Reviewed/Revised: n/a    DSM5 Diagnoses: Adjustment Disorders  309.28 (F43.23) With mixed anxiety and depressed mood  Psychosocial / Contextual Factors: works full time, has twin sons, good support system.  PROMIS (reviewed every 90 days):     Referral / Collaboration:  Referral to another professional/service is not indicated at this time..    Anticipated number of session for this episode of care: 9-12 sessions  Anticipation frequency of session: Every other week  Anticipated Duration of each session: 38-52 minutes  Treatment plan will be reviewed in 90 days or when goals have been changed.       MeasurableTreatment Goal(s) related  to diagnosis / functional impairment(s)  Goal 1: Patient will become more aware of their anxiety and utilize the skills taught to decrease their anxiety symptoms.    I will know I've met my goal when I understand more about my anxiety and have skills to cope with it.      Objective #A (Patient Action)    Patient will use at least 4 coping skills for anxiety management in the next 12 weeks..  Status: New - Date: 6/20/2023     Intervention(s)  Therapist will teach coping skills and assign homework of practicing these.    Objective #B  Patient will use cognitive strategies identified in therapy to challenge anxious thoughts. Patient will engage in activities that are anxiety producing for them, slowly increasing their tolerance for new activities. Patient will identify and recognize past negative experiences and subsequent beliefs they hold that contribute to their anxiety..  Status: New - Date: 6/20/2023     Intervention(s)  Therapist will will teach cognitive behavioral skills and engage client in Socratic dialogue around distorted thinking.  Therapist will provide educational materials on CBT. .    Objective #C  Patient will implement self-care into their routine to decrease anxiety, focusing on sleep hygiene, nutrition, movement, regular engagement in mindful activity, and regular socialization. .  Status: New - Date: 6/20/2023     Intervention(s)  Therapist will provide psychoeducation around the benefit of self-care and help client identify mindfulness based activities that may work for their life..    Goal 2: Jay will improve her ability to be effective in relationships as evidenced by client reporting use of three new communication skills over the next three months.    I will know I've met my goal when I can communicate with my family better.      Objective #A (Patient Action)    Status: New - Date: 6/20/2023     Patient will learn & utilize at least 2 assertive communication skills  weekly.    Intervention(s)  Therapist will teach assertiveness, effective interpersonal skills and about healthy boundaries in relationships.    Objective #B  Patient will identify his values regarding interpersonal relationships and how to uphold her values while navigating interpersonal relationships.    Status: New - Date: 6/20/2023     Intervention(s)  Therapist will teach how to uphold values with DBT and provide support and feedback and teach about healthy boundaries in relationships.    Objective #C  Patient will uphold her values while navigating interpersonal relationships.  Status: New - Date: 6/20/2023     Intervention(s)  Therapist will provide support and feedback and teach about healthy boundaries in relationships..      Patient has reviewed and agreed to the above plan.      Tarah Akins, MaineGeneral Medical CenterSW  June 20, 2023

## 2023-07-20 ENCOUNTER — VIRTUAL VISIT (OUTPATIENT)
Dept: PSYCHOLOGY | Facility: CLINIC | Age: 38
End: 2023-07-20
Payer: COMMERCIAL

## 2023-07-20 DIAGNOSIS — F43.23 ADJUSTMENT DISORDER WITH MIXED ANXIETY AND DEPRESSED MOOD: Primary | ICD-10-CM

## 2023-07-20 PROCEDURE — 90834 PSYTX W PT 45 MINUTES: CPT | Mod: VID

## 2023-07-20 NOTE — PROGRESS NOTES
[facility]                                     Progress Note    Patient Name: Vale Greenberg  Date: 7/20/2023         Service Type: Individual      Session Start Time: 8:30 AM  Session End Time: 9:20 AM     Session Length: 50 Minutes    Session #: 4    Attendees: Client attended alone    Service Modality:  Video Visit:      Provider verified identity through the following two step process.  Patient provided:  Patient is known previously to provider    Telemedicine Visit: The patient's condition can be safely assessed and treated via synchronous audio and visual telemedicine encounter.      Reason for Telemedicine Visit: Patient has requested telehealth visit    Originating Site (Patient Location): Patient's place of employment    Distant Site (Provider Location): Provider Remote Setting- Home Office    Consent:  The patient/guardian has verbally consented to: the potential risks and benefits of telemedicine (video visit) versus in person care; bill my insurance or make self-payment for services provided; and responsibility for payment of non-covered services.     Patient would like the video invitation sent by:  My Chart    Mode of Communication:  Video Conference via Amwell    Distant Location (Provider):  Off-site    As the provider I attest to compliance with applicable laws and regulations related to telemedicine.    DATA  Interactive Complexity: No  Crisis: No        Progress Since Last Session (Related to Symptoms / Goals / Homework):   Symptoms: Improving anxiety and utilization of skills, continued stress and some irritation    Homework: Achieved / completed to satisfaction      Episode of Care Goals: Minimal progress - ACTION (Actively working towards change); Intervened by reinforcing change plan / affirming steps taken     Current / Ongoing Stressors and Concerns:   Vale is coming to therapy to learn skills to decrease her anxiety and depression. She reports increased fear  "of possible health concerns and worries for her future have contributed to significant distress in all aspects of her life and results in high blood pressure, irritation, avoidance and withdrawing behavior. She reports increased relationship conflicts with her family and pressure to be \"it all for everyone.\"     Treatment Objective(s) Addressed in This Session:   use cognitive strategies identified in therapy to challenge anxious thoughts   practice deep breathing at least once a day     Intervention:   Therapist utilized reflected listening as patient gave brief reflection of the past week. Patient reported continued anxiety but the duration has shortened due to her daily grounding exercises and building awareness of her thoughts. Therapist supported patient as she processed and validated patient. Therapist introduced box breathing. Therapist continued to introduced CBT focusing on how identifying patterns in their thoughts, emotions and behaviors will help them better understand how they impact their life in a significant way.    Assessments completed prior to visit:  The following assessments were completed by patient for this visit:  PHQ2:       4/26/2023     7:57 AM 2/20/2023     9:54 AM 2/20/2023     9:47 AM 3/4/2021     2:32 PM 3/2/2020     8:30 AM 10/4/2018     7:18 AM 7/25/2016     8:24 AM   PHQ-2 ( 1999 [employer])   Q1: Little interest or pleasure in doing things 0 0 0 0 0 0 0   Q2: Feeling down, depressed or hopeless 0 1 1 1 0 0 0   PHQ-2 Score 0 1 1 1 0 0 0   PHQ-2 Total Score (12-17 Years)- Positive if 3 or more points; Administer PHQ-A if positive    1 0     Q1: Little interest or pleasure in doing things Not at all Not at all Not at all   Not at all    Q2: Feeling down, depressed or hopeless Not at all Several days Several days   Not at all    PHQ-2 Score 0 1 1   0      PHQ9:       6/18/2019     3:26 PM 7/10/2019     3:56 PM 3/2/2020     8:30 AM 3/4/2021     2:32 PM 6/7/2023    12:01 PM   PHQ-9 SCORE "   PHQ-9 Total Score MyChart     3 (Minimal depression)   PHQ-9 Total Score 9 2 2 2 3     GAD2:       5/31/2023     9:10 AM 6/20/2023     6:25 AM   TIM-2   Feeling nervous, anxious, or on edge 1 1   Not being able to stop or control worrying 1 0   TIM-2 Total Score 2 1     GAD7:       2/28/2020     8:06 AM 3/2/2020     8:30 AM 2/18/2021     7:29 AM 2/25/2021    10:15 AM 3/1/2022    10:35 AM 2/20/2023     9:54 AM 4/26/2023     7:57 AM   TIM-7 SCORE   Total Score 4 (minimal anxiety)  6 (mild anxiety) 6 (mild anxiety) 3 (minimal anxiety) 12 (moderate anxiety) 3 (minimal anxiety)   Total Score 4 4 6    6 6 3 12 3     PROMIS 10-Global Health (all questions and answers displayed):       5/31/2023     9:18 AM 6/20/2023     6:26 AM   PROMIS 10   In general, would you say your health is: Good Good   In general, would you say your quality of life is: Very good Very good   In general, how would you rate your physical health? Good Good   In general, how would you rate your mental health, including your mood and your ability to think? Good Very good   In general, how would you rate your satisfaction with your social activities and relationships? Excellent Very good   In general, please rate how well you carry out your usual social activities and roles Very good Very good   To what extent are you able to carry out your everyday physical activities such as walking, climbing stairs, carrying groceries, or moving a chair? Completely Completely   In the past 7 days, how often have you been bothered by emotional problems such as feeling anxious, depressed, or irritable? Often Sometimes   In the past 7 days, how would you rate your fatigue on average? Moderate Mild   In the past 7 days, how would you rate your pain on average, where 0 means no pain, and 10 means worst imaginable pain? 0 0   In general, would you say your health is: 3 3   In general, would you say your quality of life is: 4 4   In general, how would you rate your  physical health? 3 3   In general, how would you rate your mental health, including your mood and your ability to think? 3 4   In general, how would you rate your satisfaction with your social activities and relationships? 5 4   In general, please rate how well you carry out your usual social activities and roles. (This includes activities at home, at work and in your community, and responsibilities as a parent, child, spouse, employee, friend, etc.) 4 4   To what extent are you able to carry out your everyday physical activities such as walking, climbing stairs, carrying groceries, or moving a chair? 5 5   In the past 7 days, how often have you been bothered by emotional problems such as feeling anxious, depressed, or irritable? 4 3   In the past 7 days, how would you rate your fatigue on average? 3 2   In the past 7 days, how would you rate your pain on average, where 0 means no pain, and 10 means worst imaginable pain? 0 0   Global Mental Health Score 14 15   Global Physical Health Score 16 17   PROMIS TOTAL - SUBSCORES 30 32         ASSESSMENT: Current Emotional / Mental Status (status of significant symptoms):   Risk status (Self / Other harm or suicidal ideation)   Patient denies current fears or concerns for personal safety.   Patient denies current or recent suicidal ideation or behaviors.   Patient denies current or recent homicidal ideation or behaviors.   Patient denies current or recent self injurious behavior or ideation.   Patient denies other safety concerns.   Patient reports there has been no change in risk factors since their last session.     Patient reports there has been no change in protective factors since their last session.     Recommended that patient call 911 or go to the local ED should there be a change in any of these risk factors.     Appearance:   Appropriate    Eye Contact:   Good    Psychomotor Behavior: Normal    Attitude:   Cooperative    Orientation:   All   Speech    Rate /  Production: Normal     Volume:  Normal    Mood:    Normal   Affect:    Appropriate    Thought Content:  Clear    Thought Form:  Coherent  Logical    Insight:    Good      Medication Review:   No current psychiatric medications prescribed     Medication Compliance:   NA     Changes in Health Issues:   None reported     Chemical Use Review:   Substance Use: Chemical use reviewed, no active concerns identified      Tobacco Use: No current tobacco use.      Diagnosis:  1. Adjustment disorder with mixed anxiety and depressed mood        Collateral Reports Completed:   Not Applicable    PLAN: (Patient Tasks / Therapist Tasks / Other)  Vale will practice box breathing. She will build awareness of how their thoughts, feelings and actions influence one another and bring an example of this to our next session.      Tarah Akins, LICSW                                                         ______________________________________________________________________    Individual Treatment Plan    Patient's Name: Vale Greenberg  YOB: 1985    Date of Creation: 6/20/2023  Date Treatment Plan Last Reviewed/Revised: n/a    DSM5 Diagnoses: Adjustment Disorders  309.28 (F43.23) With mixed anxiety and depressed mood  Psychosocial / Contextual Factors: works full time, has twin sons, good support system.  PROMIS (reviewed every 90 days):     Referral / Collaboration:  Referral to another professional/service is not indicated at this time..    Anticipated number of session for this episode of care: 9-12 sessions  Anticipation frequency of session: Every other week  Anticipated Duration of each session: 38-52 minutes  Treatment plan will be reviewed in 90 days or when goals have been changed.       MeasurableTreatment Goal(s) related to diagnosis / functional impairment(s)  Goal 1: Patient will become more aware of their anxiety and utilize the skills taught to decrease their anxiety symptoms.    I will know I've  met my goal when I understand more about my anxiety and have skills to cope with it.      Objective #A (Patient Action)    Patient will use at least 4 coping skills for anxiety management in the next 12 weeks..  Status: New - Date: 6/20/2023     Intervention(s)  Therapist will teach coping skills and assign homework of practicing these.    Objective #B  Patient will use cognitive strategies identified in therapy to challenge anxious thoughts. Patient will engage in activities that are anxiety producing for them, slowly increasing their tolerance for new activities. Patient will identify and recognize past negative experiences and subsequent beliefs they hold that contribute to their anxiety..  Status: New - Date: 6/20/2023     Intervention(s)  Therapist will will teach cognitive behavioral skills and engage client in Socratic dialogue around distorted thinking.  Therapist will provide educational materials on CBT. .    Objective #C  Patient will implement self-care into their routine to decrease anxiety, focusing on sleep hygiene, nutrition, movement, regular engagement in mindful activity, and regular socialization. .  Status: New - Date: 6/20/2023     Intervention(s)  Therapist will provide psychoeducation around the benefit of self-care and help client identify mindfulness based activities that may work for their life..    Goal 2: Jay will improve her ability to be effective in relationships as evidenced by client reporting use of three new communication skills over the next three months.    I will know I've met my goal when I can communicate with my family better.      Objective #A (Patient Action)    Status: New - Date: 6/20/2023     Patient will learn & utilize at least 2 assertive communication skills weekly.    Intervention(s)  Therapist will teach assertiveness, effective interpersonal skills and about healthy boundaries in relationships.    Objective #B  Patient will identify his values regarding  interpersonal relationships and how to uphold her values while navigating interpersonal relationships.    Status: New - Date: 6/20/2023     Intervention(s)  Therapist will teach how to uphold values with DBT and provide support and feedback and teach about healthy boundaries in relationships.    Objective #C  Patient will uphold her values while navigating interpersonal relationships.  Status: New - Date: 6/20/2023     Intervention(s)  Therapist will provide support and feedback and teach about healthy boundaries in relationships..      Patient has reviewed and agreed to the above plan.      NIK Montero  June 20, 2023   not applicable

## 2023-07-26 NOTE — PROGRESS NOTES
León Nielsen is a 37 year old female, , here for follow on lichen sclerosis diagnosis.     She was seen on 2023, for general preventive visit. Her physical exam revealed thinning and whitening of tissue below clitoris (above urethra) and at the vaginal opening, appearance consistent with lichen sclerosis. She denied any symptoms at the time.     Today Gia reports using clobetasol daily for the past 12 weeks. She has now switched to applying it twice a week. She reports no new symptoms. She is tolerating the regimen well.     Sexually active with partner, using COCS for contraception.     Past Medical History:   Diagnosis Date    Abnormal Pap smear of cervix 2022    Follow up needed this year    Gestational hypertension 2019    developed postpartum, no meds    NO ACTIVE PROBLEMS      Current Outpatient Medications   Medication    amLODIPine (NORVASC) 5 MG tablet    clobetasol (TEMOVATE) 0.05 % external ointment    magnesium 200 MG TABS    norethindrone (MICRONOR) 0.35 MG tablet    norgestim-eth estrad triphasic (ORTHO TRI-CYCLEN, 28,) 0.18/0.215/0.25 MG-35 MCG tablet    VITAMIN D, CHOLECALCIFEROL, PO     No current facility-administered medications for this visit.     Allergies   Allergen Reactions    Penicillins Unknown    Sulfa Antibiotics Unknown     Family History   Problem Relation Age of Onset    Hypertension Father         Factor V, I have been tested (negative)    Breast Cancer Cousin         She has the BRCA1 gene. My father was tested and determined not to be a carrier.    Other Cancer Cousin         Ovarian cancer. Cousin - sister of cousin with BRCA1    Diabetes No family hx of      Objective  /86   Pulse 72   Wt 86.2 kg (190 lb)   LMP 2023   BMI 30.90 kg/m    General: pleasant female in no acute distress  Psych: normal mentation, well oriented  Respiratory:  Unlabored breathing  Musculoskeletal: no gross deformities    Pelvic Exam:  Vulva: normal hair  distribution; whitening of tissue below the clitoris improved significantly, remaining is a ~2mm white spot noted below the clitoris; clitoral munoz is mobile; distal fusion of both minora to majora; noted new whitening on the perineum (area she had not been applying clobetasol).   Rectal exam: Normal anus.     Assessment/Plan  1. Lichen sclerosus  Discussed overall responding well to therapy. Ok to go to maintenance therapy of twice a week; reviewed where to apply the ointment; she will continue current location of use but also apply to the perineum.  Follow up in 12 weeks with one of our OBGYNs for further evaluation and consideration of biopsy to confirm diagnosis.  Patient verbalized understanding and agreement with the plan for care.     I, Maricruz Daugherty, completed the PFSH and ROS. I then acted as a scribe for ANOOP Rivera, for the remainder of the visit.  Maricruz Daugherty    I agree with the PFSH and ROS as completed by the ANOOP Student, except for changes made by me.  The remainder of the encounter was performed by me and scribed by the ANOOP Student.  The scribed note accurately reflects my personal services and decisions made by me.  Rachel Chowdary, JEANETH, APRN, ANOOP

## 2023-07-27 ENCOUNTER — OFFICE VISIT (OUTPATIENT)
Dept: OBGYN | Facility: CLINIC | Age: 38
End: 2023-07-27
Attending: NURSE PRACTITIONER
Payer: COMMERCIAL

## 2023-07-27 VITALS
BODY MASS INDEX: 30.9 KG/M2 | WEIGHT: 190 LBS | HEART RATE: 72 BPM | SYSTOLIC BLOOD PRESSURE: 124 MMHG | DIASTOLIC BLOOD PRESSURE: 86 MMHG

## 2023-07-27 DIAGNOSIS — L90.0 LICHEN SCLEROSUS: Primary | ICD-10-CM

## 2023-07-27 PROCEDURE — G0463 HOSPITAL OUTPT CLINIC VISIT: HCPCS | Performed by: NURSE PRACTITIONER

## 2023-07-27 PROCEDURE — 99213 OFFICE O/P EST LOW 20 MIN: CPT | Performed by: NURSE PRACTITIONER

## 2023-07-27 NOTE — LETTER
2023       RE: Vale Greenberg  [address] [facility] [address]     Dear Colleague,    Thank you for referring your patient, Vale Greenberg, to the [facility] [place] at [facility]. Please see a copy of my visit note below.    León Nielsen is a 37 year old female, , here for follow on lichen sclerosis diagnosis.     She was seen on 2023, for general preventive visit. Her physical exam revealed thinning and whitening of tissue below clitoris (above urethra) and at the vaginal opening, appearance consistent with lichen sclerosis. She denied any symptoms at the time.     Today Gia reports using clobetasol daily for the past 12 weeks. She has now switched to applying it twice a week. She reports no new symptoms. She is tolerating the regimen well.     Sexually active with partner, using COCS for contraception.     Past Medical History:   Diagnosis Date    Abnormal Pap smear of cervix 2022    Follow up needed this year    Gestational hypertension 2019    developed postpartum, no meds    NO ACTIVE PROBLEMS      Current Outpatient Medications   Medication    amLODIPine (NORVASC) 5 MG tablet    clobetasol (TEMOVATE) 0.05 % external ointment    magnesium 200 MG TABS    norethindrone (MICRONOR) 0.35 MG tablet    norgestim-eth estrad triphasic (ORTHO TRI-CYCLEN, 28,) 0.18/0.215/0.25 MG-35 MCG tablet    VITAMIN D, CHOLECALCIFEROL, PO     No current facility-administered medications for this visit.     Allergies   Allergen Reactions    Penicillins Unknown    Sulfa Antibiotics Unknown     Family History   Problem Relation Age of Onset    Hypertension Father         Factor V, I have been tested (negative)    Breast Cancer Cousin         She has the BRCA1 gene. My father was tested and determined not to be a carrier.    Other Cancer Cousin         Ovarian cancer. Cousin - sister of cousin with BRCA1     Diabetes No family hx of      Objective  /86   Pulse 72   Wt 86.2 kg (190 lb)   LMP 07/19/2023   BMI 30.90 kg/m    General: pleasant female in no acute distress  Psych: normal mentation, well oriented  Respiratory:  Unlabored breathing  Musculoskeletal: no gross deformities    Pelvic Exam:  Vulva: normal hair distribution; whitening of tissue below the clitoris improved significantly, remaining is a ~2mm white spot noted below the clitoris; clitoral munoz is mobile; distal fusion of both minora to majora; noted new whitening on the perineum (area she had not been applying clobetasol).   Rectal exam: Normal anus.     Assessment/Plan  1. Lichen sclerosus  Discussed overall responding well to therapy. Ok to go to maintenance therapy of twice a week; reviewed where to apply the ointment; she will continue current location of use but also apply to the perineum.  Follow up in 12 weeks with one of our OBGYNs for further evaluation and consideration of biopsy to confirm diagnosis.  Patient verbalized understanding and agreement with the plan for care.     I, Maricruz Daugherty, completed the PFSH and ROS. I then acted as a scribe for ANOOP Rivera, for the remainder of the visit.  Maricruz Daugherty    I agree with the PFSH and ROS as completed by the ANOOP Student, except for changes made by me.  The remainder of the encounter was performed by me and scribed by the ANOOP Student.  The scribed note accurately reflects my personal services and decisions made by me.  Rachel Chowdary, JEANETH, APRN, ANOOP

## 2023-07-27 NOTE — PATIENT INSTRUCTIONS
Thank you for trusting us with your care!     If you need to contact us for questions about:  Symptoms, Scheduling & Medical Questions; Non-urgent (2-3 day response) [facility] message, Urgent (needing response today) [phone] (if after 3:30pm next day response)   Prescriptions: Please call your Pharmacy   Billing: [facility] [phone] or [facility]:[phone]

## 2023-08-03 ENCOUNTER — VIRTUAL VISIT (OUTPATIENT)
Dept: PSYCHOLOGY | Facility: CLINIC | Age: 38
End: 2023-08-03
Payer: COMMERCIAL

## 2023-08-03 DIAGNOSIS — F43.23 ADJUSTMENT DISORDER WITH MIXED ANXIETY AND DEPRESSED MOOD: Primary | ICD-10-CM

## 2023-08-03 PROCEDURE — 90834 PSYTX W PT 45 MINUTES: CPT | Mod: VID

## 2023-08-03 NOTE — PROGRESS NOTES
[facility]                                     Progress Note    Patient Name: Vale Greenberg  Date: 8/3/2023         Service Type: Individual      Session Start Time: 7:30 AM  Session End Time: 8:22 AM     Session Length: 52 Minutes    Session #: 5    Attendees: Client attended alone    Service Modality:  Video Visit:      Provider verified identity through the following two step process.  Patient provided:  Patient is known previously to provider    Telemedicine Visit: The patient's condition can be safely assessed and treated via synchronous audio and visual telemedicine encounter.      Reason for Telemedicine Visit: Patient has requested telehealth visit    Originating Site (Patient Location): Patient's place of employment    Distant Site (Provider Location): Provider Remote Setting- Home Office    Consent:  The patient/guardian has verbally consented to: the potential risks and benefits of telemedicine (video visit) versus in person care; bill my insurance or make self-payment for services provided; and responsibility for payment of non-covered services.     Patient would like the video invitation sent by:  My Chart    Mode of Communication:  Video Conference via [employer]    Distant Location (Provider):  Off-site    As the provider I attest to compliance with applicable laws and regulations related to telemedicine.    DATA  Interactive Complexity: No  Crisis: No        Progress Since Last Session (Related to Symptoms / Goals / Homework):   Symptoms: Improving anxiety and utilization of skills, continued stress and some irritation    Homework: Achieved / completed to satisfaction      Episode of Care Goals: Minimal progress - ACTION (Actively working towards change); Intervened by reinforcing change plan / affirming steps taken     Current / Ongoing Stressors and Concerns:   Vale is coming to therapy to learn skills to decrease her anxiety and depression. She reports increased fear of  "possible health concerns and worries for her future have contributed to significant distress in all aspects of her life and results in high blood pressure, irritation, avoidance and withdrawing behavior. She reports increased relationship conflicts with her family and pressure to be \"it all for everyone.\"     Treatment Objective(s) Addressed in This Session:   use cognitive strategies identified in therapy to challenge anxious thoughts   practice deep breathing at least once a day     Intervention:   Therapist utilized reflected listening as patient gave brief reflection of the past few weeks. Patient reported continued anxiety but the duration has shortened due to her daily grounding exercises and building awareness of her thoughts. Therapist supported patient as she processed and validated patient. Therapist introduced box breathing. Therapist continued to introduced CBT focusing on how identifying patterns in their thoughts, emotions and behaviors will help them better understand how they impact their life in a significant way. Therapist introduced SUDS for anxiety and 5-4-3-2-1 grounding technique.    Assessments completed prior to visit:  The following assessments were completed by patient for this visit:  PHQ2:       8/3/2023     7:29 AM 4/26/2023     7:57 AM 2/20/2023     9:54 AM 2/20/2023     9:47 AM 3/4/2021     2:32 PM 3/2/2020     8:30 AM 10/4/2018     7:18 AM   PHQ-2 ( 1999 [employer])   Q1: Little interest or pleasure in doing things 0 0 0 0 0 0 0   Q2: Feeling down, depressed or hopeless 1 0 1 1 1 0 0   PHQ-2 Score 1 0 1 1 1 0 0   PHQ-2 Total Score (12-17 Years)- Positive if 3 or more points; Administer PHQ-A if positive     1 0    Q1: Little interest or pleasure in doing things Not at all Not at all Not at all Not at all   Not at all   Q2: Feeling down, depressed or hopeless Several days Not at all Several days Several days   Not at all   PHQ-2 Score 1 0 1 1   0     PHQ9:       6/18/2019     3:26 PM " 7/10/2019     3:56 PM 3/2/2020     8:30 AM 3/4/2021     2:32 PM 6/7/2023    12:01 PM   PHQ-9 SCORE   PHQ-9 Total Score MyChart     3 (Minimal depression)   PHQ-9 Total Score 9 2 2 2 3     GAD2:       5/31/2023     9:10 AM 6/20/2023     6:25 AM   TIM-2   Feeling nervous, anxious, or on edge 1 1   Not being able to stop or control worrying 1 0   TIM-2 Total Score 2 1     GAD7:       2/28/2020     8:06 AM 3/2/2020     8:30 AM 2/18/2021     7:29 AM 2/25/2021    10:15 AM 3/1/2022    10:35 AM 2/20/2023     9:54 AM 4/26/2023     7:57 AM   TIM-7 SCORE   Total Score 4 (minimal anxiety)  6 (mild anxiety) 6 (mild anxiety) 3 (minimal anxiety) 12 (moderate anxiety) 3 (minimal anxiety)   Total Score 4 4 6    6 6 3 12 3     PROMIS 10-Global Health (all questions and answers displayed):       5/31/2023     9:18 AM 6/20/2023     6:26 AM   PROMIS 10   In general, would you say your health is: Good Good   In general, would you say your quality of life is: Very good Very good   In general, how would you rate your physical health? Good Good   In general, how would you rate your mental health, including your mood and your ability to think? Good Very good   In general, how would you rate your satisfaction with your social activities and relationships? Excellent Very good   In general, please rate how well you carry out your usual social activities and roles Very good Very good   To what extent are you able to carry out your everyday physical activities such as walking, climbing stairs, carrying groceries, or moving a chair? Completely Completely   In the past 7 days, how often have you been bothered by emotional problems such as feeling anxious, depressed, or irritable? Often Sometimes   In the past 7 days, how would you rate your fatigue on average? Moderate Mild   In the past 7 days, how would you rate your pain on average, where 0 means no pain, and 10 means worst imaginable pain? 0 0   In general, would you say your health is: 3 3    In general, would you say your quality of life is: 4 4   In general, how would you rate your physical health? 3 3   In general, how would you rate your mental health, including your mood and your ability to think? 3 4   In general, how would you rate your satisfaction with your social activities and relationships? 5 4   In general, please rate how well you carry out your usual social activities and roles. (This includes activities at home, at work and in your community, and responsibilities as a parent, child, spouse, employee, friend, etc.) 4 4   To what extent are you able to carry out your everyday physical activities such as walking, climbing stairs, carrying groceries, or moving a chair? 5 5   In the past 7 days, how often have you been bothered by emotional problems such as feeling anxious, depressed, or irritable? 4 3   In the past 7 days, how would you rate your fatigue on average? 3 2   In the past 7 days, how would you rate your pain on average, where 0 means no pain, and 10 means worst imaginable pain? 0 0   Global Mental Health Score 14 15   Global Physical Health Score 16 17   PROMIS TOTAL - SUBSCORES 30 32         ASSESSMENT: Current Emotional / Mental Status (status of significant symptoms):   Risk status (Self / Other harm or suicidal ideation)   Patient denies current fears or concerns for personal safety.   Patient denies current or recent suicidal ideation or behaviors.   Patient denies current or recent homicidal ideation or behaviors.   Patient denies current or recent self injurious behavior or ideation.   Patient denies other safety concerns.   Patient reports there has been no change in risk factors since their last session.     Patient reports there has been no change in protective factors since their last session.     Recommended that patient call 911 or go to the local ED should there be a change in any of these risk factors.     Appearance:   Appropriate    Eye Contact:   Good     Psychomotor Behavior: Normal    Attitude:   Cooperative    Orientation:   All   Speech    Rate / Production: Normal     Volume:  Normal    Mood:    Anxious  Irritable    Affect:    Appropriate    Thought Content:  Clear    Thought Form:  Coherent  Logical    Insight:    Good      Medication Review:   No current psychiatric medications prescribed     Medication Compliance:   NA     Changes in Health Issues:   None reported     Chemical Use Review:   Substance Use: Chemical use reviewed, no active concerns identified      Tobacco Use: No current tobacco use.      Diagnosis:  1. Adjustment disorder with mixed anxiety and depressed mood        Collateral Reports Completed:   Not Applicable    PLAN: (Patient Tasks / Therapist Tasks / Other)  Vale will practice 5-4-3-2-1 grounding technique  -5 things you can see  -4 things you can feel  -3 things you can hear  -2 things you can smell  -1 thing you can taste   She will continue to build awareness of how their thoughts, feelings and actions influence one another and bring an example of this to our next session.      Tarah Akins, CLEMSW                                                         ______________________________________________________________________    Individual Treatment Plan    Patient's Name: Vale Greenberg  YOB: 1985    Date of Creation: 6/20/2023  Date Treatment Plan Last Reviewed/Revised: n/a    DSM5 Diagnoses: Adjustment Disorders  309.28 (F43.23) With mixed anxiety and depressed mood  Psychosocial / Contextual Factors: works full time, has twin sons, good support system.  PROMIS (reviewed every 90 days):     Referral / Collaboration:  Referral to another professional/service is not indicated at this time..    Anticipated number of session for this episode of care: 9-12 sessions  Anticipation frequency of session: Every other week  Anticipated Duration of each session: 38-52 minutes  Treatment plan will be reviewed in 90  days or when goals have been changed.       MeasurableTreatment Goal(s) related to diagnosis / functional impairment(s)  Goal 1: Patient will become more aware of their anxiety and utilize the skills taught to decrease their anxiety symptoms.    I will know I've met my goal when I understand more about my anxiety and have skills to cope with it.      Objective #A (Patient Action)    Patient will  use at least 4 coping skills for anxiety management in the next 12 weeks. .  Status: New - Date: 6/20/2023      Intervention(s)  Therapist will  teach coping skills and assign homework of practicing these .    Objective #B  Patient will  use cognitive strategies identified in therapy to challenge anxious thoughts. Patient will engage in activities that are anxiety producing for them, slowly increasing their tolerance for new activities. Patient will identify and recognize past negative experiences and subsequent beliefs they hold that contribute to their anxiety. .  Status: New - Date: 6/20/2023      Intervention(s)  Therapist will  will teach cognitive behavioral skills and engage client in Socratic dialogue around distorted thinking.  Therapist will provide educational materials on CBT.  .    Objective #C  Patient will  implement self-care into their routine to decrease anxiety, focusing on sleep hygiene, nutrition, movement, regular engagement in mindful activity, and regular socialization.  .  Status: New - Date: 6/20/2023      Intervention(s)  Therapist will  provide psychoeducation around the benefit of self-care and help client identify mindfulness based activities that may work for their life. .    Goal 2: Jay will improve her ability to be effective in relationships as evidenced by client reporting use of three new communication skills over the next three months.    I will know I've met my goal when I can communicate with my family better.      Objective #A (Patient Action)    Status: New - Date: 6/20/2023       Patient will  learn & utilize at least 2 assertive communication skills weekly .    Intervention(s)  Therapist will  teach assertiveness, effective interpersonal skills and about healthy boundaries in relationships .    Objective #B  Patient will  identify his values regarding interpersonal relationships and how to uphold her values while navigating interpersonal relationships .    Status: New - Date: 6/20/2023      Intervention(s)  Therapist will  teach how to uphold values with DBT and provide support and feedback and teach about healthy boundaries in relationships .    Objective #C  Patient will  uphold her values while navigating interpersonal relationships .  Status: New - Date: 6/20/2023      Intervention(s)  Therapist will  provide support and feedback and teach about healthy boundaries in relationships. .      Patient has reviewed and agreed to the above plan.      NIK Montero  June 20, 2023

## 2023-08-22 ENCOUNTER — VIRTUAL VISIT (OUTPATIENT)
Dept: PSYCHOLOGY | Facility: CLINIC | Age: 38
End: 2023-08-22
Payer: COMMERCIAL

## 2023-08-22 DIAGNOSIS — F43.23 ADJUSTMENT DISORDER WITH MIXED ANXIETY AND DEPRESSED MOOD: Primary | ICD-10-CM

## 2023-08-22 PROCEDURE — 90834 PSYTX W PT 45 MINUTES: CPT | Mod: VID

## 2023-08-22 NOTE — PROGRESS NOTES
[facility]                                     Progress Note    Patient Name: Vale Greenberg  Date: 8/22/2023         Service Type: Individual      Session Start Time: 8:02 AM  Session End Time: 8: 50 AM     Session Length: 48 Minutes    Session #: 6    Attendees: Client attended alone    Service Modality:  Video Visit:      Provider verified identity through the following two step process.  Patient provided:  Patient is known previously to provider    Telemedicine Visit: The patient's condition can be safely assessed and treated via synchronous audio and visual telemedicine encounter.      Reason for Telemedicine Visit: Patient has requested telehealth visit    Originating Site (Patient Location): Patient's place of employment    Distant Site (Provider Location): Provider Remote Setting- Home Office    Consent:  The patient/guardian has verbally consented to: the potential risks and benefits of telemedicine (video visit) versus in person care; bill my insurance or make self-payment for services provided; and responsibility for payment of non-covered services.     Patient would like the video invitation sent by:  My Chart    Mode of Communication:  Video Conference via [employer]    Distant Location (Provider):  Off-site    As the provider I attest to compliance with applicable laws and regulations related to telemedicine.    DATA  Interactive Complexity: No  Crisis: No        Progress Since Last Session (Related to Symptoms / Goals / Homework):   Symptoms: Improving anxiety and utilization of skills, continued stress and negative self talk    Homework: Achieved / completed to satisfaction      Episode of Care Goals: Minimal progress - ACTION (Actively working towards change); Intervened by reinforcing change plan / affirming steps taken     Current / Ongoing Stressors and Concerns:   Vale is coming to therapy to learn skills to decrease her anxiety and depression. She reports increased  "fear of possible health concerns and worries for her future have contributed to significant distress in all aspects of her life and results in high blood pressure, irritation, avoidance and withdrawing behavior. She reports increased relationship conflicts with her family and pressure to be \"it all for everyone.\"     Treatment Objective(s) Addressed in This Session:   use cognitive strategies identified in therapy to challenge anxious thoughts   practice deep breathing at least once a day     Intervention:   Therapist utilized reflected listening as patient gave brief reflection of the past few weeks. Patient reported continued anxiety but the duration has shortened due to her daily grounding exercises and building awareness of her thoughts. Therapist supported patient as she processed and validated patient. Therapist introduced states of mind and what are boundaries.    Assessments completed prior to visit:  The following assessments were completed by patient for this visit:  PHQ2:       8/21/2023    10:44 AM 8/3/2023     7:29 AM 4/26/2023     7:57 AM 2/20/2023     9:54 AM 2/20/2023     9:47 AM 3/4/2021     2:32 PM 3/2/2020     8:30 AM   PHQ-2 ( 1999 [employer])   Q1: Little interest or pleasure in doing things 0 0 0 0 0 0 0   Q2: Feeling down, depressed or hopeless 1 1 0 1 1 1 0   PHQ-2 Score 1 1 0 1 1 1 0   PHQ-2 Total Score (12-17 Years)- Positive if 3 or more points; Administer PHQ-A if positive      1 0   Q1: Little interest or pleasure in doing things Not at all Not at all Not at all Not at all Not at all     Q2: Feeling down, depressed or hopeless Several days Several days Not at all Several days Several days     PHQ-2 Score 1 1 0 1 1       PHQ9:       6/18/2019     3:26 PM 7/10/2019     3:56 PM 3/2/2020     8:30 AM 3/4/2021     2:32 PM 6/7/2023    12:01 PM   PHQ-9 SCORE   PHQ-9 Total Score MyChart     3 (Minimal depression)   PHQ-9 Total Score 9 2 2 2 3     GAD2:       5/31/2023     9:10 AM 6/20/2023     6:25 " AM   TIM-2   Feeling nervous, anxious, or on edge 1 1   Not being able to stop or control worrying 1 0   TIM-2 Total Score 2 1     GAD7:       2/28/2020     8:06 AM 3/2/2020     8:30 AM 2/18/2021     7:29 AM 2/25/2021    10:15 AM 3/1/2022    10:35 AM 2/20/2023     9:54 AM 4/26/2023     7:57 AM   TIM-7 SCORE   Total Score 4 (minimal anxiety)  6 (mild anxiety) 6 (mild anxiety) 3 (minimal anxiety) 12 (moderate anxiety) 3 (minimal anxiety)   Total Score 4 4 6    6 6 3 12 3     PROMIS 10-Global Health (all questions and answers displayed):       5/31/2023     9:18 AM 6/20/2023     6:26 AM   PROMIS 10   In general, would you say your health is: Good Good   In general, would you say your quality of life is: Very good Very good   In general, how would you rate your physical health? Good Good   In general, how would you rate your mental health, including your mood and your ability to think? Good Very good   In general, how would you rate your satisfaction with your social activities and relationships? Excellent Very good   In general, please rate how well you carry out your usual social activities and roles Very good Very good   To what extent are you able to carry out your everyday physical activities such as walking, climbing stairs, carrying groceries, or moving a chair? Completely Completely   In the past 7 days, how often have you been bothered by emotional problems such as feeling anxious, depressed, or irritable? Often Sometimes   In the past 7 days, how would you rate your fatigue on average? Moderate Mild   In the past 7 days, how would you rate your pain on average, where 0 means no pain, and 10 means worst imaginable pain? 0 0   In general, would you say your health is: 3 3   In general, would you say your quality of life is: 4 4   In general, how would you rate your physical health? 3 3   In general, how would you rate your mental health, including your mood and your ability to think? 3 4   In general, how would  you rate your satisfaction with your social activities and relationships? 5 4   In general, please rate how well you carry out your usual social activities and roles. (This includes activities at home, at work and in your community, and responsibilities as a parent, child, spouse, employee, friend, etc.) 4 4   To what extent are you able to carry out your everyday physical activities such as walking, climbing stairs, carrying groceries, or moving a chair? 5 5   In the past 7 days, how often have you been bothered by emotional problems such as feeling anxious, depressed, or irritable? 4 3   In the past 7 days, how would you rate your fatigue on average? 3 2   In the past 7 days, how would you rate your pain on average, where 0 means no pain, and 10 means worst imaginable pain? 0 0   Global Mental Health Score 14 15   Global Physical Health Score 16 17   PROMIS TOTAL - SUBSCORES 30 32         ASSESSMENT: Current Emotional / Mental Status (status of significant symptoms):   Risk status (Self / Other harm or suicidal ideation)   Patient denies current fears or concerns for personal safety.   Patient denies current or recent suicidal ideation or behaviors.   Patient denies current or recent homicidal ideation or behaviors.   Patient denies current or recent self injurious behavior or ideation.   Patient denies other safety concerns.   Patient reports there has been no change in risk factors since their last session.     Patient reports there has been no change in protective factors since their last session.     Recommended that patient call 911 or go to the local ED should there be a change in any of these risk factors.     Appearance:   Appropriate    Eye Contact:   Good    Psychomotor Behavior: Normal    Attitude:   Cooperative    Orientation:   All   Speech    Rate / Production: Normal     Volume:  Normal    Mood:    Anxious  Normal   Affect:    Appropriate    Thought Content:  Clear    Thought Form:  Coherent   Logical    Insight:    Good      Medication Review:   No current psychiatric medications prescribed     Medication Compliance:   NA     Changes in Health Issues:   None reported     Chemical Use Review:   Substance Use: Chemical use reviewed, no active concerns identified      Tobacco Use: No current tobacco use.      Diagnosis:  1. Adjustment disorder with mixed anxiety and depressed mood        Collateral Reports Completed:   Not Applicable    PLAN: (Patient Tasks / Therapist Tasks / Other)  Vale will practice a grounding technique daily and practice honoring her boundaries with herself.     Traah Akins, LICSW                                                         ______________________________________________________________________    Individual Treatment Plan    Patient's Name: Vale Greenberg  YOB: 1985    Date of Creation: 6/20/2023  Date Treatment Plan Last Reviewed/Revised: n/a    DSM5 Diagnoses: Adjustment Disorders  309.28 (F43.23) With mixed anxiety and depressed mood  Psychosocial / Contextual Factors: works full time, has twin sons, good support system.  PROMIS (reviewed every 90 days):     Referral / Collaboration:  Referral to another professional/service is not indicated at this time..    Anticipated number of session for this episode of care: 9-12 sessions  Anticipation frequency of session: Every other week  Anticipated Duration of each session: 38-52 minutes  Treatment plan will be reviewed in 90 days or when goals have been changed.       MeasurableTreatment Goal(s) related to diagnosis / functional impairment(s)  Goal 1: Patient will become more aware of their anxiety and utilize the skills taught to decrease their anxiety symptoms.    I will know I've met my goal when I understand more about my anxiety and have skills to cope with it.      Objective #A (Patient Action)    Patient will  use at least 4 coping skills for anxiety management in the next 12 weeks.  .  Status: New - Date: 6/20/2023      Intervention(s)  Therapist will  teach coping skills and assign homework of practicing these .    Objective #B  Patient will  use cognitive strategies identified in therapy to challenge anxious thoughts. Patient will engage in activities that are anxiety producing for them, slowly increasing their tolerance for new activities. Patient will identify and recognize past negative experiences and subsequent beliefs they hold that contribute to their anxiety. .  Status: New - Date: 6/20/2023      Intervention(s)  Therapist will  will teach cognitive behavioral skills and engage client in Socratic dialogue around distorted thinking.  Therapist will provide educational materials on CBT.  .    Objective #C  Patient will  implement self-care into their routine to decrease anxiety, focusing on sleep hygiene, nutrition, movement, regular engagement in mindful activity, and regular socialization.  .  Status: New - Date: 6/20/2023      Intervention(s)  Therapist will  provide psychoeducation around the benefit of self-care and help client identify mindfulness based activities that may work for their life. .    Goal 2: Patiient will improve her ability to be effective in relationships as evidenced by client reporting use of three new communication skills over the next three months.    I will know I've met my goal when I can communicate with my family better.      Objective #A (Patient Action)    Status: New - Date: 6/20/2023      Patient will  learn & utilize at least 2 assertive communication skills weekly .    Intervention(s)  Therapist will  teach assertiveness, effective interpersonal skills and about healthy boundaries in relationships .    Objective #B  Patient will  identify his values regarding interpersonal relationships and how to uphold her values while navigating interpersonal relationships .    Status: New - Date: 6/20/2023      Intervention(s)  Therapist will  teach how to uphold  values with DBT and provide support and feedback and teach about healthy boundaries in relationships .    Objective #C  Patient will  uphold her values while navigating interpersonal relationships .  Status: New - Date: 6/20/2023      Intervention(s)  Therapist will  provide support and feedback and teach about healthy boundaries in relationships. .      Patient has reviewed and agreed to the above plan.      NIK Montero  June 20, 2023

## 2023-09-14 ENCOUNTER — VIRTUAL VISIT (OUTPATIENT)
Dept: PSYCHOLOGY | Facility: CLINIC | Age: 38
End: 2023-09-14
Payer: COMMERCIAL

## 2023-09-14 DIAGNOSIS — F43.23 ADJUSTMENT DISORDER WITH MIXED ANXIETY AND DEPRESSED MOOD: Primary | ICD-10-CM

## 2023-09-14 PROCEDURE — 90834 PSYTX W PT 45 MINUTES: CPT | Mod: VID

## 2023-09-14 NOTE — PROGRESS NOTES
[facility]                                     Progress Note    Patient Name: Vale Greenberg  Date: 9/14/2023         Service Type: Individual      Session Start Time: 7:30 AM  Session End Time: 8:20 AM     Session Length: 50 Minutes    Session #: 7    Attendees: Client attended alone    Service Modality:  Video Visit:      Provider verified identity through the following two step process.  Patient provided:  Patient is known previously to provider    Telemedicine Visit: The patient's condition can be safely assessed and treated via synchronous audio and visual telemedicine encounter.      Reason for Telemedicine Visit: Patient has requested telehealth visit    Originating Site (Patient Location): Patient's place of employment    Distant Site (Provider Location): Provider Remote Setting- Home Office    Consent:  The patient/guardian has verbally consented to: the potential risks and benefits of telemedicine (video visit) versus in person care; bill my insurance or make self-payment for services provided; and responsibility for payment of non-covered services.     Patient would like the video invitation sent by:  My Chart    Mode of Communication:  Video Conference via [employer]    Distant Location (Provider):  Off-site    As the provider I attest to compliance with applicable laws and regulations related to telemedicine.    DATA  Interactive Complexity: No  Crisis: No        Progress Since Last Session (Related to Symptoms / Goals / Homework):   Symptoms: Improving anxiety and utilization of skills    Homework: Achieved / completed to satisfaction      Episode of Care Goals: Minimal progress - ACTION (Actively working towards change); Intervened by reinforcing change plan / affirming steps taken     Current / Ongoing Stressors and Concerns:   Vale is coming to therapy to learn skills to decrease her anxiety and depression. She reports increased fear of possible health concerns and worries  "for her future have contributed to significant distress in all aspects of her life and results in high blood pressure, irritation, avoidance and withdrawing behavior. She reports increased relationship conflicts with her family and pressure to be \"it all for everyone.\"     Treatment Objective(s) Addressed in This Session:   use cognitive strategies identified in therapy to challenge anxious thoughts   practice deep breathing at least once a day     Intervention:   Therapist utilized reflected listening as patient gave brief reflection of the past few weeks. Patient reported \"having a more even-keel with her emotions and reactions. She reports continued use of her skills and see the changes in her responses since. Therapist supported patient as she processed and validated patient. Therapist introduced cognitive distortions and how these thoughts cause them to perceive reality inaccurately leading to irrational thoughts. Processed how these irrational thoughts lead to negative feelings triggering continued negative irrational thoughts creating a negative loop of thoughts, feelings and behaviors.    Assessments completed prior to visit:  The following assessments were completed by patient for this visit:  PHQ2:       9/13/2023    12:01 PM 9/4/2023     9:26 AM 8/21/2023    10:44 AM 8/3/2023     7:29 AM 4/26/2023     7:57 AM 2/20/2023     9:54 AM 2/20/2023     9:47 AM   PHQ-2 ( 1999 [employer])   Q1: Little interest or pleasure in doing things 0 0 0 0 0 0 0   Q2: Feeling down, depressed or hopeless 0 0 1 1 0 1 1   PHQ-2 Score 0 0 1 1 0 1 1   Q1: Little interest or pleasure in doing things Not at all Not at all Not at all Not at all Not at all Not at all Not at all   Q2: Feeling down, depressed or hopeless Not at all Not at all Several days Several days Not at all Several days Several days   PHQ-2 Score 0 0 1 1 0 1 1     PHQ9:       6/18/2019     3:26 PM 7/10/2019     3:56 PM 3/2/2020     8:30 AM 3/4/2021     2:32 PM 6/7/2023 "    12:01 PM   PHQ-9 SCORE   PHQ-9 Total Score MyChart     3 (Minimal depression)   PHQ-9 Total Score 9 2 2 2 3     GAD2:       5/31/2023     9:10 AM 6/20/2023     6:25 AM   TIM-2   Feeling nervous, anxious, or on edge 1 1   Not being able to stop or control worrying 1 0   TIM-2 Total Score 2 1     GAD7:       2/28/2020     8:06 AM 3/2/2020     8:30 AM 2/18/2021     7:29 AM 2/25/2021    10:15 AM 3/1/2022    10:35 AM 2/20/2023     9:54 AM 4/26/2023     7:57 AM   TIM-7 SCORE   Total Score 4 (minimal anxiety)  6 (mild anxiety) 6 (mild anxiety) 3 (minimal anxiety) 12 (moderate anxiety) 3 (minimal anxiety)   Total Score 4 4 6    6 6 3 12 3     PROMIS 10-Global Health (all questions and answers displayed):       5/31/2023     9:18 AM 6/20/2023     6:26 AM   PROMIS 10   In general, would you say your health is: Good Good   In general, would you say your quality of life is: Very good Very good   In general, how would you rate your physical health? Good Good   In general, how would you rate your mental health, including your mood and your ability to think? Good Very good   In general, how would you rate your satisfaction with your social activities and relationships? Excellent Very good   In general, please rate how well you carry out your usual social activities and roles Very good Very good   To what extent are you able to carry out your everyday physical activities such as walking, climbing stairs, carrying groceries, or moving a chair? Completely Completely   In the past 7 days, how often have you been bothered by emotional problems such as feeling anxious, depressed, or irritable? Often Sometimes   In the past 7 days, how would you rate your fatigue on average? Moderate Mild   In the past 7 days, how would you rate your pain on average, where 0 means no pain, and 10 means worst imaginable pain? 0 0   In general, would you say your health is: 3 3   In general, would you say your quality of life is: 4 4   In general, how  would you rate your physical health? 3 3   In general, how would you rate your mental health, including your mood and your ability to think? 3 4   In general, how would you rate your satisfaction with your social activities and relationships? 5 4   In general, please rate how well you carry out your usual social activities and roles. (This includes activities at home, at work and in your community, and responsibilities as a parent, child, spouse, employee, friend, etc.) 4 4   To what extent are you able to carry out your everyday physical activities such as walking, climbing stairs, carrying groceries, or moving a chair? 5 5   In the past 7 days, how often have you been bothered by emotional problems such as feeling anxious, depressed, or irritable? 4 3   In the past 7 days, how would you rate your fatigue on average? 3 2   In the past 7 days, how would you rate your pain on average, where 0 means no pain, and 10 means worst imaginable pain? 0 0   Global Mental Health Score 14 15   Global Physical Health Score 16 17   PROMIS TOTAL - SUBSCORES 30 32         ASSESSMENT: Current Emotional / Mental Status (status of significant symptoms):   Risk status (Self / Other harm or suicidal ideation)   Patient denies current fears or concerns for personal safety.   Patient denies current or recent suicidal ideation or behaviors.   Patient denies current or recent homicidal ideation or behaviors.   Patient denies current or recent self injurious behavior or ideation.   Patient denies other safety concerns.   Patient reports there has been no change in risk factors since their last session.     Patient reports there has been no change in protective factors since their last session.     Recommended that patient call 911 or go to the local ED should there be a change in any of these risk factors.     Appearance:   Appropriate    Eye Contact:   Good    Psychomotor Behavior: Normal    Attitude:   Cooperative     Orientation:   All   Speech    Rate / Production: Normal     Volume:  Normal    Mood:    Normal   Affect:    Appropriate    Thought Content:  Clear    Thought Form:  Coherent  Logical    Insight:    Good      Medication Review:   No current psychiatric medications prescribed     Medication Compliance:   NA     Changes in Health Issues:   None reported     Chemical Use Review:   Substance Use: Chemical use reviewed, no active concerns identified      Tobacco Use: No current tobacco use.      Diagnosis:  1. Adjustment disorder with mixed anxiety and depressed mood        Collateral Reports Completed:   Not Applicable    PLAN: (Patient Tasks / Therapist Tasks / Other)  Vale will practice a grounding technique daily and naming and challenging her distorted thoughts.     CLEM MonteroSW                                                         ______________________________________________________________________    Individual Treatment Plan    Patient's Name: Vale Greenberg  YOB: 1985    Date of Creation: 6/20/2023  Date Treatment Plan Last Reviewed/Revised: n/a    DSM5 Diagnoses: Adjustment Disorders  309.28 (F43.23) With mixed anxiety and depressed mood  Psychosocial / Contextual Factors: works full time, has twin sons, good support system.  PROMIS (reviewed every 90 days):     Referral / Collaboration:  Referral to another professional/service is not indicated at this time..    Anticipated number of session for this episode of care: 9-12 sessions  Anticipation frequency of session: Every other week  Anticipated Duration of each session: 38-52 minutes  Treatment plan will be reviewed in 90 days or when goals have been changed.       MeasurableTreatment Goal(s) related to diagnosis / functional impairment(s)  Goal 1: Patient will become more aware of their anxiety and utilize the skills taught to decrease their anxiety symptoms.    I will know I've met my goal when I understand more  about my anxiety and have skills to cope with it.      Objective #A (Patient Action)    Patient will  use at least 4 coping skills for anxiety management in the next 12 weeks. .  Status: New - Date: 6/20/2023      Intervention(s)  Therapist will  teach coping skills and assign homework of practicing these .    Objective #B  Patient will  use cognitive strategies identified in therapy to challenge anxious thoughts. Patient will engage in activities that are anxiety producing for them, slowly increasing their tolerance for new activities. Patient will identify and recognize past negative experiences and subsequent beliefs they hold that contribute to their anxiety. .  Status: New - Date: 6/20/2023      Intervention(s)  Therapist will  will teach cognitive behavioral skills and engage client in Socratic dialogue around distorted thinking.  Therapist will provide educational materials on CBT.  .    Objective #C  Patient will  implement self-care into their routine to decrease anxiety, focusing on sleep hygiene, nutrition, movement, regular engagement in mindful activity, and regular socialization.  .  Status: New - Date: 6/20/2023      Intervention(s)  Therapist will  provide psychoeducation around the benefit of self-care and help client identify mindfulness based activities that may work for their life. .    Goal 2: Jay will improve her ability to be effective in relationships as evidenced by client reporting use of three new communication skills over the next three months.    I will know I've met my goal when I can communicate with my family better.      Objective #A (Patient Action)    Status: New - Date: 6/20/2023      Patient will  learn & utilize at least 2 assertive communication skills weekly .    Intervention(s)  Therapist will  teach assertiveness, effective interpersonal skills and about healthy boundaries in relationships .    Objective #B  Patient will  identify his values regarding interpersonal  relationships and how to uphold her values while navigating interpersonal relationships .    Status: New - Date: 6/20/2023      Intervention(s)  Therapist will  teach how to uphold values with DBT and provide support and feedback and teach about healthy boundaries in relationships .    Objective #C  Patient will  uphold her values while navigating interpersonal relationships .  Status: New - Date: 6/20/2023      Intervention(s)  Therapist will  provide support and feedback and teach about healthy boundaries in relationships. .      Patient has reviewed and agreed to the above plan.      Tarah Akins, NIK  June 20, 2023

## 2023-09-21 ENCOUNTER — VIRTUAL VISIT (OUTPATIENT)
Dept: PSYCHOLOGY | Facility: CLINIC | Age: 38
End: 2023-09-21
Payer: COMMERCIAL

## 2023-09-21 DIAGNOSIS — F43.23 ADJUSTMENT DISORDER WITH MIXED ANXIETY AND DEPRESSED MOOD: Primary | ICD-10-CM

## 2023-09-21 PROCEDURE — 90834 PSYTX W PT 45 MINUTES: CPT | Mod: VID

## 2023-09-21 NOTE — PROGRESS NOTES
[facility]                                     Progress Note    Patient Name: Vale Greenberg  Date: 9/21/2023         Service Type: Individual      Session Start Time: 8:33 AM  Session End Time: 8:25 AM     Session Length: 52 Minutes    Session #: 8    Attendees: Client attended alone    Service Modality:  Video Visit:      Provider verified identity through the following two step process.  Patient provided:  Patient is known previously to provider    Telemedicine Visit: The patient's condition can be safely assessed and treated via synchronous audio and visual telemedicine encounter.      Reason for Telemedicine Visit: Patient has requested telehealth visit    Originating Site (Patient Location): Patient's place of employment    Distant Site (Provider Location): Provider Remote Setting- Home Office    Consent:  The patient/guardian has verbally consented to: the potential risks and benefits of telemedicine (video visit) versus in person care; bill my insurance or make self-payment for services provided; and responsibility for payment of non-covered services.     Patient would like the video invitation sent by:  My Chart    Mode of Communication:  Video Conference via [employer]    Distant Location (Provider):  Off-site    As the provider I attest to compliance with applicable laws and regulations related to telemedicine.    DATA  Interactive Complexity: No  Crisis: No        Progress Since Last Session (Related to Symptoms / Goals / Homework):   Symptoms: Improving anxiety, awareness and utilization of skills    Homework: Achieved / completed to satisfaction      Episode of Care Goals: Minimal progress - ACTION (Actively working towards change); Intervened by reinforcing change plan / affirming steps taken     Current / Ongoing Stressors and Concerns:   Vale is coming to therapy to learn skills to decrease her anxiety and depression. She reports increased fear of possible health concerns  "and worries for her future have contributed to significant distress in all aspects of her life and results in high blood pressure, irritation, avoidance and withdrawing behavior. She reports increased relationship conflicts with her family and pressure to be \"it all for everyone.\"     Treatment Objective(s) Addressed in This Session:   use cognitive strategies identified in therapy to challenge anxious thoughts   practice deep breathing at least once a day     Intervention:   Therapist utilized reflected listening as patient gave brief reflection of the past week. Patient reported overall improvement in and anxiety, with increased moments of uncertainty with recent alerts at her work.  She reports continued use of her skills and see the changes in her responses since. Therapist supported patient as she processed and validated patient. Therapist engaged in cognitive restructuring/ reframing, looked at cognitive distortions and challenged distorted thoughts. Therapist introduced Thought Record keeping to examine her thinking.     Assessments completed prior to visit:  The following assessments were completed by patient for this visit:  PHQ2:       9/20/2023     1:15 PM 9/13/2023    12:01 PM 9/4/2023     9:26 AM 8/21/2023    10:44 AM 8/3/2023     7:29 AM 4/26/2023     7:57 AM 2/20/2023     9:54 AM   PHQ-2 ( 1999 [employer])   Q1: Little interest or pleasure in doing things 0 0 0 0 0 0 0   Q2: Feeling down, depressed or hopeless 0 0 0 1 1 0 1   PHQ-2 Score 0 0 0 1 1 0 1   Q1: Little interest or pleasure in doing things Not at all Not at all Not at all Not at all Not at all Not at all Not at all   Q2: Feeling down, depressed or hopeless Not at all Not at all Not at all Several days Several days Not at all Several days   PHQ-2 Score 0 0 0 1 1 0 1     PHQ9:       6/18/2019     3:26 PM 7/10/2019     3:56 PM 3/2/2020     8:30 AM 3/4/2021     2:32 PM 6/7/2023    12:01 PM   PHQ-9 SCORE   PHQ-9 Total Score MyChart     3 (Minimal " depression)   PHQ-9 Total Score 9 2 2 2 3     GAD2:       5/31/2023     9:10 AM 6/20/2023     6:25 AM 9/20/2023     1:15 PM   TIM-2   Feeling nervous, anxious, or on edge 1 1 1   Not being able to stop or control worrying 1 0 1   TIM-2 Total Score 2 1 2     GAD7:       2/28/2020     8:06 AM 3/2/2020     8:30 AM 2/18/2021     7:29 AM 2/25/2021    10:15 AM 3/1/2022    10:35 AM 2/20/2023     9:54 AM 4/26/2023     7:57 AM   TIM-7 SCORE   Total Score 4 (minimal anxiety)  6 (mild anxiety) 6 (mild anxiety) 3 (minimal anxiety) 12 (moderate anxiety) 3 (minimal anxiety)   Total Score 4 4 6    6 6 3 12 3     PROMIS 10-Global Health (all questions and answers displayed):       5/31/2023     9:18 AM 6/20/2023     6:26 AM 9/20/2023     1:16 PM   PROMIS 10   In general, would you say your health is: Good Good Good   In general, would you say your quality of life is: Very good Very good Very good   In general, how would you rate your physical health? Good Good Good   In general, how would you rate your mental health, including your mood and your ability to think? Good Very good Good   In general, how would you rate your satisfaction with your social activities and relationships? Excellent Very good Very good   In general, please rate how well you carry out your usual social activities and roles Very good Very good Very good   To what extent are you able to carry out your everyday physical activities such as walking, climbing stairs, carrying groceries, or moving a chair? Completely Completely Completely   In the past 7 days, how often have you been bothered by emotional problems such as feeling anxious, depressed, or irritable? Often Sometimes Sometimes   In the past 7 days, how would you rate your fatigue on average? Moderate Mild Moderate   In the past 7 days, how would you rate your pain on average, where 0 means no pain, and 10 means worst imaginable pain? 0 0 0   In general, would you say your health is: 3 3 3   In general,  would you say your quality of life is: 4 4 4   In general, how would you rate your physical health? 3 3 3   In general, how would you rate your mental health, including your mood and your ability to think? 3 4 3   In general, how would you rate your satisfaction with your social activities and relationships? 5 4 4   In general, please rate how well you carry out your usual social activities and roles. (This includes activities at home, at work and in your community, and responsibilities as a parent, child, spouse, employee, friend, etc.) 4 4 4   To what extent are you able to carry out your everyday physical activities such as walking, climbing stairs, carrying groceries, or moving a chair? 5 5 5   In the past 7 days, how often have you been bothered by emotional problems such as feeling anxious, depressed, or irritable? 4 3 3   In the past 7 days, how would you rate your fatigue on average? 3 2 3   In the past 7 days, how would you rate your pain on average, where 0 means no pain, and 10 means worst imaginable pain? 0 0 0   Global Mental Health Score 14 15 14   Global Physical Health Score 16 17 16   PROMIS TOTAL - SUBSCORES 30 32 30         ASSESSMENT: Current Emotional / Mental Status (status of significant symptoms):   Risk status (Self / Other harm or suicidal ideation)   Patient denies current fears or concerns for personal safety.   Patient denies current or recent suicidal ideation or behaviors.   Patient denies current or recent homicidal ideation or behaviors.   Patient denies current or recent self injurious behavior or ideation.   Patient denies other safety concerns.   Patient reports there has been no change in risk factors since their last session.     Patient reports there has been no change in protective factors since their last session.     Recommended that patient call 911 or go to the local ED should there be a change in any of these risk factors.     Appearance:   Appropriate    Eye  Contact:   Good    Psychomotor Behavior: Normal    Attitude:   Cooperative    Orientation:   All   Speech    Rate / Production: Normal     Volume:  Normal    Mood:    Normal   Affect:    Appropriate    Thought Content:  Clear    Thought Form:  Coherent  Logical    Insight:    Good      Medication Review:   No current psychiatric medications prescribed     Medication Compliance:   NA     Changes in Health Issues:   None reported     Chemical Use Review:   Substance Use: Chemical use reviewed, no active concerns identified      Tobacco Use: No current tobacco use.      Diagnosis:  1. Adjustment disorder with mixed anxiety and depressed mood        Collateral Reports Completed:   Not Applicable    PLAN: (Patient Tasks / Therapist Tasks / Other)  Vale will practice naming and challenging her distorted thoughts with her thought log.    Tarah Akins, LICSW                                                         ______________________________________________________________________    Individual Treatment Plan    Patient's Name: Vale Greenberg  YOB: 1985    Date of Creation: 6/20/2023  Date Treatment Plan Last Reviewed/Revised: n/a    DSM5 Diagnoses: Adjustment Disorders  309.28 (F43.23) With mixed anxiety and depressed mood  Psychosocial / Contextual Factors: works full time, has twin sons, good support system.  PROMIS (reviewed every 90 days):     Referral / Collaboration:  Referral to another professional/service is not indicated at this time..    Anticipated number of session for this episode of care: 9-12 sessions  Anticipation frequency of session: Every other week  Anticipated Duration of each session: 38-52 minutes  Treatment plan will be reviewed in 90 days or when goals have been changed.       MeasurableTreatment Goal(s) related to diagnosis / functional impairment(s)  Goal 1: Patient will become more aware of their anxiety and utilize the skills taught to decrease their anxiety  symptoms.    I will know I've met my goal when I understand more about my anxiety and have skills to cope with it.      Objective #A (Patient Action)    Patient will  use at least 4 coping skills for anxiety management in the next 12 weeks. .  Status: New - Date: 6/20/2023      Intervention(s)  Therapist will  teach coping skills and assign homework of practicing these .    Objective #B  Patient will  use cognitive strategies identified in therapy to challenge anxious thoughts. Patient will engage in activities that are anxiety producing for them, slowly increasing their tolerance for new activities. Patient will identify and recognize past negative experiences and subsequent beliefs they hold that contribute to their anxiety. .  Status: New - Date: 6/20/2023      Intervention(s)  Therapist will  will teach cognitive behavioral skills and engage client in Socratic dialogue around distorted thinking.  Therapist will provide educational materials on CBT.  .    Objective #C  Patient will  implement self-care into their routine to decrease anxiety, focusing on sleep hygiene, nutrition, movement, regular engagement in mindful activity, and regular socialization.  .  Status: New - Date: 6/20/2023      Intervention(s)  Therapist will  provide psychoeducation around the benefit of self-care and help client identify mindfulness based activities that may work for their life. .    Goal 2: Jay will improve her ability to be effective in relationships as evidenced by client reporting use of three new communication skills over the next three months.    I will know I've met my goal when I can communicate with my family better.      Objective #A (Patient Action)    Status: New - Date: 6/20/2023      Patient will  learn & utilize at least 2 assertive communication skills weekly .    Intervention(s)  Therapist will  teach assertiveness, effective interpersonal skills and about healthy boundaries in relationships .    Objective  #B  Patient will  identify his values regarding interpersonal relationships and how to uphold her values while navigating interpersonal relationships .    Status: New - Date: 6/20/2023      Intervention(s)  Therapist will  teach how to uphold values with DBT and provide support and feedback and teach about healthy boundaries in relationships .    Objective #C  Patient will  uphold her values while navigating interpersonal relationships .  Status: New - Date: 6/20/2023      Intervention(s)  Therapist will  provide support and feedback and teach about healthy boundaries in relationships. .      Patient has reviewed and agreed to the above plan.      NIK Montero  June 20, 2023

## 2023-09-28 ENCOUNTER — VIRTUAL VISIT (OUTPATIENT)
Dept: PSYCHOLOGY | Facility: CLINIC | Age: 38
End: 2023-09-28
Payer: COMMERCIAL

## 2023-09-28 DIAGNOSIS — F43.23 ADJUSTMENT DISORDER WITH MIXED ANXIETY AND DEPRESSED MOOD: Primary | ICD-10-CM

## 2023-09-28 PROCEDURE — 90834 PSYTX W PT 45 MINUTES: CPT | Mod: VID

## 2023-09-29 NOTE — PROGRESS NOTES
[facility]                                     Progress Note    Patient Name: Vale Greenberg  Date: 9/28/2023         Service Type: Individual      Session Start Time: 7:33 AM  Session End Time: 8:25 AM     Session Length: 52 Minutes    Session #: 9    Attendees: Client attended alone    Service Modality:  Video Visit:      Provider verified identity through the following two step process.  Patient provided:  Patient is known previously to provider    Telemedicine Visit: The patient's condition can be safely assessed and treated via synchronous audio and visual telemedicine encounter.      Reason for Telemedicine Visit: Patient has requested telehealth visit    Originating Site (Patient Location): Patient's home    Distant Site (Provider Location): Provider Remote Setting- Home Office    Consent:  The patient/guardian has verbally consented to: the potential risks and benefits of telemedicine (video visit) versus in person care; bill my insurance or make self-payment for services provided; and responsibility for payment of non-covered services.     Patient would like the video invitation sent by:  My Chart    Mode of Communication:  [facility] Conference [facility]    Distant Location (Provider):  Off-site    As the provider I attest to compliance with applicable laws and regulations related to telemedicine.    DATA  Interactive Complexity: No  Crisis: No        Progress Since Last Session (Related to Symptoms / Goals / Homework):   Symptoms: Improving anxiety, awareness and utilization of skills    Homework: Achieved / completed to satisfaction      Episode of Care Goals: Minimal progress - ACTION (Actively working towards change); Intervened by reinforcing change plan / affirming steps taken     Current / Ongoing Stressors and Concerns:   Vale is coming to therapy to learn skills to decrease her anxiety and depression. She reports increased fear of possible health concerns and worries for  "her future have contributed to significant distress in all aspects of her life and results in high blood pressure, irritation, avoidance and withdrawing behavior. She reports increased relationship conflicts with her family and pressure to be \"it all for everyone.\"     Treatment Objective(s) Addressed in This Session:   use cognitive strategies identified in therapy to challenge anxious thoughts   practice deep breathing at least once a day     Intervention:   Therapist utilized reflected listening as patient gave brief reflection of the past week. Patient reported overall improvement in anxiety, with increased utilization of her skills. Therapist supported patient as she processed and validated patient. Therapist engaged in cognitive restructuring/ reframing, looked at cognitive distortions and challenged distorted thoughts. Therapist continued to introduce and review her Thought Record keeping to examine her thinking.     Assessments completed prior to visit:  The following assessments were completed by patient for this visit:  PHQ2:       9/20/2023     1:15 PM 9/13/2023    12:01 PM 9/4/2023     9:26 AM 8/21/2023    10:44 AM 8/3/2023     7:29 AM 4/26/2023     7:57 AM 2/20/2023     9:54 AM   PHQ-2 ( 1999 [employer])   Q1: Little interest or pleasure in doing things 0 0 0 0 0 0 0   Q2: Feeling down, depressed or hopeless 0 0 0 1 1 0 1   PHQ-2 Score 0 0 0 1 1 0 1   Q1: Little interest or pleasure in doing things Not at all Not at all Not at all Not at all Not at all Not at all Not at all   Q2: Feeling down, depressed or hopeless Not at all Not at all Not at all Several days Several days Not at all Several days   PHQ-2 Score 0 0 0 1 1 0 1     PHQ9:       6/18/2019     3:26 PM 7/10/2019     3:56 PM 3/2/2020     8:30 AM 3/4/2021     2:32 PM 6/7/2023    12:01 PM   PHQ-9 SCORE   PHQ-9 Total Score MyChart     3 (Minimal depression)   PHQ-9 Total Score 9 2 2 2 3     GAD2:       5/31/2023     9:10 AM 6/20/2023     6:25 AM " 9/20/2023     1:15 PM 9/28/2023     6:10 AM   TIM-2   Feeling nervous, anxious, or on edge 1 1 1 1   Not being able to stop or control worrying 1 0 1 0   TIM-2 Total Score 2 1 2 1     GAD7:       2/28/2020     8:06 AM 3/2/2020     8:30 AM 2/18/2021     7:29 AM 2/25/2021    10:15 AM 3/1/2022    10:35 AM 2/20/2023     9:54 AM 4/26/2023     7:57 AM   TIM-7 SCORE   Total Score 4 (minimal anxiety)  6 (mild anxiety) 6 (mild anxiety) 3 (minimal anxiety) 12 (moderate anxiety) 3 (minimal anxiety)   Total Score 4 4 6    6 6 3 12 3     PROMIS 10-Global Health (all questions and answers displayed):       5/31/2023     9:18 AM 6/20/2023     6:26 AM 9/20/2023     1:16 PM 9/28/2023     6:11 AM   PROMIS 10   In general, would you say your health is: Good Good Good Good   In general, would you say your quality of life is: Very good Very good Very good Very good   In general, how would you rate your physical health? Good Good Good Good   In general, how would you rate your mental health, including your mood and your ability to think? Good Very good Good Good   In general, how would you rate your satisfaction with your social activities and relationships? Excellent Very good Very good Very good   In general, please rate how well you carry out your usual social activities and roles Very good Very good Very good Very good   To what extent are you able to carry out your everyday physical activities such as walking, climbing stairs, carrying groceries, or moving a chair? Completely Completely Completely Completely   In the past 7 days, how often have you been bothered by emotional problems such as feeling anxious, depressed, or irritable? Often Sometimes Sometimes Sometimes   In the past 7 days, how would you rate your fatigue on average? Moderate Mild Moderate Moderate   In the past 7 days, how would you rate your pain on average, where 0 means no pain, and 10 means worst imaginable pain? 0 0 0 1   In general, would you say your health  is: 3 3 3 3   In general, would you say your quality of life is: 4 4 4 4   In general, how would you rate your physical health? 3 3 3 3   In general, how would you rate your mental health, including your mood and your ability to think? 3 4 3 3   In general, how would you rate your satisfaction with your social activities and relationships? 5 4 4 4   In general, please rate how well you carry out your usual social activities and roles. (This includes activities at home, at work and in your community, and responsibilities as a parent, child, spouse, employee, friend, etc.) 4 4 4 4   To what extent are you able to carry out your everyday physical activities such as walking, climbing stairs, carrying groceries, or moving a chair? 5 5 5 5   In the past 7 days, how often have you been bothered by emotional problems such as feeling anxious, depressed, or irritable? 4 3 3 3   In the past 7 days, how would you rate your fatigue on average? 3 2 3 3   In the past 7 days, how would you rate your pain on average, where 0 means no pain, and 10 means worst imaginable pain? 0 0 0 1   Global Mental Health Score 14 15 14 14   Global Physical Health Score 16 17 16 15   PROMIS TOTAL - SUBSCORES 30 32 30 29         ASSESSMENT: Current Emotional / Mental Status (status of significant symptoms):   Risk status (Self / Other harm or suicidal ideation)   Patient denies current fears or concerns for personal safety.   Patient denies current or recent suicidal ideation or behaviors.   Patient denies current or recent homicidal ideation or behaviors.   Patient denies current or recent self injurious behavior or ideation.   Patient denies other safety concerns.   Patient reports there has been no change in risk factors since their last session.     Patient reports there has been no change in protective factors since their last session.     Recommended that patient call 911 or go to the local ED should there be a change in any of these risk  factors.     Appearance:   Appropriate    Eye Contact:   Good    Psychomotor Behavior: Normal    Attitude:   Cooperative    Orientation:   All   Speech    Rate / Production: Normal     Volume:  Normal    Mood:    Normal   Affect:    Appropriate    Thought Content:  Clear    Thought Form:  Coherent  Logical    Insight:    Good      Medication Review:   No current psychiatric medications prescribed     Medication Compliance:   NA     Changes in Health Issues:   None reported     Chemical Use Review:   Substance Use: Chemical use reviewed, no active concerns identified      Tobacco Use: No current tobacco use.      Diagnosis:  1. Adjustment disorder with mixed anxiety and depressed mood        Collateral Reports Completed:   Not Applicable    PLAN: (Patient Tasks / Therapist Tasks / Other)  Vale will practice naming and challenging her distorted thoughts with her thought log.    Tarah Akins, LICSW                                                         ______________________________________________________________________    Individual Treatment Plan    Patient's Name: Vale Greenberg  YOB: 1985    Date of Creation: 6/20/2023  Date Treatment Plan Last Reviewed/Revised:9/28/2023    DSM5 Diagnoses: Adjustment Disorders  309.28 (F43.23) With mixed anxiety and depressed mood  Psychosocial / Contextual Factors: works full time, has twin sons, good support system.  PROMIS (reviewed every 90 days):     Referral / Collaboration:  Referral to another professional/service is not indicated at this time..    Anticipated number of session for this episode of care: 9-12 sessions  Anticipation frequency of session: Every other week  Anticipated Duration of each session: 38-52 minutes  Treatment plan will be reviewed in 90 days or when goals have been changed.       MeasurableTreatment Goal(s) related to diagnosis / functional impairment(s)  Goal 1: Patient will become more aware of their anxiety and utilize  the skills taught to decrease their anxiety symptoms.    I will know I've met my goal when I understand more about my anxiety and have skills to cope with it.      Objective #A (Patient Action)    Patient will  use at least 4 coping skills for anxiety management in the next 12 weeks. .  Status: Continued - Date(s):9/28/2023    Intervention(s)  Therapist will  teach coping skills and assign homework of practicing these .    Objective #B  Patient will  use cognitive strategies identified in therapy to challenge anxious thoughts. Patient will engage in activities that are anxiety producing for them, slowly increasing their tolerance for new activities. Patient will identify and recognize past negative experiences and subsequent beliefs they hold that contribute to their anxiety. .  Status: Continued - Date(s):9/28/2023    Intervention(s)  Therapist will  will teach cognitive behavioral skills and engage client in Socratic dialogue around distorted thinking.  Therapist will provide educational materials on CBT.  .    Objective #C  Patient will  implement self-care into their routine to decrease anxiety, focusing on sleep hygiene, nutrition, movement, regular engagement in mindful activity, and regular socialization.  .  Status: Continued - Date(s):9/28/2023    Intervention(s)  Therapist will  provide psychoeducation around the benefit of self-care and help client identify mindfulness based activities that may work for their life. .    Goal 2: Jay will improve her ability to be effective in relationships as evidenced by client reporting use of three new communication skills over the next three months.    I will know I've met my goal when I can communicate with my family better.      Objective #A (Patient Action)    Status: Continued - Date(s):9/28/2023    Patient will  learn & utilize at least 2 assertive communication skills weekly .    Intervention(s)  Therapist will  teach assertiveness, effective interpersonal  skills and about healthy boundaries in relationships .    Objective #B  Patient will  identify his values regarding interpersonal relationships and how to uphold her values while navigating interpersonal relationships .    Status: Continued - Date(s): 9/28/2023    Intervention(s)  Therapist will  teach how to uphold values with DBT and provide support and feedback and teach about healthy boundaries in relationships .    Objective #C  Patient will  uphold her values while navigating interpersonal relationships .  Status: Continued - Date(s):9/28/2023    Intervention(s)  Therapist will  provide support and feedback and teach about healthy boundaries in relationships. .      Patient has reviewed and agreed to the above plan.      Tarah Akins, [facility] Medical CenterSW  September 28, 2023

## 2023-10-12 ENCOUNTER — VIRTUAL VISIT (OUTPATIENT)
Dept: PSYCHOLOGY | Facility: CLINIC | Age: 38
End: 2023-10-12
Payer: COMMERCIAL

## 2023-10-12 DIAGNOSIS — F43.23 ADJUSTMENT DISORDER WITH MIXED ANXIETY AND DEPRESSED MOOD: Primary | ICD-10-CM

## 2023-10-12 PROCEDURE — 90834 PSYTX W PT 45 MINUTES: CPT | Mod: VID

## 2023-10-16 ENCOUNTER — OFFICE VISIT (OUTPATIENT)
Dept: OBGYN | Facility: CLINIC | Age: 38
End: 2023-10-16
Attending: OBSTETRICS & GYNECOLOGY
Payer: COMMERCIAL

## 2023-10-16 VITALS
HEART RATE: 84 BPM | DIASTOLIC BLOOD PRESSURE: 89 MMHG | SYSTOLIC BLOOD PRESSURE: 149 MMHG | BODY MASS INDEX: 31.57 KG/M2 | HEIGHT: 66 IN | WEIGHT: 196.4 LBS

## 2023-10-16 DIAGNOSIS — N90.4 LICHEN SCLEROSUS OF FEMALE GENITALIA: Primary | ICD-10-CM

## 2023-10-16 PROCEDURE — 99213 OFFICE O/P EST LOW 20 MIN: CPT | Performed by: OBSTETRICS & GYNECOLOGY

## 2023-10-16 RX ORDER — ESTRADIOL 0.1 MG/G
CREAM VAGINAL
Qty: 42.5 G | Refills: 11 | Status: SHIPPED | OUTPATIENT
Start: 2023-10-16

## 2023-10-16 RX ORDER — ESTRADIOL 0.1 MG/G
2 CREAM VAGINAL
Qty: 42.5 G | Refills: 11 | Status: SHIPPED | OUTPATIENT
Start: 2023-10-16 | End: 2023-10-16

## 2023-10-16 NOTE — PATIENT INSTRUCTIONS
Thank you for trusting us with your care!     If you need to contact us for questions about:  Symptoms, Scheduling & Medical Questions; Non-urgent (2-3 day response) [place] message, Urgent (needing response today) [phone] (if after 3:30pm next day response)   Prescriptions: Please call your Pharmacy   Billing: [facility] [phone] or [facility]:[phone]

## 2023-10-16 NOTE — PROGRESS NOTES
"S:  Pt is a 38 y/o  who presents today for follow up of lichen sclerosis.  She was diagnosed incidentally at her annual exam in 4/23.  She has never had any symptoms related to it.  She is s/p a 12 week course of daily clobetasol, now using twice a week.  She continues to be asymptomatic and is doing well today.     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  Start topical estadiol cream 2-3 times weekly.  Follow up in 6 months.  If continuing to be stable can likely space visits out to yearly then.     Jeanette Clark MD, FACOG    "

## 2023-10-16 NOTE — LETTER
"10/16/2023       RE: Vale Greenberg  [address] [place] [address]     Dear Colleague,    Thank you for referring your patient, Vale Gerenberg, to the [facility] [place] [facility]. Please see a copy of my visit note below.    S:  Pt is a 36 y/o  who presents today for follow up of lichen sclerosis.  She was diagnosed incidentally at her annual exam in 4/23.  She has never had any symptoms related to it.  She is s/p a 12 week course of daily clobetasol, now using twice a week.  She continues to be asymptomatic and is doing well today.     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  Start topical estadiol cream 2-3 times weekly.  Follow up in 6 months.  If continuing to be stable can likely space visits out to yearly then.     Jeanette Clark MD, FACOG    "

## 2023-11-21 ENCOUNTER — VIRTUAL VISIT (OUTPATIENT)
Dept: PSYCHOLOGY | Facility: CLINIC | Age: 38
End: 2023-11-21
Payer: COMMERCIAL

## 2023-11-21 DIAGNOSIS — F43.23 ADJUSTMENT DISORDER WITH MIXED ANXIETY AND DEPRESSED MOOD: Primary | ICD-10-CM

## 2023-11-21 PROCEDURE — 90834 PSYTX W PT 45 MINUTES: CPT | Mod: VID

## 2023-11-29 NOTE — PROGRESS NOTES
[facility]                                     Progress Note    Patient Name: Vale Greenberg  Date: 11/21/2023         Service Type: Individual      Session Start Time: 8:00 AM  Session End Time: 8:50 AM     Session Length: 50 Minutes    Session #: 11    Attendees: Client attended alone    Service Modality:  Video Visit:      Provider verified identity through the following two step process.  Patient provided:  Patient is known previously to provider    Telemedicine Visit: The patient's condition can be safely assessed and treated via synchronous audio and visual telemedicine encounter.      Reason for Telemedicine Visit: Patient has requested telehealth visit    Originating Site (Patient Location): Patient's home    Distant Site (Provider Location): Provider Remote Setting- Home Office    Consent:  The patient/guardian has verbally consented to: the potential risks and benefits of telemedicine (video visit) versus in person care; bill my insurance or make self-payment for services provided; and responsibility for payment of non-covered services.     Patient would like the video invitation sent by:  My Chart    Mode of Communication:  [facility]    Distant Location (Provider):  Off-site    As the provider I attest to compliance with applicable laws and regulations related to telemedicine.    DATA  Interactive Complexity: No  Crisis: No        Progress Since Last Session (Related to Symptoms / Goals / Homework):   Symptoms: Improving  overall anxiety, awareness and utilization of skills even while when sick.    Homework: Achieved / completed to satisfaction      Episode of Care Goals: Minimal progress - ACTION (Actively working towards change); Intervened by reinforcing change plan / affirming steps taken     Current / Ongoing Stressors and Concerns:   Vale is coming to therapy to learn skills to decrease her anxiety and depression. She reports increased fear of  "possible health concerns and worries for her future have contributed to significant distress in all aspects of her life and results in high blood pressure, irritation, avoidance and withdrawing behavior. She reports increased relationship conflicts with her family and pressure to be \"it all for everyone.\"     Treatment Objective(s) Addressed in This Session:   use cognitive strategies identified in therapy to challenge anxious thoughts   practice deep breathing at least once a day     Intervention:   Therapist utilized reflected listening as patient gave brief reflection of the past month. Patient reported overall improvement in overall anxiety, with increased utilization of her skills even while sick. Therapist supported patient as she processed and validated patient. Therapist engaged in cognitive restructuring/ reframing, looked at cognitive distortions and challenged distorted thoughts. Therapist introduced what do boundaries feel like.      Assessments completed prior to visit:  The following assessments were completed by patient for this visit:  PHQ2:       9/20/2023     1:15 PM 9/13/2023    12:01 PM 9/4/2023     9:26 AM 8/21/2023    10:44 AM 8/3/2023     7:29 AM 4/26/2023     7:57 AM 2/20/2023     9:54 AM   PHQ-2 ( 1999 [employer])   Q1: Little interest or pleasure in doing things 0 0 0 0 0 0 0   Q2: Feeling down, depressed or hopeless 0 0 0 1 1 0 1   PHQ-2 Score 0 0 0 1 1 0 1   Q1: Little interest or pleasure in doing things Not at all Not at all Not at all Not at all Not at all Not at all Not at all   Q2: Feeling down, depressed or hopeless Not at all Not at all Not at all Several days Several days Not at all Several days   PHQ-2 Score 0 0 0 1 1 0 1     PHQ9:       6/18/2019     3:26 PM 7/10/2019     3:56 PM 3/2/2020     8:30 AM 3/4/2021     2:32 PM 6/7/2023    12:01 PM   PHQ-9 SCORE   PHQ-9 Total Score MyChart     3 (Minimal depression)   PHQ-9 Total Score 9 2 2 2 3     GAD2:       5/31/2023     9:10 AM " 6/20/2023     6:25 AM 9/20/2023     1:15 PM 9/28/2023     6:10 AM 10/9/2023    10:34 AM 11/21/2023     6:08 AM   TIM-2   Feeling nervous, anxious, or on edge 1 1 1 1 1 1   Not being able to stop or control worrying 1 0 1 0 0 1   TIM-2 Total Score 2 1 2 1 1 2     GAD7:       2/28/2020     8:06 AM 3/2/2020     8:30 AM 2/18/2021     7:29 AM 2/25/2021    10:15 AM 3/1/2022    10:35 AM 2/20/2023     9:54 AM 4/26/2023     7:57 AM   TIM-7 SCORE   Total Score 4 (minimal anxiety)  6 (mild anxiety) 6 (mild anxiety) 3 (minimal anxiety) 12 (moderate anxiety) 3 (minimal anxiety)   Total Score 4 4 6    6 6 3 12 3     PROMIS 10-Global Health (all questions and answers displayed):       5/31/2023     9:18 AM 6/20/2023     6:26 AM 9/20/2023     1:16 PM 9/28/2023     6:11 AM 10/9/2023    10:35 AM 11/21/2023     6:09 AM   PROMIS 10   In general, would you say your health is: Good Good Good Good Good Good   In general, would you say your quality of life is: Very good Very good Very good Very good Very good Very good   In general, how would you rate your physical health? Good Good Good Good Good Good   In general, how would you rate your mental health, including your mood and your ability to think? Good Very good Good Good Good Good   In general, how would you rate your satisfaction with your social activities and relationships? Excellent Very good Very good Very good Very good Very good   In general, please rate how well you carry out your usual social activities and roles Very good Very good Very good Very good Very good Very good   To what extent are you able to carry out your everyday physical activities such as walking, climbing stairs, carrying groceries, or moving a chair? Completely Completely Completely Completely Completely Completely   In the past 7 days, how often have you been bothered by emotional problems such as feeling anxious, depressed, or irritable? Often Sometimes Sometimes Sometimes Sometimes Sometimes   In the past  7 days, how would you rate your fatigue on average? Moderate Mild Moderate Moderate Moderate Moderate   In the past 7 days, how would you rate your pain on average, where 0 means no pain, and 10 means worst imaginable pain? 0 0 0 1 0 0   In general, would you say your health is: 3 3 3 3 3 3   In general, would you say your quality of life is: 4 4 4 4 4 4   In general, how would you rate your physical health? 3 3 3 3 3 3   In general, how would you rate your mental health, including your mood and your ability to think? 3 4 3 3 3 3   In general, how would you rate your satisfaction with your social activities and relationships? 5 4 4 4 4 4   In general, please rate how well you carry out your usual social activities and roles. (This includes activities at home, at work and in your community, and responsibilities as a parent, child, spouse, employee, friend, etc.) 4 4 4 4 4 4   To what extent are you able to carry out your everyday physical activities such as walking, climbing stairs, carrying groceries, or moving a chair? 5 5 5 5 5 5   In the past 7 days, how often have you been bothered by emotional problems such as feeling anxious, depressed, or irritable? 4 3 3 3 3 3   In the past 7 days, how would you rate your fatigue on average? 3 2 3 3 3 3   In the past 7 days, how would you rate your pain on average, where 0 means no pain, and 10 means worst imaginable pain? 0 0 0 1 0 0   Global Mental Health Score 14 15 14 14 14 14   Global Physical Health Score 16 17 16 15 16 16   PROMIS TOTAL - SUBSCORES 30 32 30 29 30 30         ASSESSMENT: Current Emotional / Mental Status (status of significant symptoms):   Risk status (Self / Other harm or suicidal ideation)   Patient denies current fears or concerns for personal safety.   Patient denies current or recent suicidal ideation or behaviors.   Patient denies current or recent homicidal ideation or behaviors.   Patient denies current or recent self injurious behavior or  ideation.   Patient denies other safety concerns.   Patient reports there has been no change in risk factors since their last session.     Patient reports there has been no change in protective factors since their last session.     Recommended that patient call 911 or go to the local ED should there be a change in any of these risk factors.     Appearance:   Appropriate    Eye Contact:   Good    Psychomotor Behavior: Normal    Attitude:   Cooperative    Orientation:   All   Speech    Rate / Production: Normal     Volume:  Normal    Mood:    Normal   Affect:    Appropriate    Thought Content:  Clear    Thought Form:  Coherent  Logical    Insight:    Good      Medication Review:   No current psychiatric medications prescribed     Medication Compliance:   NA     Changes in Health Issues:   None reported     Chemical Use Review:   Substance Use: Chemical use reviewed, no active concerns identified      Tobacco Use: No current tobacco use.      Diagnosis:  1. Adjustment disorder with mixed anxiety and depressed mood        Collateral Reports Completed:   Not Applicable    PLAN: (Patient Tasks / Therapist Tasks / Other)  Vale will practice naming and challenging her distorted thoughts.    Tarah Akins, LICSW                                                         ______________________________________________________________________    Individual Treatment Plan    Patient's Name: Vale Greenberg  YOB: 1985    Date of Creation: 6/20/2023  Date Treatment Plan Last Reviewed/Revised:9/28/2023    DSM5 Diagnoses: Adjustment Disorders  309.28 (F43.23) With mixed anxiety and depressed mood  Psychosocial / Contextual Factors: works full time, has twin sons, good support system.  PROMIS (reviewed every 90 days):     Referral / Collaboration:  Referral to another professional/service is not indicated at this time..    Anticipated number of session for this episode of care: 9-12 sessions  Anticipation  frequency of session: Every other week  Anticipated Duration of each session: 38-52 minutes  Treatment plan will be reviewed in 90 days or when goals have been changed.       MeasurableTreatment Goal(s) related to diagnosis / functional impairment(s)  Goal 1: Patient will become more aware of their anxiety and utilize the skills taught to decrease their anxiety symptoms.    I will know I've met my goal when I understand more about my anxiety and have skills to cope with it.      Objective #A (Patient Action)    Patient will  use at least 4 coping skills for anxiety management in the next 12 weeks. .  Status: Continued - Date(s):9/28/2023    Intervention(s)  Therapist will  teach coping skills and assign homework of practicing these .    Objective #B  Patient will  use cognitive strategies identified in therapy to challenge anxious thoughts. Patient will engage in activities that are anxiety producing for them, slowly increasing their tolerance for new activities. Patient will identify and recognize past negative experiences and subsequent beliefs they hold that contribute to their anxiety. .  Status: Continued - Date(s):9/28/2023    Intervention(s)  Therapist will  will teach cognitive behavioral skills and engage client in Socratic dialogue around distorted thinking.  Therapist will provide educational materials on CBT.  .    Objective #C  Patient will  implement self-care into their routine to decrease anxiety, focusing on sleep hygiene, nutrition, movement, regular engagement in mindful activity, and regular socialization.  .  Status: Continued - Date(s):9/28/2023    Intervention(s)  Therapist will  provide psychoeducation around the benefit of self-care and help client identify mindfulness based activities that may work for their life. .    Goal 2: Jay will improve her ability to be effective in relationships as evidenced by client reporting use of three new communication skills over the next three  months.    I will know I've met my goal when I can communicate with my family better.      Objective #A (Patient Action)    Status: Continued - Date(s):9/28/2023    Patient will  learn & utilize at least 2 assertive communication skills weekly .    Intervention(s)  Therapist will  teach assertiveness, effective interpersonal skills and about healthy boundaries in relationships .    Objective #B  Patient will  identify his values regarding interpersonal relationships and how to uphold her values while navigating interpersonal relationships .    Status: Continued - Date(s): 9/28/2023    Intervention(s)  Therapist will  teach how to uphold values with DBT and provide support and feedback and teach about healthy boundaries in relationships .    Objective #C  Patient will  uphold her values while navigating interpersonal relationships .  Status: Continued - Date(s):9/28/2023    Intervention(s)  Therapist will  provide support and feedback and teach about healthy boundaries in relationships. .      Patient has reviewed and agreed to the above plan.      NIK Monteor  September 28, 2023

## 2023-11-30 ENCOUNTER — VIRTUAL VISIT (OUTPATIENT)
Dept: PSYCHOLOGY | Facility: CLINIC | Age: 38
End: 2023-11-30
Payer: COMMERCIAL

## 2023-11-30 DIAGNOSIS — F43.23 ADJUSTMENT DISORDER WITH MIXED ANXIETY AND DEPRESSED MOOD: Primary | ICD-10-CM

## 2023-11-30 PROCEDURE — 90834 PSYTX W PT 45 MINUTES: CPT | Mod: VID

## 2023-11-30 NOTE — PROGRESS NOTES
[facility]                                     Progress Note    Patient Name: Vale Greenberg  Date: 11/30/2023         Service Type: Individual      Session Start Time: 7:32 AM  Session End Time: 8:22 AM     Session Length: 50 Minutes    Session #: 12    Attendees: Client attended alone    Service Modality:  Video Visit:      Provider verified identity through the following two step process.  Patient provided:  Patient is known previously to provider    Telemedicine Visit: The patient's condition can be safely assessed and treated via synchronous audio and visual telemedicine encounter.      Reason for Telemedicine Visit: Patient has requested telehealth visit    Originating Site (Patient Location): Patient's place of employment    Distant Site (Provider Location): Provider Remote Setting- Home Office    Consent:  The patient/guardian has verbally consented to: the potential risks and benefits of telemedicine (video visit) versus in person care; bill my insurance or make self-payment for services provided; and responsibility for payment of non-covered services.     Patient would like the video invitation sent by:  My Chart    Mode of Communication:  Video Conference via [employer]    Distant Location (Provider):  Off-site    As the provider I attest to compliance with applicable laws and regulations related to telemedicine.    DATA  Interactive Complexity: No  Crisis: No        Progress Since Last Session (Related to Symptoms / Goals / Homework):   Symptoms: Improving  overall anxiety, awareness and observing hers and other's boundaries.    Homework: Achieved / completed to satisfaction      Episode of Care Goals: Minimal progress - ACTION (Actively working towards change); Intervened by reinforcing change plan / affirming steps taken     Current / Ongoing Stressors and Concerns:   Vale is coming to therapy to learn skills to decrease her anxiety and depression. She reports increased fear  "of possible health concerns and worries for her future have contributed to significant distress in all aspects of her life and results in high blood pressure, irritation, avoidance and withdrawing behavior. She reports increased relationship conflicts with her family and pressure to be \"it all for everyone.\"     Treatment Objective(s) Addressed in This Session:   use cognitive strategies identified in therapy to challenge anxious thoughts   practice deep breathing at least once a day     Intervention:   Therapist utilized reflected listening as patient gave brief reflection of the past week. Patient reported overall improvement in overall anxiety and awareness and observing hers and other's boundaries. Therapist supported patient as she processed and validated patient. Therapist engaged in cognitive restructuring/ reframing, looked at cognitive distortions and challenged distorted thoughts. Therapist continued to introduce boundaries and honoring her and other's boundaries while challenging future predicting.     Assessments completed prior to visit:  The following assessments were completed by patient for this visit:  PHQ2:       9/20/2023     1:15 PM 9/13/2023    12:01 PM 9/4/2023     9:26 AM 8/21/2023    10:44 AM 8/3/2023     7:29 AM 4/26/2023     7:57 AM 2/20/2023     9:54 AM   PHQ-2 ( 1999 [employer])   Q1: Little interest or pleasure in doing things 0 0 0 0 0 0 0   Q2: Feeling down, depressed or hopeless 0 0 0 1 1 0 1   PHQ-2 Score 0 0 0 1 1 0 1   Q1: Little interest or pleasure in doing things Not at all Not at all Not at all Not at all Not at all Not at all Not at all   Q2: Feeling down, depressed or hopeless Not at all Not at all Not at all Several days Several days Not at all Several days   PHQ-2 Score 0 0 0 1 1 0 1     PHQ9:       6/18/2019     3:26 PM 7/10/2019     3:56 PM 3/2/2020     8:30 AM 3/4/2021     2:32 PM 6/7/2023    12:01 PM   PHQ-9 SCORE   PHQ-9 Total Score MyChart     3 (Minimal depression) "   PHQ-9 Total Score 9 2 2 2 3     GAD2:       5/31/2023     9:10 AM 6/20/2023     6:25 AM 9/20/2023     1:15 PM 9/28/2023     6:10 AM 10/9/2023    10:34 AM 11/21/2023     6:08 AM 11/30/2023     7:31 AM   TMI-2   Feeling nervous, anxious, or on edge 1 1 1 1 1 1 1   Not being able to stop or control worrying 1 0 1 0 0 1 1   TIM-2 Total Score 2 1 2 1 1 2 2     GAD7:       2/28/2020     8:06 AM 3/2/2020     8:30 AM 2/18/2021     7:29 AM 2/25/2021    10:15 AM 3/1/2022    10:35 AM 2/20/2023     9:54 AM 4/26/2023     7:57 AM   TIM-7 SCORE   Total Score 4 (minimal anxiety)  6 (mild anxiety) 6 (mild anxiety) 3 (minimal anxiety) 12 (moderate anxiety) 3 (minimal anxiety)   Total Score 4 4 6    6 6 3 12 3     PROMIS 10-Global Health (all questions and answers displayed):       5/31/2023     9:18 AM 6/20/2023     6:26 AM 9/20/2023     1:16 PM 9/28/2023     6:11 AM 10/9/2023    10:35 AM 11/21/2023     6:09 AM 11/30/2023     7:31 AM   PROMIS 10   In general, would you say your health is: Good Good Good Good Good Good Good   In general, would you say your quality of life is: Very good Very good Very good Very good Very good Very good Very good   In general, how would you rate your physical health? Good Good Good Good Good Good Good   In general, how would you rate your mental health, including your mood and your ability to think? Good Very good Good Good Good Good Very good   In general, how would you rate your satisfaction with your social activities and relationships? Excellent Very good Very good Very good Very good Very good Very good   In general, please rate how well you carry out your usual social activities and roles Very good Very good Very good Very good Very good Very good Excellent   To what extent are you able to carry out your everyday physical activities such as walking, climbing stairs, carrying groceries, or moving a chair? Completely Completely Completely Completely Completely Completely Completely   In the past 7  days, how often have you been bothered by emotional problems such as feeling anxious, depressed, or irritable? Often Sometimes Sometimes Sometimes Sometimes Sometimes Sometimes   In the past 7 days, how would you rate your fatigue on average? Moderate Mild Moderate Moderate Moderate Moderate Moderate   In the past 7 days, how would you rate your pain on average, where 0 means no pain, and 10 means worst imaginable pain? 0 0 0 1 0 0 0   In general, would you say your health is: 3 3 3 3 3 3 3   In general, would you say your quality of life is: 4 4 4 4 4 4 4   In general, how would you rate your physical health? 3 3 3 3 3 3 3   In general, how would you rate your mental health, including your mood and your ability to think? 3 4 3 3 3 3 4   In general, how would you rate your satisfaction with your social activities and relationships? 5 4 4 4 4 4 4   In general, please rate how well you carry out your usual social activities and roles. (This includes activities at home, at work and in your community, and responsibilities as a parent, child, spouse, employee, friend, etc.) 4 4 4 4 4 4 5   To what extent are you able to carry out your everyday physical activities such as walking, climbing stairs, carrying groceries, or moving a chair? 5 5 5 5 5 5 5   In the past 7 days, how often have you been bothered by emotional problems such as feeling anxious, depressed, or irritable? 4 3 3 3 3 3 3   In the past 7 days, how would you rate your fatigue on average? 3 2 3 3 3 3 3   In the past 7 days, how would you rate your pain on average, where 0 means no pain, and 10 means worst imaginable pain? 0 0 0 1 0 0 0   Global Mental Health Score 14 15 14 14 14 14 15   Global Physical Health Score 16 17 16 15 16 16 16   PROMIS TOTAL - SUBSCORES 30 32 30 29 30 30 31         ASSESSMENT: Current Emotional / Mental Status (status of significant symptoms):   Risk status (Self / Other harm or suicidal ideation)   Patient denies current fears or  concerns for personal safety.   Patient denies current or recent suicidal ideation or behaviors.   Patient denies current or recent homicidal ideation or behaviors.   Patient denies current or recent self injurious behavior or ideation.   Patient denies other safety concerns.   Patient reports there has been no change in risk factors since their last session.     Patient reports there has been no change in protective factors since their last session.     Recommended that patient call 911 or go to the local ED should there be a change in any of these risk factors.     Appearance:   Appropriate    Eye Contact:   Good    Psychomotor Behavior: Normal    Attitude:   Cooperative    Orientation:   All   Speech    Rate / Production: Normal     Volume:  Normal    Mood:    Normal   Affect:    Appropriate    Thought Content:  Clear    Thought Form:  Coherent  Logical    Insight:    Good      Medication Review:   No current psychiatric medications prescribed     Medication Compliance:   NA     Changes in Health Issues:   None reported     Chemical Use Review:   Substance Use: Chemical use reviewed, no active concerns identified      Tobacco Use: No current tobacco use.      Diagnosis:  1. Adjustment disorder with mixed anxiety and depressed mood        Collateral Reports Completed:   Not Applicable    PLAN: (Patient Tasks / Therapist Tasks / Other)  Vale will practice naming and challenging her distorted thoughts while upholding her boundaries.    NIK Montero                                                         ______________________________________________________________________    Individual Treatment Plan    Patient's Name: Vale Greenberg  YOB: 1985    Date of Creation: 6/20/2023  Date Treatment Plan Last Reviewed/Revised:9/28/2023    DSM5 Diagnoses: Adjustment Disorders  309.28 (F43.23) With mixed anxiety and depressed mood  Psychosocial / Contextual Factors: works full time, has twin  urmila, good support system.  PROMIS (reviewed every 90 days):     Referral / Collaboration:  Referral to another professional/service is not indicated at this time..    Anticipated number of session for this episode of care: 9-12 sessions  Anticipation frequency of session: Every other week  Anticipated Duration of each session: 38-52 minutes  Treatment plan will be reviewed in 90 days or when goals have been changed.       MeasurableTreatment Goal(s) related to diagnosis / functional impairment(s)  Goal 1: Patient will become more aware of their anxiety and utilize the skills taught to decrease their anxiety symptoms.    I will know I've met my goal when I understand more about my anxiety and have skills to cope with it.      Objective #A (Patient Action)    Patient will  use at least 4 coping skills for anxiety management in the next 12 weeks. .  Status: Continued - Date(s):9/28/2023    Intervention(s)  Therapist will  teach coping skills and assign homework of practicing these .    Objective #B  Patient will  use cognitive strategies identified in therapy to challenge anxious thoughts. Patient will engage in activities that are anxiety producing for them, slowly increasing their tolerance for new activities. Patient will identify and recognize past negative experiences and subsequent beliefs they hold that contribute to their anxiety. .  Status: Continued - Date(s):9/28/2023    Intervention(s)  Therapist will  will teach cognitive behavioral skills and engage client in Socratic dialogue around distorted thinking.  Therapist will provide educational materials on CBT.  .    Objective #C  Patient will  implement self-care into their routine to decrease anxiety, focusing on sleep hygiene, nutrition, movement, regular engagement in mindful activity, and regular socialization.  .  Status: Continued - Date(s):9/28/2023    Intervention(s)  Therapist will  provide psychoeducation around the benefit of self-care and help  client identify mindfulness based activities that may work for their life. .    Goal 2: Paticarrington will improve her ability to be effective in relationships as evidenced by client reporting use of three new communication skills over the next three months.    I will know I've met my goal when I can communicate with my family better.      Objective #A (Patient Action)    Status: Continued - Date(s):9/28/2023    Patient will  learn & utilize at least 2 assertive communication skills weekly .    Intervention(s)  Therapist will  teach assertiveness, effective interpersonal skills and about healthy boundaries in relationships .    Objective #B  Patient will  identify his values regarding interpersonal relationships and how to uphold her values while navigating interpersonal relationships .    Status: Continued - Date(s): 9/28/2023    Intervention(s)  Therapist will  teach how to uphold values with DBT and provide support and feedback and teach about healthy boundaries in relationships .    Objective #C  Patient will  uphold her values while navigating interpersonal relationships .  Status: Continued - Date(s):9/28/2023    Intervention(s)  Therapist will  provide support and feedback and teach about healthy boundaries in relationships. .      Patient has reviewed and agreed to the above plan.      NIK Montero  September 28, 2023

## 2023-12-12 ENCOUNTER — VIRTUAL VISIT (OUTPATIENT)
Dept: PSYCHOLOGY | Facility: CLINIC | Age: 38
End: 2023-12-12
Payer: COMMERCIAL

## 2023-12-12 DIAGNOSIS — F43.23 ADJUSTMENT DISORDER WITH MIXED ANXIETY AND DEPRESSED MOOD: Primary | ICD-10-CM

## 2023-12-12 PROCEDURE — 90834 PSYTX W PT 45 MINUTES: CPT | Mod: VID

## 2023-12-13 NOTE — PROGRESS NOTES
[facility]                                     Progress Note    Patient Name: Vale Greenberg  Date: 12/12/2023         Service Type: Individual      Session Start Time: 8:00 AM  Session End Time: 8:50 AM     Session Length: 50 Minutes    Session #: 13    Attendees: Client attended alone    Service Modality:  Video Visit:      Provider verified identity through the following two step process.  Patient provided:  Patient is known previously to provider    Telemedicine Visit: The patient's condition can be safely assessed and treated via synchronous audio and visual telemedicine encounter.      Reason for Telemedicine Visit: Patient has requested telehealth visit    Originating Site (Patient Location): Patient's place of employment    Distant Site (Provider Location): Provider Remote Setting- Home Office    Consent:  The patient/guardian has verbally consented to: the potential risks and benefits of telemedicine (video visit) versus in person care; bill my insurance or make self-payment for services provided; and responsibility for payment of non-covered services.     Patient would like the video invitation sent by:  My Chart    Mode of Communication:  Video Conference via [employer]    Distant Location (Provider):  Off-site    As the provider I attest to compliance with applicable laws and regulations related to telemedicine.    DATA  Interactive Complexity: No  Crisis: No        Progress Since Last Session (Related to Symptoms / Goals / Homework):   Symptoms: Improving  overall anxiety, awareness and observing hers and other's boundaries.    Homework: Achieved / completed to satisfaction      Episode of Care Goals: Minimal progress - ACTION (Actively working towards change); Intervened by reinforcing change plan / affirming steps taken     Current / Ongoing Stressors and Concerns:   Vale is coming to therapy to learn skills to decrease her anxiety and depression. She reports increased fear  "of possible health concerns and worries for her future have contributed to significant distress in all aspects of her life and results in high blood pressure, irritation, avoidance and withdrawing behavior. She reports increased relationship conflicts with her family and pressure to be \"it all for everyone.\"     Treatment Objective(s) Addressed in This Session:   use cognitive strategies identified in therapy to challenge anxious thoughts   practice deep breathing at least once a day     Intervention:   Therapist utilized reflected listening as patient gave brief reflection of the past few weeks. Patient reported overall improvement in anxiety and awareness and observing hers and other's boundaries. Therapist supported patient as she processed and validated patient. Therapist engaged in cognitive restructuring/ reframing, looked at cognitive distortions and challenged distorted thoughts. Therapist introduced coping ahead skill. Therapist reinforced client's awareness of honoring hers and other's boundaries not taking ownership of problem solving/anticipating for others.     Assessments completed prior to visit:  The following assessments were completed by patient for this visit:  PHQ2:       9/20/2023     1:15 PM 9/13/2023    12:01 PM 9/4/2023     9:26 AM 8/21/2023    10:44 AM 8/3/2023     7:29 AM 4/26/2023     7:57 AM 2/20/2023     9:54 AM   PHQ-2 ( 1999 [employer])   Q1: Little interest or pleasure in doing things 0 0 0 0 0 0 0   Q2: Feeling down, depressed or hopeless 0 0 0 1 1 0 1   PHQ-2 Score 0 0 0 1 1 0 1   Q1: Little interest or pleasure in doing things Not at all Not at all Not at all Not at all Not at all Not at all Not at all   Q2: Feeling down, depressed or hopeless Not at all Not at all Not at all Several days Several days Not at all Several days   PHQ-2 Score 0 0 0 1 1 0 1     PHQ9:       6/18/2019     3:26 PM 7/10/2019     3:56 PM 3/2/2020     8:30 AM 3/4/2021     2:32 PM 6/7/2023    12:01 PM   PHQ-9 " SCORE   PHQ-9 Total Score MyChart     3 (Minimal depression)   PHQ-9 Total Score 9 2 2 2 3     GAD2:       5/31/2023     9:10 AM 6/20/2023     6:25 AM 9/20/2023     1:15 PM 9/28/2023     6:10 AM 10/9/2023    10:34 AM 11/21/2023     6:08 AM 11/30/2023     7:31 AM   TIM-2   Feeling nervous, anxious, or on edge 1 1 1 1 1 1 1   Not being able to stop or control worrying 1 0 1 0 0 1 1   TIM-2 Total Score 2 1 2 1 1 2 2     GAD7:       2/28/2020     8:06 AM 3/2/2020     8:30 AM 2/18/2021     7:29 AM 2/25/2021    10:15 AM 3/1/2022    10:35 AM 2/20/2023     9:54 AM 4/26/2023     7:57 AM   TIM-7 SCORE   Total Score 4 (minimal anxiety)  6 (mild anxiety) 6 (mild anxiety) 3 (minimal anxiety) 12 (moderate anxiety) 3 (minimal anxiety)   Total Score 4 4 6    6 6 3 12 3     PROMIS 10-Global Health (all questions and answers displayed):       5/31/2023     9:18 AM 6/20/2023     6:26 AM 9/20/2023     1:16 PM 9/28/2023     6:11 AM 10/9/2023    10:35 AM 11/21/2023     6:09 AM 11/30/2023     7:31 AM   PROMIS 10   In general, would you say your health is: Good Good Good Good Good Good Good   In general, would you say your quality of life is: Very good Very good Very good Very good Very good Very good Very good   In general, how would you rate your physical health? Good Good Good Good Good Good Good   In general, how would you rate your mental health, including your mood and your ability to think? Good Very good Good Good Good Good Very good   In general, how would you rate your satisfaction with your social activities and relationships? Excellent Very good Very good Very good Very good Very good Very good   In general, please rate how well you carry out your usual social activities and roles Very good Very good Very good Very good Very good Very good Excellent   To what extent are you able to carry out your everyday physical activities such as walking, climbing stairs, carrying groceries, or moving a chair? Completely Completely  Completely Completely Completely Completely Completely   In the past 7 days, how often have you been bothered by emotional problems such as feeling anxious, depressed, or irritable? Often Sometimes Sometimes Sometimes Sometimes Sometimes Sometimes   In the past 7 days, how would you rate your fatigue on average? Moderate Mild Moderate Moderate Moderate Moderate Moderate   In the past 7 days, how would you rate your pain on average, where 0 means no pain, and 10 means worst imaginable pain? 0 0 0 1 0 0 0   In general, would you say your health is: 3 3 3 3 3 3 3   In general, would you say your quality of life is: 4 4 4 4 4 4 4   In general, how would you rate your physical health? 3 3 3 3 3 3 3   In general, how would you rate your mental health, including your mood and your ability to think? 3 4 3 3 3 3 4   In general, how would you rate your satisfaction with your social activities and relationships? 5 4 4 4 4 4 4   In general, please rate how well you carry out your usual social activities and roles. (This includes activities at home, at work and in your community, and responsibilities as a parent, child, spouse, employee, friend, etc.) 4 4 4 4 4 4 5   To what extent are you able to carry out your everyday physical activities such as walking, climbing stairs, carrying groceries, or moving a chair? 5 5 5 5 5 5 5   In the past 7 days, how often have you been bothered by emotional problems such as feeling anxious, depressed, or irritable? 4 3 3 3 3 3 3   In the past 7 days, how would you rate your fatigue on average? 3 2 3 3 3 3 3   In the past 7 days, how would you rate your pain on average, where 0 means no pain, and 10 means worst imaginable pain? 0 0 0 1 0 0 0   Global Mental Health Score 14 15 14 14 14 14 15   Global Physical Health Score 16 17 16 15 16 16 16   PROMIS TOTAL - SUBSCORES 30 32 30 29 30 30 31         ASSESSMENT: Current Emotional / Mental Status (status of significant symptoms):   Risk status (Self  / Other harm or suicidal ideation)   Patient denies current fears or concerns for personal safety.   Patient denies current or recent suicidal ideation or behaviors.   Patient denies current or recent homicidal ideation or behaviors.   Patient denies current or recent self injurious behavior or ideation.   Patient denies other safety concerns.   Patient reports there has been no change in risk factors since their last session.     Patient reports there has been no change in protective factors since their last session.     Recommended that patient call 911 or go to the local ED should there be a change in any of these risk factors.     Appearance:   Appropriate    Eye Contact:   Good    Psychomotor Behavior: Normal    Attitude:   Cooperative    Orientation:   All   Speech    Rate / Production: Normal     Volume:  Normal    Mood:    Normal   Affect:    Appropriate    Thought Content:  Clear    Thought Form:  Coherent  Logical    Insight:    Good      Medication Review:   No current psychiatric medications prescribed     Medication Compliance:   NA     Changes in Health Issues:   None reported     Chemical Use Review:   Substance Use: Chemical use reviewed, no active concerns identified      Tobacco Use: No current tobacco use.      Diagnosis:  1. Adjustment disorder with mixed anxiety and depressed mood        Collateral Reports Completed:   Not Applicable    PLAN: (Patient Tasks / Therapist Tasks / Other)  Vale will practice naming and challenging her distorted thoughts while upholding her boundaries.    NIK Montero                                                         ______________________________________________________________________    Individual Treatment Plan    Patient's Name: aVle Greenberg  YOB: 1985    Date of Creation: 6/20/2023  Date Treatment Plan Last Reviewed/Revised:9/28/2023    DSM5 Diagnoses: Adjustment Disorders  309.28 (F43.23) With mixed anxiety and  depressed mood  Psychosocial / Contextual Factors: works full time, has twin sons, good support system.  PROMIS (reviewed every 90 days):     Referral / Collaboration:  Referral to another professional/service is not indicated at this time..    Anticipated number of session for this episode of care: 9-12 sessions  Anticipation frequency of session: Every other week  Anticipated Duration of each session: 38-52 minutes  Treatment plan will be reviewed in 90 days or when goals have been changed.       MeasurableTreatment Goal(s) related to diagnosis / functional impairment(s)  Goal 1: Patient will become more aware of their anxiety and utilize the skills taught to decrease their anxiety symptoms.    I will know I've met my goal when I understand more about my anxiety and have skills to cope with it.      Objective #A (Patient Action)    Patient will  use at least 4 coping skills for anxiety management in the next 12 weeks. .  Status: Continued - Date(s):9/28/2023    Intervention(s)  Therapist will  teach coping skills and assign homework of practicing these .    Objective #B  Patient will  use cognitive strategies identified in therapy to challenge anxious thoughts. Patient will engage in activities that are anxiety producing for them, slowly increasing their tolerance for new activities. Patient will identify and recognize past negative experiences and subsequent beliefs they hold that contribute to their anxiety. .  Status: Continued - Date(s):9/28/2023    Intervention(s)  Therapist will  will teach cognitive behavioral skills and engage client in Socratic dialogue around distorted thinking.  Therapist will provide educational materials on CBT.  .    Objective #C  Patient will  implement self-care into their routine to decrease anxiety, focusing on sleep hygiene, nutrition, movement, regular engagement in mindful activity, and regular socialization.  .  Status: Continued -  Date(s):9/28/2023    Intervention(s)  Therapist will  provide psychoeducation around the benefit of self-care and help client identify mindfulness based activities that may work for their life. .    Goal 2: Jay will improve her ability to be effective in relationships as evidenced by client reporting use of three new communication skills over the next three months.    I will know I've met my goal when I can communicate with my family better.      Objective #A (Patient Action)    Status: Continued - Date(s):9/28/2023    Patient will  learn & utilize at least 2 assertive communication skills weekly .    Intervention(s)  Therapist will  teach assertiveness, effective interpersonal skills and about healthy boundaries in relationships .    Objective #B  Patient will  identify his values regarding interpersonal relationships and how to uphold her values while navigating interpersonal relationships .    Status: Continued - Date(s): 9/28/2023    Intervention(s)  Therapist will  teach how to uphold values with DBT and provide support and feedback and teach about healthy boundaries in relationships .    Objective #C  Patient will  uphold her values while navigating interpersonal relationships .  Status: Continued - Date(s):9/28/2023    Intervention(s)  Therapist will  provide support and feedback and teach about healthy boundaries in relationships. .      Patient has reviewed and agreed to the above plan.      Tarah Akins, NIK  September 28, 2023

## 2023-12-28 ENCOUNTER — VIRTUAL VISIT (OUTPATIENT)
Dept: PSYCHOLOGY | Facility: CLINIC | Age: 38
End: 2023-12-28
Payer: COMMERCIAL

## 2023-12-28 DIAGNOSIS — F43.23 ADJUSTMENT DISORDER WITH MIXED ANXIETY AND DEPRESSED MOOD: Primary | ICD-10-CM

## 2023-12-28 PROCEDURE — 90834 PSYTX W PT 45 MINUTES: CPT | Mod: VID

## 2024-01-03 NOTE — PROGRESS NOTES
[facility]                                     Progress Note    Patient Name: Vale Greenberg  Date: 12/28/2023         Service Type: Individual      Session Start Time: 8:30 AM  Session End Time: 9:20 AM     Session Length: 50 Minutes    Session #: 14    Attendees: Client attended alone    Service Modality:  Video Visit:      Provider verified identity through the following two step process.  Patient provided:  Patient is known previously to provider    Telemedicine Visit: The patient's condition can be safely assessed and treated via synchronous audio and visual telemedicine encounter.      Reason for Telemedicine Visit: Patient has requested telehealth visit    Originating Site (Patient Location): Patient's place of employment    Distant Site (Provider Location): Provider Remote Setting- Home Office    Consent:  The patient/guardian has verbally consented to: the potential risks and benefits of telemedicine (video visit) versus in person care; bill my insurance or make self-payment for services provided; and responsibility for payment of non-covered services.     Patient would like the video invitation sent by:  My Chart    Mode of Communication:  Video Conference via [employer]    Distant Location (Provider):  Off-site    As the provider I attest to compliance with applicable laws and regulations related to telemedicine.    DATA  Interactive Complexity: No  Crisis: No        Progress Since Last Session (Related to Symptoms / Goals / Homework):   Symptoms: Improving  overall anxiety, awareness and observing hers and other's boundaries.    Homework: Achieved / completed to satisfaction      Episode of Care Goals: Minimal progress - ACTION (Actively working towards change); Intervened by reinforcing change plan / affirming steps taken     Current / Ongoing Stressors and Concerns:   Vale is coming to therapy to learn skills to decrease her anxiety and depression. She reports increased fear  "of possible health concerns and worries for her future have contributed to significant distress in all aspects of her life and results in high blood pressure, irritation, avoidance and withdrawing behavior. She reports increased relationship conflicts with her family and pressure to be \"it all for everyone.\"     Treatment Objective(s) Addressed in This Session:   use cognitive strategies identified in therapy to challenge anxious thoughts   practice deep breathing at least once a day     Intervention:   Therapist utilized reflected listening as patient gave brief reflection of the past few weeks. Patient reported overall improvement in anxiety and awareness and observing hers and other's boundaries. She reflected on her successes over the holiday and honoring her boundaries and staying in her royce and enjoying the holiday more. Therapist supported patient as she processed and validated patient. Therapist engaged in cognitive restructuring/ reframing, looked at cognitive distortions and challenged distorted thoughts. Therapist reinforced client's awareness of honoring hers and other's boundaries not taking ownership of problem solving/anticipating for others.     Assessments completed prior to visit:  The following assessments were completed by patient for this visit:  PHQ2:       12/28/2023     7:28 AM 9/20/2023     1:15 PM 9/13/2023    12:01 PM 9/4/2023     9:26 AM 8/21/2023    10:44 AM 8/3/2023     7:29 AM 4/26/2023     7:57 AM   PHQ-2 ( 1999 [employer])   Q1: Little interest or pleasure in doing things 0 0 0 0 0 0 0   Q2: Feeling down, depressed or hopeless 0 0 0 0 1 1 0   PHQ-2 Score 0 0 0 0 1 1 0   Q1: Little interest or pleasure in doing things Not at all Not at all Not at all Not at all Not at all Not at all Not at all   Q2: Feeling down, depressed or hopeless Not at all Not at all Not at all Not at all Several days Several days Not at all   PHQ-2 Score 0 0 0 0 1 1 0     PHQ9:       6/18/2019     3:26 PM " 7/10/2019     3:56 PM 3/2/2020     8:30 AM 3/4/2021     2:32 PM 6/7/2023    12:01 PM   PHQ-9 SCORE   PHQ-9 Total Score MyChart     3 (Minimal depression)   PHQ-9 Total Score 9 2 2 2 3     GAD2:       5/31/2023     9:10 AM 6/20/2023     6:25 AM 9/20/2023     1:15 PM 9/28/2023     6:10 AM 10/9/2023    10:34 AM 11/21/2023     6:08 AM 11/30/2023     7:31 AM   TIM-2   Feeling nervous, anxious, or on edge 1 1 1 1 1 1 1   Not being able to stop or control worrying 1 0 1 0 0 1 1   TIM-2 Total Score 2 1 2 1 1 2 2     GAD7:       2/28/2020     8:06 AM 3/2/2020     8:30 AM 2/18/2021     7:29 AM 2/25/2021    10:15 AM 3/1/2022    10:35 AM 2/20/2023     9:54 AM 4/26/2023     7:57 AM   TIM-7 SCORE   Total Score 4 (minimal anxiety)  6 (mild anxiety) 6 (mild anxiety) 3 (minimal anxiety) 12 (moderate anxiety) 3 (minimal anxiety)   Total Score 4 4 6    6 6 3 12 3     PROMIS 10-Global Health (all questions and answers displayed):       5/31/2023     9:18 AM 6/20/2023     6:26 AM 9/20/2023     1:16 PM 9/28/2023     6:11 AM 10/9/2023    10:35 AM 11/21/2023     6:09 AM 11/30/2023     7:31 AM   PROMIS 10   In general, would you say your health is: Good Good Good Good Good Good Good   In general, would you say your quality of life is: Very good Very good Very good Very good Very good Very good Very good   In general, how would you rate your physical health? Good Good Good Good Good Good Good   In general, how would you rate your mental health, including your mood and your ability to think? Good Very good Good Good Good Good Very good   In general, how would you rate your satisfaction with your social activities and relationships? Excellent Very good Very good Very good Very good Very good Very good   In general, please rate how well you carry out your usual social activities and roles Very good Very good Very good Very good Very good Very good Excellent   To what extent are you able to carry out your everyday physical activities such as  walking, climbing stairs, carrying groceries, or moving a chair? Completely Completely Completely Completely Completely Completely Completely   In the past 7 days, how often have you been bothered by emotional problems such as feeling anxious, depressed, or irritable? Often Sometimes Sometimes Sometimes Sometimes Sometimes Sometimes   In the past 7 days, how would you rate your fatigue on average? Moderate Mild Moderate Moderate Moderate Moderate Moderate   In the past 7 days, how would you rate your pain on average, where 0 means no pain, and 10 means worst imaginable pain? 0 0 0 1 0 0 0   In general, would you say your health is: 3 3 3 3 3 3 3   In general, would you say your quality of life is: 4 4 4 4 4 4 4   In general, how would you rate your physical health? 3 3 3 3 3 3 3   In general, how would you rate your mental health, including your mood and your ability to think? 3 4 3 3 3 3 4   In general, how would you rate your satisfaction with your social activities and relationships? 5 4 4 4 4 4 4   In general, please rate how well you carry out your usual social activities and roles. (This includes activities at home, at work and in your community, and responsibilities as a parent, child, spouse, employee, friend, etc.) 4 4 4 4 4 4 5   To what extent are you able to carry out your everyday physical activities such as walking, climbing stairs, carrying groceries, or moving a chair? 5 5 5 5 5 5 5   In the past 7 days, how often have you been bothered by emotional problems such as feeling anxious, depressed, or irritable? 4 3 3 3 3 3 3   In the past 7 days, how would you rate your fatigue on average? 3 2 3 3 3 3 3   In the past 7 days, how would you rate your pain on average, where 0 means no pain, and 10 means worst imaginable pain? 0 0 0 1 0 0 0   Global Mental Health Score 14 15 14 14 14 14 15   Global Physical Health Score 16 17 16 15 16 16 16   PROMIS TOTAL - SUBSCORES 30 32 30 29 30 30 31         ASSESSMENT:  Current Emotional / Mental Status (status of significant symptoms):   Risk status (Self / Other harm or suicidal ideation)   Patient denies current fears or concerns for personal safety.   Patient denies current or recent suicidal ideation or behaviors.   Patient denies current or recent homicidal ideation or behaviors.   Patient denies current or recent self injurious behavior or ideation.   Patient denies other safety concerns.   Patient reports there has been no change in risk factors since their last session.     Patient reports there has been no change in protective factors since their last session.     Recommended that patient call 911 or go to the local ED should there be a change in any of these risk factors.     Appearance:   Appropriate    Eye Contact:   Good    Psychomotor Behavior: Normal    Attitude:   Cooperative    Orientation:   All   Speech    Rate / Production: Normal     Volume:  Normal    Mood:    Normal   Affect:    Appropriate    Thought Content:  Clear    Thought Form:  Coherent  Logical    Insight:    Good      Medication Review:   No current psychiatric medications prescribed     Medication Compliance:   NA     Changes in Health Issues:   None reported     Chemical Use Review:   Substance Use: Chemical use reviewed, no active concerns identified      Tobacco Use: No current tobacco use.      Diagnosis:  1. Adjustment disorder with mixed anxiety and depressed mood        Collateral Reports Completed:   Not Applicable    PLAN: (Patient Tasks / Therapist Tasks / Other)  Vale will practice naming and challenging her distorted thoughts while upholding her boundaries.    NIK Montero                                                         ______________________________________________________________________    Individual Treatment Plan    Patient's Name: Vale Greenberg  YOB: 1985    Date of Creation: 6/20/2023  Date Treatment Plan Last  Reviewed/Revised:9/28/2023    DSM5 Diagnoses: Adjustment Disorders  309.28 (F43.23) With mixed anxiety and depressed mood  Psychosocial / Contextual Factors: works full time, has twin sons, good support system.  PROMIS (reviewed every 90 days):     Referral / Collaboration:  Referral to another professional/service is not indicated at this time..    Anticipated number of session for this episode of care: 9-12 sessions  Anticipation frequency of session: Every other week  Anticipated Duration of each session: 38-52 minutes  Treatment plan will be reviewed in 90 days or when goals have been changed.       MeasurableTreatment Goal(s) related to diagnosis / functional impairment(s)  Goal 1: Patient will become more aware of their anxiety and utilize the skills taught to decrease their anxiety symptoms.    I will know I've met my goal when I understand more about my anxiety and have skills to cope with it.      Objective #A (Patient Action)    Patient will  use at least 4 coping skills for anxiety management in the next 12 weeks. .  Status: Continued - Date(s):9/28/2023    Intervention(s)  Therapist will  teach coping skills and assign homework of practicing these .    Objective #B  Patient will  use cognitive strategies identified in therapy to challenge anxious thoughts. Patient will engage in activities that are anxiety producing for them, slowly increasing their tolerance for new activities. Patient will identify and recognize past negative experiences and subsequent beliefs they hold that contribute to their anxiety. .  Status: Continued - Date(s):9/28/2023    Intervention(s)  Therapist will  will teach cognitive behavioral skills and engage client in Socratic dialogue around distorted thinking.  Therapist will provide educational materials on CBT.  .    Objective #C  Patient will  implement self-care into their routine to decrease anxiety, focusing on sleep hygiene, nutrition, movement, regular engagement in  mindful activity, and regular socialization.  .  Status: Continued - Date(s):9/28/2023    Intervention(s)  Therapist will  provide psychoeducation around the benefit of self-care and help client identify mindfulness based activities that may work for their life. .    Goal 2: Jay will improve her ability to be effective in relationships as evidenced by client reporting use of three new communication skills over the next three months.    I will know I've met my goal when I can communicate with my family better.      Objective #A (Patient Action)    Status: Continued - Date(s):9/28/2023    Patient will  learn & utilize at least 2 assertive communication skills weekly .    Intervention(s)  Therapist will  teach assertiveness, effective interpersonal skills and about healthy boundaries in relationships .    Objective #B  Patient will  identify his values regarding interpersonal relationships and how to uphold her values while navigating interpersonal relationships .    Status: Continued - Date(s): 9/28/2023    Intervention(s)  Therapist will  teach how to uphold values with DBT and provide support and feedback and teach about healthy boundaries in relationships .    Objective #C  Patient will  uphold her values while navigating interpersonal relationships .  Status: Continued - Date(s):9/28/2023    Intervention(s)  Therapist will  provide support and feedback and teach about healthy boundaries in relationships. .      Patient has reviewed and agreed to the above plan.      NIK Montero  September 28, 2023

## 2024-01-16 ENCOUNTER — VIRTUAL VISIT (OUTPATIENT)
Dept: PSYCHOLOGY | Facility: CLINIC | Age: 39
End: 2024-01-16
Payer: COMMERCIAL

## 2024-01-16 DIAGNOSIS — F43.23 ADJUSTMENT DISORDER WITH MIXED ANXIETY AND DEPRESSED MOOD: Primary | ICD-10-CM

## 2024-01-16 PROCEDURE — 90834 PSYTX W PT 45 MINUTES: CPT | Mod: 95

## 2024-01-23 NOTE — PROGRESS NOTES
[facility]                                     Progress Note    Patient Name: Vale Greenberg  Date: 1/16/2024         Service Type: Individual      Session Start Time: 8:00 AM  Session End Time: 8:50 AM     Session Length: 50 Minutes    Session #: 15    Attendees: Client attended alone    Service Modality:  Video Visit:      Provider verified identity through the following two step process.  Patient provided:  Patient is known previously to provider    Telemedicine Visit: The patient's condition can be safely assessed and treated via synchronous audio and visual telemedicine encounter.      Reason for Telemedicine Visit: Patient has requested telehealth visit and Services only offered telehealth    Originating Site (Patient Location): Patient's place of employment    Distant Site (Provider Location): Provider Remote Setting- Home Office    Consent:  The patient/guardian has verbally consented to: the potential risks and benefits of telemedicine (video visit) versus in person care; bill my insurance or make self-payment for services provided; and responsibility for payment of non-covered services.     Patient would like the video invitation sent by:  My Chart    Mode of Communication:  [facility]    Distant Location (Provider):  Off-site    As the provider I attest to compliance with applicable laws and regulations related to telemedicine.    DATA  Interactive Complexity: No  Crisis: No        Progress Since Last Session (Related to Symptoms / Goals / Homework):   Symptoms: Improving  overall anxiety, awareness and observing hers and other's boundaries.    Homework: Achieved / completed to satisfaction      Episode of Care Goals: Minimal progress - ACTION (Actively working towards change); Intervened by reinforcing change plan / affirming steps taken     Current / Ongoing Stressors and Concerns:   Vale is coming to therapy to learn skills to decrease her anxiety and  "depression. She reports increased fear of possible health concerns and worries for her future have contributed to significant distress in all aspects of her life and results in high blood pressure, irritation, avoidance and withdrawing behavior. She reports increased relationship conflicts with her family and pressure to be \"it all for everyone.\"     Treatment Objective(s) Addressed in This Session:   use cognitive strategies identified in therapy to challenge anxious thoughts   practice deep breathing at least once a day     Intervention:   Therapist utilized reflected listening as patient gave brief reflection of the past few weeks. Patient reported overall improvement in anxiety and awareness and observing hers and other's boundaries. She reflected on her experience at work with the threat of a possible active shooter. She processed her emotions and how she had been holding this all in.  Therapist supported patient as she processed and validated patient. Therapist supported patient and offered patient space to unpack emotions. Therapist provided patient with coping skills when feeling heavier emotions.     Assessments completed prior to visit:  The following assessments were completed by patient for this visit:  PHQ2:       1/16/2024     7:23 AM 12/28/2023     7:28 AM 9/20/2023     1:15 PM 9/13/2023    12:01 PM 9/4/2023     9:26 AM 8/21/2023    10:44 AM 8/3/2023     7:29 AM   PHQ-2 ( 1999 [employer])   Q1: Little interest or pleasure in doing things 0 0 0 0 0 0 0   Q2: Feeling down, depressed or hopeless 0 0 0 0 0 1 1   PHQ-2 Score 0 0 0 0 0 1 1   Q1: Little interest or pleasure in doing things Not at all Not at all Not at all Not at all Not at all Not at all Not at all   Q2: Feeling down, depressed or hopeless Not at all Not at all Not at all Not at all Not at all Several days Several days   PHQ-2 Score 0 0 0 0 0 1 1     PHQ9:       6/18/2019     3:26 PM 7/10/2019     3:56 PM 3/2/2020     8:30 AM 3/4/2021     2:32 " PM 6/7/2023    12:01 PM   PHQ-9 SCORE   PHQ-9 Total Score MyChart     3 (Minimal depression)   PHQ-9 Total Score 9 2 2 2 3     GAD2:       5/31/2023     9:10 AM 6/20/2023     6:25 AM 9/20/2023     1:15 PM 9/28/2023     6:10 AM 10/9/2023    10:34 AM 11/21/2023     6:08 AM 11/30/2023     7:31 AM   TIM-2   Feeling nervous, anxious, or on edge 1 1 1 1 1 1 1   Not being able to stop or control worrying 1 0 1 0 0 1 1   TIM-2 Total Score 2 1 2 1 1 2 2     GAD7:       2/28/2020     8:06 AM 3/2/2020     8:30 AM 2/18/2021     7:29 AM 2/25/2021    10:15 AM 3/1/2022    10:35 AM 2/20/2023     9:54 AM 4/26/2023     7:57 AM   TIM-7 SCORE   Total Score 4 (minimal anxiety)  6 (mild anxiety) 6 (mild anxiety) 3 (minimal anxiety) 12 (moderate anxiety) 3 (minimal anxiety)   Total Score 4 4 6    6 6 3 12 3     PROMIS 10-Global Health (all questions and answers displayed):       5/31/2023     9:18 AM 6/20/2023     6:26 AM 9/20/2023     1:16 PM 9/28/2023     6:11 AM 10/9/2023    10:35 AM 11/21/2023     6:09 AM 11/30/2023     7:31 AM   PROMIS 10   In general, would you say your health is: Good Good Good Good Good Good Good   In general, would you say your quality of life is: Very good Very good Very good Very good Very good Very good Very good   In general, how would you rate your physical health? Good Good Good Good Good Good Good   In general, how would you rate your mental health, including your mood and your ability to think? Good Very good Good Good Good Good Very good   In general, how would you rate your satisfaction with your social activities and relationships? Excellent Very good Very good Very good Very good Very good Very good   In general, please rate how well you carry out your usual social activities and roles Very good Very good Very good Very good Very good Very good Excellent   To what extent are you able to carry out your everyday physical activities such as walking, climbing stairs, carrying groceries, or moving a chair?  Completely Completely Completely Completely Completely Completely Completely   In the past 7 days, how often have you been bothered by emotional problems such as feeling anxious, depressed, or irritable? Often Sometimes Sometimes Sometimes Sometimes Sometimes Sometimes   In the past 7 days, how would you rate your fatigue on average? Moderate Mild Moderate Moderate Moderate Moderate Moderate   In the past 7 days, how would you rate your pain on average, where 0 means no pain, and 10 means worst imaginable pain? 0 0 0 1 0 0 0   In general, would you say your health is: 3 3 3 3 3 3 3   In general, would you say your quality of life is: 4 4 4 4 4 4 4   In general, how would you rate your physical health? 3 3 3 3 3 3 3   In general, how would you rate your mental health, including your mood and your ability to think? 3 4 3 3 3 3 4   In general, how would you rate your satisfaction with your social activities and relationships? 5 4 4 4 4 4 4   In general, please rate how well you carry out your usual social activities and roles. (This includes activities at home, at work and in your community, and responsibilities as a parent, child, spouse, employee, friend, etc.) 4 4 4 4 4 4 5   To what extent are you able to carry out your everyday physical activities such as walking, climbing stairs, carrying groceries, or moving a chair? 5 5 5 5 5 5 5   In the past 7 days, how often have you been bothered by emotional problems such as feeling anxious, depressed, or irritable? 4 3 3 3 3 3 3   In the past 7 days, how would you rate your fatigue on average? 3 2 3 3 3 3 3   In the past 7 days, how would you rate your pain on average, where 0 means no pain, and 10 means worst imaginable pain? 0 0 0 1 0 0 0   Global Mental Health Score 14 15 14 14 14 14 15   Global Physical Health Score 16 17 16 15 16 16 16   PROMIS TOTAL - SUBSCORES 30 32 30 29 30 30 31         ASSESSMENT: Current Emotional / Mental Status (status of significant  symptoms):   Risk status (Self / Other harm or suicidal ideation)   Patient denies current fears or concerns for personal safety.   Patient denies current or recent suicidal ideation or behaviors.   Patient denies current or recent homicidal ideation or behaviors.   Patient denies current or recent self injurious behavior or ideation.   Patient denies other safety concerns.   Patient reports there has been no change in risk factors since their last session.     Patient reports there has been no change in protective factors since their last session.     Recommended that patient call 911 or go to the local ED should there be a change in any of these risk factors.     Appearance:   Appropriate    Eye Contact:   Good    Psychomotor Behavior: Normal    Attitude:   Cooperative    Orientation:   All   Speech    Rate / Production: Normal     Volume:  Normal    Mood:    Anxious  Sad    Affect:    Appropriate  Subdued    Thought Content:  Clear    Thought Form:  Coherent  Logical    Insight:    Good      Medication Review:   No current psychiatric medications prescribed     Medication Compliance:   NA     Changes in Health Issues:   None reported     Chemical Use Review:   Substance Use: Chemical use reviewed, no active concerns identified      Tobacco Use: No current tobacco use.      Diagnosis:  1. Adjustment disorder with mixed anxiety and depressed mood        Collateral Reports Completed:   Not Applicable    PLAN: (Patient Tasks / Therapist Tasks / Other)  Vale will practice naming and challenging her distorted thoughts and share her experience with her .     Tarah Akins, NIK                                                         ______________________________________________________________________    Individual Treatment Plan    Patient's Name: Vale Greenberg  YOB: 1985    Date of Creation: 6/20/2023  Date Treatment Plan Last Reviewed/Revised:1/16/2024    DSM5 Diagnoses: Adjustment  Disorders  309.28 (F43.23) With mixed anxiety and depressed mood  Psychosocial / Contextual Factors: works full time, has twin sons, good support system.  PROMIS (reviewed every 90 days):     Referral / Collaboration:  Referral to another professional/service is not indicated at this time..    Anticipated number of session for this episode of care: 9-12 sessions  Anticipation frequency of session: Every other week  Anticipated Duration of each session: 38-52 minutes  Treatment plan will be reviewed in 90 days or when goals have been changed.       MeasurableTreatment Goal(s) related to diagnosis / functional impairment(s)  Goal 1: Patient will become more aware of their anxiety and utilize the skills taught to decrease their anxiety symptoms.    I will know I've met my goal when I understand more about my anxiety and have skills to cope with it.      Objective #A (Patient Action)    Patient will  use at least 4 coping skills for anxiety management in the next 12 weeks. .  Status: Continued - Date(s):1/16/2024    Intervention(s)  Therapist will  teach coping skills and assign homework of practicing these .    Objective #B  Patient will  use cognitive strategies identified in therapy to challenge anxious thoughts. Patient will engage in activities that are anxiety producing for them, slowly increasing their tolerance for new activities. Patient will identify and recognize past negative experiences and subsequent beliefs they hold that contribute to their anxiety. .  Status: Continued - Date(s):1/16/2024    Intervention(s)  Therapist will  will teach cognitive behavioral skills and engage client in Socratic dialogue around distorted thinking.  Therapist will provide educational materials on CBT.  .    Objective #C  Patient will  implement self-care into their routine to decrease anxiety, focusing on sleep hygiene, nutrition, movement, regular engagement in mindful activity, and regular socialization.  .  Status:  Continued - Date(s):1/16/2024    Intervention(s)  Therapist will  provide psychoeducation around the benefit of self-care and help client identify mindfulness based activities that may work for their life. .    Goal 2: Jay will improve her ability to be effective in relationships as evidenced by client reporting use of three new communication skills over the next three months.    I will know I've met my goal when I can communicate with my family better.      Objective #A (Patient Action)    Status: Continued - Date(s):1/16/2024    Patient will  learn & utilize at least 2 assertive communication skills weekly .    Intervention(s)  Therapist will  teach assertiveness, effective interpersonal skills and about healthy boundaries in relationships .    Objective #B  Patient will  identify his values regarding interpersonal relationships and how to uphold her values while navigating interpersonal relationships .    Status: Continued - Date(s): 9/28/2023    Intervention(s)  Therapist will  teach how to uphold values with DBT and provide support and feedback and teach about healthy boundaries in relationships .    Objective #C  Patient will  uphold her values while navigating interpersonal relationships .  Status: Continued - Date(s):1/16/2024    Intervention(s)  Therapist will  provide support and feedback and teach about healthy boundaries in relationships. .      Patient has reviewed and agreed to the above plan.      NIK Montero  January 16, 2024

## 2024-02-01 ENCOUNTER — VIRTUAL VISIT (OUTPATIENT)
Dept: PSYCHOLOGY | Facility: CLINIC | Age: 39
End: 2024-02-01
Payer: COMMERCIAL

## 2024-02-01 DIAGNOSIS — F43.23 ADJUSTMENT DISORDER WITH MIXED ANXIETY AND DEPRESSED MOOD: Primary | ICD-10-CM

## 2024-02-01 PROCEDURE — 90834 PSYTX W PT 45 MINUTES: CPT | Mod: 95

## 2024-02-01 NOTE — PROGRESS NOTES
[facility]                                     Progress Note    Patient Name: Vale Greenberg  Date: 2/12024         Service Type: Individual      Session Start Time: 7:32  AM  Session End Time: 8:24 AM     Session Length: 52 Minutes    Session #: 16    Attendees: Client attended alone    Service Modality:  Video Visit:      Provider verified identity through the following two step process.  Patient provided:  Patient is known previously to provider    Telemedicine Visit: The patient's condition can be safely assessed and treated via synchronous audio and visual telemedicine encounter.      Reason for Telemedicine Visit: Patient has requested telehealth visit and Services only offered telehealth    Originating Site (Patient Location): Patient's place of employment    Distant Site (Provider Location): Provider Remote Setting- Home Office    Consent:  The patient/guardian has verbally consented to: the potential risks and benefits of telemedicine (video visit) versus in person care; bill my insurance or make self-payment for services provided; and responsibility for payment of non-covered services.     Patient would like the video invitation sent by:  My Chart    Mode of Communication:  Video Conference via [facility]    Distant Location (Provider):  Off-site    As the provider I attest to compliance with applicable laws and regulations related to telemedicine.    DATA  Interactive Complexity: No  Crisis: No        Progress Since Last Session (Related to Symptoms / Goals / Homework):   Symptoms: Improving  overall anxiety, awareness and observing hers and other's boundaries.    Homework: Achieved / completed to satisfaction      Episode of Care Goals: Minimal progress - ACTION (Actively working towards change); Intervened by reinforcing change plan / affirming steps taken     Current / Ongoing Stressors and Concerns:   Vale is coming to therapy to learn skills to decrease her anxiety and  "depression. She reports increased fear of possible health concerns and worries for her future have contributed to significant distress in all aspects of her life and results in high blood pressure, irritation, avoidance and withdrawing behavior. She reports increased relationship conflicts with her family and pressure to be \"it all for everyone.\"     Treatment Objective(s) Addressed in This Session:   use cognitive strategies identified in therapy to challenge anxious thoughts   practice deep breathing at least once a day     Intervention:   Therapist utilized reflected listening as patient gave brief reflection of the past few weeks. Patient reported overall improvement in anxiety and awareness and observing hers and other's boundaries. She processed recent interactions with her mother and reflected on her change of behaviors. Therapist supported patient as she processed and validated patient. Therapist supported patient as she processed and validated patient. Therapist reflected on the progress that came with persistence and putting good effort into changing her thinking patterns and reinforced positive behavioral choices.    Assessments completed prior to visit:  The following assessments were completed by patient for this visit:  PHQ2:       2/1/2024     5:24 AM 1/16/2024     7:23 AM 12/28/2023     7:28 AM 9/20/2023     1:15 PM 9/13/2023    12:01 PM 9/4/2023     9:26 AM 8/21/2023    10:44 AM   PHQ-2 ( 1999 [employer])   Q1: Little interest or pleasure in doing things 0 0 0 0 0 0 0   Q2: Feeling down, depressed or hopeless 1 0 0 0 0 0 1   PHQ-2 Score 1 0 0 0 0 0 1   Q1: Little interest or pleasure in doing things Not at all Not at all Not at all Not at all Not at all Not at all Not at all   Q2: Feeling down, depressed or hopeless Several days Not at all Not at all Not at all Not at all Not at all Several days   PHQ-2 Score 1 0 0 0 0 0 1     PHQ9:       6/18/2019     3:26 PM 7/10/2019     3:56 PM 3/2/2020     8:30 AM " 3/4/2021     2:32 PM 6/7/2023    12:01 PM   PHQ-9 SCORE   PHQ-9 Total Score MyChart     3 (Minimal depression)   PHQ-9 Total Score 9 2 2 2 3     GAD2:       5/31/2023     9:10 AM 6/20/2023     6:25 AM 9/20/2023     1:15 PM 9/28/2023     6:10 AM 10/9/2023    10:34 AM 11/21/2023     6:08 AM 11/30/2023     7:31 AM   TIM-2   Feeling nervous, anxious, or on edge 1 1 1 1 1 1 1   Not being able to stop or control worrying 1 0 1 0 0 1 1   TIM-2 Total Score 2 1 2 1 1 2 2     GAD7:       2/28/2020     8:06 AM 3/2/2020     8:30 AM 2/18/2021     7:29 AM 2/25/2021    10:15 AM 3/1/2022    10:35 AM 2/20/2023     9:54 AM 4/26/2023     7:57 AM   TIM-7 SCORE   Total Score 4 (minimal anxiety)  6 (mild anxiety) 6 (mild anxiety) 3 (minimal anxiety) 12 (moderate anxiety) 3 (minimal anxiety)   Total Score 4 4 6    6 6 3 12 3     PROMIS 10-Global Health (all questions and answers displayed):       5/31/2023     9:18 AM 6/20/2023     6:26 AM 9/20/2023     1:16 PM 9/28/2023     6:11 AM 10/9/2023    10:35 AM 11/21/2023     6:09 AM 11/30/2023     7:31 AM   PROMIS 10   In general, would you say your health is: Good Good Good Good Good Good Good   In general, would you say your quality of life is: Very good Very good Very good Very good Very good Very good Very good   In general, how would you rate your physical health? Good Good Good Good Good Good Good   In general, how would you rate your mental health, including your mood and your ability to think? Good Very good Good Good Good Good Very good   In general, how would you rate your satisfaction with your social activities and relationships? Excellent Very good Very good Very good Very good Very good Very good   In general, please rate how well you carry out your usual social activities and roles Very good Very good Very good Very good Very good Very good Excellent   To what extent are you able to carry out your everyday physical activities such as walking, climbing stairs, carrying groceries,  or moving a chair? Completely Completely Completely Completely Completely Completely Completely   In the past 7 days, how often have you been bothered by emotional problems such as feeling anxious, depressed, or irritable? Often Sometimes Sometimes Sometimes Sometimes Sometimes Sometimes   In the past 7 days, how would you rate your fatigue on average? Moderate Mild Moderate Moderate Moderate Moderate Moderate   In the past 7 days, how would you rate your pain on average, where 0 means no pain, and 10 means worst imaginable pain? 0 0 0 1 0 0 0   In general, would you say your health is: 3 3 3 3 3 3 3   In general, would you say your quality of life is: 4 4 4 4 4 4 4   In general, how would you rate your physical health? 3 3 3 3 3 3 3   In general, how would you rate your mental health, including your mood and your ability to think? 3 4 3 3 3 3 4   In general, how would you rate your satisfaction with your social activities and relationships? 5 4 4 4 4 4 4   In general, please rate how well you carry out your usual social activities and roles. (This includes activities at home, at work and in your community, and responsibilities as a parent, child, spouse, employee, friend, etc.) 4 4 4 4 4 4 5   To what extent are you able to carry out your everyday physical activities such as walking, climbing stairs, carrying groceries, or moving a chair? 5 5 5 5 5 5 5   In the past 7 days, how often have you been bothered by emotional problems such as feeling anxious, depressed, or irritable? 4 3 3 3 3 3 3   In the past 7 days, how would you rate your fatigue on average? 3 2 3 3 3 3 3   In the past 7 days, how would you rate your pain on average, where 0 means no pain, and 10 means worst imaginable pain? 0 0 0 1 0 0 0   Global Mental Health Score 14 15 14 14 14 14 15   Global Physical Health Score 16 17 16 15 16 16 16   PROMIS TOTAL - SUBSCORES 30 32 30 29 30 30 31         ASSESSMENT: Current Emotional / Mental Status (status of  significant symptoms):   Risk status (Self / Other harm or suicidal ideation)   Patient denies current fears or concerns for personal safety.   Patient denies current or recent suicidal ideation or behaviors.   Patient denies current or recent homicidal ideation or behaviors.   Patient denies current or recent self injurious behavior or ideation.   Patient denies other safety concerns.   Patient reports there has been no change in risk factors since their last session.     Patient reports there has been no change in protective factors since their last session.     Recommended that patient call 911 or go to the local ED should there be a change in any of these risk factors.     Appearance:   Appropriate    Eye Contact:   Good    Psychomotor Behavior: Normal    Attitude:   Cooperative    Orientation:   All   Speech    Rate / Production: Normal     Volume:  Normal    Mood:    Anxious  Irritable  Normal   Affect:    Appropriate    Thought Content:  Clear    Thought Form:  Coherent  Logical    Insight:    Good      Medication Review:   No current psychiatric medications prescribed     Medication Compliance:   NA     Changes in Health Issues:   None reported     Chemical Use Review:   Substance Use: Chemical use reviewed, no active concerns identified      Tobacco Use: No current tobacco use.      Diagnosis:  1. Adjustment disorder with mixed anxiety and depressed mood        Collateral Reports Completed:   Not Applicable    PLAN: (Patient Tasks / Therapist Tasks / Other)  Vale will practice naming and challenging her distorted thoughts of personalization and honoring her boundaries.     NIK Montero                                                         ______________________________________________________________________    Individual Treatment Plan    Patient's Name: Vale Greenberg  YOB: 1985    Date of Creation: 6/20/2023  Date Treatment Plan Last Reviewed/Revised:1/16/2024    DSM5  Diagnoses: Adjustment Disorders  309.28 (F43.23) With mixed anxiety and depressed mood  Psychosocial / Contextual Factors: works full time, has twin sons, good support system.  PROMIS (reviewed every 90 days):     Referral / Collaboration:  Referral to another professional/service is not indicated at this time..    Anticipated number of session for this episode of care: 9-12 sessions  Anticipation frequency of session: Every other week  Anticipated Duration of each session: 38-52 minutes  Treatment plan will be reviewed in 90 days or when goals have been changed.       MeasurableTreatment Goal(s) related to diagnosis / functional impairment(s)  Goal 1: Patient will become more aware of their anxiety and utilize the skills taught to decrease their anxiety symptoms.    I will know I've met my goal when I understand more about my anxiety and have skills to cope with it.      Objective #A (Patient Action)    Patient will  use at least 4 coping skills for anxiety management in the next 12 weeks. .  Status: Continued - Date(s):1/16/2024    Intervention(s)  Therapist will  teach coping skills and assign homework of practicing these .    Objective #B  Patient will  use cognitive strategies identified in therapy to challenge anxious thoughts. Patient will engage in activities that are anxiety producing for them, slowly increasing their tolerance for new activities. Patient will identify and recognize past negative experiences and subsequent beliefs they hold that contribute to their anxiety. .  Status: Continued - Date(s):1/16/2024    Intervention(s)  Therapist will  will teach cognitive behavioral skills and engage client in Socratic dialogue around distorted thinking.  Therapist will provide educational materials on CBT.  .    Objective #C  Patient will  implement self-care into their routine to decrease anxiety, focusing on sleep hygiene, nutrition, movement, regular engagement in mindful activity, and regular  socialization.  .  Status: Continued - Date(s):1/16/2024    Intervention(s)  Therapist will  provide psychoeducation around the benefit of self-care and help client identify mindfulness based activities that may work for their life. .    Goal 2: Jay will improve her ability to be effective in relationships as evidenced by client reporting use of three new communication skills over the next three months.    I will know I've met my goal when I can communicate with my family better.      Objective #A (Patient Action)    Status: Continued - Date(s):1/16/2024    Patient will  learn & utilize at least 2 assertive communication skills weekly .    Intervention(s)  Therapist will  teach assertiveness, effective interpersonal skills and about healthy boundaries in relationships .    Objective #B  Patient will  identify his values regarding interpersonal relationships and how to uphold her values while navigating interpersonal relationships .    Status: Continued - Date(s): 9/28/2023    Intervention(s)  Therapist will  teach how to uphold values with DBT and provide support and feedback and teach about healthy boundaries in relationships .    Objective #C  Patient will  uphold her values while navigating interpersonal relationships .  Status: Continued - Date(s):1/16/2024    Intervention(s)  Therapist will  provide support and feedback and teach about healthy boundaries in relationships. .      Patient has reviewed and agreed to the above plan.      NIK Montero  January 16, 2024

## 2024-02-20 ENCOUNTER — VIRTUAL VISIT (OUTPATIENT)
Dept: PSYCHOLOGY | Facility: CLINIC | Age: 39
End: 2024-02-20
Payer: COMMERCIAL

## 2024-02-20 DIAGNOSIS — F43.23 ADJUSTMENT DISORDER WITH MIXED ANXIETY AND DEPRESSED MOOD: Primary | ICD-10-CM

## 2024-02-20 PROCEDURE — 90834 PSYTX W PT 45 MINUTES: CPT | Mod: 95

## 2024-02-22 NOTE — PROGRESS NOTES
[facility]                                     Progress Note    Patient Name: Vale Greenberg  Date: 2/20/2024         Service Type: Individual      Session Start Time: 8:01 AM  Session End Time: 8:51 AM     Session Length: 50 Minutes    Session #: 17    Attendees: Client attended alone    Service Modality:  Video Visit:      Provider verified identity through the following two step process.  Patient provided:  Patient is known previously to provider    Telemedicine Visit: The patient's condition can be safely assessed and treated via synchronous audio and visual telemedicine encounter.      Reason for Telemedicine Visit: Patient has requested telehealth visit and Services only offered telehealth    Originating Site (Patient Location): Patient's place of employment    Distant Site (Provider Location): Provider Remote Setting- Home Office    Consent:  The patient/guardian has verbally consented to: the potential risks and benefits of telemedicine (video visit) versus in person care; bill my insurance or make self-payment for services provided; and responsibility for payment of non-covered services.     Patient would like the video invitation sent by:  My Chart    Mode of Communication:  [facility]    Distant Location (Provider):  Off-site    As the provider I attest to compliance with applicable laws and regulations related to telemedicine.    DATA  Interactive Complexity: No  Crisis: No        Progress Since Last Session (Related to Symptoms / Goals / Homework):   Symptoms: Improving  overall anxiety, awareness and observing hers and other's boundaries.    Homework: Achieved / completed to satisfaction      Episode of Care Goals: Minimal progress - ACTION (Actively working towards change); Intervened by reinforcing change plan / affirming steps taken     Current / Ongoing Stressors and Concerns:   Vale is coming to therapy to learn skills to decrease her anxiety and  "depression. She reports increased fear of possible health concerns and worries for her future have contributed to significant distress in all aspects of her life and results in high blood pressure, irritation, avoidance and withdrawing behavior. She reports increased relationship conflicts with her family and pressure to be \"it all for everyone.\"     Treatment Objective(s) Addressed in This Session:   use cognitive strategies identified in therapy to challenge anxious thoughts   practice deep breathing at least once a day     Intervention:   Therapist utilized reflected listening as patient gave brief reflection of the past few weeks. Patient reported overall improvement in anxiety and awareness and observing hers and other's boundaries. She processed observing her desire to engage in past behaviors and accepting how uncomfortable it is to be in the process of changing. Therapist supported patient as she processed and validated patient. Therapist supported patient as she processed and validated patient. Therapist reflected on the progress that came with persistence and putting good effort into changing her thinking patterns and reinforced positive behavioral choices.    Assessments completed prior to visit:  The following assessments were completed by patient for this visit:  PHQ2:       2/1/2024     5:24 AM 1/16/2024     7:23 AM 12/28/2023     7:28 AM 9/20/2023     1:15 PM 9/13/2023    12:01 PM 9/4/2023     9:26 AM 8/21/2023    10:44 AM   PHQ-2 ( 1999 [employer])   Q1: Little interest or pleasure in doing things 0 0 0 0 0 0 0   Q2: Feeling down, depressed or hopeless 1 0 0 0 0 0 1   PHQ-2 Score 1 0 0 0 0 0 1   Q1: Little interest or pleasure in doing things Not at all Not at all Not at all Not at all Not at all Not at all Not at all   Q2: Feeling down, depressed or hopeless Several days Not at all Not at all Not at all Not at all Not at all Several days   PHQ-2 Score 1 0 0 0 0 0 1     PHQ9:       6/18/2019     3:26 PM " 7/10/2019     3:56 PM 3/2/2020     8:30 AM 3/4/2021     2:32 PM 6/7/2023    12:01 PM   PHQ-9 SCORE   PHQ-9 Total Score MyChart     3 (Minimal depression)   PHQ-9 Total Score 9 2 2 2 3     GAD2:       5/31/2023     9:10 AM 6/20/2023     6:25 AM 9/20/2023     1:15 PM 9/28/2023     6:10 AM 10/9/2023    10:34 AM 11/21/2023     6:08 AM 11/30/2023     7:31 AM   TIM-2   Feeling nervous, anxious, or on edge 1 1 1 1 1 1 1   Not being able to stop or control worrying 1 0 1 0 0 1 1   TIM-2 Total Score 2 1 2 1 1 2 2     GAD7:       2/28/2020     8:06 AM 3/2/2020     8:30 AM 2/18/2021     7:29 AM 2/25/2021    10:15 AM 3/1/2022    10:35 AM 2/20/2023     9:54 AM 4/26/2023     7:57 AM   TIM-7 SCORE   Total Score 4 (minimal anxiety)  6 (mild anxiety) 6 (mild anxiety) 3 (minimal anxiety) 12 (moderate anxiety) 3 (minimal anxiety)   Total Score 4 4 6    6 6 3 12 3     PROMIS 10-Global Health (all questions and answers displayed):       5/31/2023     9:18 AM 6/20/2023     6:26 AM 9/20/2023     1:16 PM 9/28/2023     6:11 AM 10/9/2023    10:35 AM 11/21/2023     6:09 AM 11/30/2023     7:31 AM   PROMIS 10   In general, would you say your health is: Good Good Good Good Good Good Good   In general, would you say your quality of life is: Very good Very good Very good Very good Very good Very good Very good   In general, how would you rate your physical health? Good Good Good Good Good Good Good   In general, how would you rate your mental health, including your mood and your ability to think? Good Very good Good Good Good Good Very good   In general, how would you rate your satisfaction with your social activities and relationships? Excellent Very good Very good Very good Very good Very good Very good   In general, please rate how well you carry out your usual social activities and roles Very good Very good Very good Very good Very good Very good Excellent   To what extent are you able to carry out your everyday physical activities such as  walking, climbing stairs, carrying groceries, or moving a chair? Completely Completely Completely Completely Completely Completely Completely   In the past 7 days, how often have you been bothered by emotional problems such as feeling anxious, depressed, or irritable? Often Sometimes Sometimes Sometimes Sometimes Sometimes Sometimes   In the past 7 days, how would you rate your fatigue on average? Moderate Mild Moderate Moderate Moderate Moderate Moderate   In the past 7 days, how would you rate your pain on average, where 0 means no pain, and 10 means worst imaginable pain? 0 0 0 1 0 0 0   In general, would you say your health is: 3 3 3 3 3 3 3   In general, would you say your quality of life is: 4 4 4 4 4 4 4   In general, how would you rate your physical health? 3 3 3 3 3 3 3   In general, how would you rate your mental health, including your mood and your ability to think? 3 4 3 3 3 3 4   In general, how would you rate your satisfaction with your social activities and relationships? 5 4 4 4 4 4 4   In general, please rate how well you carry out your usual social activities and roles. (This includes activities at home, at work and in your community, and responsibilities as a parent, child, spouse, employee, friend, etc.) 4 4 4 4 4 4 5   To what extent are you able to carry out your everyday physical activities such as walking, climbing stairs, carrying groceries, or moving a chair? 5 5 5 5 5 5 5   In the past 7 days, how often have you been bothered by emotional problems such as feeling anxious, depressed, or irritable? 4 3 3 3 3 3 3   In the past 7 days, how would you rate your fatigue on average? 3 2 3 3 3 3 3   In the past 7 days, how would you rate your pain on average, where 0 means no pain, and 10 means worst imaginable pain? 0 0 0 1 0 0 0   Global Mental Health Score 14 15 14 14 14 14 15   Global Physical Health Score 16 17 16 15 16 16 16   PROMIS TOTAL - SUBSCORES 30 32 30 29 30 30 31         ASSESSMENT:  Current Emotional / Mental Status (status of significant symptoms):   Risk status (Self / Other harm or suicidal ideation)   Patient denies current fears or concerns for personal safety.   Patient denies current or recent suicidal ideation or behaviors.   Patient denies current or recent homicidal ideation or behaviors.   Patient denies current or recent self injurious behavior or ideation.   Patient denies other safety concerns.   Patient reports there has been no change in risk factors since their last session.     Patient reports there has been no change in protective factors since their last session.     Recommended that patient call 911 or go to the local ED should there be a change in any of these risk factors.     Appearance:   Appropriate    Eye Contact:   Good    Psychomotor Behavior: Normal    Attitude:   Cooperative    Orientation:   All   Speech    Rate / Production: Normal     Volume:  Normal    Mood:    Normal   Affect:    Appropriate    Thought Content:  Clear    Thought Form:  Coherent  Logical    Insight:    Good      Medication Review:   No current psychiatric medications prescribed     Medication Compliance:   NA     Changes in Health Issues:   None reported     Chemical Use Review:   Substance Use: Chemical use reviewed, no active concerns identified      Tobacco Use: No current tobacco use.      Diagnosis:  1. Adjustment disorder with mixed anxiety and depressed mood        Collateral Reports Completed:   Not Applicable    PLAN: (Patient Tasks / Therapist Tasks / Other)  Vale will practice naming and challenging her distorted thoughts of personalization and honoring her boundaries.     NIK Montero                                                         ______________________________________________________________________    Individual Treatment Plan    Patient's Name: Vale Greenberg  YOB: 1985    Date of Creation: 6/20/2023  Date Treatment Plan Last  Reviewed/Revised:1/16/2024    DSM5 Diagnoses: Adjustment Disorders  309.28 (F43.23) With mixed anxiety and depressed mood  Psychosocial / Contextual Factors: works full time, has twin sons, good support system.  PROMIS (reviewed every 90 days):     Referral / Collaboration:  Referral to another professional/service is not indicated at this time..    Anticipated number of session for this episode of care: 9-12 sessions  Anticipation frequency of session: Every other week  Anticipated Duration of each session: 38-52 minutes  Treatment plan will be reviewed in 90 days or when goals have been changed.       MeasurableTreatment Goal(s) related to diagnosis / functional impairment(s)  Goal 1: Patient will become more aware of their anxiety and utilize the skills taught to decrease their anxiety symptoms.    I will know I've met my goal when I understand more about my anxiety and have skills to cope with it.      Objective #A (Patient Action)    Patient will  use at least 4 coping skills for anxiety management in the next 12 weeks. .  Status: Continued - Date(s):1/16/2024    Intervention(s)  Therapist will  teach coping skills and assign homework of practicing these .    Objective #B  Patient will  use cognitive strategies identified in therapy to challenge anxious thoughts. Patient will engage in activities that are anxiety producing for them, slowly increasing their tolerance for new activities. Patient will identify and recognize past negative experiences and subsequent beliefs they hold that contribute to their anxiety. .  Status: Continued - Date(s):1/16/2024    Intervention(s)  Therapist will  will teach cognitive behavioral skills and engage client in Socratic dialogue around distorted thinking.  Therapist will provide educational materials on CBT.  .    Objective #C  Patient will  implement self-care into their routine to decrease anxiety, focusing on sleep hygiene, nutrition, movement, regular engagement in  mindful activity, and regular socialization.  .  Status: Continued - Date(s):1/16/2024    Intervention(s)  Therapist will  provide psychoeducation around the benefit of self-care and help client identify mindfulness based activities that may work for their life. .    Goal 2: Jay will improve her ability to be effective in relationships as evidenced by client reporting use of three new communication skills over the next three months.    I will know I've met my goal when I can communicate with my family better.      Objective #A (Patient Action)    Status: Continued - Date(s):1/16/2024    Patient will  learn & utilize at least 2 assertive communication skills weekly .    Intervention(s)  Therapist will  teach assertiveness, effective interpersonal skills and about healthy boundaries in relationships .    Objective #B  Patient will  identify his values regarding interpersonal relationships and how to uphold her values while navigating interpersonal relationships .    Status: Continued - Date(s): 9/28/2023    Intervention(s)  Therapist will  teach how to uphold values with DBT and provide support and feedback and teach about healthy boundaries in relationships .    Objective #C  Patient will  uphold her values while navigating interpersonal relationships .  Status: Continued - Date(s):1/16/2024    Intervention(s)  Therapist will  provide support and feedback and teach about healthy boundaries in relationships. .      Patient has reviewed and agreed to the above plan.      NIK Montero  January 16, 2024

## 2024-03-11 ENCOUNTER — ANCILLARY PROCEDURE (OUTPATIENT)
Dept: MAMMOGRAPHY | Facility: CLINIC | Age: 39
End: 2024-03-11
Payer: COMMERCIAL

## 2024-03-11 DIAGNOSIS — Z12.31 VISIT FOR SCREENING MAMMOGRAM: ICD-10-CM

## 2024-03-11 PROCEDURE — 77063 BREAST TOMOSYNTHESIS BI: CPT

## 2024-03-11 PROCEDURE — 77063 BREAST TOMOSYNTHESIS BI: CPT | Mod: 26

## 2024-03-11 PROCEDURE — 77067 SCR MAMMO BI INCL CAD: CPT | Mod: 26

## 2024-03-14 ENCOUNTER — VIRTUAL VISIT (OUTPATIENT)
Dept: PSYCHOLOGY | Facility: CLINIC | Age: 39
End: 2024-03-14
Payer: COMMERCIAL

## 2024-03-14 DIAGNOSIS — F43.23 ADJUSTMENT DISORDER WITH MIXED ANXIETY AND DEPRESSED MOOD: Primary | ICD-10-CM

## 2024-03-14 PROCEDURE — 90834 PSYTX W PT 45 MINUTES: CPT | Mod: 95

## 2024-03-18 NOTE — PROGRESS NOTES
[facility]                                     Progress Note    Patient Name: Vale Greenberg  Date: 3/14/2024         Service Type: Individual      Session Start Time: 7:30 AM  Session End Time: 8:20 AM     Session Length: 50 Minutes    Session #: 18    Attendees: Client attended alone    Service Modality:  Video Visit:      Provider verified identity through the following two step process.  Patient provided:  Patient is known previously to provider    Telemedicine Visit: The patient's condition can be safely assessed and treated via synchronous audio and visual telemedicine encounter.      Reason for Telemedicine Visit: Patient has requested telehealth visit and Services only offered telehealth    Originating Site (Patient Location): Patient's place of employment    Distant Site (Provider Location): Provider Remote Setting- Home Office    Consent:  The patient/guardian has verbally consented to: the potential risks and benefits of telemedicine (video visit) versus in person care; bill my insurance or make self-payment for services provided; and responsibility for payment of non-covered services.     Patient would like the video invitation sent by:  My Chart    Mode of Communication:  [facility] Conference via [facility]    Distant Location (Provider):  Off-site    As the provider I attest to compliance with applicable laws and regulations related to telemedicine.    DATA  Interactive Complexity: No  Crisis: No        Progress Since Last Session (Related to Symptoms / Goals / Homework):   Symptoms: Improving  overall anxiety, awareness and observing hers and other's boundaries.    Homework: Achieved / completed to satisfaction      Episode of Care Goals: Minimal progress - ACTION (Actively working towards change); Intervened by reinforcing change plan / affirming steps taken     Current / Ongoing Stressors and Concerns:   Vale is coming to therapy to learn skills to decrease her anxiety and  "depression. She reports increased fear of possible health concerns and worries for her future have contributed to significant distress in all aspects of her life and results in high blood pressure, irritation, avoidance and withdrawing behavior. She reports increased relationship conflicts with her family and pressure to be \"it all for everyone.\"     Treatment Objective(s) Addressed in This Session:   use cognitive strategies identified in therapy to challenge anxious thoughts   practice deep breathing at least once a day     Intervention:   Therapist utilized reflected listening as patient gave brief reflection of the past few weeks. Patient reported overall improvement in anxiety and awareness and observing hers and other's boundaries. She processed observing the change in discomfort she was experiencing by not engaging in past behaviors that she is changing. Therapist supported patient as she processed and validated patient. Therapist reflected on the progress that came with persistence and putting good effort into changing her thinking patterns.Therapist introduced the container visualization resource with bilateral stimulation.      Assessments completed prior to visit:  The following assessments were completed by patient for this visit:  PHQ2:       2/1/2024     5:24 AM 1/16/2024     7:23 AM 12/28/2023     7:28 AM 9/20/2023     1:15 PM 9/13/2023    12:01 PM 9/4/2023     9:26 AM 8/21/2023    10:44 AM   PHQ-2 ( 1999 [employer])   Q1: Little interest or pleasure in doing things 0 0 0 0 0 0 0   Q2: Feeling down, depressed or hopeless 1 0 0 0 0 0 1   PHQ-2 Score 1 0 0 0 0 0 1   Q1: Little interest or pleasure in doing things Not at all Not at all Not at all Not at all Not at all Not at all Not at all   Q2: Feeling down, depressed or hopeless Several days Not at all Not at all Not at all Not at all Not at all Several days   PHQ-2 Score 1 0 0 0 0 0 1     PHQ9:       6/18/2019     3:26 PM 7/10/2019     3:56 PM 3/2/2020    "  8:30 AM 3/4/2021     2:32 PM 6/7/2023    12:01 PM   PHQ-9 SCORE   PHQ-9 Total Score MyChart     3 (Minimal depression)   PHQ-9 Total Score 9 2 2 2 3     GAD2:       6/20/2023     6:25 AM 9/20/2023     1:15 PM 9/28/2023     6:10 AM 10/9/2023    10:34 AM 11/21/2023     6:08 AM 11/30/2023     7:31 AM 3/7/2024     9:48 AM   TIM-2   Feeling nervous, anxious, or on edge 1 1 1 1 1 1 1   Not being able to stop or control worrying 0 1 0 0 1 1 1   TIM-2 Total Score 1 2 1 1 2 2 2     GAD7:       2/28/2020     8:06 AM 3/2/2020     8:30 AM 2/18/2021     7:29 AM 2/25/2021    10:15 AM 3/1/2022    10:35 AM 2/20/2023     9:54 AM 4/26/2023     7:57 AM   TIM-7 SCORE   Total Score 4 (minimal anxiety)  6 (mild anxiety) 6 (mild anxiety) 3 (minimal anxiety) 12 (moderate anxiety) 3 (minimal anxiety)   Total Score 4 4 6    6 6 3 12 3     PROMIS 10-Global Health (all questions and answers displayed):       6/20/2023     6:26 AM 9/20/2023     1:16 PM 9/28/2023     6:11 AM 10/9/2023    10:35 AM 11/21/2023     6:09 AM 11/30/2023     7:31 AM 3/7/2024     9:50 AM   PROMIS 10   In general, would you say your health is: Good Good Good Good Good Good Good   In general, would you say your quality of life is: Very good Very good Very good Very good Very good Very good Very good   In general, how would you rate your physical health? Good Good Good Good Good Good Good   In general, how would you rate your mental health, including your mood and your ability to think? Very good Good Good Good Good Very good Very good   In general, how would you rate your satisfaction with your social activities and relationships? Very good Very good Very good Very good Very good Very good Very good   In general, please rate how well you carry out your usual social activities and roles Very good Very good Very good Very good Very good Excellent Very good   To what extent are you able to carry out your everyday physical activities such as walking, climbing stairs, carrying  groceries, or moving a chair? Completely Completely Completely Completely Completely Completely Completely   In the past 7 days, how often have you been bothered by emotional problems such as feeling anxious, depressed, or irritable? Sometimes Sometimes Sometimes Sometimes Sometimes Sometimes Sometimes   In the past 7 days, how would you rate your fatigue on average? Mild Moderate Moderate Moderate Moderate Moderate Moderate   In the past 7 days, how would you rate your pain on average, where 0 means no pain, and 10 means worst imaginable pain? 0 0 1 0 0 0 0   In general, would you say your health is: 3 3 3 3 3 3 3   In general, would you say your quality of life is: 4 4 4 4 4 4 4   In general, how would you rate your physical health? 3 3 3 3 3 3 3   In general, how would you rate your mental health, including your mood and your ability to think? 4 3 3 3 3 4 4   In general, how would you rate your satisfaction with your social activities and relationships? 4 4 4 4 4 4 4   In general, please rate how well you carry out your usual social activities and roles. (This includes activities at home, at work and in your community, and responsibilities as a parent, child, spouse, employee, friend, etc.) 4 4 4 4 4 5 4   To what extent are you able to carry out your everyday physical activities such as walking, climbing stairs, carrying groceries, or moving a chair? 5 5 5 5 5 5 5   In the past 7 days, how often have you been bothered by emotional problems such as feeling anxious, depressed, or irritable? 3 3 3 3 3 3 3   In the past 7 days, how would you rate your fatigue on average? 2 3 3 3 3 3 3   In the past 7 days, how would you rate your pain on average, where 0 means no pain, and 10 means worst imaginable pain? 0 0 1 0 0 0 0   Global Mental Health Score 15 14 14 14 14 15 15   Global Physical Health Score 17 16 15 16 16 16 16   PROMIS TOTAL - SUBSCORES 32 30 29 30 30 31 31         ASSESSMENT: Current Emotional / Mental  Status (status of significant symptoms):   Risk status (Self / Other harm or suicidal ideation)   Patient denies current fears or concerns for personal safety.   Patient denies current or recent suicidal ideation or behaviors.   Patient denies current or recent homicidal ideation or behaviors.   Patient denies current or recent self injurious behavior or ideation.   Patient denies other safety concerns.   Patient reports there has been no change in risk factors since their last session.     Patient reports there has been no change in protective factors since their last session.     Recommended that patient call 911 or go to the local ED should there be a change in any of these risk factors.     Appearance:   Appropriate    Eye Contact:   Good    Psychomotor Behavior: Normal    Attitude:   Cooperative    Orientation:   All   Speech    Rate / Production: Normal     Volume:  Normal    Mood:    Normal   Affect:    Appropriate    Thought Content:  Clear    Thought Form:  Coherent  Logical    Insight:    Good      Medication Review:   No current psychiatric medications prescribed     Medication Compliance:   NA     Changes in Health Issues:   None reported     Chemical Use Review:   Substance Use: Chemical use reviewed, no active concerns identified      Tobacco Use: No current tobacco use.      Diagnosis:  1. Adjustment disorder with mixed anxiety and depressed mood        Collateral Reports Completed:   Not Applicable    PLAN: (Patient Tasks / Therapist Tasks / Other)  Vale will practice container visualization resource and honoring her boundaries.     NIK Montero                                                         ______________________________________________________________________    Individual Treatment Plan    Patient's Name: Vale Greenberg  YOB: 1985    Date of Creation: 6/20/2023  Date Treatment Plan Last Reviewed/Revised:1/16/2024    DSM5 Diagnoses: Adjustment Disorders   309.28 (F43.23) With mixed anxiety and depressed mood  Psychosocial / Contextual Factors: works full time, has twin sons, good support system.  PROMIS (reviewed every 90 days):     Referral / Collaboration:  Referral to another professional/service is not indicated at this time..    Anticipated number of session for this episode of care: 9-12 sessions  Anticipation frequency of session: Every other week  Anticipated Duration of each session: 38-52 minutes  Treatment plan will be reviewed in 90 days or when goals have been changed.       MeasurableTreatment Goal(s) related to diagnosis / functional impairment(s)  Goal 1: Patient will become more aware of their anxiety and utilize the skills taught to decrease their anxiety symptoms.    I will know I've met my goal when I understand more about my anxiety and have skills to cope with it.      Objective #A (Patient Action)    Patient will  use at least 4 coping skills for anxiety management in the next 12 weeks. .  Status: Continued - Date(s):1/16/2024    Intervention(s)  Therapist will  teach coping skills and assign homework of practicing these .    Objective #B  Patient will  use cognitive strategies identified in therapy to challenge anxious thoughts. Patient will engage in activities that are anxiety producing for them, slowly increasing their tolerance for new activities. Patient will identify and recognize past negative experiences and subsequent beliefs they hold that contribute to their anxiety. .  Status: Continued - Date(s):1/16/2024    Intervention(s)  Therapist will  will teach cognitive behavioral skills and engage client in Socratic dialogue around distorted thinking.  Therapist will provide educational materials on CBT.  .    Objective #C  Patient will  implement self-care into their routine to decrease anxiety, focusing on sleep hygiene, nutrition, movement, regular engagement in mindful activity, and regular socialization.  .  Status: Continued -  Date(s):1/16/2024    Intervention(s)  Therapist will  provide psychoeducation around the benefit of self-care and help client identify mindfulness based activities that may work for their life. .    Goal 2: Jay will improve her ability to be effective in relationships as evidenced by client reporting use of three new communication skills over the next three months.    I will know I've met my goal when I can communicate with my family better.      Objective #A (Patient Action)    Status: Continued - Date(s):1/16/2024    Patient will  learn & utilize at least 2 assertive communication skills weekly .    Intervention(s)  Therapist will  teach assertiveness, effective interpersonal skills and about healthy boundaries in relationships .    Objective #B  Patient will  identify his values regarding interpersonal relationships and how to uphold her values while navigating interpersonal relationships .    Status: Continued - Date(s): 9/28/2023    Intervention(s)  Therapist will  teach how to uphold values with DBT and provide support and feedback and teach about healthy boundaries in relationships .    Objective #C  Patient will  uphold her values while navigating interpersonal relationships .  Status: Continued - Date(s):1/16/2024    Intervention(s)  Therapist will  provide support and feedback and teach about healthy boundaries in relationships. .      Patient has reviewed and agreed to the above plan.      Tarah Akins, NIK  January 16, 2024

## 2024-03-25 DIAGNOSIS — I10 HYPERTENSION, UNSPECIFIED TYPE: ICD-10-CM

## 2024-03-26 ENCOUNTER — MYC REFILL (OUTPATIENT)
Dept: OBGYN | Facility: CLINIC | Age: 39
End: 2024-03-26
Payer: COMMERCIAL

## 2024-03-26 DIAGNOSIS — I10 HYPERTENSION, UNSPECIFIED TYPE: ICD-10-CM

## 2024-03-27 RX ORDER — ACETAMINOPHEN AND CODEINE PHOSPHATE 120; 12 MG/5ML; MG/5ML
0.35 SOLUTION ORAL DAILY
Qty: 90 TABLET | Refills: 3 | Status: SHIPPED | OUTPATIENT
Start: 2024-03-27

## 2024-03-27 NOTE — TELEPHONE ENCOUNTER
Received refill request for [employer]ron[employer]. Pt last seen 7/2023. Has appointment scheduled 4/11. Rx refilled per protocol.

## 2024-04-02 ENCOUNTER — VIRTUAL VISIT (OUTPATIENT)
Dept: PSYCHOLOGY | Facility: CLINIC | Age: 39
End: 2024-04-02
Payer: COMMERCIAL

## 2024-04-02 DIAGNOSIS — F43.23 ADJUSTMENT DISORDER WITH MIXED ANXIETY AND DEPRESSED MOOD: Primary | ICD-10-CM

## 2024-04-02 PROCEDURE — 90834 PSYTX W PT 45 MINUTES: CPT | Mod: 95

## 2024-04-04 NOTE — PROGRESS NOTES
[facility]                                     Progress Note    Patient Name: Vale Greenberg  Date: 4/2/2024         Service Type: Individual      Session Start Time: 8:00 AM  Session End Time: 8:50 AM     Session Length: 50 Minutes    Session #: 19    Attendees: Client attended alone    Service Modality:  Video Visit:      Provider verified identity through the following two step process.  Patient provided:  Patient is known previously to provider    Telemedicine Visit: The patient's condition can be safely assessed and treated via synchronous audio and visual telemedicine encounter.      Reason for Telemedicine Visit: Patient has requested telehealth visit and Services only offered telehealth    Originating Site (Patient Location): Patient's place of employment    Distant Site (Provider Location): Provider Remote Setting- Home Office    Consent:  The patient/guardian has verbally consented to: the potential risks and benefits of telemedicine (video visit) versus in person care; bill my insurance or make self-payment for services provided; and responsibility for payment of non-covered services.     Patient would like the video invitation sent by:  My Chart    Mode of Communication:  Video Conference via [facility]    Distant Location (Provider):  Off-site    As the provider I attest to compliance with applicable laws and regulations related to telemedicine.    DATA  Interactive Complexity: No  Crisis: No        Progress Since Last Session (Related to Symptoms / Goals / Homework):   Symptoms: Improving  reduction anxiety, awareness and observing hers and other's boundaries.    Homework: Achieved / completed to satisfaction      Episode of Care Goals: Minimal progress - ACTION (Actively working towards change); Intervened by reinforcing change plan / affirming steps taken     Current / Ongoing Stressors and Concerns:   Vale is coming to therapy to learn skills to decrease her anxiety and  "depression. She reports increased fear of possible health concerns and worries for her future have contributed to significant distress in all aspects of her life and results in high blood pressure, irritation, avoidance and withdrawing behavior. She reports increased relationship conflicts with her family and pressure to be \"it all for everyone.\"     Treatment Objective(s) Addressed in This Session:   use cognitive strategies identified in therapy to challenge anxious thoughts   practice deep breathing at least once a day     Intervention:   Therapist utilized reflected listening as patient gave brief reflection of the past few weeks. Patient reported continued reduction anxiety, awareness and observing hers and other's boundaries. She processed observing the change in discomfort she was experiencing by not engaging in past behaviors  over the holiday weekend and challenging her distorted thoughts. Therapist supported patient as she processed and validated patient. Therapist reflected on the progress that came with persistence and putting good effort into changing her thinking patterns.     Assessments completed prior to visit:  The following assessments were completed by patient for this visit:  PHQ2:       2/1/2024     5:24 AM 1/16/2024     7:23 AM 12/28/2023     7:28 AM 9/20/2023     1:15 PM 9/13/2023    12:01 PM 9/4/2023     9:26 AM 8/21/2023    10:44 AM   PHQ-2 ( 1999 [employer])   Q1: Little interest or pleasure in doing things 0 0 0 0 0 0 0   Q2: Feeling down, depressed or hopeless 1 0 0 0 0 0 1   PHQ-2 Score 1 0 0 0 0 0 1   Q1: Little interest or pleasure in doing things Not at all Not at all Not at all Not at all Not at all Not at all Not at all   Q2: Feeling down, depressed or hopeless Several days Not at all Not at all Not at all Not at all Not at all Several days   PHQ-2 Score 1 0 0 0 0 0 1     PHQ9:       6/18/2019     3:26 PM 7/10/2019     3:56 PM 3/2/2020     8:30 AM 3/4/2021     2:32 PM 6/7/2023    12:01 " PM   PHQ-9 SCORE   PHQ-9 Total Score MyChart     3 (Minimal depression)   PHQ-9 Total Score 9 2 2 2 3     GAD2:       9/20/2023     1:15 PM 9/28/2023     6:10 AM 10/9/2023    10:34 AM 11/21/2023     6:08 AM 11/30/2023     7:31 AM 3/7/2024     9:48 AM 3/26/2024     8:41 PM   TIM-2   Feeling nervous, anxious, or on edge 1 1 1 1 1 1 1   Not being able to stop or control worrying 1 0 0 1 1 1 1   TIM-2 Total Score 2 1 1 2 2 2 2     GAD7:       2/28/2020     8:06 AM 3/2/2020     8:30 AM 2/18/2021     7:29 AM 2/25/2021    10:15 AM 3/1/2022    10:35 AM 2/20/2023     9:54 AM 4/26/2023     7:57 AM   TIM-7 SCORE   Total Score 4 (minimal anxiety)  6 (mild anxiety) 6 (mild anxiety) 3 (minimal anxiety) 12 (moderate anxiety) 3 (minimal anxiety)   Total Score 4 4 6    6 6 3 12 3     PROMIS 10-Global Health (all questions and answers displayed):       9/20/2023     1:16 PM 9/28/2023     6:11 AM 10/9/2023    10:35 AM 11/21/2023     6:09 AM 11/30/2023     7:31 AM 3/7/2024     9:50 AM 3/26/2024     8:41 PM   PROMIS 10   In general, would you say your health is: Good Good Good Good Good Good Good   In general, would you say your quality of life is: Very good Very good Very good Very good Very good Very good Very good   In general, how would you rate your physical health? Good Good Good Good Good Good Good   In general, how would you rate your mental health, including your mood and your ability to think? Good Good Good Good Very good Very good Good   In general, how would you rate your satisfaction with your social activities and relationships? Very good Very good Very good Very good Very good Very good Excellent   In general, please rate how well you carry out your usual social activities and roles Very good Very good Very good Very good Excellent Very good Very good   To what extent are you able to carry out your everyday physical activities such as walking, climbing stairs, carrying groceries, or moving a chair? Completely Completely  Completely Completely Completely Completely Completely   In the past 7 days, how often have you been bothered by emotional problems such as feeling anxious, depressed, or irritable? Sometimes Sometimes Sometimes Sometimes Sometimes Sometimes Sometimes   In the past 7 days, how would you rate your fatigue on average? Moderate Moderate Moderate Moderate Moderate Moderate Mild   In the past 7 days, how would you rate your pain on average, where 0 means no pain, and 10 means worst imaginable pain? 0 1 0 0 0 0 0   In general, would you say your health is: 3 3 3 3 3 3 3   In general, would you say your quality of life is: 4 4 4 4 4 4 4   In general, how would you rate your physical health? 3 3 3 3 3 3 3   In general, how would you rate your mental health, including your mood and your ability to think? 3 3 3 3 4 4 3   In general, how would you rate your satisfaction with your social activities and relationships? 4 4 4 4 4 4 5   In general, please rate how well you carry out your usual social activities and roles. (This includes activities at home, at work and in your community, and responsibilities as a parent, child, spouse, employee, friend, etc.) 4 4 4 4 5 4 4   To what extent are you able to carry out your everyday physical activities such as walking, climbing stairs, carrying groceries, or moving a chair? 5 5 5 5 5 5 5   In the past 7 days, how often have you been bothered by emotional problems such as feeling anxious, depressed, or irritable? 3 3 3 3 3 3 3   In the past 7 days, how would you rate your fatigue on average? 3 3 3 3 3 3 2   In the past 7 days, how would you rate your pain on average, where 0 means no pain, and 10 means worst imaginable pain? 0 1 0 0 0 0 0   Global Mental Health Score 14 14 14 14 15 15 15   Global Physical Health Score 16 15 16 16 16 16 17   PROMIS TOTAL - SUBSCORES 30 29 30 30 31 31 32         ASSESSMENT: Current Emotional / Mental Status (status of significant symptoms):   Risk status  (Self / Other harm or suicidal ideation)   Patient denies current fears or concerns for personal safety.   Patient denies current or recent suicidal ideation or behaviors.   Patient denies current or recent homicidal ideation or behaviors.   Patient denies current or recent self injurious behavior or ideation.   Patient denies other safety concerns.   Patient reports there has been no change in risk factors since their last session.     Patient reports there has been no change in protective factors since their last session.     Recommended that patient call 911 or go to the local ED should there be a change in any of these risk factors.     Appearance:   Appropriate    Eye Contact:   Good    Psychomotor Behavior: Normal    Attitude:   Cooperative    Orientation:   All   Speech    Rate / Production: Normal     Volume:  Normal    Mood:    Normal   Affect:    Appropriate    Thought Content:  Clear    Thought Form:  Coherent  Logical    Insight:    Good      Medication Review:   No current psychiatric medications prescribed     Medication Compliance:   NA     Changes in Health Issues:   None reported     Chemical Use Review:   Substance Use: Chemical use reviewed, no active concerns identified      Tobacco Use: No current tobacco use.      Diagnosis:  1. Adjustment disorder with mixed anxiety and depressed mood        Collateral Reports Completed:   Not Applicable    PLAN: (Patient Tasks / Therapist Tasks / Other)  Vale will continue to practice her resources and honoring her boundaries.   Client will continue to think about the options of continuing care when this therapist is out on medical leave.    Tarah Akins, NIK                                                         ______________________________________________________________________    Individual Treatment Plan    Patient's Name: Vale Greenberg  YOB: 1985    Date of Creation: 6/20/2023  Date Treatment Plan Last  Reviewed/Revised:1/16/2024    DSM5 Diagnoses: Adjustment Disorders  309.28 (F43.23) With mixed anxiety and depressed mood  Psychosocial / Contextual Factors: works full time, has twin sons, good support system.  PROMIS (reviewed every 90 days):     Referral / Collaboration:  Referral to another professional/service is not indicated at this time..    Anticipated number of session for this episode of care: 9-12 sessions  Anticipation frequency of session: Every other week  Anticipated Duration of each session: 38-52 minutes  Treatment plan will be reviewed in 90 days or when goals have been changed.       MeasurableTreatment Goal(s) related to diagnosis / functional impairment(s)  Goal 1: Patient will become more aware of their anxiety and utilize the skills taught to decrease their anxiety symptoms.    I will know I've met my goal when I understand more about my anxiety and have skills to cope with it.      Objective #A (Patient Action)    Patient will  use at least 4 coping skills for anxiety management in the next 12 weeks. .  Status: Continued - Date(s):1/16/2024    Intervention(s)  Therapist will  teach coping skills and assign homework of practicing these .    Objective #B  Patient will  use cognitive strategies identified in therapy to challenge anxious thoughts. Patient will engage in activities that are anxiety producing for them, slowly increasing their tolerance for new activities. Patient will identify and recognize past negative experiences and subsequent beliefs they hold that contribute to their anxiety. .  Status: Continued - Date(s):1/16/2024    Intervention(s)  Therapist will  will teach cognitive behavioral skills and engage client in Socratic dialogue around distorted thinking.  Therapist will provide educational materials on CBT.  .    Objective #C  Patient will  implement self-care into their routine to decrease anxiety, focusing on sleep hygiene, nutrition, movement, regular engagement in  mindful activity, and regular socialization.  .  Status: Continued - Date(s):1/16/2024    Intervention(s)  Therapist will  provide psychoeducation around the benefit of self-care and help client identify mindfulness based activities that may work for their life. .    Goal 2: Jay will improve her ability to be effective in relationships as evidenced by client reporting use of three new communication skills over the next three months.    I will know I've met my goal when I can communicate with my family better.      Objective #A (Patient Action)    Status: Continued - Date(s):1/16/2024    Patient will  learn & utilize at least 2 assertive communication skills weekly .    Intervention(s)  Therapist will  teach assertiveness, effective interpersonal skills and about healthy boundaries in relationships .    Objective #B  Patient will  identify his values regarding interpersonal relationships and how to uphold her values while navigating interpersonal relationships .    Status: Continued - Date(s): 9/28/2023    Intervention(s)  Therapist will  teach how to uphold values with DBT and provide support and feedback and teach about healthy boundaries in relationships .    Objective #C  Patient will  uphold her values while navigating interpersonal relationships .  Status: Continued - Date(s):1/16/2024    Intervention(s)  Therapist will  provide support and feedback and teach about healthy boundaries in relationships. .      Patient has reviewed and agreed to the above plan.      NIK Montero  January 16, 2024

## 2024-04-11 ENCOUNTER — OFFICE VISIT (OUTPATIENT)
Dept: OBGYN | Facility: CLINIC | Age: 39
End: 2024-04-11
Attending: OBSTETRICS & GYNECOLOGY
Payer: COMMERCIAL

## 2024-04-11 VITALS
SYSTOLIC BLOOD PRESSURE: 126 MMHG | DIASTOLIC BLOOD PRESSURE: 88 MMHG | BODY MASS INDEX: 33.99 KG/M2 | WEIGHT: 211.5 LBS | HEIGHT: 66 IN | HEART RATE: 69 BPM

## 2024-04-11 DIAGNOSIS — N90.4 LICHEN SCLEROSUS OF FEMALE GENITALIA: Primary | ICD-10-CM

## 2024-04-11 PROCEDURE — 99213 OFFICE O/P EST LOW 20 MIN: CPT | Performed by: OBSTETRICS & GYNECOLOGY

## 2024-04-11 NOTE — LETTER
"4/11/2024       RE: Vale Julien  [address] [place] [address]     Dear Colleague,    Thank you for referring your patient, Vale Julien, to the [facility] [place] at [facility]. Please see a copy of my visit note below.    S:  Pt is a 36 y/o  who presents for 6 month follow up of her lichen sclerosis.  She is doing very well today.  She is not having any symptoms at this time using clobetasol twice a week and topical estradiol 2-3 times a week.  She has no new concerns today.    O:  /88   Pulse 69   Ht 1.67 m (5' 5.75\")   Wt 95.9 kg (211 lb 8 oz)   LMP 03/28/2024 (Approximate)   BMI 34.40 kg/m      Gen-pleasant, NAD  Pelvic Exam:  Vulva: agglutination of the labia minora and minora bilaterally to about 2/3 of the distance to the clitoral munoz, the clitoral munoz is attenuated but still retractable, the epithelium of the perineum and medial aspects of the labia is thin and pale appearing though slightly improved from her last exam    A:  Stable, largely asymptomatic LS    P:  P:  Continue topical clobetasol twice weekly, can increase frequency if ever symptomatic.  Start topical estadiol cream 2-3 times weekly.  Follow up when next annual exam is due.  If continuing to be stable can likely space visits out to yearly then.      Jeanette Clark MD, FACOG  "

## 2024-04-11 NOTE — PATIENT INSTRUCTIONS
Thank you for trusting us with your care!     If you need to contact us for questions about:  Symptoms, Scheduling & Medical Questions; Non-urgent (2-3 day response) [place] message, Urgent (needing response today) [phone] (if after 3:30pm next day response)   Prescriptions: Please call your Pharmacy   Billing: [facility] [phone] or [facility]:[phone]

## 2024-04-16 NOTE — PROGRESS NOTES
"S:  Pt is a 36 y/o  who presents for 6 month follow up of her lichen sclerosis.  She is doing very well today.  She is not having any symptoms at this time using clobetasol twice a week and topical estradiol 2-3 times a week.  She has no new concerns today.    O:  /88   Pulse 69   Ht 1.67 m (5' 5.75\")   Wt 95.9 kg (211 lb 8 oz)   LMP 03/28/2024 (Approximate)   BMI 34.40 kg/m      Gen-pleasant, NAD  Pelvic Exam:  Vulva: agglutination of the labia minora and minora bilaterally to about 2/3 of the distance to the clitoral munoz, the clitoral munoz is attenuated but still retractable, the epithelium of the perineum and medial aspects of the labia is thin and pale appearing though slightly improved from her last exam    A:  Stable, largely asymptomatic LS    P:  P:  Continue topical clobetasol twice weekly, can increase frequency if ever symptomatic.  Start topical estadiol cream 2-3 times weekly.  Follow up when next annual exam is due.  If continuing to be stable can likely space visits out to yearly then.      Jeanette Clark MD, FACOG  "

## 2024-04-18 ENCOUNTER — VIRTUAL VISIT (OUTPATIENT)
Dept: PSYCHOLOGY | Facility: CLINIC | Age: 39
End: 2024-04-18
Payer: COMMERCIAL

## 2024-04-18 DIAGNOSIS — F43.23 ADJUSTMENT DISORDER WITH MIXED ANXIETY AND DEPRESSED MOOD: Primary | ICD-10-CM

## 2024-04-18 PROCEDURE — 90834 PSYTX W PT 45 MINUTES: CPT | Mod: 95

## 2024-04-18 NOTE — PROGRESS NOTES
[facility]                                     Progress Note    Patient Name: Vale Greenberg  Date: 4/18/2024         Service Type: Individual      Session Start Time: 7:34 AM  Session End Time: 8:24 AM     Session Length: 50 Minutes    Session #: 20    Attendees: Client attended alone    Service Modality:  Video Visit:      Provider verified identity through the following two step process.  Patient provided:  Patient is known previously to provider    Telemedicine Visit: The patient's condition can be safely assessed and treated via synchronous audio and visual telemedicine encounter.      Reason for Telemedicine Visit: Patient has requested telehealth visit and Services only offered telehealth    Originating Site (Patient Location): Patient's home    Distant Site (Provider Location): Provider Remote Setting- Home Office    Consent:  The patient/guardian has verbally consented to: the potential risks and benefits of telemedicine (video visit) versus in person care; bill my insurance or make self-payment for services provided; and responsibility for payment of non-covered services.     Patient would like the video invitation sent by:  My Chart    Mode of Communication:  Video Conference via [facility]    Distant Location (Provider):  Off-site    As the provider I attest to compliance with applicable laws and regulations related to telemedicine.    DATA  Interactive Complexity: No  Crisis: No        Progress Since Last Session (Related to Symptoms / Goals / Homework):   Symptoms: Improving  continued decrease in anxiety, awareness and observing hers thoughts and interpretations and grounding herself.     Homework: Achieved / completed to satisfaction      Episode of Care Goals: Minimal progress - ACTION (Actively working towards change); Intervened by reinforcing change plan / affirming steps taken     Current / Ongoing Stressors and Concerns:   Vale is coming to therapy to learn skills to  "decrease her anxiety and depression. She reports increased fear of possible health concerns and worries for her future have contributed to significant distress in all aspects of her life and results in high blood pressure, irritation, avoidance and withdrawing behavior. She reports increased relationship conflicts with her family and pressure to be \"it all for everyone.\"     Treatment Objective(s) Addressed in This Session:   use cognitive strategies identified in therapy to challenge anxious thoughts   practice deep breathing at least once a day     Intervention:   Therapist utilized reflected listening as patient gave brief reflection of the past few weeks. Patient reported continued decrease in anxiety, awareness and observing hers thoughts and interpretations and grounding herself. She processed observing patterns in behaviors that she would have engaged in the past with her mother. Therapist supported patient as she processed and validated patient. Therapist reflected on the progress that came with persistence and putting good effort into changing her thinking patterns. Therapist reviewed past skills and created a list of things to practice while this provider is on leave.     Assessments completed prior to visit:  The following assessments were completed by patient for this visit:  PHQ2:       2/1/2024     5:24 AM 1/16/2024     7:23 AM 12/28/2023     7:28 AM 9/20/2023     1:15 PM 9/13/2023    12:01 PM 9/4/2023     9:26 AM 8/21/2023    10:44 AM   PHQ-2 ( 1999 [employer])   Q1: Little interest or pleasure in doing things 0 0 0 0 0 0 0   Q2: Feeling down, depressed or hopeless 1 0 0 0 0 0 1   PHQ-2 Score 1 0 0 0 0 0 1   Q1: Little interest or pleasure in doing things Not at all Not at all Not at all Not at all Not at all Not at all Not at all   Q2: Feeling down, depressed or hopeless Several days Not at all Not at all Not at all Not at all Not at all Several days   PHQ-2 Score 1 0 0 0 0 0 1     PHQ9:       6/18/2019 "     3:26 PM 7/10/2019     3:56 PM 3/2/2020     8:30 AM 3/4/2021     2:32 PM 6/7/2023    12:01 PM   PHQ-9 SCORE   PHQ-9 Total Score MyChart     3 (Minimal depression)   PHQ-9 Total Score 9 2 2 2 3     GAD2:       9/28/2023     6:10 AM 10/9/2023    10:34 AM 11/21/2023     6:08 AM 11/30/2023     7:31 AM 3/7/2024     9:48 AM 3/26/2024     8:41 PM 4/16/2024     8:19 PM   TIM-2   Feeling nervous, anxious, or on edge 1 1 1 1 1 1 1   Not being able to stop or control worrying 0 0 1 1 1 1 1   TIM-2 Total Score 1 1 2 2 2 2 2     GAD7:       2/28/2020     8:06 AM 3/2/2020     8:30 AM 2/18/2021     7:29 AM 2/25/2021    10:15 AM 3/1/2022    10:35 AM 2/20/2023     9:54 AM 4/26/2023     7:57 AM   TIM-7 SCORE   Total Score 4 (minimal anxiety)  6 (mild anxiety) 6 (mild anxiety) 3 (minimal anxiety) 12 (moderate anxiety) 3 (minimal anxiety)   Total Score 4 4 6    6 6 3 12 3     PROMIS 10-Global Health (all questions and answers displayed):       9/28/2023     6:11 AM 10/9/2023    10:35 AM 11/21/2023     6:09 AM 11/30/2023     7:31 AM 3/7/2024     9:50 AM 3/26/2024     8:41 PM 4/16/2024     8:20 PM   PROMIS 10   In general, would you say your health is: Good Good Good Good Good Good Good   In general, would you say your quality of life is: Very good Very good Very good Very good Very good Very good Very good   In general, how would you rate your physical health? Good Good Good Good Good Good Good   In general, how would you rate your mental health, including your mood and your ability to think? Good Good Good Very good Very good Good Good   In general, how would you rate your satisfaction with your social activities and relationships? Very good Very good Very good Very good Very good Excellent Very good   In general, please rate how well you carry out your usual social activities and roles Very good Very good Very good Excellent Very good Very good Very good   To what extent are you able to carry out your everyday physical activities  such as walking, climbing stairs, carrying groceries, or moving a chair? Completely Completely Completely Completely Completely Completely Completely   In the past 7 days, how often have you been bothered by emotional problems such as feeling anxious, depressed, or irritable? Sometimes Sometimes Sometimes Sometimes Sometimes Sometimes Sometimes   In the past 7 days, how would you rate your fatigue on average? Moderate Moderate Moderate Moderate Moderate Mild Moderate   In the past 7 days, how would you rate your pain on average, where 0 means no pain, and 10 means worst imaginable pain? 1 0 0 0 0 0 0   In general, would you say your health is: 3 3 3 3 3 3 3   In general, would you say your quality of life is: 4 4 4 4 4 4 4   In general, how would you rate your physical health? 3 3 3 3 3 3 3   In general, how would you rate your mental health, including your mood and your ability to think? 3 3 3 4 4 3 3   In general, how would you rate your satisfaction with your social activities and relationships? 4 4 4 4 4 5 4   In general, please rate how well you carry out your usual social activities and roles. (This includes activities at home, at work and in your community, and responsibilities as a parent, child, spouse, employee, friend, etc.) 4 4 4 5 4 4 4   To what extent are you able to carry out your everyday physical activities such as walking, climbing stairs, carrying groceries, or moving a chair? 5 5 5 5 5 5 5   In the past 7 days, how often have you been bothered by emotional problems such as feeling anxious, depressed, or irritable? 3 3 3 3 3 3 3   In the past 7 days, how would you rate your fatigue on average? 3 3 3 3 3 2 3   In the past 7 days, how would you rate your pain on average, where 0 means no pain, and 10 means worst imaginable pain? 1 0 0 0 0 0 0   Global Mental Health Score 14 14 14 15 15 15 14   Global Physical Health Score 15 16 16 16 16 17 16   PROMIS TOTAL - SUBSCORES 29 30 30 31 31 32 30          ASSESSMENT: Current Emotional / Mental Status (status of significant symptoms):   Risk status (Self / Other harm or suicidal ideation)   Patient denies current fears or concerns for personal safety.   Patient denies current or recent suicidal ideation or behaviors.   Patient denies current or recent homicidal ideation or behaviors.   Patient denies current or recent self injurious behavior or ideation.   Patient denies other safety concerns.   Patient reports there has been no change in risk factors since their last session.     Patient reports there has been no change in protective factors since their last session.     Recommended that patient call 911 or go to the local ED should there be a change in any of these risk factors.     Appearance:   Appropriate    Eye Contact:   Good    Psychomotor Behavior: Normal    Attitude:   Cooperative    Orientation:   All   Speech    Rate / Production: Normal     Volume:  Normal    Mood:    Normal Happy   Affect:    Appropriate    Thought Content:  Clear    Thought Form:  Coherent  Logical    Insight:    Good      Medication Review:   No current psychiatric medications prescribed     Medication Compliance:   NA     Changes in Health Issues:   None reported     Chemical Use Review:   Substance Use: Chemical use reviewed, no active concerns identified      Tobacco Use: No current tobacco use.      Diagnosis:  1. Adjustment disorder with mixed anxiety and depressed mood        Collateral Reports Completed:   Not Applicable    PLAN: (Patient Tasks / Therapist Tasks / Other)  Vale will continue to practice her resources and honoring her boundaries. She will continue to write out her skills for future use.     NIK Montero                                                         ______________________________________________________________________    Individual Treatment Plan    Patient's Name: Vale Greenberg  YOB: 1985    Date of Creation:  6/20/2023  Date Treatment Plan Last Reviewed/Revised:4/18/2024    DSM5 Diagnoses: Adjustment Disorders  309.28 (F43.23) With mixed anxiety and depressed mood  Psychosocial / Contextual Factors: works full time, has twin sons, good support system.  PROMIS (reviewed every 90 days):     Referral / Collaboration:  Referral to another professional/service is not indicated at this time..    Anticipated number of session for this episode of care: 9-12 sessions  Anticipation frequency of session: Every other week  Anticipated Duration of each session: 38-52 minutes  Treatment plan will be reviewed in 90 days or when goals have been changed.       MeasurableTreatment Goal(s) related to diagnosis / functional impairment(s)  Goal 1: Patient will become more aware of their anxiety and utilize the skills taught to decrease their anxiety symptoms.    I will know I've met my goal when I understand more about my anxiety and have skills to cope with it.      Objective #A (Patient Action)    Patient will  use at least 4 coping skills for anxiety management in the next 12 weeks. .  Status: Continued - Date(s):4/18/2024    Intervention(s)  Therapist will  teach coping skills and assign homework of practicing these .    Objective #B  Patient will  use cognitive strategies identified in therapy to challenge anxious thoughts. Patient will engage in activities that are anxiety producing for them, slowly increasing their tolerance for new activities. Patient will identify and recognize past negative experiences and subsequent beliefs they hold that contribute to their anxiety. .  Status: Continued - Date(s):4/18/2024    Intervention(s)  Therapist will  will teach cognitive behavioral skills and engage client in Socratic dialogue around distorted thinking.  Therapist will provide educational materials on CBT.  .    Objective #C  Patient will  implement self-care into their routine to decrease anxiety, focusing on sleep hygiene, nutrition,  movement, regular engagement in mindful activity, and regular socialization.  .  Status: Continued - Date(s):4/18/2024    Intervention(s)  Therapist will  provide psychoeducation around the benefit of self-care and help client identify mindfulness based activities that may work for their life. .    Goal 2: Jay will improve her ability to be effective in relationships as evidenced by client reporting use of three new communication skills over the next three months.    I will know I've met my goal when I can communicate with my family better.      Objective #A (Patient Action)    Status: Continued - Date(s):4/18/2024    Patient will  learn & utilize at least 2 assertive communication skills weekly .    Intervention(s)  Therapist will  teach assertiveness, effective interpersonal skills and about healthy boundaries in relationships .    Objective #B  Patient will  identify his values regarding interpersonal relationships and how to uphold her values while navigating interpersonal relationships .    Status: Continued - Date(s): 4/18/2024    Intervention(s)  Therapist will  teach how to uphold values with DBT and provide support and feedback and teach about healthy boundaries in relationships .    Objective #C  Patient will  uphold her values while navigating interpersonal relationships .  Status: Continued - Date(s):4/18/2024    Intervention(s)  Therapist will  provide support and feedback and teach about healthy boundaries in relationships. .      Patient has reviewed and agreed to the above plan.      Tarah Akins, NIK  April 18, 2024

## 2024-05-09 ENCOUNTER — VIRTUAL VISIT (OUTPATIENT)
Dept: PSYCHOLOGY | Facility: CLINIC | Age: 39
End: 2024-05-09
Payer: COMMERCIAL

## 2024-05-09 DIAGNOSIS — F43.23 ADJUSTMENT DISORDER WITH MIXED ANXIETY AND DEPRESSED MOOD: Primary | ICD-10-CM

## 2024-05-09 PROCEDURE — 90834 PSYTX W PT 45 MINUTES: CPT | Mod: 95

## 2024-05-09 NOTE — PROGRESS NOTES
[facility]                                     Progress Note    Patient Name: Vale Greenberg  Date: 5/9/2024         Service Type: Individual      Session Start Time: 10:30 AM  Session End Time: 11:22 AM     Session Length: 52 Minutes    Session #: 21    Attendees: Client attended alone    Service Modality:  Video Visit:      Provider verified identity through the following two step process.  Patient provided:  Patient is known previously to provider    Telemedicine Visit: The patient's condition can be safely assessed and treated via synchronous audio and visual telemedicine encounter.      Reason for Telemedicine Visit: Patient has requested telehealth visit and Services only offered telehealth    Originating Site (Patient Location): Patient's place of employment    Distant Site (Provider Location): Provider Remote Setting- Home Office    Consent:  The patient/guardian has verbally consented to: the potential risks and benefits of telemedicine (video visit) versus in person care; bill my insurance or make self-payment for services provided; and responsibility for payment of non-covered services.     Patient would like the video invitation sent by:  My Chart    Mode of Communication:  [facility]    Distant Location (Provider):  Off-site    As the provider I attest to compliance with applicable laws and regulations related to telemedicine.    DATA  Interactive Complexity: No  Crisis: No        Progress Since Last Session (Related to Symptoms / Goals / Homework):   Symptoms: Improving  continued decrease in anxiety, awareness and observing hers thoughts and interpretations and grounding herself.     Homework: Achieved / completed to satisfaction      Episode of Care Goals: Satisfactory progress - ACTION (Actively working towards change); Intervened by reinforcing change plan / affirming steps taken     Current / Ongoing Stressors and Concerns:   Vale is coming to therapy  "to learn skills to decrease her anxiety and depression. She reports increased fear of possible health concerns and worries for her future have contributed to significant distress in all aspects of her life and results in high blood pressure, irritation, avoidance and withdrawing behavior. She reports increased relationship conflicts with her family and pressure to be \"it all for everyone.\"     Treatment Objective(s) Addressed in This Session:   use cognitive strategies identified in therapy to challenge anxious thoughts   practice deep breathing at least once a day     Intervention:   Therapist utilized reflected listening as patient gave brief reflection of the past few weeks. Patient reported continued overall decrease in anxiety, awareness and observing hers thoughts and interpretations and grounding herself which has allowed her to see her patterns and empathy towards her mother. She processed her son's anxiety and her personalization of his emotions noticing her desire to fix it for him and limiting her behaviors.  Therapist supported patient as she processed and validated patient. Therapist reflected on the progress that came with persistence and putting good effort into changing her thinking patterns. Therapist engaged in cognitive restructuring/ reframing, looked at cognitive distortions and challenged distorted thoughts while creating a cope ahead skill.     Assessments completed prior to visit:  The following assessments were completed by patient for this visit:  PHQ2:       5/9/2024    10:29 AM 2/1/2024     5:24 AM 1/16/2024     7:23 AM 12/28/2023     7:28 AM 9/20/2023     1:15 PM 9/13/2023    12:01 PM 9/4/2023     9:26 AM   PHQ-2 ( 1999 [employer])   Q1: Little interest or pleasure in doing things 0 0 0 0 0 0 0   Q2: Feeling down, depressed or hopeless 0 1 0 0 0 0 0   PHQ-2 Score 0 1 0 0 0 0 0   Q1: Little interest or pleasure in doing things Not at all Not at all Not at all Not at all Not at all Not at all " Not at all   Q2: Feeling down, depressed or hopeless Not at all Several days Not at all Not at all Not at all Not at all Not at all   PHQ-2 Score 0 1 0 0 0 0 0     PHQ9:       6/18/2019     3:26 PM 7/10/2019     3:56 PM 3/2/2020     8:30 AM 3/4/2021     2:32 PM 6/7/2023    12:01 PM   PHQ-9 SCORE   PHQ-9 Total Score MyChart     3 (Minimal depression)   PHQ-9 Total Score 9 2 2 2 3     GAD2:       9/28/2023     6:10 AM 10/9/2023    10:34 AM 11/21/2023     6:08 AM 11/30/2023     7:31 AM 3/7/2024     9:48 AM 3/26/2024     8:41 PM 4/16/2024     8:19 PM   TIM-2   Feeling nervous, anxious, or on edge 1 1 1 1 1 1 1   Not being able to stop or control worrying 0 0 1 1 1 1 1   TIM-2 Total Score 1 1 2 2 2 2 2     GAD7:       2/28/2020     8:06 AM 3/2/2020     8:30 AM 2/18/2021     7:29 AM 2/25/2021    10:15 AM 3/1/2022    10:35 AM 2/20/2023     9:54 AM 4/26/2023     7:57 AM   TIM-7 SCORE   Total Score 4 (minimal anxiety)  6 (mild anxiety) 6 (mild anxiety) 3 (minimal anxiety) 12 (moderate anxiety) 3 (minimal anxiety)   Total Score 4 4 6    6 6 3 12 3     PROMIS 10-Global Health (all questions and answers displayed):       9/28/2023     6:11 AM 10/9/2023    10:35 AM 11/21/2023     6:09 AM 11/30/2023     7:31 AM 3/7/2024     9:50 AM 3/26/2024     8:41 PM 4/16/2024     8:20 PM   PROMIS 10   In general, would you say your health is: Good Good Good Good Good Good Good   In general, would you say your quality of life is: Very good Very good Very good Very good Very good Very good Very good   In general, how would you rate your physical health? Good Good Good Good Good Good Good   In general, how would you rate your mental health, including your mood and your ability to think? Good Good Good Very good Very good Good Good   In general, how would you rate your satisfaction with your social activities and relationships? Very good Very good Very good Very good Very good Excellent Very good   In general, please rate how well you carry out  your usual social activities and roles Very good Very good Very good Excellent Very good Very good Very good   To what extent are you able to carry out your everyday physical activities such as walking, climbing stairs, carrying groceries, or moving a chair? Completely Completely Completely Completely Completely Completely Completely   In the past 7 days, how often have you been bothered by emotional problems such as feeling anxious, depressed, or irritable? Sometimes Sometimes Sometimes Sometimes Sometimes Sometimes Sometimes   In the past 7 days, how would you rate your fatigue on average? Moderate Moderate Moderate Moderate Moderate Mild Moderate   In the past 7 days, how would you rate your pain on average, where 0 means no pain, and 10 means worst imaginable pain? 1 0 0 0 0 0 0   In general, would you say your health is: 3 3 3 3 3 3 3   In general, would you say your quality of life is: 4 4 4 4 4 4 4   In general, how would you rate your physical health? 3 3 3 3 3 3 3   In general, how would you rate your mental health, including your mood and your ability to think? 3 3 3 4 4 3 3   In general, how would you rate your satisfaction with your social activities and relationships? 4 4 4 4 4 5 4   In general, please rate how well you carry out your usual social activities and roles. (This includes activities at home, at work and in your community, and responsibilities as a parent, child, spouse, employee, friend, etc.) 4 4 4 5 4 4 4   To what extent are you able to carry out your everyday physical activities such as walking, climbing stairs, carrying groceries, or moving a chair? 5 5 5 5 5 5 5   In the past 7 days, how often have you been bothered by emotional problems such as feeling anxious, depressed, or irritable? 3 3 3 3 3 3 3   In the past 7 days, how would you rate your fatigue on average? 3 3 3 3 3 2 3   In the past 7 days, how would you rate your pain on average, where 0 means no pain, and 10 means worst  imaginable pain? 1 0 0 0 0 0 0   Global Mental Health Score 14 14 14 15 15 15 14   Global Physical Health Score 15 16 16 16 16 17 16   PROMIS TOTAL - SUBSCORES 29 30 30 31 31 32 30         ASSESSMENT: Current Emotional / Mental Status (status of significant symptoms):   Risk status (Self / Other harm or suicidal ideation)   Patient denies current fears or concerns for personal safety.   Patient denies current or recent suicidal ideation or behaviors.   Patient denies current or recent homicidal ideation or behaviors.   Patient denies current or recent self injurious behavior or ideation.   Patient denies other safety concerns.   Patient reports there has been no change in risk factors since their last session.     Patient reports there has been no change in protective factors since their last session.     Recommended that patient call 911 or go to the local ED should there be a change in any of these risk factors.     Appearance:   Appropriate    Eye Contact:   Good    Psychomotor Behavior: Normal    Attitude:   Cooperative    Orientation:   All   Speech    Rate / Production: Normal     Volume:  Normal    Mood:    Anxious  Normal Happy   Affect:    Appropriate    Thought Content:  Clear    Thought Form:  Coherent  Logical    Insight:    Good      Medication Review:   No current psychiatric medications prescribed     Medication Compliance:   NA     Changes in Health Issues:   None reported     Chemical Use Review:   Substance Use: Chemical use reviewed, no active concerns identified      Tobacco Use: No current tobacco use.      Diagnosis:  1. Adjustment disorder with mixed anxiety and depressed mood        Collateral Reports Completed:   Not Applicable    PLAN: (Patient Tasks / Therapist Tasks / Other)  Vale will continue to practice her resources and honoring her boundaries. She will continue to write out her skills for future use.     Tarah Akins, CLEMSW                                                          ______________________________________________________________________    Individual Treatment Plan    Patient's Name: Vale Greenberg  YOB: 1985    Date of Creation: 6/20/2023  Date Treatment Plan Last Reviewed/Revised:4/18/2024    DSM5 Diagnoses: Adjustment Disorders  309.28 (F43.23) With mixed anxiety and depressed mood  Psychosocial / Contextual Factors: works full time, has twin sons, good support system.  PROMIS (reviewed every 90 days):     Referral / Collaboration:  Referral to another professional/service is not indicated at this time..    Anticipated number of session for this episode of care: 9-12 sessions  Anticipation frequency of session: Every other week  Anticipated Duration of each session: 38-52 minutes  Treatment plan will be reviewed in 90 days or when goals have been changed.       MeasurableTreatment Goal(s) related to diagnosis / functional impairment(s)  Goal 1: Patient will become more aware of their anxiety and utilize the skills taught to decrease their anxiety symptoms.    I will know I've met my goal when I understand more about my anxiety and have skills to cope with it.      Objective #A (Patient Action)    Patient will  use at least 4 coping skills for anxiety management in the next 12 weeks. .  Status: Continued - Date(s):4/18/2024    Intervention(s)  Therapist will  teach coping skills and assign homework of practicing these .    Objective #B  Patient will  use cognitive strategies identified in therapy to challenge anxious thoughts. Patient will engage in activities that are anxiety producing for them, slowly increasing their tolerance for new activities. Patient will identify and recognize past negative experiences and subsequent beliefs they hold that contribute to their anxiety. .  Status: Continued - Date(s):4/18/2024    Intervention(s)  Therapist will  will teach cognitive behavioral skills and engage client in Socratic dialogue around distorted  thinking.  Therapist will provide educational materials on CBT.  .    Objective #C  Patient will  implement self-care into their routine to decrease anxiety, focusing on sleep hygiene, nutrition, movement, regular engagement in mindful activity, and regular socialization.  .  Status: Continued - Date(s):4/18/2024    Intervention(s)  Therapist will  provide psychoeducation around the benefit of self-care and help client identify mindfulness based activities that may work for their life. .    Goal 2: Patiient will improve her ability to be effective in relationships as evidenced by client reporting use of three new communication skills over the next three months.    I will know I've met my goal when I can communicate with my family better.      Objective #A (Patient Action)    Status: Continued - Date(s):4/18/2024    Patient will  learn & utilize at least 2 assertive communication skills weekly .    Intervention(s)  Therapist will  teach assertiveness, effective interpersonal skills and about healthy boundaries in relationships .    Objective #B  Patient will  identify his values regarding interpersonal relationships and how to uphold her values while navigating interpersonal relationships .    Status: Continued - Date(s): 4/18/2024    Intervention(s)  Therapist will  teach how to uphold values with DBT and provide support and feedback and teach about healthy boundaries in relationships .    Objective #C  Patient will  uphold her values while navigating interpersonal relationships .  Status: Continued - Date(s):4/18/2024    Intervention(s)  Therapist will  provide support and feedback and teach about healthy boundaries in relationships. .      Patient has reviewed and agreed to the above plan.      NIK Montero  April 18, 2024

## 2024-05-21 ENCOUNTER — VIRTUAL VISIT (OUTPATIENT)
Dept: PSYCHOLOGY | Facility: CLINIC | Age: 39
End: 2024-05-21
Payer: COMMERCIAL

## 2024-05-21 DIAGNOSIS — F43.23 ADJUSTMENT DISORDER WITH MIXED ANXIETY AND DEPRESSED MOOD: Primary | ICD-10-CM

## 2024-05-21 PROCEDURE — 90834 PSYTX W PT 45 MINUTES: CPT | Mod: 95

## 2024-05-21 NOTE — PROGRESS NOTES
[facility]                                     Progress Note    Patient Name: Vale Greenberg  Date: 5/21/2024         Service Type: Individual      Session Start Time: 8:02 AM  Session End Time: 8:54 AM     Session Length: 52 Minutes    Session #: 22    Attendees: Client attended alone    Service Modality:  Video Visit:      Provider verified identity through the following two step process.  Patient provided:  Patient is known previously to provider    Telemedicine Visit: The patient's condition can be safely assessed and treated via synchronous audio and visual telemedicine encounter.      Reason for Telemedicine Visit: Patient has requested telehealth visit and Services only offered telehealth    Originating Site (Patient Location): Patient's place of employment    Distant Site (Provider Location): Provider Remote Setting- Home Office    Consent:  The patient/guardian has verbally consented to: the potential risks and benefits of telemedicine (video visit) versus in person care; bill my insurance or make self-payment for services provided; and responsibility for payment of non-covered services.     Patient would like the video invitation sent by:  My Chart    Mode of Communication:  [facility]    Distant Location (Provider):  Off-site    As the provider I attest to compliance with applicable laws and regulations related to telemedicine.    DATA  Interactive Complexity: No  Crisis: No        Progress Since Last Session (Related to Symptoms / Goals / Homework):   Symptoms: Improving  continued decrease in anxiety and utilization of her skills, awareness and observing her thoughts and interpretations with certain interaction with others.     Homework: Achieved / completed to satisfaction      Episode of Care Goals: Satisfactory progress - ACTION (Actively working towards change); Intervened by reinforcing change plan / affirming steps taken     Current / Ongoing Stressors  "and Concerns:   Vale is coming to therapy to learn skills to decrease her anxiety and depression. She reports increased fear of possible health concerns and worries for her future have contributed to significant distress in all aspects of her life and results in high blood pressure, irritation, avoidance and withdrawing behavior. She reports increased relationship conflicts with her family and pressure to be \"it all for everyone.\"     Treatment Objective(s) Addressed in This Session:   use cognitive strategies identified in therapy to challenge anxious thoughts   practice deep breathing at least once a day     Intervention:   Therapist utilized reflected listening as patient gave brief reflection of the past few weeks. Patient reportedcontinued decrease in anxiety and utilization of her skills, awareness and observing her thoughts and interpretations with certain interaction with others. She processed her feelings of guilt when her mother is upset.  Therapist supported patient as she processed and validated patient. Therapist reflected on the progress that came with persistence and putting good effort into changing her thinking patterns. Therapist engaged in cognitive restructuring/ reframing, looked at cognitive distortions and challenged distorted thoughts of personalization. Therapist reviewed DBT's guilt worksheet and observing is her feelings fit the facts.     Assessments completed prior to visit:  The following assessments were completed by patient for this visit:  PHQ2:       5/20/2024     8:53 AM 5/9/2024    10:29 AM 2/1/2024     5:24 AM 1/16/2024     7:23 AM 12/28/2023     7:28 AM 9/20/2023     1:15 PM 9/13/2023    12:01 PM   PHQ-2 ( 1999 [employer])   Q1: Little interest or pleasure in doing things 0 0 0 0 0 0 0   Q2: Feeling down, depressed or hopeless 0 0 1 0 0 0 0   PHQ-2 Score 0 0 1 0 0 0 0   Q1: Little interest or pleasure in doing things Not at all Not at all Not at all Not at all Not at all Not at " all Not at all   Q2: Feeling down, depressed or hopeless Not at all Not at all Several days Not at all Not at all Not at all Not at all   PHQ-2 Score 0 0 1 0 0 0 0     PHQ9:       6/18/2019     3:26 PM 7/10/2019     3:56 PM 3/2/2020     8:30 AM 3/4/2021     2:32 PM 6/7/2023    12:01 PM   PHQ-9 SCORE   PHQ-9 Total Score MyChart     3 (Minimal depression)   PHQ-9 Total Score 9 2 2 2 3     GAD2:       9/28/2023     6:10 AM 10/9/2023    10:34 AM 11/21/2023     6:08 AM 11/30/2023     7:31 AM 3/7/2024     9:48 AM 3/26/2024     8:41 PM 4/16/2024     8:19 PM   TIM-2   Feeling nervous, anxious, or on edge 1 1 1 1 1 1 1   Not being able to stop or control worrying 0 0 1 1 1 1 1   TIM-2 Total Score 1 1 2 2 2 2 2     GAD7:       2/28/2020     8:06 AM 3/2/2020     8:30 AM 2/18/2021     7:29 AM 2/25/2021    10:15 AM 3/1/2022    10:35 AM 2/20/2023     9:54 AM 4/26/2023     7:57 AM   TIM-7 SCORE   Total Score 4 (minimal anxiety)  6 (mild anxiety) 6 (mild anxiety) 3 (minimal anxiety) 12 (moderate anxiety) 3 (minimal anxiety)   Total Score 4 4 6    6 6 3 12 3     PROMIS 10-Global Health (all questions and answers displayed):       9/28/2023     6:11 AM 10/9/2023    10:35 AM 11/21/2023     6:09 AM 11/30/2023     7:31 AM 3/7/2024     9:50 AM 3/26/2024     8:41 PM 4/16/2024     8:20 PM   PROMIS 10   In general, would you say your health is: Good Good Good Good Good Good Good   In general, would you say your quality of life is: Very good Very good Very good Very good Very good Very good Very good   In general, how would you rate your physical health? Good Good Good Good Good Good Good   In general, how would you rate your mental health, including your mood and your ability to think? Good Good Good Very good Very good Good Good   In general, how would you rate your satisfaction with your social activities and relationships? Very good Very good Very good Very good Very good Excellent Very good   In general, please rate how well you carry  out your usual social activities and roles Very good Very good Very good Excellent Very good Very good Very good   To what extent are you able to carry out your everyday physical activities such as walking, climbing stairs, carrying groceries, or moving a chair? Completely Completely Completely Completely Completely Completely Completely   In the past 7 days, how often have you been bothered by emotional problems such as feeling anxious, depressed, or irritable? Sometimes Sometimes Sometimes Sometimes Sometimes Sometimes Sometimes   In the past 7 days, how would you rate your fatigue on average? Moderate Moderate Moderate Moderate Moderate Mild Moderate   In the past 7 days, how would you rate your pain on average, where 0 means no pain, and 10 means worst imaginable pain? 1 0 0 0 0 0 0   In general, would you say your health is: 3 3 3 3 3 3 3   In general, would you say your quality of life is: 4 4 4 4 4 4 4   In general, how would you rate your physical health? 3 3 3 3 3 3 3   In general, how would you rate your mental health, including your mood and your ability to think? 3 3 3 4 4 3 3   In general, how would you rate your satisfaction with your social activities and relationships? 4 4 4 4 4 5 4   In general, please rate how well you carry out your usual social activities and roles. (This includes activities at home, at work and in your community, and responsibilities as a parent, child, spouse, employee, friend, etc.) 4 4 4 5 4 4 4   To what extent are you able to carry out your everyday physical activities such as walking, climbing stairs, carrying groceries, or moving a chair? 5 5 5 5 5 5 5   In the past 7 days, how often have you been bothered by emotional problems such as feeling anxious, depressed, or irritable? 3 3 3 3 3 3 3   In the past 7 days, how would you rate your fatigue on average? 3 3 3 3 3 2 3   In the past 7 days, how would you rate your pain on average, where 0 means no pain, and 10 means worst  imaginable pain? 1 0 0 0 0 0 0   Global Mental Health Score 14 14 14 15 15 15 14   Global Physical Health Score 15 16 16 16 16 17 16   PROMIS TOTAL - SUBSCORES 29 30 30 31 31 32 30         ASSESSMENT: Current Emotional / Mental Status (status of significant symptoms):   Risk status (Self / Other harm or suicidal ideation)   Patient denies current fears or concerns for personal safety.   Patient denies current or recent suicidal ideation or behaviors.   Patient denies current or recent homicidal ideation or behaviors.   Patient denies current or recent self injurious behavior or ideation.   Patient denies other safety concerns.   Patient reports there has been no change in risk factors since their last session.     Patient reports there has been no change in protective factors since their last session.     Recommended that patient call 911 or go to the local ED should there be a change in any of these risk factors.     Appearance:   Appropriate    Eye Contact:   Good    Psychomotor Behavior: Normal    Attitude:   Cooperative    Orientation:   All   Speech    Rate / Production: Normal     Volume:  Normal    Mood:    Normal Happy   Affect:    Appropriate    Thought Content:  Clear    Thought Form:  Coherent  Logical    Insight:    Good      Medication Review:   No current psychiatric medications prescribed     Medication Compliance:   NA     Changes in Health Issues:   None reported     Chemical Use Review:   Substance Use: Chemical use reviewed, no active concerns identified      Tobacco Use: No current tobacco use.      Diagnosis:  1. Adjustment disorder with mixed anxiety and depressed mood        Collateral Reports Completed:   Not Applicable    PLAN: (Patient Tasks / Therapist Tasks / Other)  Vale will continue to practice her resources and honoring her boundaries. She will observe if her feelings fit the facts.  Client has requested to take a pause in therapy while this writer is out on leave. Therapist will  contact client upon her return to schedule follow up appointments.     Tarah Akins, LICSW                                                         ______________________________________________________________________    Individual Treatment Plan    Patient's Name: Vale Greenberg  YOB: 1985    Date of Creation: 6/20/2023  Date Treatment Plan Last Reviewed/Revised:4/18/2024    DSM5 Diagnoses: Adjustment Disorders  309.28 (F43.23) With mixed anxiety and depressed mood  Psychosocial / Contextual Factors: works full time, has twin sons, good support system.  PROMIS (reviewed every 90 days):     Referral / Collaboration:  Referral to another professional/service is not indicated at this time..    Anticipated number of session for this episode of care: 9-12 sessions  Anticipation frequency of session: Every other week  Anticipated Duration of each session: 38-52 minutes  Treatment plan will be reviewed in 90 days or when goals have been changed.       MeasurableTreatment Goal(s) related to diagnosis / functional impairment(s)  Goal 1: Patient will become more aware of their anxiety and utilize the skills taught to decrease their anxiety symptoms.    I will know I've met my goal when I understand more about my anxiety and have skills to cope with it.      Objective #A (Patient Action)    Patient will  use at least 4 coping skills for anxiety management in the next 12 weeks. .  Status: Continued - Date(s):4/18/2024    Intervention(s)  Therapist will  teach coping skills and assign homework of practicing these .    Objective #B  Patient will  use cognitive strategies identified in therapy to challenge anxious thoughts. Patient will engage in activities that are anxiety producing for them, slowly increasing their tolerance for new activities. Patient will identify and recognize past negative experiences and subsequent beliefs they hold that contribute to their anxiety. .  Status: Continued -  Date(s):4/18/2024    Intervention(s)  Therapist will  will teach cognitive behavioral skills and engage client in Socratic dialogue around distorted thinking.  Therapist will provide educational materials on CBT.  .    Objective #C  Patient will  implement self-care into their routine to decrease anxiety, focusing on sleep hygiene, nutrition, movement, regular engagement in mindful activity, and regular socialization.  .  Status: Continued - Date(s):4/18/2024    Intervention(s)  Therapist will  provide psychoeducation around the benefit of self-care and help client identify mindfulness based activities that may work for their life. .    Goal 2: Paticarrington will improve her ability to be effective in relationships as evidenced by client reporting use of three new communication skills over the next three months.    I will know I've met my goal when I can communicate with my family better.      Objective #A (Patient Action)    Status: Continued - Date(s):4/18/2024    Patient will  learn & utilize at least 2 assertive communication skills weekly .    Intervention(s)  Therapist will  teach assertiveness, effective interpersonal skills and about healthy boundaries in relationships .    Objective #B  Patient will  identify his values regarding interpersonal relationships and how to uphold her values while navigating interpersonal relationships .    Status: Continued - Date(s): 4/18/2024    Intervention(s)  Therapist will  teach how to uphold values with DBT and provide support and feedback and teach about healthy boundaries in relationships .    Objective #C  Patient will  uphold her values while navigating interpersonal relationships .  Status: Continued - Date(s):4/18/2024    Intervention(s)  Therapist will  provide support and feedback and teach about healthy boundaries in relationships. .      Patient has reviewed and agreed to the above plan.      NIK Montero  April 18, 2024

## 2024-05-23 ENCOUNTER — OFFICE VISIT (OUTPATIENT)
Dept: FAMILY MEDICINE | Facility: CLINIC | Age: 39
End: 2024-05-23
Attending: PHYSICIAN ASSISTANT
Payer: COMMERCIAL

## 2024-05-23 VITALS
WEIGHT: 209 LBS | BODY MASS INDEX: 33.59 KG/M2 | RESPIRATION RATE: 16 BRPM | TEMPERATURE: 97.7 F | DIASTOLIC BLOOD PRESSURE: 86 MMHG | SYSTOLIC BLOOD PRESSURE: 134 MMHG | HEIGHT: 66 IN | OXYGEN SATURATION: 99 % | HEART RATE: 75 BPM

## 2024-05-23 DIAGNOSIS — Z13.220 LIPID SCREENING: ICD-10-CM

## 2024-05-23 DIAGNOSIS — I10 ESSENTIAL HYPERTENSION: Primary | ICD-10-CM

## 2024-05-23 LAB
ANION GAP SERPL CALCULATED.3IONS-SCNC: 10 MMOL/L (ref 7–15)
BASOPHILS # BLD AUTO: 0.1 10E3/UL (ref 0–0.2)
BASOPHILS NFR BLD AUTO: 1 %
BUN SERPL-MCNC: 9.9 MG/DL (ref 6–20)
CALCIUM SERPL-MCNC: 9.2 MG/DL (ref 8.6–10)
CHLORIDE SERPL-SCNC: 104 MMOL/L (ref 98–107)
CHOLEST SERPL-MCNC: 196 MG/DL
CREAT SERPL-MCNC: 0.74 MG/DL (ref 0.51–0.95)
DEPRECATED HCO3 PLAS-SCNC: 23 MMOL/L (ref 22–29)
EGFRCR SERPLBLD CKD-EPI 2021: >90 ML/MIN/1.73M2
EOSINOPHIL # BLD AUTO: 0.1 10E3/UL (ref 0–0.7)
EOSINOPHIL NFR BLD AUTO: 2 %
ERYTHROCYTE [DISTWIDTH] IN BLOOD BY AUTOMATED COUNT: 13.6 % (ref 10–15)
FASTING STATUS PATIENT QL REPORTED: YES
FASTING STATUS PATIENT QL REPORTED: YES
GLUCOSE SERPL-MCNC: 85 MG/DL (ref 70–99)
HCT VFR BLD AUTO: 40 % (ref 35–47)
HDLC SERPL-MCNC: 70 MG/DL
HGB BLD-MCNC: 13.2 G/DL (ref 11.7–15.7)
IMM GRANULOCYTES # BLD: 0 10E3/UL
IMM GRANULOCYTES NFR BLD: 0 %
LDLC SERPL CALC-MCNC: 108 MG/DL
LYMPHOCYTES # BLD AUTO: 1.9 10E3/UL (ref 0.8–5.3)
LYMPHOCYTES NFR BLD AUTO: 24 %
MCH RBC QN AUTO: 30.8 PG (ref 26.5–33)
MCHC RBC AUTO-ENTMCNC: 33 G/DL (ref 31.5–36.5)
MCV RBC AUTO: 94 FL (ref 78–100)
MONOCYTES # BLD AUTO: 0.6 10E3/UL (ref 0–1.3)
MONOCYTES NFR BLD AUTO: 8 %
NEUTROPHILS # BLD AUTO: 5.4 10E3/UL (ref 1.6–8.3)
NEUTROPHILS NFR BLD AUTO: 66 %
NONHDLC SERPL-MCNC: 126 MG/DL
PLATELET # BLD AUTO: 303 10E3/UL (ref 150–450)
POTASSIUM SERPL-SCNC: 4.2 MMOL/L (ref 3.4–5.3)
RBC # BLD AUTO: 4.28 10E6/UL (ref 3.8–5.2)
SODIUM SERPL-SCNC: 137 MMOL/L (ref 135–145)
TRIGL SERPL-MCNC: 90 MG/DL
TSH SERPL DL<=0.005 MIU/L-ACNC: 1.12 UIU/ML (ref 0.3–4.2)
WBC # BLD AUTO: 8.1 10E3/UL (ref 4–11)

## 2024-05-23 PROCEDURE — 36415 COLL VENOUS BLD VENIPUNCTURE: CPT | Performed by: PHYSICIAN ASSISTANT

## 2024-05-23 PROCEDURE — 85025 COMPLETE CBC W/AUTO DIFF WBC: CPT | Performed by: PHYSICIAN ASSISTANT

## 2024-05-23 PROCEDURE — 80061 LIPID PANEL: CPT | Performed by: PHYSICIAN ASSISTANT

## 2024-05-23 PROCEDURE — 80048 BASIC METABOLIC PNL TOTAL CA: CPT | Performed by: PHYSICIAN ASSISTANT

## 2024-05-23 PROCEDURE — 99213 OFFICE O/P EST LOW 20 MIN: CPT | Performed by: PHYSICIAN ASSISTANT

## 2024-05-23 PROCEDURE — 84443 ASSAY THYROID STIM HORMONE: CPT | Performed by: PHYSICIAN ASSISTANT

## 2024-05-23 RX ORDER — AMLODIPINE BESYLATE 5 MG/1
5 TABLET ORAL DAILY
Qty: 90 TABLET | Refills: 3 | Status: SHIPPED | OUTPATIENT
Start: 2024-05-23

## 2024-05-23 ASSESSMENT — PAIN SCALES - GENERAL: PAINLEVEL: NO PAIN (0)

## 2024-05-23 NOTE — PROGRESS NOTES
"  Assessment & Plan     Essential hypertension  Stable condition.   Will check lab tests today.   Continue with the amlodipine.   - CBC with platelets and differential; Future  - Basic metabolic panel  (Ca, Cl, CO2, Creat, Gluc, K, Na, BUN); Future  - TSH with free T4 reflex; Future  - amLODIPine (NORVASC) 5 MG tablet; Take 1 tablet (5 mg) by mouth daily    Lipid screening  - Lipid panel reflex to direct LDL Fasting; Future          BMI  Estimated body mass index is 34.25 kg/m  as calculated from the following:    Height as of this encounter: 1.664 m (5' 5.5\").    Weight as of this encounter: 94.8 kg (209 lb).   Weight management plan: Discussed healthy diet and exercise guidelines          León Collazo is a 38 year old, presenting for the following health issues:  Hypertension        5/23/2024     7:14 AM   Additional Questions   Roomed by Sean CAIN MA     History of Present Illness       Hypertension: She presents for follow up of hypertension.  She does not check blood pressure  regularly outside of the clinic. Outpatient blood pressures have not been over 140/90. She follows a low salt diet.     She eats 0-1 servings of fruits and vegetables daily.She consumes 0 sweetened beverage(s) daily.She exercises with enough effort to increase her heart rate 20 to 29 minutes per day.  She exercises with enough effort to increase her heart rate 4 days per week.   She is taking medications regularly.                 Review of Systems  Constitutional, HEENT, cardiovascular, pulmonary, GI, , musculoskeletal, neuro, skin, endocrine and psych systems are negative, except as otherwise noted.      Objective    /86   Pulse 75   Temp 97.7  F (36.5  C) (Tympanic)   Resp 16   Ht 1.664 m (5' 5.5\")   Wt 94.8 kg (209 lb)   LMP 03/28/2024 (Approximate)   SpO2 99%   Breastfeeding No   BMI 34.25 kg/m    Body mass index is 34.25 kg/m .  Physical Exam   GENERAL: alert and no distress  RESP: lungs clear to auscultation " - no rales, rhonchi or wheezes  CV: regular rate and rhythm, normal S1 S2, no S3 or S4, no murmur, click or rub, no peripheral edema   MS: no gross musculoskeletal defects noted, no edema  SKIN: no suspicious lesions or rashes  NEURO: Normal strength and tone, mentation intact and speech normal  PSYCH: mentation appears normal, affect normal/bright            Signed Electronically by: Kristen M. Kehr, PA-C

## 2024-05-31 ENCOUNTER — MYC REFILL (OUTPATIENT)
Dept: OBGYN | Facility: CLINIC | Age: 39
End: 2024-05-31
Payer: COMMERCIAL

## 2024-05-31 DIAGNOSIS — L90.0 LICHEN SCLEROSUS: ICD-10-CM

## 2024-06-04 RX ORDER — CLOBETASOL PROPIONATE 0.5 MG/G
OINTMENT TOPICAL
Qty: 45 G | Refills: 2 | Status: SHIPPED | OUTPATIENT
Start: 2024-06-06

## 2024-06-04 NOTE — TELEPHONE ENCOUNTER
"Per 4/11/24 visit with Dr. Clark: \"Continue topical clobetasol twice weekly, can increase frequency if ever symptomatic.\"  "

## 2024-06-06 ENCOUNTER — TELEPHONE (OUTPATIENT)
Dept: FAMILY MEDICINE | Facility: CLINIC | Age: 39
End: 2024-06-06
Payer: COMMERCIAL

## 2024-06-06 DIAGNOSIS — I10 ESSENTIAL HYPERTENSION: ICD-10-CM

## 2024-06-06 RX ORDER — AMLODIPINE BESYLATE 5 MG/1
5 TABLET ORAL DAILY
Qty: 90 TABLET | Refills: 0 | OUTPATIENT
Start: 2024-06-06

## 2024-06-06 NOTE — TELEPHONE ENCOUNTER
Please clarify with pt which pharmacy they want this prescription sent to. Pt should have refills available at [facility] pharmacy and is requesting refills from [facility] pharmacy.

## 2024-06-06 NOTE — TELEPHONE ENCOUNTER
RN contacted pt and she confirmed she has already picked up the prescriptions from the [facility], and did not request refills from [facility].    RN denied the [facility] refill request and pt will continue to use the refills available at [facility].    NEHA HowardN, RN

## 2024-06-09 ENCOUNTER — HEALTH MAINTENANCE LETTER (OUTPATIENT)
Age: 39
End: 2024-06-09

## 2024-06-23 RX ORDER — ACETAMINOPHEN AND CODEINE PHOSPHATE 120; 12 MG/5ML; MG/5ML
0.35 SOLUTION ORAL DAILY
Qty: 84 TABLET | Refills: 3 | OUTPATIENT
Start: 2024-06-23

## 2024-08-21 ENCOUNTER — VIRTUAL VISIT (OUTPATIENT)
Dept: PSYCHOLOGY | Facility: CLINIC | Age: 39
End: 2024-08-21
Payer: COMMERCIAL

## 2024-08-21 DIAGNOSIS — F43.23 ADJUSTMENT DISORDER WITH MIXED ANXIETY AND DEPRESSED MOOD: Primary | ICD-10-CM

## 2024-08-21 PROCEDURE — 90834 PSYTX W PT 45 MINUTES: CPT | Mod: 95

## 2024-08-22 NOTE — PROGRESS NOTES
[facility]                                     Progress Note    Patient Name: Vale Greenberg  Date: 8/21/2024         Service Type: Individual      Session Start Time: 10:01 AM  Session End Time: 10:52 AM     Session Length: 51 Minutes    Session #: 23    Attendees: Client attended alone    Service Modality:  Video Visit:      Provider verified identity through the following two step process.  Patient provided:  Patient is known previously to provider    Telemedicine Visit: The patient's condition can be safely assessed and treated via synchronous audio and visual telemedicine encounter.      Reason for Telemedicine Visit: Patient has requested telehealth visit and Services only offered telehealth    Originating Site (Patient Location): Patient's home    Distant Site (Provider Location): Provider Remote Setting- Home Office    Consent:  The patient/guardian has verbally consented to: the potential risks and benefits of telemedicine (video visit) versus in person care; bill my insurance or make self-payment for services provided; and responsibility for payment of non-covered services.     Patient would like the video invitation sent by:  My Chart    Mode of Communication:  Video Conference via [facility]    Distant Location (Provider):  Off-site    As the provider I attest to compliance with applicable laws and regulations related to telemedicine.    DATA  Interactive Complexity: No  Crisis: No        Progress Since Last Session (Related to Symptoms / Goals / Homework):   Symptoms: Worsening anxiety and sadness over the past week    Homework: Achieved / completed to satisfaction      Episode of Care Goals: Satisfactory progress - ACTION (Actively working towards change); Intervened by reinforcing change plan / affirming steps taken     Current / Ongoing Stressors and Concerns:   Vale is coming to therapy to learn skills to decrease her anxiety and depression. She reports increased fear of  "possible health concerns and worries for her future have contributed to significant distress in all aspects of her life and results in high blood pressure, irritation, avoidance and withdrawing behavior. She reports increased relationship conflicts with her family and pressure to be \"it all for everyone.\"     Treatment Objective(s) Addressed in This Session:   use cognitive strategies identified in therapy to challenge anxious thoughts   practice deep breathing at least once a day     Intervention:   Therapist utilized reflected listening as patient gave brief reflection of the summer while this provider was on leave. Patient reported overall reduction in anxiety and personalization over the summer with an increase in anxiety and sadness over the past week due to her children starting school soon. She processed a recent event and her feelings about her children  being away from her when they start school. Therapist supported patient as she processed and validated patient. Therapist reflected on the progress that came with persistence and putting good effort into changing her thinking patterns. Therapist engaged in cognitive restructuring/ reframing, looked at cognitive distortions and challenged distorted thoughts.     Assessments completed prior to visit:  The following assessments were completed by patient for this visit:  PHQ2:       8/21/2024    10:00 AM 5/20/2024     8:53 AM 5/9/2024    10:29 AM 2/1/2024     5:24 AM 1/16/2024     7:23 AM 12/28/2023     7:28 AM 9/20/2023     1:15 PM   PHQ-2 ( 1999 [employer])   Q1: Little interest or pleasure in doing things 0 0 0 0 0 0 0   Q2: Feeling down, depressed or hopeless 0 0 0 1 0 0 0   PHQ-2 Score 0 0 0 1 0 0 0   Q1: Little interest or pleasure in doing things Not at all Not at all Not at all Not at all Not at all Not at all Not at all   Q2: Feeling down, depressed or hopeless Not at all Not at all Not at all Several days Not at all Not at all Not at all   PHQ-2 Score 0 0 " 0 1 0 0 0     PHQ9:       6/18/2019     3:26 PM 7/10/2019     3:56 PM 3/2/2020     8:30 AM 3/4/2021     2:32 PM 6/7/2023    12:01 PM   PHQ-9 SCORE   PHQ-9 Total Score MyChart     3 (Minimal depression)   PHQ-9 Total Score 9 2 2 2 3     GAD2:       10/9/2023    10:34 AM 11/21/2023     6:08 AM 11/30/2023     7:31 AM 3/7/2024     9:48 AM 3/26/2024     8:41 PM 4/16/2024     8:19 PM 8/16/2024     9:33 AM   TIM-2   Feeling nervous, anxious, or on edge 1 1 1 1 1 1 1   Not being able to stop or control worrying 0 1 1 1 1 1 0   TIM-2 Total Score 1 2 2 2 2 2 1     GAD7:       2/28/2020     8:06 AM 3/2/2020     8:30 AM 2/18/2021     7:29 AM 2/25/2021    10:15 AM 3/1/2022    10:35 AM 2/20/2023     9:54 AM 4/26/2023     7:57 AM   TIM-7 SCORE   Total Score 4 (minimal anxiety)  6 (mild anxiety) 6 (mild anxiety) 3 (minimal anxiety) 12 (moderate anxiety) 3 (minimal anxiety)   Total Score 4 4 6    6 6 3 12 3     PROMIS 10-Global Health (all questions and answers displayed):       10/9/2023    10:35 AM 11/21/2023     6:09 AM 11/30/2023     7:31 AM 3/7/2024     9:50 AM 3/26/2024     8:41 PM 4/16/2024     8:20 PM 8/16/2024     9:34 AM   PROMIS 10   In general, would you say your health is: Good Good Good Good Good Good Good   In general, would you say your quality of life is: Very good Very good Very good Very good Very good Very good Very good   In general, how would you rate your physical health? Good Good Good Good Good Good Good   In general, how would you rate your mental health, including your mood and your ability to think? Good Good Very good Very good Good Good Very good   In general, how would you rate your satisfaction with your social activities and relationships? Very good Very good Very good Very good Excellent Very good Very good   In general, please rate how well you carry out your usual social activities and roles Very good Very good Excellent Very good Very good Very good Excellent   To what extent are you able to  carry out your everyday physical activities such as walking, climbing stairs, carrying groceries, or moving a chair? Completely Completely Completely Completely Completely Completely Completely   In the past 7 days, how often have you been bothered by emotional problems such as feeling anxious, depressed, or irritable? Sometimes Sometimes Sometimes Sometimes Sometimes Sometimes Sometimes   In the past 7 days, how would you rate your fatigue on average? Moderate Moderate Moderate Moderate Mild Moderate Mild   In the past 7 days, how would you rate your pain on average, where 0 means no pain, and 10 means worst imaginable pain? 0 0 0 0 0 0 0   In general, would you say your health is: 3 3 3 3 3 3 3   In general, would you say your quality of life is: 4 4 4 4 4 4 4   In general, how would you rate your physical health? 3 3 3 3 3 3 3   In general, how would you rate your mental health, including your mood and your ability to think? 3 3 4 4 3 3 4   In general, how would you rate your satisfaction with your social activities and relationships? 4 4 4 4 5 4 4   In general, please rate how well you carry out your usual social activities and roles. (This includes activities at home, at work and in your community, and responsibilities as a parent, child, spouse, employee, friend, etc.) 4 4 5 4 4 4 5   To what extent are you able to carry out your everyday physical activities such as walking, climbing stairs, carrying groceries, or moving a chair? 5 5 5 5 5 5 5   In the past 7 days, how often have you been bothered by emotional problems such as feeling anxious, depressed, or irritable? 3 3 3 3 3 3 3   In the past 7 days, how would you rate your fatigue on average? 3 3 3 3 2 3 2   In the past 7 days, how would you rate your pain on average, where 0 means no pain, and 10 means worst imaginable pain? 0 0 0 0 0 0 0   Global Mental Health Score 14 14 15 15 15 14 15   Global Physical Health Score 16 16 16 16 17 16 17   PROMIS TOTAL -  SUBSCORES 30 30 31 31 32 30 32         ASSESSMENT: Current Emotional / Mental Status (status of significant symptoms):   Risk status (Self / Other harm or suicidal ideation)   Patient denies current fears or concerns for personal safety.   Patient denies current or recent suicidal ideation or behaviors.   Patient denies current or recent homicidal ideation or behaviors.   Patient denies current or recent self injurious behavior or ideation.   Patient denies other safety concerns.   Patient reports there has been no change in risk factors since their last session.     Patient reports there has been no change in protective factors since their last session.     Recommended that patient call 911 or go to the local ED should there be a change in any of these risk factors.     Appearance:   Appropriate    Eye Contact:   Good    Psychomotor Behavior: Normal    Attitude:   Cooperative    Orientation:   All   Speech    Rate / Production: Normal     Volume:  Normal    Mood:    Anxious  Normal Happy   Affect:    Appropriate    Thought Content:  Clear    Thought Form:  Coherent  Logical    Insight:    Good      Medication Review:   No current psychiatric medications prescribed     Medication Compliance:   NA     Changes in Health Issues:   None reported     Chemical Use Review:   Substance Use: Chemical use reviewed, no active concerns identified      Tobacco Use: No current tobacco use.      Diagnosis:  1. Adjustment disorder with mixed anxiety and depressed mood        Collateral Reports Completed:   Not Applicable    PLAN: (Patient Tasks / Therapist Tasks / Other)  Vale will continue to practice her grounding skills and challenging her cognitive distortions.     NIK Montero                                                         ______________________________________________________________________    Individual Treatment Plan    Patient's Name: Vale Greenberg  YOB: 1985    Date of  Creation: 8/21/2024  Date Treatment Plan Last Reviewed/Revised:8/21/2024    DSM5 Diagnoses: Adjustment Disorders  309.28 (F43.23) With mixed anxiety and depressed mood  Psychosocial / Contextual Factors: works full time, has twin sons, good support system.  PROMIS (reviewed every 90 days):     Referral / Collaboration:  Referral to another professional/service is not indicated at this time..    Anticipated number of session for this episode of care: 9-12 sessions  Anticipation frequency of session: Every other week  Anticipated Duration of each session: 38-52 minutes  Treatment plan will be reviewed in 90 days or when goals have been changed.       MeasurableTreatment Goal(s) related to diagnosis / functional impairment(s)  Goal 1: Patient will become more aware of their anxiety and utilize the skills taught to decrease their anxiety symptoms.    I will know I've met my goal when I understand more about my anxiety and have skills to cope with it.      Objective #A (Patient Action)    Patient will  use at least 4 coping skills for anxiety management in the next 12 weeks. .  Status: Continued - Date(s):8/21/2024    Intervention(s)  Therapist will  teach coping skills and assign homework of practicing these .    Objective #B  Patient will  use cognitive strategies identified in therapy to challenge anxious thoughts. Patient will engage in activities that are anxiety producing for them, slowly increasing their tolerance for new activities. Patient will identify and recognize past negative experiences and subsequent beliefs they hold that contribute to their anxiety. .  Status: Continued - Date(s):8/21/2024    Intervention(s)  Therapist will  will teach cognitive behavioral skills and engage client in Socratic dialogue around distorted thinking.  Therapist will provide educational materials on CBT.  .    Objective #C  Patient will  implement self-care into their routine to decrease anxiety, focusing on sleep hygiene,  nutrition, movement, regular engagement in mindful activity, and regular socialization.  .  Status: Continued - Date(s):8/21/2024    Intervention(s)  Therapist will  provide psychoeducation around the benefit of self-care and help client identify mindfulness based activities that may work for their life. .    Goal 2: Jay will improve her ability to be effective in relationships as evidenced by client reporting use of three new communication skills over the next three months.    I will know I've met my goal when I can communicate with my family better.      Objective #A (Patient Action)    Status: Continued - Date(s):8/21/2024    Patient will  learn & utilize at least 2 assertive communication skills weekly .    Intervention(s)  Therapist will  teach assertiveness, effective interpersonal skills and about healthy boundaries in relationships .    Objective #B  Patient will  identify his values regarding interpersonal relationships and how to uphold her values while navigating interpersonal relationships .    Status: Continued - Date(s): 8/21/2024    Intervention(s)  Therapist will  teach how to uphold values with DBT and provide support and feedback and teach about healthy boundaries in relationships .    Objective #C  Patient will  uphold her values while navigating interpersonal relationships .  Status: Continued - Date(s):8/21/2024    Intervention(s)  Therapist will  provide support and feedback and teach about healthy boundaries in relationships. .      Patient has reviewed and agreed to the above plan.      NIK Montero  August 21, 2024

## 2024-09-03 ENCOUNTER — VIRTUAL VISIT (OUTPATIENT)
Dept: PSYCHOLOGY | Facility: CLINIC | Age: 39
End: 2024-09-03
Payer: COMMERCIAL

## 2024-09-03 DIAGNOSIS — F43.23 ADJUSTMENT DISORDER WITH MIXED ANXIETY AND DEPRESSED MOOD: Primary | ICD-10-CM

## 2024-09-03 PROCEDURE — 90837 PSYTX W PT 60 MINUTES: CPT | Mod: 95

## 2024-09-05 NOTE — PROGRESS NOTES
[facility]                                     Progress Note    Patient Name: Vale Greenberg  Date: 9/3/2024         Service Type: Individual      Session Start Time: 10:00 AM  Session End Time: 10:58 AM     Session Length: 58 Minutes    Session #: 24    Attendees: Client attended alone    Service Modality:  Video Visit:      Provider verified identity through the following two step process.  Patient provided:  Patient is known previously to provider    Telemedicine Visit: The patient's condition can be safely assessed and treated via synchronous audio and visual telemedicine encounter.      Reason for Telemedicine Visit: Patient has requested telehealth visit and Services only offered telehealth    Originating Site (Patient Location): Patient's home    Distant Site (Provider Location): Provider Remote Setting- Home Office    Consent:  The patient/guardian has verbally consented to: the potential risks and benefits of telemedicine (video visit) versus in person care; bill my insurance or make self-payment for services provided; and responsibility for payment of non-covered services.     Patient would like the video invitation sent by:  My Chart    Mode of Communication:  Video Conference via [facility]    Distant Location (Provider):  Off-site    As the provider I attest to compliance with applicable laws and regulations related to telemedicine.    DATA  Extended Session (53+ minutes): PROLONGED SERVICE IN THE OUTPATIENT SETTING REQUIRING DIRECT (FACE-TO-FACE) PATIENT CONTACT BEYOND THE USUAL SERVICE:    - Patient's presenting concerns require more intensive intervention than could be completed within the usual service  Interactive Complexity: No  Crisis: No      Progress Since Last Session (Related to Symptoms / Goals / Homework):   Symptoms: No change in anxiety, continued improved boundary setting     Homework: Achieved / completed to satisfaction      Episode of Care Goals: Satisfactory  "progress - ACTION (Actively working towards change); Intervened by reinforcing change plan / affirming steps taken     Current / Ongoing Stressors and Concerns:   Vale is coming to therapy to learn skills to decrease her anxiety and depression. She reports increased fear of possible health concerns and worries for her future have contributed to significant distress in all aspects of her life and results in high blood pressure, irritation, avoidance and withdrawing behavior. She reports increased relationship conflicts with her family and pressure to be \"it all for everyone.\"     Treatment Objective(s) Addressed in This Session:   use cognitive strategies identified in therapy to challenge anxious thoughts   practice deep breathing at least once a day     Intervention:   Therapist utilized reflected listening as patient gave brief reflection of the past few weeks. Patient reported continued anxiety about her sons starting school for the first time, burnout and resentment after solo parenting and continued improved boundary setting when saying now. She processed taking her children to the bus this morning and having an empty house. She reports better insight into her feelings of burnout and resentment after solo parenting and how she discussed it with her . Therapist supported patient as she processed and validated patient. Therapist reflected on the progress that came with persistence and putting good effort into changing her thinking patterns. Therapist engaged in cognitive restructuring/ reframing, looked at cognitive distortions and challenged distorted thoughts.     Assessments completed prior to visit:  The following assessments were completed by patient for this visit:  PHQ2:       8/21/2024    10:00 AM 5/20/2024     8:53 AM 5/9/2024    10:29 AM 2/1/2024     5:24 AM 1/16/2024     7:23 AM 12/28/2023     7:28 AM 9/20/2023     1:15 PM   PHQ-2 ( 1999 [employer])   Q1: Little interest or pleasure in doing " things 0 0 0 0 0 0 0   Q2: Feeling down, depressed or hopeless 0 0 0 1 0 0 0   PHQ-2 Score 0 0 0 1 0 0 0   Q1: Little interest or pleasure in doing things Not at all Not at all Not at all Not at all Not at all Not at all Not at all   Q2: Feeling down, depressed or hopeless Not at all Not at all Not at all Several days Not at all Not at all Not at all   PHQ-2 Score 0 0 0 1 0 0 0     PHQ9:       6/18/2019     3:26 PM 7/10/2019     3:56 PM 3/2/2020     8:30 AM 3/4/2021     2:32 PM 6/7/2023    12:01 PM   PHQ-9 SCORE   PHQ-9 Total Score MyChart     3 (Minimal depression)   PHQ-9 Total Score 9 2 2 2 3     GAD2:       10/9/2023    10:34 AM 11/21/2023     6:08 AM 11/30/2023     7:31 AM 3/7/2024     9:48 AM 3/26/2024     8:41 PM 4/16/2024     8:19 PM 8/16/2024     9:33 AM   TIM-2   Feeling nervous, anxious, or on edge 1 1 1 1 1 1 1   Not being able to stop or control worrying 0 1 1 1 1 1 0   TIM-2 Total Score 1 2 2 2 2 2 1     GAD7:       2/28/2020     8:06 AM 3/2/2020     8:30 AM 2/18/2021     7:29 AM 2/25/2021    10:15 AM 3/1/2022    10:35 AM 2/20/2023     9:54 AM 4/26/2023     7:57 AM   TIM-7 SCORE   Total Score 4 (minimal anxiety)  6 (mild anxiety) 6 (mild anxiety) 3 (minimal anxiety) 12 (moderate anxiety) 3 (minimal anxiety)   Total Score 4 4 6    6 6 3 12 3     PROMIS 10-Global Health (all questions and answers displayed):       10/9/2023    10:35 AM 11/21/2023     6:09 AM 11/30/2023     7:31 AM 3/7/2024     9:50 AM 3/26/2024     8:41 PM 4/16/2024     8:20 PM 8/16/2024     9:34 AM   PROMIS 10   In general, would you say your health is: Good Good Good Good Good Good Good   In general, would you say your quality of life is: Very good Very good Very good Very good Very good Very good Very good   In general, how would you rate your physical health? Good Good Good Good Good Good Good   In general, how would you rate your mental health, including your mood and your ability to think? Good Good Very good Very good Good Good  Very good   In general, how would you rate your satisfaction with your social activities and relationships? Very good Very good Very good Very good Excellent Very good Very good   In general, please rate how well you carry out your usual social activities and roles Very good Very good Excellent Very good Very good Very good Excellent   To what extent are you able to carry out your everyday physical activities such as walking, climbing stairs, carrying groceries, or moving a chair? Completely Completely Completely Completely Completely Completely Completely   In the past 7 days, how often have you been bothered by emotional problems such as feeling anxious, depressed, or irritable? Sometimes Sometimes Sometimes Sometimes Sometimes Sometimes Sometimes   In the past 7 days, how would you rate your fatigue on average? Moderate Moderate Moderate Moderate Mild Moderate Mild   In the past 7 days, how would you rate your pain on average, where 0 means no pain, and 10 means worst imaginable pain? 0 0 0 0 0 0 0   In general, would you say your health is: 3 3 3 3 3 3 3   In general, would you say your quality of life is: 4 4 4 4 4 4 4   In general, how would you rate your physical health? 3 3 3 3 3 3 3   In general, how would you rate your mental health, including your mood and your ability to think? 3 3 4 4 3 3 4   In general, how would you rate your satisfaction with your social activities and relationships? 4 4 4 4 5 4 4   In general, please rate how well you carry out your usual social activities and roles. (This includes activities at home, at work and in your community, and responsibilities as a parent, child, spouse, employee, friend, etc.) 4 4 5 4 4 4 5   To what extent are you able to carry out your everyday physical activities such as walking, climbing stairs, carrying groceries, or moving a chair? 5 5 5 5 5 5 5   In the past 7 days, how often have you been bothered by emotional problems such as feeling anxious,  depressed, or irritable? 3 3 3 3 3 3 3   In the past 7 days, how would you rate your fatigue on average? 3 3 3 3 2 3 2   In the past 7 days, how would you rate your pain on average, where 0 means no pain, and 10 means worst imaginable pain? 0 0 0 0 0 0 0   Global Mental Health Score 14 14 15 15 15 14 15   Global Physical Health Score 16 16 16 16 17 16 17   PROMIS TOTAL - SUBSCORES 30 30 31 31 32 30 32         ASSESSMENT: Current Emotional / Mental Status (status of significant symptoms):   Risk status (Self / Other harm or suicidal ideation)   Patient denies current fears or concerns for personal safety.   Patient denies current or recent suicidal ideation or behaviors.   Patient denies current or recent homicidal ideation or behaviors.   Patient denies current or recent self injurious behavior or ideation.   Patient denies other safety concerns.   Patient reports there has been no change in risk factors since their last session.     Patient reports there has been no change in protective factors since their last session.     Recommended that patient call 911 or go to the local ED should there be a change in any of these risk factors.     Appearance:   Appropriate    Eye Contact:   Good    Psychomotor Behavior: Normal    Attitude:   Cooperative    Orientation:   All   Speech    Rate / Production: Normal     Volume:  Normal    Mood:    Anxious  Normal Happy   Affect:    Appropriate    Thought Content:  Clear    Thought Form:  Coherent  Logical    Insight:    Good      Medication Review:   No current psychiatric medications prescribed     Medication Compliance:   NA     Changes in Health Issues:   None reported     Chemical Use Review:   Substance Use: Chemical use reviewed, no active concerns identified      Tobacco Use: No current tobacco use.      Diagnosis:  1. Adjustment disorder with mixed anxiety and depressed mood        Collateral Reports Completed:   Not Applicable    PLAN: (Patient Tasks / Therapist Tasks /  Other)  Vale will continue to practice her grounding skills and challenging her cognitive distortions.     Tarah Akins, LICSW                                                         ______________________________________________________________________    Individual Treatment Plan    Patient's Name: Vale Greenberg  YOB: 1985    Date of Creation: 8/21/2024  Date Treatment Plan Last Reviewed/Revised:8/21/2024    DSM5 Diagnoses: Adjustment Disorders  309.28 (F43.23) With mixed anxiety and depressed mood  Psychosocial / Contextual Factors: works full time, has twin sons, good support system.  PROMIS (reviewed every 90 days):     Referral / Collaboration:  Referral to another professional/service is not indicated at this time..    Anticipated number of session for this episode of care: 9-12 sessions  Anticipation frequency of session: Every other week  Anticipated Duration of each session: 38-52 minutes  Treatment plan will be reviewed in 90 days or when goals have been changed.       MeasurableTreatment Goal(s) related to diagnosis / functional impairment(s)  Goal 1: Patient will become more aware of their anxiety and utilize the skills taught to decrease their anxiety symptoms.    I will know I've met my goal when I understand more about my anxiety and have skills to cope with it.      Objective #A (Patient Action)    Patient will  use at least 4 coping skills for anxiety management in the next 12 weeks. .  Status: Continued - Date(s):8/21/2024    Intervention(s)  Therapist will  teach coping skills and assign homework of practicing these .    Objective #B  Patient will  use cognitive strategies identified in therapy to challenge anxious thoughts. Patient will engage in activities that are anxiety producing for them, slowly increasing their tolerance for new activities. Patient will identify and recognize past negative experiences and subsequent beliefs they hold that contribute to their  anxiety. .  Status: Continued - Date(s):8/21/2024    Intervention(s)  Therapist will  will teach cognitive behavioral skills and engage client in Socratic dialogue around distorted thinking.  Therapist will provide educational materials on CBT.  .    Objective #C  Patient will  implement self-care into their routine to decrease anxiety, focusing on sleep hygiene, nutrition, movement, regular engagement in mindful activity, and regular socialization.  .  Status: Continued - Date(s):8/21/2024    Intervention(s)  Therapist will  provide psychoeducation around the benefit of self-care and help client identify mindfulness based activities that may work for their life. .    Goal 2: Jay will improve her ability to be effective in relationships as evidenced by client reporting use of three new communication skills over the next three months.    I will know I've met my goal when I can communicate with my family better.      Objective #A (Patient Action)    Status: Continued - Date(s):8/21/2024    Patient will  learn & utilize at least 2 assertive communication skills weekly .    Intervention(s)  Therapist will  teach assertiveness, effective interpersonal skills and about healthy boundaries in relationships .    Objective #B  Patient will  identify his values regarding interpersonal relationships and how to uphold her values while navigating interpersonal relationships .    Status: Continued - Date(s): 8/21/2024    Intervention(s)  Therapist will  teach how to uphold values with DBT and provide support and feedback and teach about healthy boundaries in relationships .    Objective #C  Patient will  uphold her values while navigating interpersonal relationships .  Status: Continued - Date(s):8/21/2024    Intervention(s)  Therapist will  provide support and feedback and teach about healthy boundaries in relationships. .      Patient has reviewed and agreed to the above plan.      NIK Montero  August 21, 2024

## 2024-09-16 ENCOUNTER — VIRTUAL VISIT (OUTPATIENT)
Dept: PSYCHOLOGY | Facility: CLINIC | Age: 39
End: 2024-09-16
Payer: COMMERCIAL

## 2024-09-16 DIAGNOSIS — F43.23 ADJUSTMENT DISORDER WITH MIXED ANXIETY AND DEPRESSED MOOD: Primary | ICD-10-CM

## 2024-09-16 PROCEDURE — 90834 PSYTX W PT 45 MINUTES: CPT | Mod: 95

## 2024-09-25 NOTE — PROGRESS NOTES
[facility]                                     Progress Note    Patient Name: Vale Greenberg  Date: 9/16/2024         Service Type: Individual      Session Start Time: 2:30 PM  Session End Time: 3:20 PM     Session Length: 50 Minutes    Session #: 25    Attendees: Client attended alone    Service Modality:  Video Visit:      Provider verified identity through the following two step process.  Patient provided:  Patient is known previously to provider    Telemedicine Visit: The patient's condition can be safely assessed and treated via synchronous audio and visual telemedicine encounter.      Reason for Telemedicine Visit: Patient has requested telehealth visit and Patient unable to travel    Originating Site (Patient Location): Patient's home    Distant Site (Provider Location): [facility]    Consent:  The patient/guardian has verbally consented to: the potential risks and benefits of telemedicine (video visit) versus in person care; bill my insurance or make self-payment for services provided; and responsibility for payment of non-covered services.     Patient would like the video invitation sent by:  My Chart    Mode of Communication:  [facility]    Distant Location (Provider):  On-site    As the provider I attest to compliance with applicable laws and regulations related to telemedicine.    DATA  Extended Session (53+ minutes): No  Interactive Complexity: No  Crisis: No      Progress Since Last Session (Related to Symptoms / Goals / Homework):   Symptoms: Worsening anxiety with her children attending school and push back going to school. Continued upholding her boundaries.     Homework: Achieved / completed to satisfaction      Episode of Care Goals: Satisfactory progress - ACTION (Actively working towards change); Intervened by reinforcing change plan / affirming steps taken     Current / Ongoing Stressors and  "Concerns:   Vale is coming to therapy to learn skills to decrease her anxiety and depression. She reports increased fear of possible health concerns and worries for her future have contributed to significant distress in all aspects of her life and results in high blood pressure, irritation, avoidance and withdrawing behavior. She reports increased relationship conflicts with her family and pressure to be \"it all for everyone.\"     Treatment Objective(s) Addressed in This Session:   use cognitive strategies identified in therapy to challenge anxious thoughts   practice deep breathing at least once a day     Intervention:   Therapist utilized reflected listening as patient gave brief reflection of the past few weeks. Patient reported worsening anxiety with her children attending school and push back going to school. Continued upholding her boundaries. She processed the first week of her children being in school and the reluctance of one child to go back to school daily. She reports better insight into her reactions to her son; feelings and behaviors and the thought process of over generalization and negatively predicting the future.  Therapist supported patient as she processed and validated patient. Therapist engaged in cognitive restructuring/ reframing, looked at cognitive distortions and challenged distorted thoughts. Therapist introduced creating a list of skills and thoughts she wishes to remind herself of when things get hard, this too shall pass.     Assessments completed prior to visit:  The following assessments were completed by patient for this visit:  PHQ2:       8/21/2024    10:00 AM 5/20/2024     8:53 AM 5/9/2024    10:29 AM 2/1/2024     5:24 AM 1/16/2024     7:23 AM 12/28/2023     7:28 AM 9/20/2023     1:15 PM   PHQ-2 ( 1999 [employer])   Q1: Little interest or pleasure in doing things 0 0 0 0 0 0 0   Q2: Feeling down, depressed or hopeless 0 0 0 1 0 0 0   PHQ-2 Score 0 0 0 1 0 0 0   Q1: Little interest " or pleasure in doing things Not at all Not at all Not at all Not at all Not at all Not at all Not at all   Q2: Feeling down, depressed or hopeless Not at all Not at all Not at all Several days Not at all Not at all Not at all   PHQ-2 Score 0 0 0 1 0 0 0     PHQ9:       6/18/2019     3:26 PM 7/10/2019     3:56 PM 3/2/2020     8:30 AM 3/4/2021     2:32 PM 6/7/2023    12:01 PM   PHQ-9 SCORE   PHQ-9 Total Score MyChart     3 (Minimal depression)   PHQ-9 Total Score 9 2 2 2 3     GAD2:       10/9/2023    10:34 AM 11/21/2023     6:08 AM 11/30/2023     7:31 AM 3/7/2024     9:48 AM 3/26/2024     8:41 PM 4/16/2024     8:19 PM 8/16/2024     9:33 AM   TIM-2   Feeling nervous, anxious, or on edge 1 1 1 1 1 1 1   Not being able to stop or control worrying 0 1 1 1 1 1 0   TIM-2 Total Score 1 2 2 2 2 2 1     GAD7:       2/28/2020     8:06 AM 3/2/2020     8:30 AM 2/18/2021     7:29 AM 2/25/2021    10:15 AM 3/1/2022    10:35 AM 2/20/2023     9:54 AM 4/26/2023     7:57 AM   TIM-7 SCORE   Total Score 4 (minimal anxiety)  6 (mild anxiety) 6 (mild anxiety) 3 (minimal anxiety) 12 (moderate anxiety) 3 (minimal anxiety)   Total Score 4 4 6    6 6 3 12 3     PROMIS 10-Global Health (all questions and answers displayed):       10/9/2023    10:35 AM 11/21/2023     6:09 AM 11/30/2023     7:31 AM 3/7/2024     9:50 AM 3/26/2024     8:41 PM 4/16/2024     8:20 PM 8/16/2024     9:34 AM   PROMIS 10   In general, would you say your health is: Good Good Good Good Good Good Good   In general, would you say your quality of life is: Very good Very good Very good Very good Very good Very good Very good   In general, how would you rate your physical health? Good Good Good Good Good Good Good   In general, how would you rate your mental health, including your mood and your ability to think? Good Good Very good Very good Good Good Very good   In general, how would you rate your satisfaction with your social activities and relationships? Very good Very good  Very good Very good Excellent Very good Very good   In general, please rate how well you carry out your usual social activities and roles Very good Very good Excellent Very good Very good Very good Excellent   To what extent are you able to carry out your everyday physical activities such as walking, climbing stairs, carrying groceries, or moving a chair? Completely Completely Completely Completely Completely Completely Completely   In the past 7 days, how often have you been bothered by emotional problems such as feeling anxious, depressed, or irritable? Sometimes Sometimes Sometimes Sometimes Sometimes Sometimes Sometimes   In the past 7 days, how would you rate your fatigue on average? Moderate Moderate Moderate Moderate Mild Moderate Mild   In the past 7 days, how would you rate your pain on average, where 0 means no pain, and 10 means worst imaginable pain? 0 0 0 0 0 0 0   In general, would you say your health is: 3 3 3 3 3 3 3   In general, would you say your quality of life is: 4 4 4 4 4 4 4   In general, how would you rate your physical health? 3 3 3 3 3 3 3   In general, how would you rate your mental health, including your mood and your ability to think? 3 3 4 4 3 3 4   In general, how would you rate your satisfaction with your social activities and relationships? 4 4 4 4 5 4 4   In general, please rate how well you carry out your usual social activities and roles. (This includes activities at home, at work and in your community, and responsibilities as a parent, child, spouse, employee, friend, etc.) 4 4 5 4 4 4 5   To what extent are you able to carry out your everyday physical activities such as walking, climbing stairs, carrying groceries, or moving a chair? 5 5 5 5 5 5 5   In the past 7 days, how often have you been bothered by emotional problems such as feeling anxious, depressed, or irritable? 3 3 3 3 3 3 3   In the past 7 days, how would you rate your fatigue on average? 3 3 3 3 2 3 2   In the past  7 days, how would you rate your pain on average, where 0 means no pain, and 10 means worst imaginable pain? 0 0 0 0 0 0 0   Global Mental Health Score 14 14 15 15 15 14 15   Global Physical Health Score 16 16 16 16 17 16 17   PROMIS TOTAL - SUBSCORES 30 30 31 31 32 30 32         ASSESSMENT: Current Emotional / Mental Status (status of significant symptoms):   Risk status (Self / Other harm or suicidal ideation)   Patient denies current fears or concerns for personal safety.   Patient denies current or recent suicidal ideation or behaviors.   Patient denies current or recent homicidal ideation or behaviors.   Patient denies current or recent self injurious behavior or ideation.   Patient denies other safety concerns.   Patient reports there has been no change in risk factors since their last session.     Patient reports there has been no change in protective factors since their last session.     Recommended that patient call 911 or go to the local ED should there be a change in any of these risk factors.     Appearance:   Appropriate    Eye Contact:   Good    Psychomotor Behavior: Normal    Attitude:   Cooperative    Orientation:   All   Speech    Rate / Production: Talkative Normal     Volume:  Normal    Mood:    Anxious  Normal   Affect:    Appropriate    Thought Content:  Clear    Thought Form:  Coherent  Logical    Insight:    Good      Medication Review:   No current psychiatric medications prescribed     Medication Compliance:   NA     Changes in Health Issues:   None reported     Chemical Use Review:   Substance Use: Chemical use reviewed, no active concerns identified      Tobacco Use: No current tobacco use.      Diagnosis:  1. Adjustment disorder with mixed anxiety and depressed mood        Collateral Reports Completed:   Not Applicable    PLAN: (Patient Tasks / Therapist Tasks / Other)  Vale will continue to practice her grounding skills and challenging her cognitive distortions. She will create a list  of helpful thoughts and evidence to remind herself of when things get hard.     Tarah Mijaresires, LICSW                                                         ______________________________________________________________________    Individual Treatment Plan    Patient's Name: Vale Greenberg  YOB: 1985    Date of Creation: 8/21/2024  Date Treatment Plan Last Reviewed/Revised:8/21/2024    DSM5 Diagnoses: Adjustment Disorders  309.28 (F43.23) With mixed anxiety and depressed mood  Psychosocial / Contextual Factors: works full time, has twin sons, good support system.  PROMIS (reviewed every 90 days):     Referral / Collaboration:  Referral to another professional/service is not indicated at this time..    Anticipated number of session for this episode of care: 9-12 sessions  Anticipation frequency of session: Every other week  Anticipated Duration of each session: 38-52 minutes  Treatment plan will be reviewed in 90 days or when goals have been changed.       MeasurableTreatment Goal(s) related to diagnosis / functional impairment(s)  Goal 1: Patient will become more aware of their anxiety and utilize the skills taught to decrease their anxiety symptoms.    I will know I've met my goal when I understand more about my anxiety and have skills to cope with it.      Objective #A (Patient Action)    Patient will  use at least 4 coping skills for anxiety management in the next 12 weeks. .  Status: Continued - Date(s):8/21/2024    Intervention(s)  Therapist will  teach coping skills and assign homework of practicing these .    Objective #B  Patient will  use cognitive strategies identified in therapy to challenge anxious thoughts. Patient will engage in activities that are anxiety producing for them, slowly increasing their tolerance for new activities. Patient will identify and recognize past negative experiences and subsequent beliefs they hold that contribute to their anxiety. .  Status: Continued -  Date(s):8/21/2024    Intervention(s)  Therapist will  will teach cognitive behavioral skills and engage client in Socratic dialogue around distorted thinking.  Therapist will provide educational materials on CBT.  .    Objective #C  Patient will  implement self-care into their routine to decrease anxiety, focusing on sleep hygiene, nutrition, movement, regular engagement in mindful activity, and regular socialization.  .  Status: Continued - Date(s):8/21/2024    Intervention(s)  Therapist will  provide psychoeducation around the benefit of self-care and help client identify mindfulness based activities that may work for their life. .    Goal 2: Paticarrington will improve her ability to be effective in relationships as evidenced by client reporting use of three new communication skills over the next three months.    I will know I've met my goal when I can communicate with my family better.      Objective #A (Patient Action)    Status: Continued - Date(s):8/21/2024    Patient will  learn & utilize at least 2 assertive communication skills weekly .    Intervention(s)  Therapist will  teach assertiveness, effective interpersonal skills and about healthy boundaries in relationships .    Objective #B  Patient will  identify his values regarding interpersonal relationships and how to uphold her values while navigating interpersonal relationships .    Status: Continued - Date(s): 8/21/2024    Intervention(s)  Therapist will  teach how to uphold values with DBT and provide support and feedback and teach about healthy boundaries in relationships .    Objective #C  Patient will  uphold her values while navigating interpersonal relationships .  Status: Continued - Date(s):8/21/2024    Intervention(s)  Therapist will  provide support and feedback and teach about healthy boundaries in relationships. .      Patient has reviewed and agreed to the above plan.      NIK Montero  August 21, 2024

## 2024-10-02 ENCOUNTER — VIRTUAL VISIT (OUTPATIENT)
Dept: PSYCHOLOGY | Facility: CLINIC | Age: 39
End: 2024-10-02
Payer: COMMERCIAL

## 2024-10-02 DIAGNOSIS — F43.23 ADJUSTMENT DISORDER WITH MIXED ANXIETY AND DEPRESSED MOOD: Primary | ICD-10-CM

## 2024-10-02 PROCEDURE — 90837 PSYTX W PT 60 MINUTES: CPT | Mod: 95

## 2024-10-02 NOTE — PROGRESS NOTES
[facility]                                     Progress Note    Patient Name: Vale Greenberg  Date: 10/2/2024         Service Type: Individual      Session Start Time: 10:02 AM  Session End Time: 11:00 AM     Session Length: 58 Minutes    Session #: 26    Attendees: Client attended alone    Service Modality:  Video Visit:      Provider verified identity through the following two step process.  Patient provided:  Patient is known previously to provider    Telemedicine Visit: The patient's condition can be safely assessed and treated via synchronous audio and visual telemedicine encounter.      Reason for Telemedicine Visit: Patient has requested telehealth visit and Patient unable to travel    Originating Site (Patient Location): Patient's place of employment    Distant Site (Provider Location): Provider Remote Setting- Home Office    Consent:  The patient/guardian has verbally consented to: the potential risks and benefits of telemedicine (video visit) versus in person care; bill my insurance or make self-payment for services provided; and responsibility for payment of non-covered services.     Patient would like the video invitation sent by:  My Chart    Mode of Communication:  [facility] Conference [facility]    Distant Location (Provider):  Off-site    As the provider I attest to compliance with applicable laws and regulations related to telemedicine.    DATA  Extended Session (53+ minutes): PROLONGED SERVICE IN THE OUTPATIENT SETTING REQUIRING DIRECT (FACE-TO-FACE) PATIENT CONTACT BEYOND THE USUAL SERVICE:    - Patient's presenting concerns require more intensive intervention than could be completed within the usual service  Interactive Complexity: No  Crisis: No      Progress Since Last Session (Related to Symptoms / Goals / Homework):   Symptoms: Worsening anxiety, over thinking and avoidance when feeling anxious.     Homework: Achieved / completed to satisfaction      Episode of Care  "Goals: Satisfactory progress - ACTION (Actively working towards change); Intervened by reinforcing change plan / affirming steps taken     Current / Ongoing Stressors and Concerns:   Vale is coming to therapy to learn skills to decrease her anxiety and depression. She reports increased fear of possible health concerns and worries for her future have contributed to significant distress in all aspects of her life and results in high blood pressure, irritation, avoidance and withdrawing behavior. She reports increased relationship conflicts with her family and pressure to be \"it all for everyone.\"     Treatment Objective(s) Addressed in This Session:   use cognitive strategies identified in therapy to challenge anxious thoughts   practice deep breathing at least once a day     Intervention:   Therapist utilized reflected listening as patient gave brief reflection of the past few weeks. Patient reported worsening anxiety, over thinking and avoidance when feeling anxious. She processed  noticing her avoidant behaviors around her mother since the change in their schedules and not seeing she has much as she had previously been seeing her. She reports internal conflict with exposing her children to others that have very different values than her and her family. Therapist supported patient as she processed and validated patient. Therapist engaged in cognitive restructuring/ reframing, looked at cognitive distortions and challenged distorted thoughts.     Assessments completed prior to visit:  The following assessments were completed by patient for this visit:  PHQ2:       8/21/2024    10:00 AM 5/20/2024     8:53 AM 5/9/2024    10:29 AM 2/1/2024     5:24 AM 1/16/2024     7:23 AM 12/28/2023     7:28 AM 9/20/2023     1:15 PM   PHQ-2 ( 1999 [employer])   Q1: Little interest or pleasure in doing things 0 0 0 0 0 0 0   Q2: Feeling down, depressed or hopeless 0 0 0 1 0 0 0   PHQ-2 Score 0 0 0 1 0 0 0   Q1: Little interest or " pleasure in doing things Not at all Not at all Not at all Not at all Not at all Not at all Not at all   Q2: Feeling down, depressed or hopeless Not at all Not at all Not at all Several days Not at all Not at all Not at all   PHQ-2 Score 0 0 0 1 0 0 0     PHQ9:       6/18/2019     3:26 PM 7/10/2019     3:56 PM 3/2/2020     8:30 AM 3/4/2021     2:32 PM 6/7/2023    12:01 PM   PHQ-9 SCORE   PHQ-9 Total Score MyChart     3 (Minimal depression)   PHQ-9 Total Score 9 2 2 2 3     GAD2:       10/9/2023    10:34 AM 11/21/2023     6:08 AM 11/30/2023     7:31 AM 3/7/2024     9:48 AM 3/26/2024     8:41 PM 4/16/2024     8:19 PM 8/16/2024     9:33 AM   TIM-2   Feeling nervous, anxious, or on edge 1 1 1 1 1 1 1   Not being able to stop or control worrying 0 1 1 1 1 1 0   TIM-2 Total Score 1 2 2 2 2 2 1     GAD7:       2/28/2020     8:06 AM 3/2/2020     8:30 AM 2/18/2021     7:29 AM 2/25/2021    10:15 AM 3/1/2022    10:35 AM 2/20/2023     9:54 AM 4/26/2023     7:57 AM   TIM-7 SCORE   Total Score 4 (minimal anxiety)  6 (mild anxiety) 6 (mild anxiety) 3 (minimal anxiety) 12 (moderate anxiety) 3 (minimal anxiety)   Total Score 4 4 6    6 6 3 12 3     PROMIS 10-Global Health (all questions and answers displayed):       10/9/2023    10:35 AM 11/21/2023     6:09 AM 11/30/2023     7:31 AM 3/7/2024     9:50 AM 3/26/2024     8:41 PM 4/16/2024     8:20 PM 8/16/2024     9:34 AM   PROMIS 10   In general, would you say your health is: Good Good Good Good Good Good Good   In general, would you say your quality of life is: Very good Very good Very good Very good Very good Very good Very good   In general, how would you rate your physical health? Good Good Good Good Good Good Good   In general, how would you rate your mental health, including your mood and your ability to think? Good Good Very good Very good Good Good Very good   In general, how would you rate your satisfaction with your social activities and relationships? Very good Very good Very  good Very good Excellent Very good Very good   In general, please rate how well you carry out your usual social activities and roles Very good Very good Excellent Very good Very good Very good Excellent   To what extent are you able to carry out your everyday physical activities such as walking, climbing stairs, carrying groceries, or moving a chair? Completely Completely Completely Completely Completely Completely Completely   In the past 7 days, how often have you been bothered by emotional problems such as feeling anxious, depressed, or irritable? Sometimes Sometimes Sometimes Sometimes Sometimes Sometimes Sometimes   In the past 7 days, how would you rate your fatigue on average? Moderate Moderate Moderate Moderate Mild Moderate Mild   In the past 7 days, how would you rate your pain on average, where 0 means no pain, and 10 means worst imaginable pain? 0 0 0 0 0 0 0   In general, would you say your health is: 3 3 3 3 3 3 3   In general, would you say your quality of life is: 4 4 4 4 4 4 4   In general, how would you rate your physical health? 3 3 3 3 3 3 3   In general, how would you rate your mental health, including your mood and your ability to think? 3 3 4 4 3 3 4   In general, how would you rate your satisfaction with your social activities and relationships? 4 4 4 4 5 4 4   In general, please rate how well you carry out your usual social activities and roles. (This includes activities at home, at work and in your community, and responsibilities as a parent, child, spouse, employee, friend, etc.) 4 4 5 4 4 4 5   To what extent are you able to carry out your everyday physical activities such as walking, climbing stairs, carrying groceries, or moving a chair? 5 5 5 5 5 5 5   In the past 7 days, how often have you been bothered by emotional problems such as feeling anxious, depressed, or irritable? 3 3 3 3 3 3 3   In the past 7 days, how would you rate your fatigue on average? 3 3 3 3 2 3 2   In the past 7  days, how would you rate your pain on average, where 0 means no pain, and 10 means worst imaginable pain? 0 0 0 0 0 0 0   Global Mental Health Score 14 14 15 15 15 14 15   Global Physical Health Score 16 16 16 16 17 16 17   PROMIS TOTAL - SUBSCORES 30 30 31 31 32 30 32         ASSESSMENT: Current Emotional / Mental Status (status of significant symptoms):   Risk status (Self / Other harm or suicidal ideation)   Patient denies current fears or concerns for personal safety.   Patient denies current or recent suicidal ideation or behaviors.   Patient denies current or recent homicidal ideation or behaviors.   Patient denies current or recent self injurious behavior or ideation.   Patient denies other safety concerns.   Patient reports there has been no change in risk factors since their last session.     Patient reports there has been no change in protective factors since their last session.     Recommended that patient call 911 or go to the local ED should there be a change in any of these risk factors.     Appearance:   Appropriate    Eye Contact:   Good    Psychomotor Behavior: Normal    Attitude:   Cooperative    Orientation:   All   Speech    Rate / Production: Talkative Normal     Volume:  Normal    Mood:    Anxious  Normal   Affect:    Appropriate    Thought Content:  Clear    Thought Form:  Coherent  Logical    Insight:    Good      Medication Review:   No current psychiatric medications prescribed     Medication Compliance:   NA     Changes in Health Issues:   None reported     Chemical Use Review:   Substance Use: Chemical use reviewed, no active concerns identified      Tobacco Use: No current tobacco use.      Diagnosis:  1. Adjustment disorder with mixed anxiety and depressed mood        Collateral Reports Completed:   Not Applicable    PLAN: (Patient Tasks / Therapist Tasks / Other)  Vale will continue to practice her grounding skills and challenging her cognitive distortions.    Tarah Akins  LICSW                                                         ______________________________________________________________________    Individual Treatment Plan    Patient's Name: Vale Greenberg  YOB: 1985    Date of Creation: 8/21/2024  Date Treatment Plan Last Reviewed/Revised:8/21/2024    DSM5 Diagnoses: Adjustment Disorders  309.28 (F43.23) With mixed anxiety and depressed mood  Psychosocial / Contextual Factors: works full time, has twin sons, good support system.  PROMIS (reviewed every 90 days):     Referral / Collaboration:  Referral to another professional/service is not indicated at this time..    Anticipated number of session for this episode of care: 9-12 sessions  Anticipation frequency of session: Every other week  Anticipated Duration of each session: 38-52 minutes  Treatment plan will be reviewed in 90 days or when goals have been changed.       MeasurableTreatment Goal(s) related to diagnosis / functional impairment(s)  Goal 1: Patient will become more aware of their anxiety and utilize the skills taught to decrease their anxiety symptoms.    I will know I've met my goal when I understand more about my anxiety and have skills to cope with it.      Objective #A (Patient Action)    Patient will  use at least 4 coping skills for anxiety management in the next 12 weeks. .  Status: Continued - Date(s):8/21/2024    Intervention(s)  Therapist will  teach coping skills and assign homework of practicing these .    Objective #B  Patient will  use cognitive strategies identified in therapy to challenge anxious thoughts. Patient will engage in activities that are anxiety producing for them, slowly increasing their tolerance for new activities. Patient will identify and recognize past negative experiences and subsequent beliefs they hold that contribute to their anxiety. .  Status: Continued - Date(s):8/21/2024    Intervention(s)  Therapist will  will teach cognitive behavioral skills and  engage client in Socratic dialogue around distorted thinking.  Therapist will provide educational materials on CBT.  .    Objective #C  Patient will  implement self-care into their routine to decrease anxiety, focusing on sleep hygiene, nutrition, movement, regular engagement in mindful activity, and regular socialization.  .  Status: Continued - Date(s):8/21/2024    Intervention(s)  Therapist will  provide psychoeducation around the benefit of self-care and help client identify mindfulness based activities that may work for their life. .    Goal 2: Patiient will improve her ability to be effective in relationships as evidenced by client reporting use of three new communication skills over the next three months.    I will know I've met my goal when I can communicate with my family better.      Objective #A (Patient Action)    Status: Continued - Date(s):8/21/2024    Patient will  learn & utilize at least 2 assertive communication skills weekly .    Intervention(s)  Therapist will  teach assertiveness, effective interpersonal skills and about healthy boundaries in relationships .    Objective #B  Patient will  identify his values regarding interpersonal relationships and how to uphold her values while navigating interpersonal relationships .    Status: Continued - Date(s): 8/21/2024    Intervention(s)  Therapist will  teach how to uphold values with DBT and provide support and feedback and teach about healthy boundaries in relationships .    Objective #C  Patient will  uphold her values while navigating interpersonal relationships .  Status: Continued - Date(s):8/21/2024    Intervention(s)  Therapist will  provide support and feedback and teach about healthy boundaries in relationships. .      Patient has reviewed and agreed to the above plan.      NIK Montero  August 21, 2024

## 2024-10-15 ENCOUNTER — VIRTUAL VISIT (OUTPATIENT)
Dept: PSYCHOLOGY | Facility: CLINIC | Age: 39
End: 2024-10-15
Payer: COMMERCIAL

## 2024-10-15 DIAGNOSIS — F43.23 ADJUSTMENT DISORDER WITH MIXED ANXIETY AND DEPRESSED MOOD: Primary | ICD-10-CM

## 2024-10-15 PROCEDURE — 90837 PSYTX W PT 60 MINUTES: CPT | Mod: 95

## 2024-10-17 NOTE — PROGRESS NOTES
[facility]                                     Progress Note    Patient Name: Vale Greenberg  Date: 10/15/2024         Service Type: Individual      Session Start Time: 10:01 AM  Session End Time: 10:58 AM     Session Length: 57 Minutes    Session #: 27    Attendees: Client attended alone    Service Modality:  Video Visit:      Provider verified identity through the following two step process.  Patient provided:  Patient is known previously to provider    Telemedicine Visit: The patient's condition can be safely assessed and treated via synchronous audio and visual telemedicine encounter.      Reason for Telemedicine Visit: Patient has requested telehealth visit and Patient unable to travel    Originating Site (Patient Location): Patient's home    Distant Site (Provider Location): Provider Remote Setting- Home Office    Consent:  The patient/guardian has verbally consented to: the potential risks and benefits of telemedicine (video visit) versus in person care; bill my insurance or make self-payment for services provided; and responsibility for payment of non-covered services.     Patient would like the video invitation sent by:  My Chart    Mode of Communication:  Video Conference via [facility]    Distant Location (Provider):  Off-site    As the provider I attest to compliance with applicable laws and regulations related to telemedicine.    DATA  Extended Session (53+ minutes): PROLONGED SERVICE IN THE OUTPATIENT SETTING REQUIRING DIRECT (FACE-TO-FACE) PATIENT CONTACT BEYOND THE USUAL SERVICE:    - Patient's presenting concerns require more intensive intervention than could be completed within the usual service  Interactive Complexity: No  Crisis: No      Progress Since Last Session (Related to Symptoms / Goals / Homework):   Symptoms: No change in anxiety, over thinking and avoidance when feeling anxious. Improved insight in her behaviors.    Homework: Achieved / completed to  "satisfaction      Episode of Care Goals: Satisfactory progress - ACTION (Actively working towards change); Intervened by reinforcing change plan / affirming steps taken     Current / Ongoing Stressors and Concerns:   Vale is coming to therapy to learn skills to decrease her anxiety and depression. She reports increased fear of possible health concerns and worries for her future have contributed to significant distress in all aspects of her life and results in high blood pressure, irritation, avoidance and withdrawing behavior. She reports increased relationship conflicts with her family and pressure to be \"it all for everyone.\"     Treatment Objective(s) Addressed in This Session:   use cognitive strategies identified in therapy to challenge anxious thoughts   practice deep breathing at least once a day     Intervention:   Therapist utilized reflected listening as patient gave brief reflection of the past few weeks. Patient reported continued anxiety, over thinking and avoidance when feeling anxious with improved insight in her behaviors. She processed  noticing her thought patterns of catastrophizing and personalization of other's feelings that would often result in her apologizing for sharing her feelings and needs. Therapist supported patient as she processed and validated patient. Therapist engaged in cognitive restructuring/ reframing, looked at cognitive distortions and challenged distorted thoughts.     Assessments completed prior to visit:  The following assessments were completed by patient for this visit:  PHQ2:       8/21/2024    10:00 AM 5/20/2024     8:53 AM 5/9/2024    10:29 AM 2/1/2024     5:24 AM 1/16/2024     7:23 AM 12/28/2023     7:28 AM 9/20/2023     1:15 PM   PHQ-2 ( 1999 [employer])   Q1: Little interest or pleasure in doing things 0 0 0 0 0 0 0   Q2: Feeling down, depressed or hopeless 0 0 0 1 0 0 0   PHQ-2 Score 0 0 0 1 0 0 0   Q1: Little interest or pleasure in doing things Not at all Not at " all Not at all Not at all Not at all Not at all Not at all   Q2: Feeling down, depressed or hopeless Not at all Not at all Not at all Several days Not at all Not at all Not at all   PHQ-2 Score 0 0 0 1 0 0 0     PHQ9:       6/18/2019     3:26 PM 7/10/2019     3:56 PM 3/2/2020     8:30 AM 3/4/2021     2:32 PM 6/7/2023    12:01 PM   PHQ-9 SCORE   PHQ-9 Total Score MyChart     3 (Minimal depression)   PHQ-9 Total Score 9 2 2 2 3     GAD2:       10/9/2023    10:34 AM 11/21/2023     6:08 AM 11/30/2023     7:31 AM 3/7/2024     9:48 AM 3/26/2024     8:41 PM 4/16/2024     8:19 PM 8/16/2024     9:33 AM   TIM-2   Feeling nervous, anxious, or on edge 1 1 1 1 1 1 1   Not being able to stop or control worrying 0 1 1 1 1 1 0   TIM-2 Total Score 1 2 2 2 2 2 1     GAD7:       2/28/2020     8:06 AM 3/2/2020     8:30 AM 2/18/2021     7:29 AM 2/25/2021    10:15 AM 3/1/2022    10:35 AM 2/20/2023     9:54 AM 4/26/2023     7:57 AM   TIM-7 SCORE   Total Score 4 (minimal anxiety)  6 (mild anxiety) 6 (mild anxiety) 3 (minimal anxiety) 12 (moderate anxiety) 3 (minimal anxiety)   Total Score 4 4 6    6 6 3 12 3     PROMIS 10-Global Health (all questions and answers displayed):       10/9/2023    10:35 AM 11/21/2023     6:09 AM 11/30/2023     7:31 AM 3/7/2024     9:50 AM 3/26/2024     8:41 PM 4/16/2024     8:20 PM 8/16/2024     9:34 AM   PROMIS 10   In general, would you say your health is: Good Good Good Good Good Good Good   In general, would you say your quality of life is: Very good Very good Very good Very good Very good Very good Very good   In general, how would you rate your physical health? Good Good Good Good Good Good Good   In general, how would you rate your mental health, including your mood and your ability to think? Good Good Very good Very good Good Good Very good   In general, how would you rate your satisfaction with your social activities and relationships? Very good Very good Very good Very good Excellent Very good Very  good   In general, please rate how well you carry out your usual social activities and roles Very good Very good Excellent Very good Very good Very good Excellent   To what extent are you able to carry out your everyday physical activities such as walking, climbing stairs, carrying groceries, or moving a chair? Completely Completely Completely Completely Completely Completely Completely   In the past 7 days, how often have you been bothered by emotional problems such as feeling anxious, depressed, or irritable? Sometimes Sometimes Sometimes Sometimes Sometimes Sometimes Sometimes   In the past 7 days, how would you rate your fatigue on average? Moderate Moderate Moderate Moderate Mild Moderate Mild   In the past 7 days, how would you rate your pain on average, where 0 means no pain, and 10 means worst imaginable pain? 0 0 0 0 0 0 0   In general, would you say your health is: 3 3 3 3 3 3 3   In general, would you say your quality of life is: 4 4 4 4 4 4 4   In general, how would you rate your physical health? 3 3 3 3 3 3 3   In general, how would you rate your mental health, including your mood and your ability to think? 3 3 4 4 3 3 4   In general, how would you rate your satisfaction with your social activities and relationships? 4 4 4 4 5 4 4   In general, please rate how well you carry out your usual social activities and roles. (This includes activities at home, at work and in your community, and responsibilities as a parent, child, spouse, employee, friend, etc.) 4 4 5 4 4 4 5   To what extent are you able to carry out your everyday physical activities such as walking, climbing stairs, carrying groceries, or moving a chair? 5 5 5 5 5 5 5   In the past 7 days, how often have you been bothered by emotional problems such as feeling anxious, depressed, or irritable? 3 3 3 3 3 3 3   In the past 7 days, how would you rate your fatigue on average? 3 3 3 3 2 3 2   In the past 7 days, how would you rate your pain on  average, where 0 means no pain, and 10 means worst imaginable pain? 0 0 0 0 0 0 0   Global Mental Health Score 14 14 15 15 15 14 15   Global Physical Health Score 16 16 16 16 17 16 17   PROMIS TOTAL - SUBSCORES 30 30 31 31 32 30 32         ASSESSMENT: Current Emotional / Mental Status (status of significant symptoms):   Risk status (Self / Other harm or suicidal ideation)   Patient denies current fears or concerns for personal safety.   Patient denies current or recent suicidal ideation or behaviors.   Patient denies current or recent homicidal ideation or behaviors.   Patient denies current or recent self injurious behavior or ideation.   Patient denies other safety concerns.   Patient reports there has been no change in risk factors since their last session.     Patient reports there has been no change in protective factors since their last session.     Recommended that patient call 911 or go to the local ED should there be a change in any of these risk factors     Appearance:   Appropriate    Eye Contact:   Good    Psychomotor Behavior: Normal    Attitude:   Cooperative    Orientation:   All   Speech    Rate / Production: Talkative Normal     Volume:  Normal    Mood:    Anxious  Normal   Affect:    Appropriate    Thought Content:  Clear    Thought Form:  Coherent  Logical    Insight:    Good      Medication Review:   No current psychiatric medications prescribed     Medication Compliance:   NA     Changes in Health Issues:   None reported     Chemical Use Review:   Substance Use: Chemical use reviewed, no active concerns identified      Tobacco Use: No current tobacco use.      Diagnosis:  1. Adjustment disorder with mixed anxiety and depressed mood        Collateral Reports Completed:   Not Applicable    PLAN: (Patient Tasks / Therapist Tasks / Other)  Vale will continue to practice her grounding skills and challenging her cognitive distortions.    Tarah Akins, CLEMSW                                                          ______________________________________________________________________    Individual Treatment Plan    Patient's Name: Vale Greenberg  YOB: 1985    Date of Creation: 8/21/2024  Date Treatment Plan Last Reviewed/Revised:8/21/2024    DSM5 Diagnoses: Adjustment Disorders  309.28 (F43.23) With mixed anxiety and depressed mood  Psychosocial / Contextual Factors: works full time, has twin sons, good support system.  PROMIS (reviewed every 90 days):     Referral / Collaboration:  Referral to another professional/service is not indicated at this time..    Anticipated number of session for this episode of care: 9-12 sessions  Anticipation frequency of session: Every other week  Anticipated Duration of each session: 38-52 minutes  Treatment plan will be reviewed in 90 days or when goals have been changed.       MeasurableTreatment Goal(s) related to diagnosis / functional impairment(s)  Goal 1: Patient will become more aware of their anxiety and utilize the skills taught to decrease their anxiety symptoms.    I will know I've met my goal when I understand more about my anxiety and have skills to cope with it.      Objective #A (Patient Action)    Patient will  use at least 4 coping skills for anxiety management in the next 12 weeks. .  Status: Continued - Date(s):8/21/2024    Intervention(s)  Therapist will  teach coping skills and assign homework of practicing these .    Objective #B  Patient will  use cognitive strategies identified in therapy to challenge anxious thoughts. Patient will engage in activities that are anxiety producing for them, slowly increasing their tolerance for new activities. Patient will identify and recognize past negative experiences and subsequent beliefs they hold that contribute to their anxiety. .  Status: Continued - Date(s):8/21/2024    Intervention(s)  Therapist will  will teach cognitive behavioral skills and engage client in Socratic dialogue around  distorted thinking.  Therapist will provide educational materials on CBT.  .    Objective #C  Patient will  implement self-care into their routine to decrease anxiety, focusing on sleep hygiene, nutrition, movement, regular engagement in mindful activity, and regular socialization.  .  Status: Continued - Date(s):8/21/2024    Intervention(s)  Therapist will  provide psychoeducation around the benefit of self-care and help client identify mindfulness based activities that may work for their life. .    Goal 2: Patiient will improve her ability to be effective in relationships as evidenced by client reporting use of three new communication skills over the next three months.    I will know I've met my goal when I can communicate with my family better.      Objective #A (Patient Action)    Status: Continued - Date(s):8/21/2024    Patient will  learn & utilize at least 2 assertive communication skills weekly .    Intervention(s)  Therapist will  teach assertiveness, effective interpersonal skills and about healthy boundaries in relationships .    Objective #B  Patient will  identify his values regarding interpersonal relationships and how to uphold her values while navigating interpersonal relationships .    Status: Continued - Date(s): 8/21/2024    Intervention(s)  Therapist will  teach how to uphold values with DBT and provide support and feedback and teach about healthy boundaries in relationships .    Objective #C  Patient will  uphold her values while navigating interpersonal relationships .  Status: Continued - Date(s):8/21/2024    Intervention(s)  Therapist will  provide support and feedback and teach about healthy boundaries in relationships. .      Patient has reviewed and agreed to the above plan.      NIK Montero  August 21, 2024

## 2024-10-24 ENCOUNTER — PATIENT OUTREACH (OUTPATIENT)
Dept: CARE COORDINATION | Facility: CLINIC | Age: 39
End: 2024-10-24
Payer: COMMERCIAL

## 2024-10-30 ENCOUNTER — VIRTUAL VISIT (OUTPATIENT)
Dept: PSYCHOLOGY | Facility: CLINIC | Age: 39
End: 2024-10-30
Payer: COMMERCIAL

## 2024-10-30 DIAGNOSIS — F43.23 ADJUSTMENT DISORDER WITH MIXED ANXIETY AND DEPRESSED MOOD: Primary | ICD-10-CM

## 2024-10-30 PROCEDURE — 90837 PSYTX W PT 60 MINUTES: CPT | Mod: 95

## 2024-11-04 NOTE — PROGRESS NOTES
[facility]                                     Progress Note    Patient Name: Vale Greenberg  Date: 10/30/2024         Service Type: Individual      Session Start Time: 10:00 AM  Session End Time: 10:58 AM     Session Length: 58 Minutes    Session #: 28    Attendees: Client attended alone  Client consented to today's session being shadowed by DOROTA Cherry, MSW student.    Service Modality:  Video Visit:      Provider verified identity through the following two step process.  Patient provided:  Patient is known previously to provider    Telemedicine Visit: The patient's condition can be safely assessed and treated via synchronous audio and visual telemedicine encounter.      Reason for Telemedicine Visit: Patient has requested telehealth visit and Patient unable to travel    Originating Site (Patient Location): Patient's place of employment    Distant Site (Provider Location): Provider Remote Setting- Home Office    Consent:  The patient/guardian has verbally consented to: the potential risks and benefits of telemedicine (video visit) versus in person care; bill my insurance or make self-payment for services provided; and responsibility for payment of non-covered services.     Patient would like the video invitation sent by:  My Chart    Mode of Communication:  [facility]    Distant Location (Provider):  Off-site    As the provider I attest to compliance with applicable laws and regulations related to telemedicine.    DATA  Extended Session (53+ minutes): PROLONGED SERVICE IN THE OUTPATIENT SETTING REQUIRING DIRECT (FACE-TO-FACE) PATIENT CONTACT BEYOND THE USUAL SERVICE:    - Patient's presenting concerns require more intensive intervention than could be completed within the usual service  Interactive Complexity: No  Crisis: No      Progress Since Last Session (Related to Symptoms / Goals / Homework):   Symptoms: Worsening anxiety, catastrophizing and negative self  "talk    Homework: Achieved / completed to satisfaction      Episode of Care Goals: Satisfactory progress - ACTION (Actively working towards change); Intervened by reinforcing change plan / affirming steps taken     Current / Ongoing Stressors and Concerns:   Vale is coming to therapy to learn skills to decrease her anxiety and depression. She reports increased fear of possible health concerns and worries for her future have contributed to significant distress in all aspects of her life and results in high blood pressure, irritation, avoidance and withdrawing behavior. She reports increased relationship conflicts with her family and pressure to be \"it all for everyone.\"     Treatment Objective(s) Addressed in This Session:   use cognitive strategies identified in therapy to challenge anxious thoughts   practice deep breathing at least once a day     Intervention:   Therapist utilized reflected listening as patient gave brief reflection of the past few weeks. Patient reported worsening anxiety, catastrophizing and negative self talk. She processed recent events that occurred with her children and the judgments she has had towards herself. Therapist supported patient as she processed and validated patient. Therapist engaged in cognitive restructuring/ reframing, looked at cognitive distortions and challenged distorted thoughts.     Assessments completed prior to visit:  The following assessments were completed by patient for this visit:  PHQ2:       8/21/2024    10:00 AM 5/20/2024     8:53 AM 5/9/2024    10:29 AM 2/1/2024     5:24 AM 1/16/2024     7:23 AM 12/28/2023     7:28 AM 9/20/2023     1:15 PM   PHQ-2 ( 1999 [employer])   Q1: Little interest or pleasure in doing things 0  0  0  0  0  0  0    Q2: Feeling down, depressed or hopeless 0  0  0  1  0  0  0    PHQ-2 Score 0 0 0 1 0 0 0   Q1: Little interest or pleasure in doing things Not at all Not at all Not at all Not at all Not at all Not at all Not at all   Q2: " Feeling down, depressed or hopeless Not at all Not at all Not at all Several days Not at all Not at all Not at all   PHQ-2 Score 0 0 0 1 0 0 0       Patient-reported     PHQ9:       6/18/2019     3:26 PM 7/10/2019     3:56 PM 3/2/2020     8:30 AM 3/4/2021     2:32 PM 6/7/2023    12:01 PM   PHQ-9 SCORE   PHQ-9 Total Score MyChart     3 (Minimal depression)   PHQ-9 Total Score 9 2 2 2 3     GAD2:       10/9/2023    10:34 AM 11/21/2023     6:08 AM 11/30/2023     7:31 AM 3/7/2024     9:48 AM 3/26/2024     8:41 PM 4/16/2024     8:19 PM 8/16/2024     9:33 AM   TIM-2   Feeling nervous, anxious, or on edge 1  1  1  1  1  1  1    Not being able to stop or control worrying 0  1  1  1  1  1  0    TIM-2 Total Score 1 2 2 2 2 2 1       Patient-reported     GAD7:       2/28/2020     8:06 AM 3/2/2020     8:30 AM 2/18/2021     7:29 AM 2/25/2021    10:15 AM 3/1/2022    10:35 AM 2/20/2023     9:54 AM 4/26/2023     7:57 AM   TIM-7 SCORE   Total Score 4 (minimal anxiety)  6 (mild anxiety) 6 (mild anxiety) 3 (minimal anxiety) 12 (moderate anxiety) 3 (minimal anxiety)   Total Score 4 4 6    6 6 3 12 3     PROMIS 10-Global Health (all questions and answers displayed):       10/9/2023    10:35 AM 11/21/2023     6:09 AM 11/30/2023     7:31 AM 3/7/2024     9:50 AM 3/26/2024     8:41 PM 4/16/2024     8:20 PM 8/16/2024     9:34 AM   PROMIS 10   In general, would you say your health is: Good Good Good Good Good Good Good   In general, would you say your quality of life is: Very good Very good Very good Very good Very good Very good Very good   In general, how would you rate your physical health? Good Good Good Good Good Good Good   In general, how would you rate your mental health, including your mood and your ability to think? Good Good Very good Very good Good Good Very good   In general, how would you rate your satisfaction with your social activities and relationships? Very good Very good Very good Very good Excellent Very good Very good    In general, please rate how well you carry out your usual social activities and roles Very good Very good Excellent Very good Very good Very good Excellent   To what extent are you able to carry out your everyday physical activities such as walking, climbing stairs, carrying groceries, or moving a chair? Completely Completely Completely Completely Completely Completely Completely   In the past 7 days, how often have you been bothered by emotional problems such as feeling anxious, depressed, or irritable? Sometimes Sometimes Sometimes Sometimes Sometimes Sometimes Sometimes   In the past 7 days, how would you rate your fatigue on average? Moderate Moderate Moderate Moderate Mild Moderate Mild   In the past 7 days, how would you rate your pain on average, where 0 means no pain, and 10 means worst imaginable pain? 0 0 0 0 0 0 0   In general, would you say your health is: 3  3  3  3  3  3  3    In general, would you say your quality of life is: 4  4  4  4  4  4  4    In general, how would you rate your physical health? 3  3  3  3  3  3  3    In general, how would you rate your mental health, including your mood and your ability to think? 3  3  4  4  3  3  4    In general, how would you rate your satisfaction with your social activities and relationships? 4  4  4  4  5  4  4    In general, please rate how well you carry out your usual social activities and roles. (This includes activities at home, at work and in your community, and responsibilities as a parent, child, spouse, employee, friend, etc.) 4  4  5  4  4  4  5    To what extent are you able to carry out your everyday physical activities such as walking, climbing stairs, carrying groceries, or moving a chair? 5  5  5  5  5  5  5    In the past 7 days, how often have you been bothered by emotional problems such as feeling anxious, depressed, or irritable? 3  3  3  3  3  3  3    In the past 7 days, how would you rate your fatigue on average? 3  3  3  3  2  3  2     In the past 7 days, how would you rate your pain on average, where 0 means no pain, and 10 means worst imaginable pain? 0  0  0  0  0  0  0    Global Mental Health Score 14 14 15 15 15 14 15   Global Physical Health Score 16 16 16 16 17 16 17   PROMIS TOTAL - SUBSCORES 30 30 31 31 32 30 32       Patient-reported         ASSESSMENT: Current Emotional / Mental Status (status of significant symptoms):   Risk status (Self / Other harm or suicidal ideation)   Patient denies current fears or concerns for personal safety.   Patient denies current or recent suicidal ideation or behaviors.   Patient denies current or recent homicidal ideation or behaviors.   Patient denies current or recent self injurious behavior or ideation.   Patient denies other safety concerns.   Patient reports there has been no change in risk factors since their last session.     Patient reports there has been no change in protective factors since their last session.     Recommended that patient call 911 or go to the local ED should there be a change in any of these risk factors     Appearance:   Appropriate    Eye Contact:   Good    Psychomotor Behavior: Normal    Attitude:   Cooperative    Orientation:   All   Speech    Rate / Production: Normal/ Responsive    Volume:  Normal    Mood:    Anxious  Normal   Affect:    Appropriate    Thought Content:  Clear    Thought Form:  Coherent  Logical    Insight:    Good      Medication Review:   No current psychiatric medications prescribed     Medication Compliance:   NA     Changes in Health Issues:   None reported     Chemical Use Review:   Substance Use: Chemical use reviewed, no active concerns identified      Tobacco Use: No current tobacco use.      Diagnosis:  1. Adjustment disorder with mixed anxiety and depressed mood        Collateral Reports Completed:   Not Applicable    PLAN: (Patient Tasks / Therapist Tasks / Other)  Vale will continue to practice her grounding skills and challenging her  cognitive distortions.    Tarah Akins, LICSW                                                         ______________________________________________________________________    Individual Treatment Plan    Patient's Name: Vale Greenberg  YOB: 1985    Date of Creation: 8/21/2024  Date Treatment Plan Last Reviewed/Revised:8/21/2024    DSM5 Diagnoses: Adjustment Disorders  309.28 (F43.23) With mixed anxiety and depressed mood  Psychosocial / Contextual Factors: works full time, has twin sons, good support system.  PROMIS (reviewed every 90 days):     Referral / Collaboration:  Referral to another professional/service is not indicated at this time..    Anticipated number of session for this episode of care: 9-12 sessions  Anticipation frequency of session: Every other week  Anticipated Duration of each session: 38-52 minutes  Treatment plan will be reviewed in 90 days or when goals have been changed.       MeasurableTreatment Goal(s) related to diagnosis / functional impairment(s)  Goal 1: Patient will become more aware of their anxiety and utilize the skills taught to decrease their anxiety symptoms.    I will know I've met my goal when I understand more about my anxiety and have skills to cope with it.      Objective #A (Patient Action)    Patient will  use at least 4 coping skills for anxiety management in the next 12 weeks. .  Status: Continued - Date(s):8/21/2024    Intervention(s)  Therapist will  teach coping skills and assign homework of practicing these .    Objective #B  Patient will  use cognitive strategies identified in therapy to challenge anxious thoughts. Patient will engage in activities that are anxiety producing for them, slowly increasing their tolerance for new activities. Patient will identify and recognize past negative experiences and subsequent beliefs they hold that contribute to their anxiety. .  Status: Continued - Date(s):8/21/2024    Intervention(s)  Therapist will   will teach cognitive behavioral skills and engage client in Socratic dialogue around distorted thinking.  Therapist will provide educational materials on CBT.  .    Objective #C  Patient will  implement self-care into their routine to decrease anxiety, focusing on sleep hygiene, nutrition, movement, regular engagement in mindful activity, and regular socialization.  .  Status: Continued - Date(s):8/21/2024    Intervention(s)  Therapist will  provide psychoeducation around the benefit of self-care and help client identify mindfulness based activities that may work for their life. .    Goal 2: Jay will improve her ability to be effective in relationships as evidenced by client reporting use of three new communication skills over the next three months.    I will know I've met my goal when I can communicate with my family better.      Objective #A (Patient Action)    Status: Continued - Date(s):8/21/2024    Patient will  learn & utilize at least 2 assertive communication skills weekly .    Intervention(s)  Therapist will  teach assertiveness, effective interpersonal skills and about healthy boundaries in relationships .    Objective #B  Patient will  identify his values regarding interpersonal relationships and how to uphold her values while navigating interpersonal relationships .    Status: Continued - Date(s): 8/21/2024    Intervention(s)  Therapist will  teach how to uphold values with DBT and provide support and feedback and teach about healthy boundaries in relationships .    Objective #C  Patient will  uphold her values while navigating interpersonal relationships .  Status: Continued - Date(s):8/21/2024    Intervention(s)  Therapist will  provide support and feedback and teach about healthy boundaries in relationships. .      Patient has reviewed and agreed to the above plan.      NIK Montero  August 21, 2024

## 2024-11-05 ENCOUNTER — PATIENT OUTREACH (OUTPATIENT)
Dept: CARE COORDINATION | Facility: CLINIC | Age: 39
End: 2024-11-05
Payer: COMMERCIAL

## 2024-11-11 ENCOUNTER — OFFICE VISIT (OUTPATIENT)
Dept: OBGYN | Facility: CLINIC | Age: 39
End: 2024-11-11
Attending: OBSTETRICS & GYNECOLOGY
Payer: COMMERCIAL

## 2024-11-11 VITALS
BODY MASS INDEX: 34.23 KG/M2 | HEIGHT: 66 IN | SYSTOLIC BLOOD PRESSURE: 153 MMHG | DIASTOLIC BLOOD PRESSURE: 97 MMHG | WEIGHT: 213 LBS | HEART RATE: 91 BPM

## 2024-11-11 DIAGNOSIS — N90.4 LICHEN SCLEROSUS OF FEMALE GENITALIA: Primary | ICD-10-CM

## 2024-11-11 PROCEDURE — 99213 OFFICE O/P EST LOW 20 MIN: CPT | Performed by: OBSTETRICS & GYNECOLOGY

## 2024-11-11 RX ORDER — ACETAMINOPHEN AND CODEINE PHOSPHATE 120; 12 MG/5ML; MG/5ML
0.35 SOLUTION ORAL DAILY
Qty: 90 TABLET | Refills: 3 | Status: SHIPPED | OUTPATIENT
Start: 2024-11-11

## 2024-11-11 NOTE — LETTER
2024       RE: Vale Julien  [address] [place] [address]     Dear Colleague,    Thank you for referring your patient, Vale Julien, to the [facility] [place] at [facility]. Please see a copy of my visit note below.    [facility]  Annual Wellness Exam    HPI:    Vale Julien is a 38 year old  here for an annual exam and follow up of lichen sclerosis. Using clobetasol twice a week and topical estradiol 1 time per week. She is doing well and has no complaints. Denies any vaginal irritation, discomfort, discharge. Taking POPs, occasionally experiences some breakthrough bleeding.      GYN History  - LMP: No LMP recorded. (Menstrual status: Birth Control).  - Menses: amenorrheic   - Pap Smears: 2023 pap NILM, HPV negative   - Contraception: POPs    OBHx  OB History    Para Term  AB Living   1 1 1 0 0 2   SAB IAB Ectopic Multiple Live Births   0 0 0 1 2      # Outcome Date GA Lbr Holland/2nd Weight Sex Type Anes PTL Lv   1A Term 19 37w0d  2.892 kg (6 lb 6 oz) M CS-LTranv  N LIZANDRO      Name: Rogelio      Apgar1: 9  Apgar5: 8   1B Term 19 37w0d  2.722 kg (6 lb) M CS-LTranv   LIZANDRO      Name: Josafat      Apgar1: 9  Apgar5: 9     Past Medical History:   Diagnosis Date     Abnormal Pap smear of cervix 2022    Follow up needed this year     Gestational hypertension 2019    developed postpartum, no meds     NO ACTIVE PROBLEMS      Past Surgical History:   Procedure Laterality Date      SECTION N/A 2019    Procedure: Primary  Section;  Surgeon: Cynthia Rivas MD;  Location: [facility] L+[facility]     ENT SURGERY      Tubes put in my ears as a small child       Current Outpatient Medications:      amLODIPine (NORVASC) 5 MG tablet, Take 1 tablet (5 mg) by mouth daily, Disp: 90 tablet, Rfl: 3     clobetasol (TEMOVATE) 0.05 % external ointment, Apply topically twice a week ,  "increase frequency if symptomatic, Disp: 45 g, Rfl: 2     estradiol (ESTRACE) 0.1 MG/GM vaginal cream, Apply a pea sized amount topically nightly 2-3 times a week., Disp: 42.5 g, Rfl: 11     magnesium 200 MG TABS, , Disp: , Rfl:      norethindrone (MICRONOR) 0.35 MG tablet, Take 1 tablet (0.35 mg) by mouth daily., Disp: 90 tablet, Rfl: 3     VITAMIN D, CHOLECALCIFEROL, PO, Take 5,000 Units by mouth daily, Disp: , Rfl:   Allergies   Allergen Reactions     Penicillins Unknown     Sulfa Antibiotics Unknown     Family History   Problem Relation Age of Onset     Hypertension Father         Factor V, I have been tested (negative)     Breast Cancer Cousin         She has the BRCA1 gene. My father was tested and determined not to be a carrier.     Other Cancer Cousin         Ovarian cancer. Cousin - sister of cousin with BRCA1     Diabetes No family hx of      Social History     Tobacco Use     Smoking status: Never     Smokeless tobacco: Never   Vaping Use     Vaping status: Never Used   Substance Use Topics     Alcohol use: Yes     Comment: 1-2x  drinks a week, stopped in pregnancy     Drug use: No     ROS: 10-Point ROS negative except as noted in HPI    Physical Exam  BP (!) 153/97   Pulse 91   Ht 1.664 m (5' 5.51\")   Wt 96.6 kg (213 lb)   BMI 34.89 kg/m    Gen: well-appearing, NAD  HEENT: normocephalic, atraumatic  CV: warm, well perfused  Pulm: normal work of breathing and respiratory rate  Abd: soft, non-tender, non-distended  Ext: no LE edema  Pelvic exam:  Vulva: agglutination of the labia minora and minora bilaterally to about 2/3 of the distance to the clitoral munoz, the clitoral munoz is attenuated but still retractable, the epithelium of the perineum and medial aspects of the labia is thin and pale appearing, stable from her last exam.     Assessment/Plan:  Vale Julien is a 38 year old  here for follow up of lichen sclerosis.     # Contraception   Refills provided for Micronor.     # Lichen " sclerosis   Continue topical clobetasol twice weekly, can increase frequency if ever symptomatic. Continue topical estadiol cream 2-3 times weekly.     # Cervical cancer screening   Last pap 4/272023 NILM, HPV negative. History of LSIL/HPV negative 3/4/2022. Recommend follow up pap in 3 years (4/2026).     Return to clinic in 1 year for annual exam and lichen sclerosis follow up. Staffed with Dr. Clark.     Sandy Lane MD  Obstetrics & Gynecology, PGY-1  12:20 PM 11/11/2024    The patient was seen and examined with Dr. Lane.  I have reviewed and edited the above note.     Jeanette Clark MD, FACOG      Again, thank you for allowing me to participate in the care of your patient.      Sincerely,    Jeanette Clark MD

## 2024-11-11 NOTE — PROGRESS NOTES
[facility] [[email or web address]] Clinic  Annual Wellness Exam    HPI:    Vale Julien is a 38 year old  here for an annual exam and follow up of lichen sclerosis. Using clobetasol twice a week and topical estradiol 1 time per week. She is doing well and has no complaints. Denies any vaginal irritation, discomfort, discharge. Taking POPs, occasionally experiences some breakthrough bleeding.      GYN History  - LMP: No LMP recorded. (Menstrual status: Birth Control).  - Menses: amenorrheic   - Pap Smears: 2023 pap NILM, HPV negative   - Contraception: POPs    OBHx  OB History    Para Term  AB Living   1 1 1 0 0 2   SAB IAB Ectopic Multiple Live Births   0 0 0 1 2      # Outcome Date GA Lbr Holland/2nd Weight Sex Type Anes PTL Lv   1A Term 19 37w0d  2.892 kg (6 lb 6 oz) M CS-LTranv  N LIZANDRO      Name: Rogelio      Apgar1: 9  Apgar5: 8   1B Term 19 37w0d  2.722 kg (6 lb) M CS-LTranv   LIZANDRO      Name: Josafat      Apgar1: 9  Apgar5: 9     Past Medical History:   Diagnosis Date    Abnormal Pap smear of cervix 2022    Follow up needed this year    Gestational hypertension 2019    developed postpartum, no meds    NO ACTIVE PROBLEMS      Past Surgical History:   Procedure Laterality Date     SECTION N/A 2019    Procedure: Primary  Section;  Surgeon: Cynthia Rivas MD;  Location:  L+D    ENT SURGERY      Tubes put in my ears as a small child       Current Outpatient Medications:     amLODIPine (NORVASC) 5 MG tablet, Take 1 tablet (5 mg) by mouth daily, Disp: 90 tablet, Rfl: 3    clobetasol (TEMOVATE) 0.05 % external ointment, Apply topically twice a week , increase frequency if symptomatic, Disp: 45 g, Rfl: 2    estradiol (ESTRACE) 0.1 MG/GM vaginal cream, Apply a pea sized amount topically nightly 2-3 times a week., Disp: 42.5 g, Rfl: 11    magnesium 200 MG TABS, , Disp: , Rfl:     norethindrone (MICRONOR) 0.35 MG tablet, Take 1 tablet (0.35 mg) by mouth daily., Disp: 90  "tablet, Rfl: 3    VITAMIN D, CHOLECALCIFEROL, PO, Take 5,000 Units by mouth daily, Disp: , Rfl:   Allergies   Allergen Reactions    Penicillins Unknown    Sulfa Antibiotics Unknown     Family History   Problem Relation Age of Onset    Hypertension Father         Factor V, I have been tested (negative)    Breast Cancer Cousin         She has the BRCA1 gene. My father was tested and determined not to be a carrier.    Other Cancer Cousin         Ovarian cancer. Cousin - sister of cousin with BRCA1    Diabetes No family hx of      Social History     Tobacco Use    Smoking status: Never    Smokeless tobacco: Never   Vaping Use    Vaping status: Never Used   Substance Use Topics    Alcohol use: Yes     Comment: 1-2x  drinks a week, stopped in pregnancy    Drug use: No     ROS: 10-Point ROS negative except as noted in HPI    Physical Exam  BP (!) 153/97   Pulse 91   Ht 1.664 m (5' 5.51\")   Wt 96.6 kg (213 lb)   BMI 34.89 kg/m    Gen: well-appearing, NAD  HEENT: normocephalic, atraumatic  CV: warm, well perfused  Pulm: normal work of breathing and respiratory rate  Abd: soft, non-tender, non-distended  Ext: no LE edema  Pelvic exam:  Vulva: agglutination of the labia minora and minora bilaterally to about 2/3 of the distance to the clitoral munoz, the clitoral munoz is attenuated but still retractable, the epithelium of the perineum and medial aspects of the labia is thin and pale appearing, stable from her last exam.     Assessment/Plan:  Vale Julien is a 38 year old  here for follow up of lichen sclerosis.     # Contraception   Refills provided for Micronor.     # Lichen sclerosis   Continue topical clobetasol twice weekly, can increase frequency if ever symptomatic. Continue topical estadiol cream 2-3 times weekly.     # Cervical cancer screening   Last pap  NILM, HPV negative. History of LSIL/HPV negative 3/4/2022. Recommend follow up pap in 3 years (2026).     Return to clinic in 1 year for " annual exam and lichen sclerosis follow up. Staffed with Dr. Clark.     Sandy Lane MD  Obstetrics & Gynecology, PGY-1  12:20 PM 11/11/2024    The patient was seen and examined with Dr. Lane.  I have reviewed and edited the above note.     Jeanette Clark MD, FACOG

## 2024-11-11 NOTE — PATIENT INSTRUCTIONS
Thank you for trusting us with your care!   Please be aware, if you are on Mychart, you may see your results prior to your providers review. If labs are abnormal, we will call or message you on AOLt with a follow up plan.    If you need to contact us for questions about:  Symptoms, Scheduling & Medical Questions; Non-urgent (2-3 day response) [employer] message, Urgent (needing response today) [phone] (if after 3:30pm next day response)   Prescriptions: Please call your Pharmacy   Billing: [facility] [phone] or [facility]:[phone]

## 2024-11-19 ENCOUNTER — VIRTUAL VISIT (OUTPATIENT)
Dept: PSYCHOLOGY | Facility: CLINIC | Age: 39
End: 2024-11-19
Payer: COMMERCIAL

## 2024-11-19 DIAGNOSIS — F43.23 ADJUSTMENT DISORDER WITH MIXED ANXIETY AND DEPRESSED MOOD: Primary | ICD-10-CM

## 2024-11-19 PROCEDURE — 90837 PSYTX W PT 60 MINUTES: CPT | Mod: 95

## 2024-11-19 NOTE — PROGRESS NOTES
[facility]                                     Progress Note    Patient Name: Vale Greenberg  Date: 11/19/2024         Service Type: Individual      Session Start Time: 9:00 AM  Session End Time: 9:58 AM     Session Length: 58 Minutes    Session #: 29    Attendees: Client attended alone  Client consented to today's session being shadowed by DOROTA Cherry, MSW student.    Service Modality:  Video Visit:      Provider verified identity through the following two step process.  Patient provided:  Patient is known previously to provider    Telemedicine Visit: The patient's condition can be safely assessed and treated via synchronous audio and visual telemedicine encounter.      Reason for Telemedicine Visit: Patient has requested telehealth visit and Patient unable to travel    Originating Site (Patient Location): Patient's home    Distant Site (Provider Location): Provider Remote Setting- Home Office    Consent:  The patient/guardian has verbally consented to: the potential risks and benefits of telemedicine (video visit) versus in person care; bill my insurance or make self-payment for services provided; and responsibility for payment of non-covered services.     Patient would like the video invitation sent by:  My Chart    Mode of Communication:  Video Conference via [facility]    Distant Location (Provider):  Off-site    As the provider I attest to compliance with applicable laws and regulations related to telemedicine.    DATA  Extended Session (53+ minutes): PROLONGED SERVICE IN THE OUTPATIENT SETTING REQUIRING DIRECT (FACE-TO-FACE) PATIENT CONTACT BEYOND THE USUAL SERVICE:    - Patient's presenting concerns require more intensive intervention than could be completed within the usual service  Interactive Complexity: No  Crisis: No      Progress Since Last Session (Related to Symptoms / Goals / Homework):   Symptoms: Worsening anxiety, anger, and emotional distress, with improved boundary  "setting and clear communication    Homework: Achieved / completed to satisfaction      Episode of Care Goals: Satisfactory progress - ACTION (Actively working towards change); Intervened by reinforcing change plan / affirming steps taken     Current / Ongoing Stressors and Concerns:   Vale is coming to therapy to learn skills to decrease her anxiety and depression. She reports increased fear of possible health concerns and worries for her future have contributed to significant distress in all aspects of her life and results in high blood pressure, irritation, avoidance and withdrawing behavior. She reports increased relationship conflicts with her family and pressure to be \"it all for everyone.\"     Treatment Objective(s) Addressed in This Session:   use cognitive strategies identified in therapy to challenge anxious thoughts   practice deep breathing at least once a day     Intervention:   Therapist utilized reflected listening as patient gave brief reflection of the past few weeks. Patient reported worsening anxiety, anger, and emotional distress, with improved boundary setting and clear communication. She processed recent events post election and with her family as they prepare for the holidays. Therapist supported patient as she processed and validated patient. Therapist engaged in cognitive restructuring/ reframing, looked at cognitive distortions and challenged distorted thoughts. Therapist reflected on the progress that came with persistence and putting good effort into changing her thinking patterns and reinforced positive behavioral choices of limiting social media/news.     Assessments completed prior to visit:  The following assessments were completed by patient for this visit:  PHQ2:       8/21/2024    10:00 AM 5/20/2024     8:53 AM 5/9/2024    10:29 AM 2/1/2024     5:24 AM 1/16/2024     7:23 AM 12/28/2023     7:28 AM 9/20/2023     1:15 PM   PHQ-2 ( 1999 [employer])   Q1: Little interest or pleasure in " doing things 0  0  0  0  0  0  0    Q2: Feeling down, depressed or hopeless 0  0  0  1  0  0  0    PHQ-2 Score 0 0 0 1 0 0 0   Q1: Little interest or pleasure in doing things Not at all Not at all Not at all Not at all Not at all Not at all Not at all   Q2: Feeling down, depressed or hopeless Not at all Not at all Not at all Several days Not at all Not at all Not at all   PHQ-2 Score 0 0 0 1 0 0 0       Patient-reported     PHQ9:       6/18/2019     3:26 PM 7/10/2019     3:56 PM 3/2/2020     8:30 AM 3/4/2021     2:32 PM 6/7/2023    12:01 PM   PHQ-9 SCORE   PHQ-9 Total Score MyChart     3 (Minimal depression)   PHQ-9 Total Score 9 2 2 2 3     GAD2:       11/21/2023     6:08 AM 11/30/2023     7:31 AM 3/7/2024     9:48 AM 3/26/2024     8:41 PM 4/16/2024     8:19 PM 8/16/2024     9:33 AM 11/19/2024     7:34 AM   TIM-2   Feeling nervous, anxious, or on edge 1  1  1  1  1  1  1    Not being able to stop or control worrying 1  1  1  1  1  0  1    TIM-2 Total Score 2 2 2 2 2 1 2        Patient-reported     GAD7:       2/28/2020     8:06 AM 3/2/2020     8:30 AM 2/18/2021     7:29 AM 2/25/2021    10:15 AM 3/1/2022    10:35 AM 2/20/2023     9:54 AM 4/26/2023     7:57 AM   TIM-7 SCORE   Total Score 4 (minimal anxiety)  6 (mild anxiety) 6 (mild anxiety) 3 (minimal anxiety) 12 (moderate anxiety) 3 (minimal anxiety)   Total Score 4 4 6    6 6 3 12 3     PROMIS 10-Global Health (all questions and answers displayed):       11/21/2023     6:09 AM 11/30/2023     7:31 AM 3/7/2024     9:50 AM 3/26/2024     8:41 PM 4/16/2024     8:20 PM 8/16/2024     9:34 AM 11/19/2024     7:35 AM   PROMIS 10   In general, would you say your health is: Good Good Good Good Good Good Good   In general, would you say your quality of life is: Very good Very good Very good Very good Very good Very good Very good   In general, how would you rate your physical health? Good Good Good Good Good Good Good   In general, how would you rate your mental health,  including your mood and your ability to think? Good Very good Very good Good Good Very good Good   In general, how would you rate your satisfaction with your social activities and relationships? Very good Very good Very good Excellent Very good Very good Excellent   In general, please rate how well you carry out your usual social activities and roles Very good Excellent Very good Very good Very good Excellent Excellent   To what extent are you able to carry out your everyday physical activities such as walking, climbing stairs, carrying groceries, or moving a chair? Completely Completely Completely Completely Completely Completely Completely   In the past 7 days, how often have you been bothered by emotional problems such as feeling anxious, depressed, or irritable? Sometimes Sometimes Sometimes Sometimes Sometimes Sometimes Sometimes   In the past 7 days, how would you rate your fatigue on average? Moderate Moderate Moderate Mild Moderate Mild Moderate   In the past 7 days, how would you rate your pain on average, where 0 means no pain, and 10 means worst imaginable pain? 0 0 0 0 0 0 0   In general, would you say your health is: 3  3  3  3  3  3  3    In general, would you say your quality of life is: 4  4  4  4  4  4  4    In general, how would you rate your physical health? 3  3  3  3  3  3  3    In general, how would you rate your mental health, including your mood and your ability to think? 3  4  4  3  3  4  3    In general, how would you rate your satisfaction with your social activities and relationships? 4  4  4  5  4  4  5    In general, please rate how well you carry out your usual social activities and roles. (This includes activities at home, at work and in your community, and responsibilities as a parent, child, spouse, employee, friend, etc.) 4  5  4  4  4  5  5    To what extent are you able to carry out your everyday physical activities such as walking, climbing stairs, carrying groceries, or moving  a chair? 5  5  5  5  5  5  5    In the past 7 days, how often have you been bothered by emotional problems such as feeling anxious, depressed, or irritable? 3  3  3  3  3  3  3    In the past 7 days, how would you rate your fatigue on average? 3  3  3  2  3  2  3    In the past 7 days, how would you rate your pain on average, where 0 means no pain, and 10 means worst imaginable pain? 0  0  0  0  0  0  0    Global Mental Health Score 14 15 15 15 14 15 15    Global Physical Health Score 16 16 16 17 16 17 16    PROMIS TOTAL - SUBSCORES 30 31 31 32 30 32 31        Patient-reported         ASSESSMENT: Current Emotional / Mental Status (status of significant symptoms):   Risk status (Self / Other harm or suicidal ideation)   Patient denies current fears or concerns for personal safety.   Patient denies current or recent suicidal ideation or behaviors.   Patient denies current or recent homicidal ideation or behaviors.   Patient denies current or recent self injurious behavior or ideation.   Patient denies other safety concerns.   Patient reports there has been no change in risk factors since their last session.     Patient reports there has been no change in protective factors since their last session.     Recommended that patient call 911 or go to the local ED should there be a change in any of these risk factors     Appearance:   Appropriate    Eye Contact:   Good    Psychomotor Behavior: Normal    Attitude:   Cooperative    Orientation:   All   Speech    Rate / Production: Normal/ Responsive    Volume:  Normal    Mood:    Anxious  Normal   Affect:    Appropriate    Thought Content:  Clear    Thought Form:  Coherent  Logical    Insight:    Good      Medication Review:   No current psychiatric medications prescribed     Medication Compliance:   NA     Changes in Health Issues:   None reported     Chemical Use Review:   Substance Use: Chemical use reviewed, no active concerns identified      Tobacco Use: No current  tobacco use.      Diagnosis:  1. Adjustment disorder with mixed anxiety and depressed mood        Collateral Reports Completed:   Not Applicable    PLAN: (Patient Tasks / Therapist Tasks / Other)  Vale will continue to practice her grounding skills, challenging her cognitive distortions and limit social media/news.    Tarahtan Akins, LICSW                                                         ______________________________________________________________________    Individual Treatment Plan    Patient's Name: Vale Greenberg  YOB: 1985    Date of Creation: 8/21/2024  Date Treatment Plan Last Reviewed/Revised:8/21/2024    DSM5 Diagnoses: Adjustment Disorders  309.28 (F43.23) With mixed anxiety and depressed mood  Psychosocial / Contextual Factors: works full time, has twin sons, good support system.  PROMIS (reviewed every 90 days):     Referral / Collaboration:  Referral to another professional/service is not indicated at this time..    Anticipated number of session for this episode of care: 9-12 sessions  Anticipation frequency of session: Every other week  Anticipated Duration of each session: 38-52 minutes  Treatment plan will be reviewed in 90 days or when goals have been changed.       MeasurableTreatment Goal(s) related to diagnosis / functional impairment(s)  Goal 1: Patient will become more aware of their anxiety and utilize the skills taught to decrease their anxiety symptoms.    I will know I've met my goal when I understand more about my anxiety and have skills to cope with it.      Objective #A (Patient Action)    Patient will  use at least 4 coping skills for anxiety management in the next 12 weeks. .  Status: Continued - Date(s):8/21/2024    Intervention(s)  Therapist will  teach coping skills and assign homework of practicing these .    Objective #B  Patient will  use cognitive strategies identified in therapy to challenge anxious thoughts. Patient will engage in activities  that are anxiety producing for them, slowly increasing their tolerance for new activities. Patient will identify and recognize past negative experiences and subsequent beliefs they hold that contribute to their anxiety. .  Status: Continued - Date(s):8/21/2024    Intervention(s)  Therapist will  will teach cognitive behavioral skills and engage client in Socratic dialogue around distorted thinking.  Therapist will provide educational materials on CBT.  .    Objective #C  Patient will  implement self-care into their routine to decrease anxiety, focusing on sleep hygiene, nutrition, movement, regular engagement in mindful activity, and regular socialization.  .  Status: Continued - Date(s):8/21/2024    Intervention(s)  Therapist will  provide psychoeducation around the benefit of self-care and help client identify mindfulness based activities that may work for their life. .    Goal 2: Patiient will improve her ability to be effective in relationships as evidenced by client reporting use of three new communication skills over the next three months.    I will know I've met my goal when I can communicate with my family better.      Objective #A (Patient Action)    Status: Continued - Date(s):8/21/2024    Patient will  learn & utilize at least 2 assertive communication skills weekly .    Intervention(s)  Therapist will  teach assertiveness, effective interpersonal skills and about healthy boundaries in relationships .    Objective #B  Patient will  identify his values regarding interpersonal relationships and how to uphold her values while navigating interpersonal relationships .    Status: Continued - Date(s): 8/21/2024    Intervention(s)  Therapist will  teach how to uphold values with DBT and provide support and feedback and teach about healthy boundaries in relationships .    Objective #C  Patient will  uphold her values while navigating interpersonal relationships .  Status: Continued -  Date(s):8/21/2024    Intervention(s)  Therapist will  provide support and feedback and teach about healthy boundaries in relationships. .      Patient has reviewed and agreed to the above plan.      NIK Montero  August 21, 2024

## 2024-12-03 ENCOUNTER — PATIENT OUTREACH (OUTPATIENT)
Dept: FAMILY MEDICINE | Facility: CLINIC | Age: 39
End: 2024-12-03
Payer: COMMERCIAL

## 2024-12-03 NOTE — TELEPHONE ENCOUNTER
Patient Quality Outreach    Patient is due for the following:   Hypertension -  BP check- blood pressure was high on 11/11/24  Physical Preventive Adult Physical    Action(s) Taken:   Schedule a Adult Preventative    Type of outreach:    Sent [employer] message.    Questions for provider review:    None           Sean Amado MA

## 2025-01-08 ENCOUNTER — VIRTUAL VISIT (OUTPATIENT)
Dept: PSYCHOLOGY | Facility: CLINIC | Age: 40
End: 2025-01-08
Payer: COMMERCIAL

## 2025-01-08 DIAGNOSIS — F43.23 ADJUSTMENT DISORDER WITH MIXED ANXIETY AND DEPRESSED MOOD: Primary | ICD-10-CM

## 2025-01-08 PROCEDURE — 90837 PSYTX W PT 60 MINUTES: CPT | Mod: 95

## 2025-01-14 NOTE — PROGRESS NOTES
[facility]                                     Progress Note    Patient Name: Vale Greenberg  Date: 1/8/2025         Service Type: Individual      Session Start Time: 1:00 PM  Session End Time: 1:58 PM     Session Length: 58 Minutes    Session #: 32    Attendees: Client attended alone    Service Modality:  Video Visit:      Provider verified identity through the following two step process.  Patient provided:  Patient is known previously to provider    Telemedicine Visit: The patient's condition can be safely assessed and treated via synchronous audio and visual telemedicine encounter.      Reason for Telemedicine Visit: Patient has requested telehealth visit and Patient unable to travel    Originating Site (Patient Location): Patient's home    Distant Site (Provider Location): Provider Remote Setting- Home Office    Consent:  The patient/guardian has verbally consented to: the potential risks and benefits of telemedicine (video visit) versus in person care; bill my insurance or make self-payment for services provided; and responsibility for payment of non-covered services.     Patient would like the video invitation sent by:  My Chart    Mode of Communication:  Video Conference via [employer]    Distant Location (Provider):  Off-site    As the provider I attest to compliance with applicable laws and regulations related to telemedicine.    DATA  Extended Session (53+ minutes): PROLONGED SERVICE IN THE OUTPATIENT SETTING REQUIRING DIRECT (FACE-TO-FACE) PATIENT CONTACT BEYOND THE USUAL SERVICE:    - Patient's presenting concerns require more intensive intervention than could be completed within the usual service  Interactive Complexity: No  Crisis: No      Progress Since Last Session (Related to Symptoms / Goals / Homework):   Symptoms: Improving utilization of skills when emotionally escalated, honoring her boundaries and stress in parenting and navigating friendships    Homework: Achieved /  "completed to satisfaction      Episode of Care Goals: Satisfactory progress - ACTION (Actively working towards change); Intervened by reinforcing change plan / affirming steps taken     Current / Ongoing Stressors and Concerns:   Vale is coming to therapy to learn skills to decrease her anxiety and depression. She reports increased fear of possible health concerns and worries for her future have contributed to significant distress in all aspects of her life and results in high blood pressure, irritation, avoidance and withdrawing behavior. She reports increased relationship conflicts with her family and pressure to be \"it all for everyone.\"     Treatment Objective(s) Addressed in This Session:   use cognitive strategies identified in therapy to challenge anxious thoughts   practice deep breathing at least once a day     Intervention:   Therapist utilized reflected listening as patient gave brief reflection of the past few weeks. Patient reported increased utilization of skills when emotionally escalated, honoring her boundaries and stress in parenting and navigating friendships. She processed her feelings navigating ending a friendship that is no longer serving her. She reflected on her pride for being herself and honoring her boundaries with celebrating her mother's birthday. Therapist supported patient as she processed and validated patient. Therapist engaged in cognitive restructuring/ reframing, looked at cognitive distortions and challenged distorted thoughts focusing on being herself and being clear with her expectations. Therapist reflected on the progress that came with persistence and putting good effort into changing her thinking patterns and reinforced positive behavioral choices with the utilization of her skills.     Assessments completed prior to visit:  The following assessments were completed by patient for this visit:  PHQ2:       1/8/2025    12:45 PM 12/20/2024     9:45 AM 12/5/2024     9:26 AM " 8/21/2024    10:00 AM 5/20/2024     8:53 AM 5/9/2024    10:29 AM 2/1/2024     5:24 AM   PHQ-2 ( 1999 [employer])   Q1: Little interest or pleasure in doing things 0 0 0 0 0 0 0   Q2: Feeling down, depressed or hopeless 0 0 0 0 0 0 1   PHQ-2 Score 0  0  0  0 0 0 1   Q1: Little interest or pleasure in doing things Not at all Not at all Not at all Not at all Not at all Not at all Not at all   Q2: Feeling down, depressed or hopeless Not at all Not at all Not at all Not at all Not at all Not at all Several days   PHQ-2 Score 0 0 0 0 0 0 1       Patient-reported     PHQ9:       6/18/2019     3:26 PM 7/10/2019     3:56 PM 3/2/2020     8:30 AM 3/4/2021     2:32 PM 6/7/2023    12:01 PM   PHQ-9 SCORE   PHQ-9 Total Score MyChart     3 (Minimal depression)   PHQ-9 Total Score 9 2 2 2 3     GAD2:       3/26/2024     8:41 PM 4/16/2024     8:19 PM 8/16/2024     9:33 AM 11/19/2024     7:34 AM 12/5/2024     9:26 AM 12/20/2024     9:46 AM 1/8/2025    12:45 PM   TIM-2   Feeling nervous, anxious, or on edge 1 1 1 1 1 1 1   Not being able to stop or control worrying 1 1 0 1 0 1 1   TIM-2 Total Score 2 2 1 2  1  2  2        Patient-reported     GAD7:       2/28/2020     8:06 AM 3/2/2020     8:30 AM 2/18/2021     7:29 AM 2/25/2021    10:15 AM 3/1/2022    10:35 AM 2/20/2023     9:54 AM 4/26/2023     7:57 AM   TIM-7 SCORE   Total Score 4 (minimal anxiety)  6 (mild anxiety) 6 (mild anxiety) 3 (minimal anxiety) 12 (moderate anxiety) 3 (minimal anxiety)   Total Score 4 4 6    6 6 3 12 3     PROMIS 10-Global Health (all questions and answers displayed):       3/26/2024     8:41 PM 4/16/2024     8:20 PM 8/16/2024     9:34 AM 11/19/2024     7:35 AM 12/5/2024     9:27 AM 12/20/2024     9:46 AM 1/8/2025    12:46 PM   PROMIS 10   In general, would you say your health is: Good Good Good Good Good Good Good   In general, would you say your quality of life is: Very good Very good Very good Very good Very good Very good Very good   In general, how would you  rate your physical health? Good Good Good Good Good Good Good   In general, how would you rate your mental health, including your mood and your ability to think? Good Good Very good Good Good Very good Very good   In general, how would you rate your satisfaction with your social activities and relationships? Excellent Very good Very good Excellent Very good Excellent Very good   In general, please rate how well you carry out your usual social activities and roles Very good Very good Excellent Excellent Excellent Very good Very good   To what extent are you able to carry out your everyday physical activities such as walking, climbing stairs, carrying groceries, or moving a chair? Completely Completely Completely Completely Completely Completely Completely   In the past 7 days, how often have you been bothered by emotional problems such as feeling anxious, depressed, or irritable? Sometimes Sometimes Sometimes Sometimes Sometimes Sometimes Rarely   In the past 7 days, how would you rate your fatigue on average? Mild Moderate Mild Moderate Moderate Mild Moderate   In the past 7 days, how would you rate your pain on average, where 0 means no pain, and 10 means worst imaginable pain? 0 0 0 0 0 0 0   In general, would you say your health is: 3 3 3 3 3 3 3   In general, would you say your quality of life is: 4 4 4 4 4 4 4   In general, how would you rate your physical health? 3 3 3 3 3 3 3   In general, how would you rate your mental health, including your mood and your ability to think? 3 3 4 3 3 4 4   In general, how would you rate your satisfaction with your social activities and relationships? 5 4 4 5 4 5 4   In general, please rate how well you carry out your usual social activities and roles. (This includes activities at home, at work and in your community, and responsibilities as a parent, child, spouse, employee, friend, etc.) 4 4 5 5 5 4 4   To what extent are you able to carry out your everyday physical  "activities such as walking, climbing stairs, carrying groceries, or moving a chair? 5 5 5 5 5 5 5   In the past 7 days, how often have you been bothered by emotional problems such as feeling anxious, depressed, or irritable? 3 3 3 3 3 3 2   In the past 7 days, how would you rate your fatigue on average? 2 3 2 3 3 2 3   In the past 7 days, how would you rate your pain on average, where 0 means no pain, and 10 means worst imaginable pain? 0 0 0 0 0 0 0   Global Mental Health Score 15 14 15 15  14  16  16    Global Physical Health Score 17 16 17 16  16  17  16    PROMIS TOTAL - SUBSCORES 32 30 32 31  30  33  32        Patient-reported         ASSESSMENT: Current Emotional / Mental Status (status of significant symptoms):   Risk status (Self / Other harm or suicidal ideation)   Patient denies current fears or concerns for personal safety.   Patient denies current or recent suicidal ideation or behaviors.   Patient denies current or recent homicidal ideation or behaviors.   Patient denies current or recent self injurious behavior or ideation.   Patient denies other safety concerns.   Patient reports there has been no change in risk factors since their last session.     Patient reports there has been no change in protective factors since their last session.     Recommended that patient call 911 or go to the local ED should there be a change in any of these risk factors     Appearance:   Appropriate    Eye Contact:   Good    Psychomotor Behavior: Normal    Attitude:   Cooperative    Orientation:   All   Speech    Rate / Production: Normal/ Responsive    Volume:  Normal    Mood:    Anxious  Normal \"Happy\"   Affect:    Appropriate    Thought Content:  Clear    Thought Form:  Coherent  Logical    Insight:    Good      Medication Review:   No current psychiatric medications prescribed     Medication Compliance:   NA     Changes in Health Issues:   None reported     Chemical Use Review:   Substance Use: Chemical use reviewed, " no active concerns identified      Tobacco Use: No current tobacco use.      Diagnosis:  1. Adjustment disorder with mixed anxiety and depressed mood        Collateral Reports Completed:   Not Applicable    PLAN: (Patient Tasks / Therapist Tasks / Other)  Vale will continue to practice her grounding skills and challenging her cognitive distortions.    Tarah Akins, LICSW                                                         ______________________________________________________________________    Individual Treatment Plan    Patient's Name: Vale Greenberg  YOB: 1985    Date of Creation: 8/21/2024  Date Treatment Plan Last Reviewed/Revised:12/5/2024    DSM5 Diagnoses: Adjustment Disorders  309.28 (F43.23) With mixed anxiety and depressed mood  Psychosocial / Contextual Factors: works full time, has twin sons, good support system.  PROMIS (reviewed every 90 days):     Referral / Collaboration:  Referral to another professional/service is not indicated at this time..    Anticipated number of session for this episode of care: 9-12 sessions  Anticipation frequency of session: Every other week  Anticipated Duration of each session: 38-52 minutes  Treatment plan will be reviewed in 90 days or when goals have been changed.       MeasurableTreatment Goal(s) related to diagnosis / functional impairment(s)  Goal 1: Patient will become more aware of their anxiety and utilize the skills taught to decrease their anxiety symptoms.    I will know I've met my goal when I understand more about my anxiety and have skills to cope with it.      Objective #A (Patient Action)    Patient will  use at least 4 coping skills for anxiety management in the next 12 weeks. .  Status: Continued - Date(s):12/5/2024    Intervention(s)  Therapist will  teach coping skills and assign homework of practicing these .    Objective #B  Patient will  use cognitive strategies identified in therapy to challenge anxious thoughts.  Patient will engage in activities that are anxiety producing for them, slowly increasing their tolerance for new activities. Patient will identify and recognize past negative experiences and subsequent beliefs they hold that contribute to their anxiety. .  Status: Continued - Date(s):12/5/2024    Intervention(s)  Therapist will  will teach cognitive behavioral skills and engage client in Socratic dialogue around distorted thinking.  Therapist will provide educational materials on CBT.  .    Objective #C  Patient will  implement self-care into their routine to decrease anxiety, focusing on sleep hygiene, nutrition, movement, regular engagement in mindful activity, and regular socialization.  .  Status: Continued - Date(s):12/5/2024    Intervention(s)  Therapist will  provide psychoeducation around the benefit of self-care and help client identify mindfulness based activities that may work for their life. .    Goal 2: Patiient will improve her ability to be effective in relationships as evidenced by client reporting use of three new communication skills over the next three months.    I will know I've met my goal when I can communicate with my family better.      Objective #A (Patient Action)    Status: Continued - Date(s):12/5/2024    Patient will  learn & utilize at least 2 assertive communication skills weekly .    Intervention(s)  Therapist will  teach assertiveness, effective interpersonal skills and about healthy boundaries in relationships .    Objective #B  Patient will  identify his values regarding interpersonal relationships and how to uphold her values while navigating interpersonal relationships .    Status: Continued - Date(s): 12/5/2024    Intervention(s)  Therapist will  teach how to uphold values with DBT and provide support and feedback and teach about healthy boundaries in relationships .    Objective #C  Patient will  uphold her values while navigating interpersonal relationships .  Status:  Continued - Date(s):12/5/2024    Intervention(s)  Therapist will  provide support and feedback and teach about healthy boundaries in relationships. .      Patient has reviewed and agreed to the above plan.      NIK Montero  December 5, 2024

## 2025-01-22 ENCOUNTER — VIRTUAL VISIT (OUTPATIENT)
Dept: PSYCHOLOGY | Facility: CLINIC | Age: 40
End: 2025-01-22
Payer: COMMERCIAL

## 2025-01-22 DIAGNOSIS — F43.23 ADJUSTMENT DISORDER WITH MIXED ANXIETY AND DEPRESSED MOOD: Primary | ICD-10-CM

## 2025-01-22 PROCEDURE — 90837 PSYTX W PT 60 MINUTES: CPT | Mod: 95

## 2025-01-23 NOTE — PROGRESS NOTES
[facility]                                     Progress Note    Patient Name: Vale Greenberg  Date: 1/22/2025         Service Type: Individual      Session Start Time: 1:00 PM  Session End Time: 1:57 PM     Session Length: 57 Minutes    Session #: 33    Attendees: Client attended alone    Service Modality:  Video Visit:      Provider verified identity through the following two step process.  Patient provided:  Patient is known previously to provider    Telemedicine Visit: The patient's condition can be safely assessed and treated via synchronous audio and visual telemedicine encounter.      Reason for Telemedicine Visit: Patient has requested telehealth visit and Patient unable to travel    Originating Site (Patient Location): Patient's home    Distant Site (Provider Location): Provider Remote Setting- Home Office    Consent:  The patient/guardian has verbally consented to: the potential risks and benefits of telemedicine (video visit) versus in person care; bill my insurance or make self-payment for services provided; and responsibility for payment of non-covered services.     Patient would like the video invitation sent by:  My Chart    Mode of Communication:  Video Conference via [employer]    Distant Location (Provider):  Off-site    As the provider I attest to compliance with applicable laws and regulations related to telemedicine.    DATA  Extended Session (53+ minutes): PROLONGED SERVICE IN THE OUTPATIENT SETTING REQUIRING DIRECT (FACE-TO-FACE) PATIENT CONTACT BEYOND THE USUAL SERVICE:    - Patient's presenting concerns require more intensive intervention than could be completed within the usual service  Interactive Complexity: No  Crisis: No      Progress Since Last Session (Related to Symptoms / Goals / Homework):   Symptoms: Worsening anxiety, worry and anger    Homework: Achieved / completed to satisfaction      Episode of Care Goals: Satisfactory progress - ACTION (Actively working  Fax received from [facility]. Requesting provider sign updated admission orders.     Forms placed in JL bin for review/signature.    "towards change); Intervened by reinforcing change plan / affirming steps taken     Current / Ongoing Stressors and Concerns:   Vale is coming to therapy to learn skills to decrease her anxiety and depression. She reports increased fear of possible health concerns and worries for her future have contributed to significant distress in all aspects of her life and results in high blood pressure, irritation, avoidance and withdrawing behavior. She reports increased relationship conflicts with her family and pressure to be \"it all for everyone.\"     Treatment Objective(s) Addressed in This Session:   use cognitive strategies identified in therapy to challenge anxious thoughts   practice deep breathing at least once a day     Intervention:   Therapist utilized reflected listening as patient gave brief reflection of the past few weeks. Patient reported worsening anxiety, worry and anger. She processed her feelings post inauguration and her social media usage. She reflected on her change in media usage and conversations with others. Therapist supported patient as she processed and validated patient. Therapist engaged in cognitive restructuring/ reframing, looked at cognitive distortions and challenged distorted thoughts focusing on being herself and being clear with her expectations. Therapist reflected on the progress that came with persistence and putting good effort into changing her thinking patterns and reinforced positive behavioral choices with the utilization of her skills.     Assessments completed prior to visit:  The following assessments were completed by patient for this visit:  PHQ2:       1/8/2025    12:45 PM 12/20/2024     9:45 AM 12/5/2024     9:26 AM 8/21/2024    10:00 AM 5/20/2024     8:53 AM 5/9/2024    10:29 AM 2/1/2024     5:24 AM   PHQ-2 ( 1999 [employer])   Q1: Little interest or pleasure in doing things 0 0 0 0 0 0 0   Q2: Feeling down, depressed or hopeless 0 0 0 0 0 0 1   PHQ-2 Score 0  0  0  0 0 0 " 1   Q1: Little interest or pleasure in doing things Not at all Not at all Not at all Not at all Not at all Not at all Not at all   Q2: Feeling down, depressed or hopeless Not at all Not at all Not at all Not at all Not at all Not at all Several days   PHQ-2 Score 0 0 0 0 0 0 1       Patient-reported     PHQ9:       6/18/2019     3:26 PM 7/10/2019     3:56 PM 3/2/2020     8:30 AM 3/4/2021     2:32 PM 6/7/2023    12:01 PM   PHQ-9 SCORE   PHQ-9 Total Score MyChart     3 (Minimal depression)   PHQ-9 Total Score 9 2 2 2 3     GAD2:       3/26/2024     8:41 PM 4/16/2024     8:19 PM 8/16/2024     9:33 AM 11/19/2024     7:34 AM 12/5/2024     9:26 AM 12/20/2024     9:46 AM 1/8/2025    12:45 PM   TIM-2   Feeling nervous, anxious, or on edge 1 1 1 1 1 1 1   Not being able to stop or control worrying 1 1 0 1 0 1 1   TIM-2 Total Score 2 2 1 2  1  2  2        Patient-reported     GAD7:       2/28/2020     8:06 AM 3/2/2020     8:30 AM 2/18/2021     7:29 AM 2/25/2021    10:15 AM 3/1/2022    10:35 AM 2/20/2023     9:54 AM 4/26/2023     7:57 AM   TIM-7 SCORE   Total Score 4 (minimal anxiety)  6 (mild anxiety) 6 (mild anxiety) 3 (minimal anxiety) 12 (moderate anxiety) 3 (minimal anxiety)   Total Score 4 4 6    6 6 3 12 3     PROMIS 10-Global Health (all questions and answers displayed):       3/26/2024     8:41 PM 4/16/2024     8:20 PM 8/16/2024     9:34 AM 11/19/2024     7:35 AM 12/5/2024     9:27 AM 12/20/2024     9:46 AM 1/8/2025    12:46 PM   PROMIS 10   In general, would you say your health is: Good Good Good Good Good Good Good   In general, would you say your quality of life is: Very good Very good Very good Very good Very good Very good Very good   In general, how would you rate your physical health? Good Good Good Good Good Good Good   In general, how would you rate your mental health, including your mood and your ability to think? Good Good Very good Good Good Very good Very good   In general, how would you rate your  satisfaction with your social activities and relationships? Excellent Very good Very good Excellent Very good Excellent Very good   In general, please rate how well you carry out your usual social activities and roles Very good Very good Excellent Excellent Excellent Very good Very good   To what extent are you able to carry out your everyday physical activities such as walking, climbing stairs, carrying groceries, or moving a chair? Completely Completely Completely Completely Completely Completely Completely   In the past 7 days, how often have you been bothered by emotional problems such as feeling anxious, depressed, or irritable? Sometimes Sometimes Sometimes Sometimes Sometimes Sometimes Rarely   In the past 7 days, how would you rate your fatigue on average? Mild Moderate Mild Moderate Moderate Mild Moderate   In the past 7 days, how would you rate your pain on average, where 0 means no pain, and 10 means worst imaginable pain? 0 0 0 0 0 0 0   In general, would you say your health is: 3 3 3 3 3 3 3   In general, would you say your quality of life is: 4 4 4 4 4 4 4   In general, how would you rate your physical health? 3 3 3 3 3 3 3   In general, how would you rate your mental health, including your mood and your ability to think? 3 3 4 3 3 4 4   In general, how would you rate your satisfaction with your social activities and relationships? 5 4 4 5 4 5 4   In general, please rate how well you carry out your usual social activities and roles. (This includes activities at home, at work and in your community, and responsibilities as a parent, child, spouse, employee, friend, etc.) 4 4 5 5 5 4 4   To what extent are you able to carry out your everyday physical activities such as walking, climbing stairs, carrying groceries, or moving a chair? 5 5 5 5 5 5 5   In the past 7 days, how often have you been bothered by emotional problems such as feeling anxious, depressed, or irritable? 3 3 3 3 3 3 2   In the past 7  "days, how would you rate your fatigue on average? 2 3 2 3 3 2 3   In the past 7 days, how would you rate your pain on average, where 0 means no pain, and 10 means worst imaginable pain? 0 0 0 0 0 0 0   Global Mental Health Score 15 14 15 15  14  16  16    Global Physical Health Score 17 16 17 16  16  17  16    PROMIS TOTAL - SUBSCORES 32 30 32 31  30  33  32        Patient-reported         ASSESSMENT: Current Emotional / Mental Status (status of significant symptoms):   Risk status (Self / Other harm or suicidal ideation)   Patient denies current fears or concerns for personal safety.   Patient denies current or recent suicidal ideation or behaviors.   Patient denies current or recent homicidal ideation or behaviors.   Patient denies current or recent self injurious behavior or ideation.   Patient denies other safety concerns.   Patient reports there has been no change in risk factors since their last session.     Patient reports there has been no change in protective factors since their last session.     Recommended that patient call 911 or go to the local ED should there be a change in any of these risk factors     Appearance:   Appropriate    Eye Contact:   Good    Psychomotor Behavior: Normal    Attitude:   Cooperative    Orientation:   All   Speech    Rate / Production: Normal/ Responsive    Volume:  Normal    Mood:    Anxious  Normal \"Happy\"   Affect:    Appropriate    Thought Content:  Clear    Thought Form:  Coherent  Logical    Insight:    Good      Medication Review:   No current psychiatric medications prescribed     Medication Compliance:   NA     Changes in Health Issues:   None reported     Chemical Use Review:   Substance Use: Chemical use reviewed, no active concerns identified      Tobacco Use: No current tobacco use.      Diagnosis:  1. Adjustment disorder with mixed anxiety and depressed mood        Collateral Reports Completed:   Not Applicable    PLAN: (Patient Tasks / Therapist Tasks / " Other)  Vale will continue to practice her grounding skills, challenging her cognitive distortions and limit media usage.     Tarah Akins, LICSW                                                         ______________________________________________________________________    Individual Treatment Plan    Patient's Name: Vale Greenberg  YOB: 1985    Date of Creation: 8/21/2024  Date Treatment Plan Last Reviewed/Revised:12/5/2024    DSM5 Diagnoses: Adjustment Disorders  309.28 (F43.23) With mixed anxiety and depressed mood  Psychosocial / Contextual Factors: works full time, has twin sons, good support system.  PROMIS (reviewed every 90 days):     Referral / Collaboration:  Referral to another professional/service is not indicated at this time..    Anticipated number of session for this episode of care: 9-12 sessions  Anticipation frequency of session: Every other week  Anticipated Duration of each session: 38-52 minutes  Treatment plan will be reviewed in 90 days or when goals have been changed.       MeasurableTreatment Goal(s) related to diagnosis / functional impairment(s)  Goal 1: Patient will become more aware of their anxiety and utilize the skills taught to decrease their anxiety symptoms.    I will know I've met my goal when I understand more about my anxiety and have skills to cope with it.      Objective #A (Patient Action)    Patient will  use at least 4 coping skills for anxiety management in the next 12 weeks. .  Status: Continued - Date(s):12/5/2024    Intervention(s)  Therapist will  teach coping skills and assign homework of practicing these .    Objective #B  Patient will  use cognitive strategies identified in therapy to challenge anxious thoughts. Patient will engage in activities that are anxiety producing for them, slowly increasing their tolerance for new activities. Patient will identify and recognize past negative experiences and subsequent beliefs they hold that  contribute to their anxiety. .  Status: Continued - Date(s):12/5/2024    Intervention(s)  Therapist will  will teach cognitive behavioral skills and engage client in Socratic dialogue around distorted thinking.  Therapist will provide educational materials on CBT.  .    Objective #C  Patient will  implement self-care into their routine to decrease anxiety, focusing on sleep hygiene, nutrition, movement, regular engagement in mindful activity, and regular socialization.  .  Status: Continued - Date(s):12/5/2024    Intervention(s)  Therapist will  provide psychoeducation around the benefit of self-care and help client identify mindfulness based activities that may work for their life. .    Goal 2: Patiient will improve her ability to be effective in relationships as evidenced by client reporting use of three new communication skills over the next three months.    I will know I've met my goal when I can communicate with my family better.      Objective #A (Patient Action)    Status: Continued - Date(s):12/5/2024    Patient will  learn & utilize at least 2 assertive communication skills weekly .    Intervention(s)  Therapist will  teach assertiveness, effective interpersonal skills and about healthy boundaries in relationships .    Objective #B  Patient will  identify his values regarding interpersonal relationships and how to uphold her values while navigating interpersonal relationships .    Status: Continued - Date(s): 12/5/2024    Intervention(s)  Therapist will  teach how to uphold values with DBT and provide support and feedback and teach about healthy boundaries in relationships .    Objective #C  Patient will  uphold her values while navigating interpersonal relationships .  Status: Continued - Date(s):12/5/2024    Intervention(s)  Therapist will  provide support and feedback and teach about healthy boundaries in relationships. .      Patient has reviewed and agreed to the above plan.      Tarah Akins,  [facility]  December 5, 2024

## 2025-02-10 ENCOUNTER — VIRTUAL VISIT (OUTPATIENT)
Dept: PSYCHOLOGY | Facility: CLINIC | Age: 40
End: 2025-02-10
Payer: COMMERCIAL

## 2025-02-10 DIAGNOSIS — F43.23 ADJUSTMENT DISORDER WITH MIXED ANXIETY AND DEPRESSED MOOD: Primary | ICD-10-CM

## 2025-02-10 PROCEDURE — 90837 PSYTX W PT 60 MINUTES: CPT | Mod: 95

## 2025-02-11 NOTE — PROGRESS NOTES
[facility]                                     Progress Note    Patient Name: Vale Greenberg  Date: 2/10/2025         Service Type: Individual      Session Start Time: 1:33 PM  Session End Time: 2:30 PM     Session Length: 57 Minutes    Session #: 34    Attendees: Client attended alone    Service Modality:  Video Visit:      Provider verified identity through the following two step process.  Patient provided:  Patient is known previously to provider    Telemedicine Visit: The patient's condition can be safely assessed and treated via synchronous audio and visual telemedicine encounter.      Reason for Telemedicine Visit: Patient has requested telehealth visit and Patient unable to travel    Originating Site (Patient Location): Patient's home    Distant Site (Provider Location): [facility]    Consent:  The patient/guardian has verbally consented to: the potential risks and benefits of telemedicine (video visit) versus in person care; bill my insurance or make self-payment for services provided; and responsibility for payment of non-covered services.     Patient would like the video invitation sent by:  My Chart    Mode of Communication:  Video Conference via [facility]    Distant Location (Provider):  On-site    As the provider I attest to compliance with applicable laws and regulations related to telemedicine.    DATA  Extended Session (53+ minutes): PROLONGED SERVICE IN THE OUTPATIENT SETTING REQUIRING DIRECT (FACE-TO-FACE) PATIENT CONTACT BEYOND THE USUAL SERVICE:    - Patient's presenting concerns require more intensive intervention than could be completed within the usual service  Interactive Complexity: No  Crisis: No      Progress Since Last Session (Related to Symptoms / Goals / Homework):   Symptoms: Improving a reduction in anxiety and frustration.     Homework: Achieved / completed to satisfaction      Episode of Care Goals:  "Satisfactory progress - ACTION (Actively working towards change); Intervened by reinforcing change plan / affirming steps taken     Current / Ongoing Stressors and Concerns:   Vale is coming to therapy to learn skills to decrease her anxiety and depression. She reports increased fear of possible health concerns and worries for her future have contributed to significant distress in all aspects of her life and results in high blood pressure, irritation, avoidance and withdrawing behavior. She reports increased relationship conflicts with her family and pressure to be \"it all for everyone.\"     Treatment Objective(s) Addressed in This Session:   use cognitive strategies identified in therapy to challenge anxious thoughts  Identify negative self-talk and behaviors: challenge core beliefs, myths, and actions   practice deep breathing at least once a day     Intervention:   Therapist utilized reflected listening as patient gave brief reflection of the past few weeks. Patient reported a reduction in anxiety and frustration with increased exhaustion since being sick. She processed how hard it has been to parent while her and her whole family have been sick. She reflected on her incremental increase of taking in the daily news while utilizing her ground skills and how comparison to others or the possible thoughts of others has made her feeling less than and then frustrated. Therapist supported patient as she processed and validated patient. Therapist engaged in cognitive restructuring/ reframing, looked at cognitive distortions and challenged distorted thoughts with self compassion. Therapist reflected on the progress that came with persistence and putting good effort into changing her thinking patterns and reinforced positive behavioral choices with the utilization of her skills.     Assessments completed prior to visit:  The following assessments were completed by patient for this visit:  PHQ2:       1/8/2025    12:45 " PM 12/20/2024     9:45 AM 12/5/2024     9:26 AM 8/21/2024    10:00 AM 5/20/2024     8:53 AM 5/9/2024    10:29 AM 2/1/2024     5:24 AM   PHQ-2 ( 1999 [employer])   Q1: Little interest or pleasure in doing things 0 0 0 0 0 0 0   Q2: Feeling down, depressed or hopeless 0 0 0 0 0 0 1   PHQ-2 Score 0  0  0  0 0 0 1   Q1: Little interest or pleasure in doing things Not at all Not at all Not at all Not at all Not at all Not at all Not at all   Q2: Feeling down, depressed or hopeless Not at all Not at all Not at all Not at all Not at all Not at all Several days   PHQ-2 Score 0 0 0 0 0 0 1       Patient-reported     PHQ9:       6/18/2019     3:26 PM 7/10/2019     3:56 PM 3/2/2020     8:30 AM 3/4/2021     2:32 PM 6/7/2023    12:01 PM   PHQ-9 SCORE   PHQ-9 Total Score MyChart     3 (Minimal depression)   PHQ-9 Total Score 9 2 2 2 3     GAD2:       3/26/2024     8:41 PM 4/16/2024     8:19 PM 8/16/2024     9:33 AM 11/19/2024     7:34 AM 12/5/2024     9:26 AM 12/20/2024     9:46 AM 1/8/2025    12:45 PM   TIM-2   Feeling nervous, anxious, or on edge 1 1 1 1 1 1 1   Not being able to stop or control worrying 1 1 0 1 0 1 1   TIM-2 Total Score 2 2 1 2  1  2  2        Patient-reported     GAD7:       2/28/2020     8:06 AM 3/2/2020     8:30 AM 2/18/2021     7:29 AM 2/25/2021    10:15 AM 3/1/2022    10:35 AM 2/20/2023     9:54 AM 4/26/2023     7:57 AM   TIM-7 SCORE   Total Score 4 (minimal anxiety)  6 (mild anxiety) 6 (mild anxiety) 3 (minimal anxiety) 12 (moderate anxiety) 3 (minimal anxiety)   Total Score 4 4 6    6 6 3 12 3     PROMIS 10-Global Health (all questions and answers displayed):       3/26/2024     8:41 PM 4/16/2024     8:20 PM 8/16/2024     9:34 AM 11/19/2024     7:35 AM 12/5/2024     9:27 AM 12/20/2024     9:46 AM 1/8/2025    12:46 PM   PROMIS 10   In general, would you say your health is: Good Good Good Good Good Good Good   In general, would you say your quality of life is: Very good Very good Very good Very good Very good  Very good Very good   In general, how would you rate your physical health? Good Good Good Good Good Good Good   In general, how would you rate your mental health, including your mood and your ability to think? Good Good Very good Good Good Very good Very good   In general, how would you rate your satisfaction with your social activities and relationships? Excellent Very good Very good Excellent Very good Excellent Very good   In general, please rate how well you carry out your usual social activities and roles Very good Very good Excellent Excellent Excellent Very good Very good   To what extent are you able to carry out your everyday physical activities such as walking, climbing stairs, carrying groceries, or moving a chair? Completely Completely Completely Completely Completely Completely Completely   In the past 7 days, how often have you been bothered by emotional problems such as feeling anxious, depressed, or irritable? Sometimes Sometimes Sometimes Sometimes Sometimes Sometimes Rarely   In the past 7 days, how would you rate your fatigue on average? Mild Moderate Mild Moderate Moderate Mild Moderate   In the past 7 days, how would you rate your pain on average, where 0 means no pain, and 10 means worst imaginable pain? 0 0 0 0 0 0 0   In general, would you say your health is: 3 3 3 3 3 3 3   In general, would you say your quality of life is: 4 4 4 4 4 4 4   In general, how would you rate your physical health? 3 3 3 3 3 3 3   In general, how would you rate your mental health, including your mood and your ability to think? 3 3 4 3 3 4 4   In general, how would you rate your satisfaction with your social activities and relationships? 5 4 4 5 4 5 4   In general, please rate how well you carry out your usual social activities and roles. (This includes activities at home, at work and in your community, and responsibilities as a parent, child, spouse, employee, friend, etc.) 4 4 5 5 5 4 4   To what extent are you  able to carry out your everyday physical activities such as walking, climbing stairs, carrying groceries, or moving a chair? 5 5 5 5 5 5 5   In the past 7 days, how often have you been bothered by emotional problems such as feeling anxious, depressed, or irritable? 3 3 3 3 3 3 2   In the past 7 days, how would you rate your fatigue on average? 2 3 2 3 3 2 3   In the past 7 days, how would you rate your pain on average, where 0 means no pain, and 10 means worst imaginable pain? 0 0 0 0 0 0 0   Global Mental Health Score 15 14 15 15  14  16  16    Global Physical Health Score 17 16 17 16  16  17  16    PROMIS TOTAL - SUBSCORES 32 30 32 31  30  33  32        Patient-reported         ASSESSMENT: Current Emotional / Mental Status (status of significant symptoms):   Risk status (Self / Other harm or suicidal ideation)   Patient denies current fears or concerns for personal safety.   Patient denies current or recent suicidal ideation or behaviors.   Patient denies current or recent homicidal ideation or behaviors.   Patient denies current or recent self injurious behavior or ideation.   Patient denies other safety concerns.   Patient reports there has been no change in risk factors since their last session.     Patient reports there has been no change in protective factors since their last session.     Recommended that patient call 911 or go to the local ED should there be a change in any of these risk factors     Appearance:   Appropriate    Eye Contact:   Good    Psychomotor Behavior: Normal    Attitude:   Cooperative  Friendly   Orientation:   All   Speech    Rate / Production: Normal/ Responsive    Volume:  Normal    Mood:    Normal Sick/Tired   Affect:    Appropriate    Thought Content:  Clear    Thought Form:  Coherent  Logical    Insight:    Good      Medication Review:   No current psychiatric medications prescribed     Medication Compliance:   NA     Changes in Health Issues:   None reported     Chemical Use  Review:   Substance Use: Chemical use reviewed, no active concerns identified      Tobacco Use: No current tobacco use.      Diagnosis:  1. Adjustment disorder with mixed anxiety and depressed mood        Collateral Reports Completed:   Not Applicable    PLAN: (Patient Tasks / Therapist Tasks / Other)  Vale will continue to practice her grounding skills and challenging her cognitive distortions with self compassion.     Tarah Tashi, LICSW                                                         ______________________________________________________________________    Individual Treatment Plan    Patient's Name: Vale Greenberg  YOB: 1985    Date of Creation: 8/21/2024  Date Treatment Plan Last Reviewed/Revised:12/5/2024    DSM5 Diagnoses: Adjustment Disorders  309.28 (F43.23) With mixed anxiety and depressed mood  Psychosocial / Contextual Factors: works full time, has twin sons, good support system.  PROMIS (reviewed every 90 days):     Referral / Collaboration:  Referral to another professional/service is not indicated at this time..    Anticipated number of session for this episode of care: 9-12 sessions  Anticipation frequency of session: Every other week  Anticipated Duration of each session: 38-52 minutes  Treatment plan will be reviewed in 90 days or when goals have been changed.       MeasurableTreatment Goal(s) related to diagnosis / functional impairment(s)  Goal 1: Patient will become more aware of their anxiety and utilize the skills taught to decrease their anxiety symptoms.    I will know I've met my goal when I understand more about my anxiety and have skills to cope with it.      Objective #A (Patient Action)    Patient will  use at least 4 coping skills for anxiety management in the next 12 weeks. .  Status: Continued - Date(s):12/5/2024    Intervention(s)  Therapist will  teach coping skills and assign homework of practicing these .    Objective #B  Patient will  use  cognitive strategies identified in therapy to challenge anxious thoughts. Patient will engage in activities that are anxiety producing for them, slowly increasing their tolerance for new activities. Patient will identify and recognize past negative experiences and subsequent beliefs they hold that contribute to their anxiety. .  Status: Continued - Date(s):12/5/2024    Intervention(s)  Therapist will  will teach cognitive behavioral skills and engage client in Socratic dialogue around distorted thinking.  Therapist will provide educational materials on CBT.  .    Objective #C  Patient will  implement self-care into their routine to decrease anxiety, focusing on sleep hygiene, nutrition, movement, regular engagement in mindful activity, and regular socialization.  .  Status: Continued - Date(s):12/5/2024    Intervention(s)  Therapist will  provide psychoeducation around the benefit of self-care and help client identify mindfulness based activities that may work for their life. .    Goal 2: Beatrizient will improve her ability to be effective in relationships as evidenced by client reporting use of three new communication skills over the next three months.    I will know I've met my goal when I can communicate with my family better.      Objective #A (Patient Action)    Status: Continued - Date(s):12/5/2024    Patient will  learn & utilize at least 2 assertive communication skills weekly .    Intervention(s)  Therapist will  teach assertiveness, effective interpersonal skills and about healthy boundaries in relationships .    Objective #B  Patient will  identify his values regarding interpersonal relationships and how to uphold her values while navigating interpersonal relationships .    Status: Continued - Date(s): 12/5/2024    Intervention(s)  Therapist will  teach how to uphold values with DBT and provide support and feedback and teach about healthy boundaries in relationships .    Objective #C  Patient will  uphold  her values while navigating interpersonal relationships .  Status: Continued - Date(s):12/5/2024    Intervention(s)  Therapist will  provide support and feedback and teach about healthy boundaries in relationships. .      Patient has reviewed and agreed to the above plan.      NIK Montero  December 5, 2024

## 2025-02-28 ENCOUNTER — VIRTUAL VISIT (OUTPATIENT)
Facility: CLINIC | Age: 40
End: 2025-02-28
Payer: COMMERCIAL

## 2025-02-28 DIAGNOSIS — F43.23 ADJUSTMENT DISORDER WITH MIXED ANXIETY AND DEPRESSED MOOD: Primary | ICD-10-CM

## 2025-02-28 PROCEDURE — 90837 PSYTX W PT 60 MINUTES: CPT | Mod: 95

## 2025-02-28 NOTE — PROGRESS NOTES
[facility]                                     Progress Note    Patient Name: Vale Greenberg  Date: 2/28/2025         Service Type: Individual      Session Start Time: 10:02 AM  Session End Time: 10:58 AM     Session Length: 56 Minutes    Session #: 35    Attendees: Client attended alone    Service Modality:  Video Visit:      Provider verified identity through the following two step process.  Patient provided:  Patient is known previously to provider    Telemedicine Visit: The patient's condition can be safely assessed and treated via synchronous audio and visual telemedicine encounter.      Reason for Telemedicine Visit: Patient convenience (e.g. access to timely appointments / distance to available provider)    Originating Site (Patient Location): Patient's home    Distant Site (Provider Location): Provider Remote Setting- Home Office    Consent:  The patient/guardian has verbally consented to: the potential risks and benefits of telemedicine (video visit) versus in person care; bill my insurance or make self-payment for services provided; and responsibility for payment of non-covered services.     Patient would like the video invitation sent by:  My Chart    Mode of Communication:  Video Conference via [facility]    Distant Location (Provider):  Off-site    As the provider I attest to compliance with applicable laws and regulations related to telemedicine.    DATA  Extended Session (53+ minutes): PROLONGED SERVICE IN THE OUTPATIENT SETTING REQUIRING DIRECT (FACE-TO-FACE) PATIENT CONTACT BEYOND THE USUAL SERVICE:    - Patient's presenting concerns require more intensive intervention than could be completed within the usual service  Interactive Complexity: No  Crisis: No      Progress Since Last Session (Related to Symptoms / Goals / Homework):   Symptoms: Worsening comparison and anxiety     Homework: Achieved / completed to satisfaction      Episode of Care Goals: Satisfactory progress -  "ACTION (Actively working towards change); Intervened by reinforcing change plan / affirming steps taken     Current / Ongoing Stressors and Concerns:   Vale is coming to therapy to learn skills to decrease her anxiety and depression. She reports increased fear of possible health concerns and worries for her future have contributed to significant distress in all aspects of her life and results in high blood pressure, irritation, avoidance and withdrawing behavior. She reports increased relationship conflicts with her family and pressure to be \"it all for everyone.\"     Treatment Objective(s) Addressed in This Session:   use cognitive strategies identified in therapy to challenge anxious thoughts  Identify negative self-talk and behaviors: challenge core beliefs, myths, and actions   practice deep breathing at least once a day     Intervention:   Therapist utilized reflected listening as patient gave brief reflection of the past few weeks. Patient reported worsening comparison and anxiety. She processed her feelings when comparing herself to others and thinking less of her life. She reflected on her negatively predicting the future for her kids. Therapist supported patient as she processed and validated patient. Therapist engaged in cognitive restructuring/ reframing, looked at cognitive distortions and challenged distorted thoughts with self compassion. Therapist reflected on the progress that came with persistence and putting good effort into changing her thinking patterns and reinforced positive behavioral choices with the utilization of her skills.     Assessments completed prior to visit:  The following assessments were completed by patient for this visit:  PHQ2:       1/8/2025    12:45 PM 12/20/2024     9:45 AM 12/5/2024     9:26 AM 8/21/2024    10:00 AM 5/20/2024     8:53 AM 5/9/2024    10:29 AM 2/1/2024     5:24 AM   PHQ-2 ( 1999 [employer])   Q1: Little interest or pleasure in doing things 0 0 0 0 0 0 0   Q2: " Feeling down, depressed or hopeless 0 0 0 0 0 0 1   PHQ-2 Score 0  0  0  0 0 0 1   Q1: Little interest or pleasure in doing things Not at all Not at all Not at all Not at all Not at all Not at all Not at all   Q2: Feeling down, depressed or hopeless Not at all Not at all Not at all Not at all Not at all Not at all Several days   PHQ-2 Score 0 0 0 0 0 0 1       Patient-reported     PHQ9:       6/18/2019     3:26 PM 7/10/2019     3:56 PM 3/2/2020     8:30 AM 3/4/2021     2:32 PM 6/7/2023    12:01 PM   PHQ-9 SCORE   PHQ-9 Total Score MyChart     3 (Minimal depression)   PHQ-9 Total Score 9 2 2 2 3     GAD2:       3/26/2024     8:41 PM 4/16/2024     8:19 PM 8/16/2024     9:33 AM 11/19/2024     7:34 AM 12/5/2024     9:26 AM 12/20/2024     9:46 AM 1/8/2025    12:45 PM   TIM-2   Feeling nervous, anxious, or on edge 1 1 1 1 1 1 1   Not being able to stop or control worrying 1 1 0 1 0 1 1   TIM-2 Total Score 2 2 1 2  1  2  2        Patient-reported     GAD7:       2/28/2020     8:06 AM 3/2/2020     8:30 AM 2/18/2021     7:29 AM 2/25/2021    10:15 AM 3/1/2022    10:35 AM 2/20/2023     9:54 AM 4/26/2023     7:57 AM   TIM-7 SCORE   Total Score 4 (minimal anxiety)  6 (mild anxiety) 6 (mild anxiety) 3 (minimal anxiety) 12 (moderate anxiety) 3 (minimal anxiety)   Total Score 4 4 6    6 6 3 12 3     PROMIS 10-Global Health (all questions and answers displayed):       3/26/2024     8:41 PM 4/16/2024     8:20 PM 8/16/2024     9:34 AM 11/19/2024     7:35 AM 12/5/2024     9:27 AM 12/20/2024     9:46 AM 1/8/2025    12:46 PM   PROMIS 10   In general, would you say your health is: Good Good Good Good Good Good Good   In general, would you say your quality of life is: Very good Very good Very good Very good Very good Very good Very good   In general, how would you rate your physical health? Good Good Good Good Good Good Good   In general, how would you rate your mental health, including your mood and your ability to think? Good Good Very  good Good Good Very good Very good   In general, how would you rate your satisfaction with your social activities and relationships? Excellent Very good Very good Excellent Very good Excellent Very good   In general, please rate how well you carry out your usual social activities and roles Very good Very good Excellent Excellent Excellent Very good Very good   To what extent are you able to carry out your everyday physical activities such as walking, climbing stairs, carrying groceries, or moving a chair? Completely Completely Completely Completely Completely Completely Completely   In the past 7 days, how often have you been bothered by emotional problems such as feeling anxious, depressed, or irritable? Sometimes Sometimes Sometimes Sometimes Sometimes Sometimes Rarely   In the past 7 days, how would you rate your fatigue on average? Mild Moderate Mild Moderate Moderate Mild Moderate   In the past 7 days, how would you rate your pain on average, where 0 means no pain, and 10 means worst imaginable pain? 0 0 0 0 0 0 0   In general, would you say your health is: 3 3 3 3 3 3 3   In general, would you say your quality of life is: 4 4 4 4 4 4 4   In general, how would you rate your physical health? 3 3 3 3 3 3 3   In general, how would you rate your mental health, including your mood and your ability to think? 3 3 4 3 3 4 4   In general, how would you rate your satisfaction with your social activities and relationships? 5 4 4 5 4 5 4   In general, please rate how well you carry out your usual social activities and roles. (This includes activities at home, at work and in your community, and responsibilities as a parent, child, spouse, employee, friend, etc.) 4 4 5 5 5 4 4   To what extent are you able to carry out your everyday physical activities such as walking, climbing stairs, carrying groceries, or moving a chair? 5 5 5 5 5 5 5   In the past 7 days, how often have you been bothered by emotional problems such as  feeling anxious, depressed, or irritable? 3 3 3 3 3 3 2   In the past 7 days, how would you rate your fatigue on average? 2 3 2 3 3 2 3   In the past 7 days, how would you rate your pain on average, where 0 means no pain, and 10 means worst imaginable pain? 0 0 0 0 0 0 0   Global Mental Health Score 15 14 15 15  14  16  16    Global Physical Health Score 17 16 17 16  16  17  16    PROMIS TOTAL - SUBSCORES 32 30 32 31  30  33  32        Patient-reported         ASSESSMENT: Current Emotional / Mental Status (status of significant symptoms):   Risk status (Self / Other harm or suicidal ideation)   Patient denies current fears or concerns for personal safety.   Patient denies current or recent suicidal ideation or behaviors.   Patient denies current or recent homicidal ideation or behaviors.   Patient denies current or recent self injurious behavior or ideation.   Patient denies other safety concerns.   Patient reports there has been no change in risk factors since their last session.     Patient reports there has been no change in protective factors since their last session.     Recommended that patient call 911 or go to the local ED should there be a change in any of these risk factors     Appearance:   Appropriate    Eye Contact:   Good    Psychomotor Behavior: Normal    Attitude:   Cooperative  Friendly   Orientation:   All   Speech    Rate / Production: Normal/ Responsive    Volume:  Normal    Mood:    Anxious  Depressed    Affect:    Appropriate    Thought Content:  Clear    Thought Form:  Coherent  Logical    Insight:    Good      Medication Review:   No current psychiatric medications prescribed     Medication Compliance:   NA     Changes in Health Issues:   None reported     Chemical Use Review:   Substance Use: Chemical use reviewed, no active concerns identified      Tobacco Use: No current tobacco use.      Diagnosis:  1. Adjustment disorder with mixed anxiety and depressed mood        Collateral Reports  Completed:   Not Applicable    PLAN: (Patient Tasks / Therapist Tasks / Other)  Vale will continue to practice her grounding skills and challenging her cognitive distortions with self compassion.     Tarah Akins, [facility]                                                         ______________________________________________________________________    Individual Treatment Plan    Patient's Name: Vale Greenberg  YOB: 1985    Date of Creation: 8/21/2024  Date Treatment Plan Last Reviewed/Revised:12/5/2024    DSM5 Diagnoses: Adjustment Disorders  309.28 (F43.23) With mixed anxiety and depressed mood  Psychosocial / Contextual Factors: works full time, has twin sons, good support system.  PROMIS (reviewed every 90 days):     Referral / Collaboration:  Referral to another professional/service is not indicated at this time..    Anticipated number of session for this episode of care: 9-12 sessions  Anticipation frequency of session: Every other week  Anticipated Duration of each session: 38-52 minutes  Treatment plan will be reviewed in 90 days or when goals have been changed.       MeasurableTreatment Goal(s) related to diagnosis / functional impairment(s)  Goal 1: Patient will become more aware of their anxiety and utilize the skills taught to decrease their anxiety symptoms.    I will know I've met my goal when I understand more about my anxiety and have skills to cope with it.      Objective #A (Patient Action)    Patient will  use at least 4 coping skills for anxiety management in the next 12 weeks. .  Status: Continued - Date(s):12/5/2024    Intervention(s)  Therapist will  teach coping skills and assign homework of practicing these .    Objective #B  Patient will  use cognitive strategies identified in therapy to challenge anxious thoughts. Patient will engage in activities that are anxiety producing for them, slowly increasing their tolerance for new activities. Patient will identify and  recognize past negative experiences and subsequent beliefs they hold that contribute to their anxiety. .  Status: Continued - Date(s):12/5/2024    Intervention(s)  Therapist will  will teach cognitive behavioral skills and engage client in Socratic dialogue around distorted thinking.  Therapist will provide educational materials on CBT.  .    Objective #C  Patient will  implement self-care into their routine to decrease anxiety, focusing on sleep hygiene, nutrition, movement, regular engagement in mindful activity, and regular socialization.  .  Status: Continued - Date(s):12/5/2024    Intervention(s)  Therapist will  provide psychoeducation around the benefit of self-care and help client identify mindfulness based activities that may work for their life. .    Goal 2: Patient will improve her ability to be effective in relationships as evidenced by client reporting use of three new communication skills over the next three months.    I will know I've met my goal when I can communicate with my family better.      Objective #A (Patient Action)    Status: Continued - Date(s):12/5/2024    Patient will  learn & utilize at least 2 assertive communication skills weekly .    Intervention(s)  Therapist will  teach assertiveness, effective interpersonal skills and about healthy boundaries in relationships .    Objective #B  Patient will  identify his values regarding interpersonal relationships and how to uphold her values while navigating interpersonal relationships .    Status: Continued - Date(s): 12/5/2024    Intervention(s)  Therapist will  teach how to uphold values with DBT and provide support and feedback and teach about healthy boundaries in relationships .    Objective #C  Patient will  uphold her values while navigating interpersonal relationships .  Status: Continued - Date(s):12/5/2024    Intervention(s)  Therapist will  provide support and feedback and teach about healthy boundaries in relationships.  .      Patient has reviewed and agreed to the above plan.      Tarah Akins, NIK  December 5, 2024

## 2025-03-17 ENCOUNTER — VIRTUAL VISIT (OUTPATIENT)
Facility: CLINIC | Age: 40
End: 2025-03-17
Payer: COMMERCIAL

## 2025-03-17 DIAGNOSIS — F43.23 ADJUSTMENT DISORDER WITH MIXED ANXIETY AND DEPRESSED MOOD: Primary | ICD-10-CM

## 2025-03-17 PROCEDURE — 90837 PSYTX W PT 60 MINUTES: CPT | Mod: 95

## 2025-03-17 NOTE — PROGRESS NOTES
[facility]                                     Progress Note    Patient Name: Vale Greenberg  Date:  3/17/2025         Service Type: Individual      Session Start Time: 9:04 AM  Session End Time: 10:01 AM     Session Length: 57 Minutes    Session #: 36    Attendees: Client attended alone    Service Modality:  Video Visit:      Provider verified identity through the following two step process.  Patient provided:  Patient is known previously to provider    Telemedicine Visit: The patient's condition can be safely assessed and treated via synchronous audio and visual telemedicine encounter.      Reason for Telemedicine Visit: Patient convenience (e.g. access to timely appointments / distance to available provider)    Originating Site (Patient Location): Patient's home    Distant Site (Provider Location): [facility] MENTAL [facility] AND ADDI[facility]    Consent:  The patient/guardian has verbally consented to: the potential risks and benefits of telemedicine (video visit) versus in person care; bill my insurance or make self-payment for services provided; and responsibility for payment of non-covered services.     Patient would like the video invitation sent by:  My Chart    Mode of Communication:  Video Conference via [facility]    Distant Location (Provider):  On-site    As the provider I attest to compliance with applicable laws and regulations related to telemedicine.    DATA  Extended Session (53+ minutes): PROLONGED SERVICE IN THE OUTPATIENT SETTING REQUIRING DIRECT (FACE-TO-FACE) PATIENT CONTACT BEYOND THE USUAL SERVICE:    - Patient's presenting concerns require more intensive intervention than could be completed within the usual service  Interactive Complexity: No  Crisis: No      Progress Since Last Session (Related to Symptoms / Goals / Homework):   Symptoms: Worsening hesitancy, withdrawing behaviors from friends, continued improved mood, chata and  "utilization of skills.      Homework: Achieved / completed to satisfaction      Episode of Care Goals: Satisfactory progress - ACTION (Actively working towards change); Intervened by reinforcing change plan / affirming steps taken     Current / Ongoing Stressors and Concerns:   Vale is coming to therapy to learn skills to decrease her anxiety and depression. She reports increased fear of possible health concerns and worries for her future have contributed to significant distress in all aspects of her life and results in high blood pressure, irritation, avoidance and withdrawing behavior. She reports increased relationship conflicts with her family and pressure to be \"it all for everyone.\"     Treatment Objective(s) Addressed in This Session:   use cognitive strategies identified in therapy to challenge anxious thoughts  Identify negative self-talk and behaviors: challenge core beliefs, myths, and actions   practice deep breathing at least once a day     Intervention:   Therapist utilized reflected listening as patient gave brief reflection of the past few weeks. Patient reported worsening hesitancy, withdrawing behaviors from friends, continued improved mood, chata and utilization of skills. She processed her withdrawing behaviors from her friends and feeling disconnecting from them. She reflected on her successes of taking her kids out and navigating obstacles on her own. Therapist supported patient as she processed and validated patient. Therapist engaged in cognitive restructuring/ reframing, looked at cognitive distortions and challenged distorted thoughts with self compassion. Therapist reflected on the progress that came with persistence and putting good effort into changing her thinking patterns and reinforced positive behavioral choices with the utilization of her skills.     Assessments completed prior to visit:  The following assessments were completed by patient for this visit:  PHQ2:       1/8/2025    " 12:45 PM 12/20/2024     9:45 AM 12/5/2024     9:26 AM 8/21/2024    10:00 AM 5/20/2024     8:53 AM 5/9/2024    10:29 AM 2/1/2024     5:24 AM   PHQ-2 ( 1999 [employer])   Q1: Little interest or pleasure in doing things 0 0 0 0 0 0 0   Q2: Feeling down, depressed or hopeless 0 0 0 0 0 0 1   PHQ-2 Score 0  0  0  0 0 0 1   Q1: Little interest or pleasure in doing things Not at all Not at all Not at all Not at all Not at all Not at all Not at all   Q2: Feeling down, depressed or hopeless Not at all Not at all Not at all Not at all Not at all Not at all Several days   PHQ-2 Score 0 0 0 0 0 0 1       Patient-reported     PHQ9:       6/18/2019     3:26 PM 7/10/2019     3:56 PM 3/2/2020     8:30 AM 3/4/2021     2:32 PM 6/7/2023    12:01 PM   PHQ-9 SCORE   PHQ-9 Total Score MyChart     3 (Minimal depression)   PHQ-9 Total Score 9 2 2 2 3     GAD2:       3/26/2024     8:41 PM 4/16/2024     8:19 PM 8/16/2024     9:33 AM 11/19/2024     7:34 AM 12/5/2024     9:26 AM 12/20/2024     9:46 AM 1/8/2025    12:45 PM   TIM-2   Feeling nervous, anxious, or on edge 1 1 1 1 1 1 1   Not being able to stop or control worrying 1 1 0 1 0 1 1   TIM-2 Total Score 2 2 1 2  1  2  2        Patient-reported     GAD7:       2/28/2020     8:06 AM 3/2/2020     8:30 AM 2/18/2021     7:29 AM 2/25/2021    10:15 AM 3/1/2022    10:35 AM 2/20/2023     9:54 AM 4/26/2023     7:57 AM   TIM-7 SCORE   Total Score 4 (minimal anxiety)  6 (mild anxiety) 6 (mild anxiety) 3 (minimal anxiety) 12 (moderate anxiety) 3 (minimal anxiety)   Total Score 4 4 6    6 6 3 12 3     PROMIS 10-Global Health (all questions and answers displayed):       3/26/2024     8:41 PM 4/16/2024     8:20 PM 8/16/2024     9:34 AM 11/19/2024     7:35 AM 12/5/2024     9:27 AM 12/20/2024     9:46 AM 1/8/2025    12:46 PM   PROMIS 10   In general, would you say your health is: Good Good Good Good Good Good Good   In general, would you say your quality of life is: Very good Very good Very good Very good  Very good Very good Very good   In general, how would you rate your physical health? Good Good Good Good Good Good Good   In general, how would you rate your mental health, including your mood and your ability to think? Good Good Very good Good Good Very good Very good   In general, how would you rate your satisfaction with your social activities and relationships? Excellent Very good Very good Excellent Very good Excellent Very good   In general, please rate how well you carry out your usual social activities and roles Very good Very good Excellent Excellent Excellent Very good Very good   To what extent are you able to carry out your everyday physical activities such as walking, climbing stairs, carrying groceries, or moving a chair? Completely Completely Completely Completely Completely Completely Completely   In the past 7 days, how often have you been bothered by emotional problems such as feeling anxious, depressed, or irritable? Sometimes Sometimes Sometimes Sometimes Sometimes Sometimes Rarely   In the past 7 days, how would you rate your fatigue on average? Mild Moderate Mild Moderate Moderate Mild Moderate   In the past 7 days, how would you rate your pain on average, where 0 means no pain, and 10 means worst imaginable pain? 0 0 0 0 0 0 0   In general, would you say your health is: 3 3 3 3 3 3 3   In general, would you say your quality of life is: 4 4 4 4 4 4 4   In general, how would you rate your physical health? 3 3 3 3 3 3 3   In general, how would you rate your mental health, including your mood and your ability to think? 3 3 4 3 3 4 4   In general, how would you rate your satisfaction with your social activities and relationships? 5 4 4 5 4 5 4   In general, please rate how well you carry out your usual social activities and roles. (This includes activities at home, at work and in your community, and responsibilities as a parent, child, spouse, employee, friend, etc.) 4 4 5 5 5 4 4   To what extent  Patient with history of ESRD on HD, functional quadriplegic, on home O2 chronically prescribed 2 L.  Found to have bilateral pleural effusions status post thoracentesis on 6/2023, transudative.  Agree with increasing fluid removal via HD to prevent reaccumulation of pleural effusions.  Patient not in respiratory distress, SPO2 of 97% on room air.  Pulmonary to sign off, reconsult as needed. are you able to carry out your everyday physical activities such as walking, climbing stairs, carrying groceries, or moving a chair? 5 5 5 5 5 5 5   In the past 7 days, how often have you been bothered by emotional problems such as feeling anxious, depressed, or irritable? 3 3 3 3 3 3 2   In the past 7 days, how would you rate your fatigue on average? 2 3 2 3 3 2 3   In the past 7 days, how would you rate your pain on average, where 0 means no pain, and 10 means worst imaginable pain? 0 0 0 0 0 0 0   Global Mental Health Score 15 14 15 15  14  16  16    Global Physical Health Score 17 16 17 16  16  17  16    PROMIS TOTAL - SUBSCORES 32 30 32 31  30  33  32        Patient-reported         ASSESSMENT: Current Emotional / Mental Status (status of significant symptoms):   Risk status (Self / Other harm or suicidal ideation)   Patient denies current fears or concerns for personal safety.   Patient denies current or recent suicidal ideation or behaviors.   Patient denies current or recent homicidal ideation or behaviors.   Patient denies current or recent self injurious behavior or ideation.   Patient denies other safety concerns.   Patient reports there has been no change in risk factors since their last session.     Patient reports there has been no change in protective factors since their last session.     Recommended that patient call 911 or go to the local ED should there be a change in any of these risk factors     Appearance:   Appropriate    Eye Contact:   Good    Psychomotor Behavior: Normal    Attitude:   Cooperative  Friendly   Orientation:   All   Speech    Rate / Production: Normal/ Responsive    Volume:  Normal    Mood:    Depressed  Happy   Affect:    Appropriate    Thought Content:  Clear    Thought Form:  Coherent  Logical    Insight:    Good      Medication Review:   No current psychiatric medications prescribed     Medication Compliance:   NA     Changes in Health Issues:   None reported     Chemical  Use Review:   Substance Use: Chemical use reviewed, no active concerns identified      Tobacco Use: No current tobacco use.      Diagnosis:  1. Adjustment disorder with mixed anxiety and depressed mood        Collateral Reports Completed:   Not Applicable    PLAN: (Patient Tasks / Therapist Tasks / Other)  Vale will continue to practice her grounding skills and challenging her cognitive distortions with self compassion.     Tarah Tashi, LICSW                                                         ______________________________________________________________________    Individual Treatment Plan    Patient's Name: Vale Greenberg  YOB: 1985    Date of Creation: 8/21/2024  Date Treatment Plan Last Reviewed/Revised: 3/17/2025    DSM5 Diagnoses: Adjustment Disorders  309.28 (F43.23) With mixed anxiety and depressed mood  Psychosocial / Contextual Factors: works full time, has twin sons, good support system.  PROMIS (reviewed every 90 days):     Referral / Collaboration:  Referral to another professional/service is not indicated at this time..    Anticipated number of session for this episode of care: 9-12 sessions  Anticipation frequency of session: Every other week  Anticipated Duration of each session: 38-52 minutes  Treatment plan will be reviewed in 90 days or when goals have been changed.       MeasurableTreatment Goal(s) related to diagnosis / functional impairment(s)  Goal 1: Patient will become more aware of their anxiety and utilize the skills taught to decrease their anxiety symptoms.    I will know I've met my goal when I understand more about my anxiety and have skills to cope with it.      Objective #A (Patient Action)    Patient will  use at least 4 coping skills for anxiety management in the next 12 weeks. .  Status: Continued - Date(s): 3/17/2025    Intervention(s)  Therapist will  teach coping skills and assign homework of practicing these .    Objective #B  Patient will  use  cognitive strategies identified in therapy to challenge anxious thoughts. Patient will engage in activities that are anxiety producing for them, slowly increasing their tolerance for new activities. Patient will identify and recognize past negative experiences and subsequent beliefs they hold that contribute to their anxiety. .  Status: Continued - Date(s): 3/17/2025    Intervention(s)  Therapist will  will teach cognitive behavioral skills and engage client in Socratic dialogue around distorted thinking.  Therapist will provide educational materials on CBT.  .    Objective #C  Patient will  implement self-care into their routine to decrease anxiety, focusing on sleep hygiene, nutrition, movement, regular engagement in mindful activity, and regular socialization.  .  Status: Continued - Date(s): 3/17/2025    Intervention(s)  Therapist will  provide psychoeducation around the benefit of self-care and help client identify mindfulness based activities that may work for their life. .    Goal 2: Patient will improve her ability to be effective in relationships as evidenced by client reporting use of three new communication skills over the next three months.    I will know I've met my goal when I can communicate with my family better.      Objective #A (Patient Action)    Status: Continued - Date(s): 3/17/2025    Patient will  learn & utilize at least 2 assertive communication skills weekly .    Intervention(s)  Therapist will  teach assertiveness, effective interpersonal skills and about healthy boundaries in relationships .    Objective #B  Patient will  identify his values regarding interpersonal relationships and how to uphold her values while navigating interpersonal relationships .    Status: Continued - Date(s):  3/17/2025    Intervention(s)  Therapist will  teach how to uphold values with DBT and provide support and feedback and teach about healthy boundaries in relationships .    Objective #C  Patient will   uphold her values while navigating interpersonal relationships .  Status: Continued - Date(s): 3/17/2025    Intervention(s)  Therapist will  provide support and feedback and teach about healthy boundaries in relationships. .      Patient has reviewed and agreed to the above plan.      NIK Montero  March 17, 2025

## 2025-04-02 ENCOUNTER — ANCILLARY PROCEDURE (OUTPATIENT)
Dept: MAMMOGRAPHY | Facility: CLINIC | Age: 40
End: 2025-04-02
Attending: PHYSICIAN ASSISTANT
Payer: COMMERCIAL

## 2025-04-02 DIAGNOSIS — Z12.31 VISIT FOR SCREENING MAMMOGRAM: ICD-10-CM

## 2025-04-02 PROCEDURE — 77067 SCR MAMMO BI INCL CAD: CPT | Mod: 26 | Performed by: STUDENT IN AN ORGANIZED HEALTH CARE EDUCATION/TRAINING PROGRAM

## 2025-04-02 PROCEDURE — 77063 BREAST TOMOSYNTHESIS BI: CPT

## 2025-04-02 PROCEDURE — 77063 BREAST TOMOSYNTHESIS BI: CPT | Mod: 26 | Performed by: STUDENT IN AN ORGANIZED HEALTH CARE EDUCATION/TRAINING PROGRAM

## 2025-04-30 ENCOUNTER — VIRTUAL VISIT (OUTPATIENT)
Facility: CLINIC | Age: 40
End: 2025-04-30
Payer: COMMERCIAL

## 2025-04-30 DIAGNOSIS — F43.23 ADJUSTMENT DISORDER WITH MIXED ANXIETY AND DEPRESSED MOOD: Primary | ICD-10-CM

## 2025-04-30 PROCEDURE — 90837 PSYTX W PT 60 MINUTES: CPT | Mod: 95

## 2025-05-01 NOTE — PROGRESS NOTES
[facility]                                     Progress Note    Patient Name: Vale Greenberg  Date:  4/30/2025         Service Type: Individual      Session Start Time: 10:01 AM  Session End Time: 11:00 AM     Session Length: 58 Minutes    Session #: 38    Attendees: Client attended alone    Service Modality:  Video Visit:      Provider verified identity through the following two step process.  Patient provided:  Patient is known previously to provider    Telemedicine Visit: The patient's condition can be safely assessed and treated via synchronous audio and visual telemedicine encounter.      Reason for Telemedicine Visit: Patient convenience (e.g. access to timely appointments / distance to available provider)    Originating Site (Patient Location): Patient's home    Distant Site (Provider Location): Provider Remote Setting- Home Office    Consent:  The patient/guardian has verbally consented to: the potential risks and benefits of telemedicine (video visit) versus in person care; bill my insurance or make self-payment for services provided; and responsibility for payment of non-covered services.     Patient would like the video invitation sent by:  My Chart    Mode of Communication:  Video Conference via [facility]    Distant Location (Provider):  Off-site    As the provider I attest to compliance with applicable laws and regulations related to telemedicine.    DATA  Extended Session (53+ minutes): PROLONGED SERVICE IN THE OUTPATIENT SETTING REQUIRING DIRECT (FACE-TO-FACE) PATIENT CONTACT BEYOND THE USUAL SERVICE:    - Patient's presenting concerns require more intensive intervention than could be completed within the usual service  Interactive Complexity: No  Crisis: No      Progress Since Last Session (Related to Symptoms / Goals / Homework):   Symptoms: Improving utilization of her skills to address her anxiety     Homework: Achieved / completed to satisfaction      Episode of Care Goals:  "Satisfactory progress - ACTION (Actively working towards change); Intervened by reinforcing change plan / affirming steps taken     Current / Ongoing Stressors and Concerns:   Vale is coming to therapy to learn skills to decrease her anxiety and depression. She reports increased fear of possible health concerns and worries for her future have contributed to significant distress in all aspects of her life and results in high blood pressure, irritation, avoidance and withdrawing behavior. She reports increased relationship conflicts with her family and pressure to be \"it all for everyone.\"     Treatment Objective(s) Addressed in This Session:   use cognitive strategies identified in therapy to challenge anxious thoughts, Identify negative self-talk and behaviors: challenge core beliefs, myths, and actions   practice deep breathing at least once a day     Intervention:   Therapist utilized reflected listening as patient gave brief reflection of the past few weeks. Patient reported improving utilization of her skills to address her anxiety. She reflected on her conversations with her friends and shared skills they are wall working on. She processed her anxiety for her son's health, their first field trip, other's drama and how she used her skills to get through. Therapist supported patient as she processed and validated patient. Therapist engaged in cognitive restructuring/ reframing, looked at cognitive distortions and challenged distorted thoughts while utilizing her ground skills. Therapist reflected on the progress that came with persistence and putting good effort into changing her thinking patterns and reinforced positive behavioral choices with the utilization of her skills.     Assessments completed prior to visit:  The following assessments were completed by patient for this visit:  PHQ2:       4/11/2025     9:50 AM 1/8/2025    12:45 PM 12/20/2024     9:45 AM 12/5/2024     9:26 AM 8/21/2024    10:00 AM " 5/20/2024     8:53 AM 5/9/2024    10:29 AM   PHQ-2 ( 1999 [employer])   Q1: Little interest or pleasure in doing things 0 0 0 0 0 0 0   Q2: Feeling down, depressed or hopeless 0 0 0 0 0 0 0   PHQ-2 Score 0  0  0  0  0 0 0   Q1: Little interest or pleasure in doing things Not at all Not at all Not at all Not at all Not at all Not at all Not at all   Q2: Feeling down, depressed or hopeless Not at all Not at all Not at all Not at all Not at all Not at all Not at all   PHQ-2 Score 0 0 0 0 0 0 0       Patient-reported     PHQ9:       6/18/2019     3:26 PM 7/10/2019     3:56 PM 3/2/2020     8:30 AM 3/4/2021     2:32 PM 6/7/2023    12:01 PM   PHQ-9 SCORE   PHQ-9 Total Score MyChart     3 (Minimal depression)   PHQ-9 Total Score 9 2 2 2 3     GAD2:       4/16/2024     8:19 PM 8/16/2024     9:33 AM 11/19/2024     7:34 AM 12/5/2024     9:26 AM 12/20/2024     9:46 AM 1/8/2025    12:45 PM 4/11/2025     9:50 AM   TIM-2   Feeling nervous, anxious, or on edge 1 1 1 1 1 1 1   Not being able to stop or control worrying 1 0 1 0 1 1 1   TIM-2 Total Score 2 1 2  1  2  2  2        Patient-reported     GAD7:       2/28/2020     8:06 AM 3/2/2020     8:30 AM 2/18/2021     7:29 AM 2/25/2021    10:15 AM 3/1/2022    10:35 AM 2/20/2023     9:54 AM 4/26/2023     7:57 AM   TIM-7 SCORE   Total Score 4 (minimal anxiety)  6 (mild anxiety) 6 (mild anxiety) 3 (minimal anxiety) 12 (moderate anxiety) 3 (minimal anxiety)   Total Score 4 4 6    6 6 3 12 3     PROMIS 10-Global Health (all questions and answers displayed):       4/16/2024     8:20 PM 8/16/2024     9:34 AM 11/19/2024     7:35 AM 12/5/2024     9:27 AM 12/20/2024     9:46 AM 1/8/2025    12:46 PM 4/11/2025     9:51 AM   PROMIS 10   In general, would you say your health is: Good Good Good Good Good Good Good   In general, would you say your quality of life is: Very good Very good Very good Very good Very good Very good Very good   In general, how would you rate your physical health? Good Good Good  Good Good Good Good   In general, how would you rate your mental health, including your mood and your ability to think? Good Very good Good Good Very good Very good Very good   In general, how would you rate your satisfaction with your social activities and relationships? Very good Very good Excellent Very good Excellent Very good Very good   In general, please rate how well you carry out your usual social activities and roles Very good Excellent Excellent Excellent Very good Very good Very good   To what extent are you able to carry out your everyday physical activities such as walking, climbing stairs, carrying groceries, or moving a chair? Completely Completely Completely Completely Completely Completely Completely   In the past 7 days, how often have you been bothered by emotional problems such as feeling anxious, depressed, or irritable? Sometimes Sometimes Sometimes Sometimes Sometimes Rarely Sometimes   In the past 7 days, how would you rate your fatigue on average? Moderate Mild Moderate Moderate Mild Moderate Moderate   In the past 7 days, how would you rate your pain on average, where 0 means no pain, and 10 means worst imaginable pain? 0 0 0 0 0 0 0   In general, would you say your health is: 3 3 3 3 3 3 3   In general, would you say your quality of life is: 4 4 4 4 4 4 4   In general, how would you rate your physical health? 3 3 3 3 3 3 3   In general, how would you rate your mental health, including your mood and your ability to think? 3 4 3 3 4 4 4   In general, how would you rate your satisfaction with your social activities and relationships? 4 4 5 4 5 4 4   In general, please rate how well you carry out your usual social activities and roles. (This includes activities at home, at work and in your community, and responsibilities as a parent, child, spouse, employee, friend, etc.) 4 5 5 5 4 4 4   To what extent are you able to carry out your everyday physical activities such as walking, climbing  stairs, carrying groceries, or moving a chair? 5 5 5 5 5 5 5   In the past 7 days, how often have you been bothered by emotional problems such as feeling anxious, depressed, or irritable? 3 3 3 3 3 2 3   In the past 7 days, how would you rate your fatigue on average? 3 2 3 3 2 3 3   In the past 7 days, how would you rate your pain on average, where 0 means no pain, and 10 means worst imaginable pain? 0 0 0 0 0 0 0   Global Mental Health Score 14 15 15  14  16  16  15    Global Physical Health Score 16 17 16  16  17  16  16    PROMIS TOTAL - SUBSCORES 30 32 31  30  33  32  31        Patient-reported         ASSESSMENT: Current Emotional / Mental Status (status of significant symptoms):   Risk status (Self / Other harm or suicidal ideation)   Patient denies current fears or concerns for personal safety.   Patient denies current or recent suicidal ideation or behaviors.   Patient denies current or recent homicidal ideation or behaviors.   Patient denies current or recent self injurious behavior or ideation.   Patient denies other safety concerns.   Patient reports there has been no change in risk factors since their last session.     Patient reports there has been no change in protective factors since their last session.     Recommended that patient call 911 or go to the local ED should there be a change in any of these risk factors     Appearance:   Appropriate    Eye Contact:   Good    Psychomotor Behavior: Normal    Attitude:   Cooperative  Friendly   Orientation:   All   Speech    Rate / Production: Normal/ Responsive    Volume  Normal    Mood:    Anxious  Sad  Happy   Affect:    Appropriate    Thought Content:  Clear    Thought Form:  Coherent  Goal Directed  Logical    Insight:    Good      Medication Review:   No current psychiatric medications prescribed     Medication Compliance:   NA     Changes in Health Issues:   None reported     Chemical Use Review:   Substance Use: Chemical use reviewed, no active  concerns identified      Tobacco Use: No current tobacco use.      Diagnosis:  1. Adjustment disorder with mixed anxiety and depressed mood        Collateral Reports Completed:   Not Applicable    PLAN: (Patient Tasks / Therapist Tasks / Other)  Vale will continue to practice her grounding skills and challenging her cognitive distortions.    Tarah Akins, LICSW                                                         ______________________________________________________________________    Individual Treatment Plan    Patient's Name: Vale Greenberg  YOB: 1985    Date of Creation: 8/21/2024  Date Treatment Plan Last Reviewed/Revised: 3/17/2025    DSM5 Diagnoses: Adjustment Disorders  309.28 (F43.23) With mixed anxiety and depressed mood  Psychosocial / Contextual Factors: works full time, has twin sons, good support system.  PROMIS (reviewed every 90 days):     Referral / Collaboration:  Referral to another professional/service is not indicated at this time..    Anticipated number of session for this episode of care: 9-12 sessions  Anticipation frequency of session: Every other week  Anticipated Duration of each session: 38-52 minutes  Treatment plan will be reviewed in 90 days or when goals have been changed.       MeasurableTreatment Goal(s) related to diagnosis / functional impairment(s)  Goal 1: Patient will become more aware of their anxiety and utilize the skills taught to decrease their anxiety symptoms.    I will know I've met my goal when I understand more about my anxiety and have skills to cope with it.      Objective #A (Patient Action)    Patient will  use at least 4 coping skills for anxiety management in the next 12 weeks. .  Status: Continued - Date(s):  3/17/2025      Intervention(s)  Therapist will  teach coping skills and assign homework of practicing these .    Objective #B  Patient will  use cognitive strategies identified in therapy to challenge anxious thoughts.  Patient will engage in activities that are anxiety producing for them, slowly increasing their tolerance for new activities. Patient will identify and recognize past negative experiences and subsequent beliefs they hold that contribute to their anxiety. .  Status: Continued - Date(s):  3/17/2025      Intervention(s)  Therapist will  will teach cognitive behavioral skills and engage client in Socratic dialogue around distorted thinking.  Therapist will provide educational materials on CBT.  .    Objective #C  Patient will  implement self-care into their routine to decrease anxiety, focusing on sleep hygiene, nutrition, movement, regular engagement in mindful activity, and regular socialization.  .  Status: Continued - Date(s):  3/17/2025      Intervention(s)  Therapist will  provide psychoeducation around the benefit of self-care and help client identify mindfulness based activities that may work for their life. .    Goal 2: Patient will improve her ability to be effective in relationships as evidenced by client reporting use of three new communication skills over the next three months.    I will know I've met my goal when I can communicate with my family better.      Objective #A (Patient Action)    Status: Continued - Date(s):  3/17/2025      Patient will  learn & utilize at least 2 assertive communication skills weekly .    Intervention(s)  Therapist will  teach assertiveness, effective interpersonal skills and about healthy boundaries in relationships .    Objective #B  Patient will  identify his values regarding interpersonal relationships and how to uphold her values while navigating interpersonal relationships .    Status: Continued - Date(s):  3/17/2025      Intervention(s)  Therapist will  teach how to uphold values with DBT and provide support and feedback and teach about healthy boundaries in relationships .    Objective #C  Patient will  uphold her values while navigating interpersonal relationships  .  Status: Continued - Date(s):  3/17/2025      Intervention(s)  Therapist will  provide support and feedback and teach about healthy boundaries in relationships. .      Patient has reviewed and agreed to the above plan.      NIK Montero  March 17, 2025

## 2025-05-14 ENCOUNTER — VIRTUAL VISIT (OUTPATIENT)
Facility: CLINIC | Age: 40
End: 2025-05-14
Payer: COMMERCIAL

## 2025-05-14 DIAGNOSIS — F43.23 ADJUSTMENT DISORDER WITH MIXED ANXIETY AND DEPRESSED MOOD: Primary | ICD-10-CM

## 2025-05-14 PROCEDURE — 90837 PSYTX W PT 60 MINUTES: CPT | Mod: 95

## 2025-05-15 NOTE — PROGRESS NOTES
[facility]                                     Progress Note    Patient Name: Vale Greenberg  Date:  5/14/2025         Service Type: Individual      Session Start Time: 10:00 AM  Session End Time: 11:00 AM     Session Length: 60 Minutes    Session #: 39    Attendees: Client attended alone    Service Modality:  Video Visit:      Provider verified identity through the following two step process.  Patient provided:  Patient is known previously to provider    Telemedicine Visit: The patient's condition can be safely assessed and treated via synchronous audio and visual telemedicine encounter.      Reason for Telemedicine Visit: Patient convenience (e.g. access to timely appointments / distance to available provider)    Originating Site (Patient Location): Patient's home    Distant Site (Provider Location): Provider Remote Setting- Home Office    Consent:  The patient/guardian has verbally consented to: the potential risks and benefits of telemedicine (video visit) versus in person care; bill my insurance or make self-payment for services provided; and responsibility for payment of non-covered services.     Patient would like the video invitation sent by:  My Chart    Mode of Communication:  Video Conference via [facility]    Distant Location (Provider):  Off-site    As the provider I attest to compliance with applicable laws and regulations related to telemedicine.    DATA  Extended Session (53+ minutes): PROLONGED SERVICE IN THE OUTPATIENT SETTING REQUIRING DIRECT (FACE-TO-FACE) PATIENT CONTACT BEYOND THE USUAL SERVICE:    - Patient's presenting concerns require more intensive intervention than could be completed within the usual service  Interactive Complexity: No  Crisis: No      Progress Since Last Session (Related to Symptoms / Goals / Homework):   Symptoms: Worsening increased anxiety and overstimulation.     Homework: Achieved / completed to satisfaction      Episode of Care Goals:  "Satisfactory progress - ACTION (Actively working towards change); Intervened by reinforcing change plan / affirming steps taken     Current / Ongoing Stressors and Concerns:   Vale is coming to therapy to learn skills to decrease her anxiety and depression. She reports increased fear of possible health concerns and worries for her future have contributed to significant distress in all aspects of her life and results in high blood pressure, irritation, avoidance and withdrawing behavior. She reports increased relationship conflicts with her family and pressure to be \"it all for everyone.\"     Treatment Objective(s) Addressed in This Session:   use cognitive strategies identified in therapy to challenge anxious thoughts, Identify negative self-talk and behaviors: challenge core beliefs, myths, and actions   practice deep breathing at least once a day     Intervention:   Therapist utilized reflected listening as patient gave brief reflection of the past few weeks. Patient reported increased anxiety and overstimulation. She reflected on emotional state when visiting he children at school. She processed her anxiety and negative comparison thoughts. Therapist supported patient as she processed and validated patient. Therapist engaged in cognitive restructuring/ reframing, looked at cognitive distortions and challenged distorted thoughts while utilizing her ground skills. Therapist reflected on the progress that came with persistence and putting good effort into changing her thinking patterns and reinforced positive behavioral choices with the utilization of her skills. Therapist utilized [place] A[employer] skill.     Assessments completed prior to visit:  The following assessments were completed by patient for this visit:  PHQ2:       4/11/2025     9:50 AM 1/8/2025    12:45 PM 12/20/2024     9:45 AM 12/5/2024     9:26 AM 8/21/2024    10:00 AM 5/20/2024     8:53 AM 5/9/2024    10:29 AM   PHQ-2 ( 1999 [employer])   Q1: Little " interest or pleasure in doing things 0 0 0 0 0 0 0   Q2: Feeling down, depressed or hopeless 0 0 0 0 0 0 0   PHQ-2 Score 0  0  0  0  0 0 0   Q1: Little interest or pleasure in doing things Not at all Not at all Not at all Not at all Not at all Not at all Not at all   Q2: Feeling down, depressed or hopeless Not at all Not at all Not at all Not at all Not at all Not at all Not at all   PHQ-2 Score 0 0 0 0 0 0 0       Patient-reported     PHQ9:       6/18/2019     3:26 PM 7/10/2019     3:56 PM 3/2/2020     8:30 AM 3/4/2021     2:32 PM 6/7/2023    12:01 PM   PHQ-9 SCORE   PHQ-9 Total Score MyChart     3 (Minimal depression)   PHQ-9 Total Score 9 2 2 2 3     GAD2:       4/16/2024     8:19 PM 8/16/2024     9:33 AM 11/19/2024     7:34 AM 12/5/2024     9:26 AM 12/20/2024     9:46 AM 1/8/2025    12:45 PM 4/11/2025     9:50 AM   TIM-2   Feeling nervous, anxious, or on edge 1 1 1 1 1 1 1   Not being able to stop or control worrying 1 0 1 0 1 1 1   TIM-2 Total Score 2 1 2  1  2  2  2        Patient-reported     GAD7:       2/28/2020     8:06 AM 3/2/2020     8:30 AM 2/18/2021     7:29 AM 2/25/2021    10:15 AM 3/1/2022    10:35 AM 2/20/2023     9:54 AM 4/26/2023     7:57 AM   TIM-7 SCORE   Total Score 4 (minimal anxiety)  6 (mild anxiety) 6 (mild anxiety) 3 (minimal anxiety) 12 (moderate anxiety) 3 (minimal anxiety)   Total Score 4 4 6    6 6 3 12 3     PROMIS 10-Global Health (all questions and answers displayed):       4/16/2024     8:20 PM 8/16/2024     9:34 AM 11/19/2024     7:35 AM 12/5/2024     9:27 AM 12/20/2024     9:46 AM 1/8/2025    12:46 PM 4/11/2025     9:51 AM   PROMIS 10   In general, would you say your health is: Good Good Good Good Good Good Good   In general, would you say your quality of life is: Very good Very good Very good Very good Very good Very good Very good   In general, how would you rate your physical health? Good Good Good Good Good Good Good   In general, how would you rate your mental health,  including your mood and your ability to think? Good Very good Good Good Very good Very good Very good   In general, how would you rate your satisfaction with your social activities and relationships? Very good Very good Excellent Very good Excellent Very good Very good   In general, please rate how well you carry out your usual social activities and roles Very good Excellent Excellent Excellent Very good Very good Very good   To what extent are you able to carry out your everyday physical activities such as walking, climbing stairs, carrying groceries, or moving a chair? Completely Completely Completely Completely Completely Completely Completely   In the past 7 days, how often have you been bothered by emotional problems such as feeling anxious, depressed, or irritable? Sometimes Sometimes Sometimes Sometimes Sometimes Rarely Sometimes   In the past 7 days, how would you rate your fatigue on average? Moderate Mild Moderate Moderate Mild Moderate Moderate   In the past 7 days, how would you rate your pain on average, where 0 means no pain, and 10 means worst imaginable pain? 0 0 0 0 0 0 0   In general, would you say your health is: 3 3 3 3 3 3 3   In general, would you say your quality of life is: 4 4 4 4 4 4 4   In general, how would you rate your physical health? 3 3 3 3 3 3 3   In general, how would you rate your mental health, including your mood and your ability to think? 3 4 3 3 4 4 4   In general, how would you rate your satisfaction with your social activities and relationships? 4 4 5 4 5 4 4   In general, please rate how well you carry out your usual social activities and roles. (This includes activities at home, at work and in your community, and responsibilities as a parent, child, spouse, employee, friend, etc.) 4 5 5 5 4 4 4   To what extent are you able to carry out your everyday physical activities such as walking, climbing stairs, carrying groceries, or moving a chair? 5 5 5 5 5 5 5   In the past 7  days, how often have you been bothered by emotional problems such as feeling anxious, depressed, or irritable? 3 3 3 3 3 2 3   In the past 7 days, how would you rate your fatigue on average? 3 2 3 3 2 3 3   In the past 7 days, how would you rate your pain on average, where 0 means no pain, and 10 means worst imaginable pain? 0 0 0 0 0 0 0   Global Mental Health Score 14 15 15  14  16  16  15    Global Physical Health Score 16 17 16  16  17  16  16    PROMIS TOTAL - SUBSCORES 30 32 31  30  33  32  31        Patient-reported         ASSESSMENT: Current Emotional / Mental Status (status of significant symptoms):   Risk status (Self / Other harm or suicidal ideation)   Patient denies current fears or concerns for personal safety.   Patient denies current or recent suicidal ideation or behaviors.   Patient denies current or recent homicidal ideation or behaviors.   Patient denies current or recent self injurious behavior or ideation.   Patient denies other safety concerns.   Patient reports there has been no change in risk factors since their last session.     Patient reports there has been no change in protective factors since their last session.     Recommended that patient call 911 or go to the local ED should there be a change in any of these risk factors     Appearance:   Appropriate    Eye Contact:   Good    Psychomotor Behavior: Normal    Attitude:   Cooperative  Friendly   Orientation:   All   Speech    Rate / Production: Normal/ Responsive    Volume  Normal    Mood:    Anxious  Happy   Affect:    Appropriate    Thought Content:  Clear    Thought Form:  Coherent  Goal Directed  Logical    Insight:    Good      Medication Review:   No current psychiatric medications prescribed     Medication Compliance:   NA     Changes in Health Issues:   None reported     Chemical Use Review:   Substance Use: Chemical use reviewed, no active concerns identified      Tobacco Use: No current tobacco use.      Diagnosis:  1.  Adjustment disorder with mixed anxiety and depressed mood        Collateral Reports Completed:   Not Applicable    PLAN: (Patient Tasks / Therapist Tasks / Other)  Vale will continue to practice her grounding skills and challenging her cognitive distortions.    Tarah Akins, NIK                                                         ______________________________________________________________________    Individual Treatment Plan    Patient's Name: Vale Greenberg  YOB: 1985    Date of Creation: 8/21/2024  Date Treatment Plan Last Reviewed/Revised: 3/17/2025    DSM5 Diagnoses: Adjustment Disorders  309.28 (F43.23) With mixed anxiety and depressed mood  Psychosocial / Contextual Factors: works full time, has twin sons, good support system.  PROMIS (reviewed every 90 days):     Referral / Collaboration:  Referral to another professional/service is not indicated at this time..    Anticipated number of session for this episode of care: 9-12 sessions  Anticipation frequency of session: Every other week  Anticipated Duration of each session: 38-52 minutes  Treatment plan will be reviewed in 90 days or when goals have been changed.       MeasurableTreatment Goal(s) related to diagnosis / functional impairment(s)  Goal 1: Patient will become more aware of their anxiety and utilize the skills taught to decrease their anxiety symptoms.    I will know I've met my goal when I understand more about my anxiety and have skills to cope with it.      Objective #A (Patient Action)    Patient will use at least 4 coping skills for anxiety management in the next 12 weeks..  Status: Continued - Date(s):  3/17/2025     Intervention(s)  Therapist will teach coping skills and assign homework of practicing these.    Objective #B  Patient will use cognitive strategies identified in therapy to challenge anxious thoughts. Patient will engage in activities that are anxiety producing for them, slowly increasing their  tolerance for new activities. Patient will identify and recognize past negative experiences and subsequent beliefs they hold that contribute to their anxiety..  Status: Continued - Date(s):  3/17/2025     Intervention(s)  Therapist will will teach cognitive behavioral skills and engage client in Socratic dialogue around distorted thinking.  Therapist will provide educational materials on CBT. .    Objective #C  Patient will implement self-care into their routine to decrease anxiety, focusing on sleep hygiene, nutrition, movement, regular engagement in mindful activity, and regular socialization. .  Status: Continued - Date(s):  3/17/2025     Intervention(s)  Therapist will provide psychoeducation around the benefit of self-care and help client identify mindfulness based activities that may work for their life..    Goal 2: Patient will improve her ability to be effective in relationships as evidenced by client reporting use of three new communication skills over the next three months.    I will know I've met my goal when I can communicate with my family better.      Objective #A (Patient Action)    Status: Continued - Date(s):  3/17/2025     Patient will learn & utilize at least 2 assertive communication skills weekly.    Intervention(s)  Therapist will teach assertiveness, effective interpersonal skills and about healthy boundaries in relationships.    Objective #B  Patient will identify his values regarding interpersonal relationships and how to uphold her values while navigating interpersonal relationships.    Status: Continued - Date(s):  3/17/2025     Intervention(s)  Therapist will teach how to uphold values with DBT and provide support and feedback and teach about healthy boundaries in relationships.    Objective #C  Patient will uphold her values while navigating interpersonal relationships.  Status: Continued - Date(s):  3/17/2025     Intervention(s)  Therapist will provide support and feedback and teach  about healthy boundaries in relationships..      Patient has reviewed and agreed to the above plan.      NIK Montero  March 17, 2025

## 2025-05-28 ENCOUNTER — VIRTUAL VISIT (OUTPATIENT)
Facility: CLINIC | Age: 40
End: 2025-05-28
Payer: COMMERCIAL

## 2025-05-28 DIAGNOSIS — F43.23 ADJUSTMENT DISORDER WITH MIXED ANXIETY AND DEPRESSED MOOD: Primary | ICD-10-CM

## 2025-05-28 PROCEDURE — 90837 PSYTX W PT 60 MINUTES: CPT | Mod: 95

## 2025-06-02 ENCOUNTER — RESULTS FOLLOW-UP (OUTPATIENT)
Dept: FAMILY MEDICINE | Facility: CLINIC | Age: 40
End: 2025-06-02

## 2025-06-02 ENCOUNTER — OFFICE VISIT (OUTPATIENT)
Dept: FAMILY MEDICINE | Facility: CLINIC | Age: 40
End: 2025-06-02
Payer: COMMERCIAL

## 2025-06-02 VITALS
OXYGEN SATURATION: 98 % | HEIGHT: 66 IN | BODY MASS INDEX: 34.72 KG/M2 | WEIGHT: 216 LBS | TEMPERATURE: 98.5 F | HEART RATE: 88 BPM | DIASTOLIC BLOOD PRESSURE: 96 MMHG | SYSTOLIC BLOOD PRESSURE: 142 MMHG

## 2025-06-02 DIAGNOSIS — Z12.83 SKIN CANCER SCREENING: ICD-10-CM

## 2025-06-02 DIAGNOSIS — Z11.59 NEED FOR HEPATITIS C SCREENING TEST: ICD-10-CM

## 2025-06-02 DIAGNOSIS — Z13.220 LIPID SCREENING: ICD-10-CM

## 2025-06-02 DIAGNOSIS — I10 ESSENTIAL HYPERTENSION: Primary | ICD-10-CM

## 2025-06-02 LAB
ANION GAP SERPL CALCULATED.3IONS-SCNC: 11 MMOL/L (ref 7–15)
BUN SERPL-MCNC: 11 MG/DL (ref 6–20)
CALCIUM SERPL-MCNC: 9.1 MG/DL (ref 8.8–10.4)
CHLORIDE SERPL-SCNC: 102 MMOL/L (ref 98–107)
CHOLEST SERPL-MCNC: 215 MG/DL
CREAT SERPL-MCNC: 0.72 MG/DL (ref 0.51–0.95)
EGFRCR SERPLBLD CKD-EPI 2021: >90 ML/MIN/1.73M2
FASTING STATUS PATIENT QL REPORTED: YES
FASTING STATUS PATIENT QL REPORTED: YES
GLUCOSE SERPL-MCNC: 90 MG/DL (ref 70–99)
HCO3 SERPL-SCNC: 22 MMOL/L (ref 22–29)
HDLC SERPL-MCNC: 70 MG/DL
LDLC SERPL CALC-MCNC: 127 MG/DL
NONHDLC SERPL-MCNC: 145 MG/DL
POTASSIUM SERPL-SCNC: 4.1 MMOL/L (ref 3.4–5.3)
SODIUM SERPL-SCNC: 135 MMOL/L (ref 135–145)
TRIGL SERPL-MCNC: 91 MG/DL

## 2025-06-02 PROCEDURE — 1126F AMNT PAIN NOTED NONE PRSNT: CPT | Performed by: PHYSICIAN ASSISTANT

## 2025-06-02 PROCEDURE — 3077F SYST BP >= 140 MM HG: CPT | Performed by: PHYSICIAN ASSISTANT

## 2025-06-02 PROCEDURE — G2211 COMPLEX E/M VISIT ADD ON: HCPCS | Performed by: PHYSICIAN ASSISTANT

## 2025-06-02 PROCEDURE — 99213 OFFICE O/P EST LOW 20 MIN: CPT | Performed by: PHYSICIAN ASSISTANT

## 2025-06-02 PROCEDURE — 36415 COLL VENOUS BLD VENIPUNCTURE: CPT | Performed by: PHYSICIAN ASSISTANT

## 2025-06-02 PROCEDURE — 3080F DIAST BP >= 90 MM HG: CPT | Performed by: PHYSICIAN ASSISTANT

## 2025-06-02 PROCEDURE — 80048 BASIC METABOLIC PNL TOTAL CA: CPT | Performed by: PHYSICIAN ASSISTANT

## 2025-06-02 PROCEDURE — 80061 LIPID PANEL: CPT | Performed by: PHYSICIAN ASSISTANT

## 2025-06-02 RX ORDER — AMLODIPINE BESYLATE 10 MG/1
10 TABLET ORAL DAILY
Qty: 90 TABLET | Refills: 3 | Status: SHIPPED | OUTPATIENT
Start: 2025-06-02

## 2025-06-02 ASSESSMENT — PAIN SCALES - GENERAL: PAINLEVEL_OUTOF10: NO PAIN (0)

## 2025-06-02 NOTE — PATIENT INSTRUCTIONS
Adjust amlodipine to 10 mg daily    Recheck with nursing in 1-2 months for blood pressure check.     If BP is still >140/90, then I will plan to add a different medication for management of blood pressure

## 2025-06-02 NOTE — PROGRESS NOTES
"  Assessment & Plan     Essential hypertension  Adjust medication.   Increase amlodipine to 10 mg daily  Recheck with nursing in 1-2 months  Add medication after that time if still above 140/90   - BASIC METABOLIC PANEL; Future  - amLODIPine (NORVASC) 10 MG tablet; Take 1 tablet (10 mg) by mouth daily.  - BASIC METABOLIC PANEL    Need for hepatitis C screening test    Lipid screening  - Lipid panel reflex to direct LDL Fasting; Future  - Lipid panel reflex to direct LDL Fasting    Skin cancer screening  - Adult Dermatology  Referral; Future    The longitudinal plan of care for the diagnosis(es)/condition(s) as documented were addressed during this visit. Due to the added complexity in care, I will continue to support Vale in the subsequent management and with ongoing continuity of care.        BMI  Estimated body mass index is 35.4 kg/m  as calculated from the following:    Height as of this encounter: 1.664 m (5' 5.5\").    Weight as of this encounter: 98 kg (216 lb).   Weight management plan: Discussed healthy diet and exercise guidelines          León Collazo is a 39 year old, presenting for the following health issues:  Hypertension (Refill meds/)        6/2/2025     9:45 AM   Additional Questions   Roomed by Sean BROWN MA     History of Present Illness       Hypertension: She presents for follow up of hypertension.  She does check blood pressure  regularly outside of the clinic. Outside blood pressures have been over 140/90. She follows a low salt diet.     She eats 0-1 servings of fruits and vegetables daily.She consumes 0 sweetened beverage(s) daily.She exercises with enough effort to increase her heart rate 20 to 29 minutes per day.  She exercises with enough effort to increase her heart rate 3 or less days per week.   She is taking medications regularly.     Vale is here today for hypertension.   She continues on the amlodipine 5 mg.   She is monitoring at home and the diastolic readings " "are often in the 88-94 range.   She feels well.                Review of Systems  Constitutional, HEENT, cardiovascular, pulmonary, GI, , musculoskeletal, neuro, skin, endocrine and psych systems are negative, except as otherwise noted.      Objective    BP (!) 142/96   Pulse 88   Temp 98.5  F (36.9  C) (Tympanic)   Ht 1.664 m (5' 5.5\")   Wt 98 kg (216 lb)   LMP  (LMP Unknown)   SpO2 98%   Breastfeeding No   BMI 35.40 kg/m    Body mass index is 35.4 kg/m .  Physical Exam   GENERAL: alert and no distress  RESP: lungs clear to auscultation - no rales, rhonchi or wheezes  CV: regular rate and rhythm, normal S1 S2, no S3 or S4, no murmur, click or rub, no peripheral edema   MS: no gross musculoskeletal defects noted, no edema  SKIN: no suspicious lesions or rashes  NEURO: Normal strength and tone, mentation intact and speech normal  PSYCH: mentation appears normal, affect normal/bright            Signed Electronically by: Kristen M. Kehr, PA-C    "

## 2025-06-02 NOTE — PROGRESS NOTES
[facility]                                     Progress Note    Patient Name: Vale Greenberg  Date:  5/28/2025         Service Type: Individual      Session Start Time: 10:02 AM  Session End Time: 10:58 AM     Session Length: 56 Minutes    Session #: 40    Attendees: Client attended alone    Service Modality:  Video Visit:      Provider verified identity through the following two step process.  Patient provided:  Patient is known previously to provider    Telemedicine Visit: The patient's condition can be safely assessed and treated via synchronous audio and visual telemedicine encounter.      Reason for Telemedicine Visit: Patient convenience (e.g. access to timely appointments / distance to available provider)    Originating Site (Patient Location): Patient's home    Distant Site (Provider Location): Provider Remote Setting- Home Office    Consent:  The patient/guardian has verbally consented to: the potential risks and benefits of telemedicine (video visit) versus in person care; bill my insurance or make self-payment for services provided; and responsibility for payment of non-covered services.     Patient would like the video invitation sent by:  My Chart    Mode of Communication:  Video Conference via [facility]    Distant Location (Provider):  Off-site    As the provider I attest to compliance with applicable laws and regulations related to telemedicine.    DATA  Extended Session (53+ minutes): PROLONGED SERVICE IN THE OUTPATIENT SETTING REQUIRING DIRECT (FACE-TO-FACE) PATIENT CONTACT BEYOND THE USUAL SERVICE:    - Patient's presenting concerns require more intensive intervention than could be completed within the usual service  Interactive Complexity: No  Crisis: No      Progress Since Last Session (Related to Symptoms / Goals / Homework):   Symptoms: No change continued anxiety, stress and comparison     Homework: Achieved / completed to satisfaction      Episode of Care Goals:  "Satisfactory progress - ACTION (Actively working towards change); Intervened by reinforcing change plan / affirming steps taken     Current / Ongoing Stressors and Concerns:   Vale is coming to therapy to learn skills to decrease her anxiety and depression. She reports increased fear of possible health concerns and worries for her future have contributed to significant distress in all aspects of her life and results in high blood pressure, irritation, avoidance and withdrawing behavior. She reports increased relationship conflicts with her family and pressure to be \"it all for everyone.\"     Treatment Objective(s) Addressed in This Session:   use cognitive strategies identified in therapy to challenge anxious thoughts, Identify negative self-talk and behaviors: challenge core beliefs, myths, and actions   practice deep breathing at least once a day     Intervention:   Therapist utilized reflected listening as patient gave brief reflection of the past few weeks. Patient reported continued anxiety, stress and comparison. She reflected on the boundaries and skills she has been upholding when interacting with her family  and their upcoming gathering. She processed her anxiety, stress and negative comparison thoughts. Therapist supported patient as she processed and validated patient. Therapist engaged in cognitive restructuring/ reframing, looked at cognitive distortions and challenged distorted thoughts while utilizing her ground skills. Therapist reflected on the progress that came with persistence and putting good effort into changing her thinking patterns and reinforced positive behavioral choices with the utilization of her skills. Therapist continued to utilize [place] skill for her sons' field trip.     Assessments completed prior to visit:  The following assessments were completed by patient for this visit:  PHQ2:       4/11/2025     9:50 AM 1/8/2025    12:45 PM 12/20/2024     9:45 AM 12/5/2024     9:26 AM " 8/21/2024    10:00 AM 5/20/2024     8:53 AM 5/9/2024    10:29 AM   PHQ-2 ( 1999 [employer])   Q1: Little interest or pleasure in doing things 0 0 0 0 0 0 0   Q2: Feeling down, depressed or hopeless 0 0 0 0 0 0 0   PHQ-2 Score 0  0  0  0  0 0 0   Q1: Little interest or pleasure in doing things Not at all Not at all Not at all Not at all Not at all Not at all Not at all   Q2: Feeling down, depressed or hopeless Not at all Not at all Not at all Not at all Not at all Not at all Not at all   PHQ-2 Score 0 0 0 0 0 0 0       Patient-reported     PHQ9:       6/18/2019     3:26 PM 7/10/2019     3:56 PM 3/2/2020     8:30 AM 3/4/2021     2:32 PM 6/7/2023    12:01 PM   PHQ-9 SCORE   PHQ-9 Total Score MyChart     3 (Minimal depression)   PHQ-9 Total Score 9 2 2 2 3     GAD2:       4/16/2024     8:19 PM 8/16/2024     9:33 AM 11/19/2024     7:34 AM 12/5/2024     9:26 AM 12/20/2024     9:46 AM 1/8/2025    12:45 PM 4/11/2025     9:50 AM   TIM-2   Feeling nervous, anxious, or on edge 1 1 1 1 1 1 1   Not being able to stop or control worrying 1 0 1 0 1 1 1   TIM-2 Total Score 2 1 2  1  2  2  2        Patient-reported     GAD7:       2/28/2020     8:06 AM 3/2/2020     8:30 AM 2/18/2021     7:29 AM 2/25/2021    10:15 AM 3/1/2022    10:35 AM 2/20/2023     9:54 AM 4/26/2023     7:57 AM   TIM-7 SCORE   Total Score 4 (minimal anxiety)  6 (mild anxiety) 6 (mild anxiety) 3 (minimal anxiety) 12 (moderate anxiety) 3 (minimal anxiety)   Total Score 4 4 6    6 6 3 12 3     PROMIS 10-Global Health (all questions and answers displayed):       4/16/2024     8:20 PM 8/16/2024     9:34 AM 11/19/2024     7:35 AM 12/5/2024     9:27 AM 12/20/2024     9:46 AM 1/8/2025    12:46 PM 4/11/2025     9:51 AM   PROMIS 10   In general, would you say your health is: Good Good Good Good Good Good Good   In general, would you say your quality of life is: Very good Very good Very good Very good Very good Very good Very good   In general, how would you rate your physical  health? Good Good Good Good Good Good Good   In general, how would you rate your mental health, including your mood and your ability to think? Good Very good Good Good Very good Very good Very good   In general, how would you rate your satisfaction with your social activities and relationships? Very good Very good Excellent Very good Excellent Very good Very good   In general, please rate how well you carry out your usual social activities and roles Very good Excellent Excellent Excellent Very good Very good Very good   To what extent are you able to carry out your everyday physical activities such as walking, climbing stairs, carrying groceries, or moving a chair? Completely Completely Completely Completely Completely Completely Completely   In the past 7 days, how often have you been bothered by emotional problems such as feeling anxious, depressed, or irritable? Sometimes Sometimes Sometimes Sometimes Sometimes Rarely Sometimes   In the past 7 days, how would you rate your fatigue on average? Moderate Mild Moderate Moderate Mild Moderate Moderate   In the past 7 days, how would you rate your pain on average, where 0 means no pain, and 10 means worst imaginable pain? 0 0 0 0 0 0 0   In general, would you say your health is: 3 3 3 3 3 3 3   In general, would you say your quality of life is: 4 4 4 4 4 4 4   In general, how would you rate your physical health? 3 3 3 3 3 3 3   In general, how would you rate your mental health, including your mood and your ability to think? 3 4 3 3 4 4 4   In general, how would you rate your satisfaction with your social activities and relationships? 4 4 5 4 5 4 4   In general, please rate how well you carry out your usual social activities and roles. (This includes activities at home, at work and in your community, and responsibilities as a parent, child, spouse, employee, friend, etc.) 4 5 5 5 4 4 4   To what extent are you able to carry out your everyday physical activities such as  walking, climbing stairs, carrying groceries, or moving a chair? 5 5 5 5 5 5 5   In the past 7 days, how often have you been bothered by emotional problems such as feeling anxious, depressed, or irritable? 3 3 3 3 3 2 3   In the past 7 days, how would you rate your fatigue on average? 3 2 3 3 2 3 3   In the past 7 days, how would you rate your pain on average, where 0 means no pain, and 10 means worst imaginable pain? 0 0 0 0 0 0 0   Global Mental Health Score 14 15 15  14  16  16  15    Global Physical Health Score 16 17 16  16  17  16  16    PROMIS TOTAL - SUBSCORES 30 32 31  30  33  32  31        Patient-reported         ASSESSMENT: Current Emotional / Mental Status (status of significant symptoms):   Risk status (Self / Other harm or suicidal ideation)   Patient denies current fears or concerns for personal safety.   Patient denies current or recent suicidal ideation or behaviors.   Patient denies current or recent homicidal ideation or behaviors.   Patient denies current or recent self injurious behavior or ideation.   Patient denies other safety concerns.   Patient reports there has been no change in risk factors since their last session.     Patient reports there has been no change in protective factors since their last session.     Recommended that patient call 911 or go to the local ED should there be a change in any of these risk factors     Appearance:   Appropriate    Eye Contact:   Good    Psychomotor Behavior: Normal    Attitude:   Cooperative  Friendly   Orientation:   All   Speech    Rate / Production: Normal/ Responsive    Volume  Normal    Mood:    Anxious  Happy   Affect:    Appropriate    Thought Content:  Clear    Thought Form:  Coherent  Goal Directed  Logical    Insight:    Good      Medication Review:   No current psychiatric medications prescribed     Medication Compliance:   NA     Changes in Health Issues:   None reported     Chemical Use Review:   Substance Use: Chemical use reviewed, no  active concerns identified      Tobacco Use: No current tobacco use.      Diagnosis:  1. Adjustment disorder with mixed anxiety and depressed mood        Collateral Reports Completed:   Not Applicable    PLAN: (Patient Tasks / Therapist Tasks / Other)  Vale will continue to practice her grounding skills, challenging her cognitive distortions and cope ahead skills/breaks while on the field trip.     Tarah Tashi, LICSW                                                         ______________________________________________________________________    Individual Treatment Plan    Patient's Name: Vale Greenberg  YOB: 1985    Date of Creation: 8/21/2024  Date Treatment Plan Last Reviewed/Revised: 3/17/2025    DSM5 Diagnoses: Adjustment Disorders  309.28 (F43.23) With mixed anxiety and depressed mood  Psychosocial / Contextual Factors: works full time, has twin sons, good support system.  PROMIS (reviewed every 90 days):     Referral / Collaboration:  Referral to another professional/service is not indicated at this time..    Anticipated number of session for this episode of care: 9-12 sessions  Anticipation frequency of session: Every other week  Anticipated Duration of each session: 38-52 minutes  Treatment plan will be reviewed in 90 days or when goals have been changed.       MeasurableTreatment Goal(s) related to diagnosis / functional impairment(s)  Goal 1: Patient will become more aware of their anxiety and utilize the skills taught to decrease their anxiety symptoms.    I will know I've met my goal when I understand more about my anxiety and have skills to cope with it.      Objective #A (Patient Action)    Patient will use at least 4 coping skills for anxiety management in the next 12 weeks..  Status: Continued - Date(s):  3/17/2025     Intervention(s)  Therapist will teach coping skills and assign homework of practicing these.    Objective #B  Patient will use cognitive strategies  identified in therapy to challenge anxious thoughts. Patient will engage in activities that are anxiety producing for them, slowly increasing their tolerance for new activities. Patient will identify and recognize past negative experiences and subsequent beliefs they hold that contribute to their anxiety..  Status: Continued - Date(s):  3/17/2025     Intervention(s)  Therapist will will teach cognitive behavioral skills and engage client in Socratic dialogue around distorted thinking.  Therapist will provide educational materials on CBT. .    Objective #C  Patient will implement self-care into their routine to decrease anxiety, focusing on sleep hygiene, nutrition, movement, regular engagement in mindful activity, and regular socialization. .  Status: Continued - Date(s):  3/17/2025     Intervention(s)  Therapist will provide psychoeducation around the benefit of self-care and help client identify mindfulness based activities that may work for their life..    Goal 2: Patient will improve her ability to be effective in relationships as evidenced by client reporting use of three new communication skills over the next three months.    I will know I've met my goal when I can communicate with my family better.      Objective #A (Patient Action)    Status: Continued - Date(s):  3/17/2025     Patient will learn & utilize at least 2 assertive communication skills weekly.    Intervention(s)  Therapist will teach assertiveness, effective interpersonal skills and about healthy boundaries in relationships.    Objective #B  Patient will identify his values regarding interpersonal relationships and how to uphold her values while navigating interpersonal relationships.    Status: Continued - Date(s):  3/17/2025     Intervention(s)  Therapist will teach how to uphold values with DBT and provide support and feedback and teach about healthy boundaries in relationships.    Objective #C  Patient will uphold her values while navigating  interpersonal relationships.  Status: Continued - Date(s):  3/17/2025     Intervention(s)  Therapist will provide support and feedback and teach about healthy boundaries in relationships..      Patient has reviewed and agreed to the above plan.      NIK Montero  March 17, 2025

## 2025-06-03 ENCOUNTER — PATIENT OUTREACH (OUTPATIENT)
Dept: CARE COORDINATION | Facility: CLINIC | Age: 40
End: 2025-06-03
Payer: COMMERCIAL

## 2025-06-05 ENCOUNTER — PATIENT OUTREACH (OUTPATIENT)
Dept: CARE COORDINATION | Facility: CLINIC | Age: 40
End: 2025-06-05
Payer: COMMERCIAL

## 2025-06-11 ENCOUNTER — VIRTUAL VISIT (OUTPATIENT)
Facility: CLINIC | Age: 40
End: 2025-06-11
Payer: COMMERCIAL

## 2025-06-11 DIAGNOSIS — F43.23 ADJUSTMENT DISORDER WITH MIXED ANXIETY AND DEPRESSED MOOD: Primary | ICD-10-CM

## 2025-06-11 PROCEDURE — 90837 PSYTX W PT 60 MINUTES: CPT | Mod: 95

## 2025-06-11 NOTE — PROGRESS NOTES
[facility]                                     Progress Note    Patient Name: Vale Greenberg  Date:  6/11/2025         Service Type: Individual      Session Start Time: 10:04 AM  Session End Time: 10:59 AM     Session Length: 55 Minutes    Session #: 41    Attendees: Client attended alone    Service Modality:  Video Visit:      Provider verified identity through the following two step process.  Patient provided:  Patient is known previously to provider    Telemedicine Visit: The patient's condition can be safely assessed and treated via synchronous audio and visual telemedicine encounter.      Reason for Telemedicine Visit: Patient convenience (e.g. access to timely appointments / distance to available provider)    Originating Site (Patient Location): Patient's home    Distant Site (Provider Location): Provider Remote Setting- Home Office    Consent:  The patient/guardian has verbally consented to: the potential risks and benefits of telemedicine (video visit) versus in person care; bill my insurance or make self-payment for services provided; and responsibility for payment of non-covered services.     Patient would like the video invitation sent by:  My Chart    Mode of Communication:  Video Conference via [facility]    Distant Location (Provider):  Off-site    As the provider I attest to compliance with applicable laws and regulations related to telemedicine.    DATA  Extended Session (53+ minutes): PROLONGED SERVICE IN THE OUTPATIENT SETTING REQUIRING DIRECT (FACE-TO-FACE) PATIENT CONTACT BEYOND THE USUAL SERVICE:    - Patient's presenting concerns require more intensive intervention than could be completed within the usual service  Interactive Complexity: No  Crisis: No      Progress Since Last Session (Related to Symptoms / Goals / Homework):   Symptoms: Improving utilization of skills with decreased anxiety and being present with increased stress    Homework: Achieved / completed to  "satisfaction      Episode of Care Goals: Satisfactory progress - ACTION (Actively working towards change); Intervened by reinforcing change plan / affirming steps taken     Current / Ongoing Stressors and Concerns:   Vale is coming to therapy to learn skills to decrease her anxiety and depression. She reports increased fear of possible health concerns and worries for her future have contributed to significant distress in all aspects of her life and results in high blood pressure, irritation, avoidance and withdrawing behavior. She reports increased relationship conflicts with her family and pressure to be \"it all for everyone.\"     Treatment Objective(s) Addressed in This Session:   use cognitive strategies identified in therapy to challenge anxious thoughts, Identify negative self-talk and behaviors: challenge core beliefs, myths, and actions   practice deep breathing at least once a day     Intervention:   Therapist utilized reflected listening as patient gave brief reflection of the past few weeks. Patient reported improving utilization of skills with decreased anxiety and being present with increased stress. She reflected on how proud of herself she is for how she navigated two recent stressors with very little anxiety and how it had a ripple down effect to others positively.   She processed how her stress has taken away her chata and looking for ways to fill her cup. Therapist supported patient as she processed and validated patient. Therapist engaged in cognitive restructuring/ reframing, looked at cognitive distortions and challenged distorted thoughts. Therapist reflected on the progress that came with persistence and putting good effort into changing her thinking patterns and reinforced positive behavioral choices with the utilization of her skills.    Assessments completed prior to visit:  The following assessments were completed by patient for this visit:  PHQ2:       4/11/2025     9:50 AM 1/8/2025    " 12:45 PM 12/20/2024     9:45 AM 12/5/2024     9:26 AM 8/21/2024    10:00 AM 5/20/2024     8:53 AM 5/9/2024    10:29 AM   PHQ-2 ( 1999 [employer])   Q1: Little interest or pleasure in doing things 0 0 0 0 0 0 0   Q2: Feeling down, depressed or hopeless 0 0 0 0 0 0 0   PHQ-2 Score 0  0  0  0  0 0 0   Q1: Little interest or pleasure in doing things Not at all Not at all Not at all Not at all Not at all Not at all Not at all   Q2: Feeling down, depressed or hopeless Not at all Not at all Not at all Not at all Not at all Not at all Not at all   PHQ-2 Score 0 0 0 0 0 0 0       Patient-reported     PHQ9:       6/18/2019     3:26 PM 7/10/2019     3:56 PM 3/2/2020     8:30 AM 3/4/2021     2:32 PM 6/7/2023    12:01 PM   PHQ-9 SCORE   PHQ-9 Total Score MyChart     3 (Minimal depression)   PHQ-9 Total Score 9 2 2 2 3     GAD2:       4/16/2024     8:19 PM 8/16/2024     9:33 AM 11/19/2024     7:34 AM 12/5/2024     9:26 AM 12/20/2024     9:46 AM 1/8/2025    12:45 PM 4/11/2025     9:50 AM   TIM-2   Feeling nervous, anxious, or on edge 1 1 1 1 1 1 1   Not being able to stop or control worrying 1 0 1 0 1 1 1   TIM-2 Total Score 2 1 2  1  2  2  2        Patient-reported     GAD7:       2/28/2020     8:06 AM 3/2/2020     8:30 AM 2/18/2021     7:29 AM 2/25/2021    10:15 AM 3/1/2022    10:35 AM 2/20/2023     9:54 AM 4/26/2023     7:57 AM   TIM-7 SCORE   Total Score 4 (minimal anxiety)  6 (mild anxiety) 6 (mild anxiety) 3 (minimal anxiety) 12 (moderate anxiety) 3 (minimal anxiety)   Total Score 4 4 6    6 6 3 12 3     PROMIS 10-Global Health (all questions and answers displayed):       4/16/2024     8:20 PM 8/16/2024     9:34 AM 11/19/2024     7:35 AM 12/5/2024     9:27 AM 12/20/2024     9:46 AM 1/8/2025    12:46 PM 4/11/2025     9:51 AM   PROMIS 10   In general, would you say your health is: Good Good Good Good Good Good Good   In general, would you say your quality of life is: Very good Very good Very good Very good Very good Very good Very  good   In general, how would you rate your physical health? Good Good Good Good Good Good Good   In general, how would you rate your mental health, including your mood and your ability to think? Good Very good Good Good Very good Very good Very good   In general, how would you rate your satisfaction with your social activities and relationships? Very good Very good Excellent Very good Excellent Very good Very good   In general, please rate how well you carry out your usual social activities and roles Very good Excellent Excellent Excellent Very good Very good Very good   To what extent are you able to carry out your everyday physical activities such as walking, climbing stairs, carrying groceries, or moving a chair? Completely Completely Completely Completely Completely Completely Completely   In the past 7 days, how often have you been bothered by emotional problems such as feeling anxious, depressed, or irritable? Sometimes Sometimes Sometimes Sometimes Sometimes Rarely Sometimes   In the past 7 days, how would you rate your fatigue on average? Moderate Mild Moderate Moderate Mild Moderate Moderate   In the past 7 days, how would you rate your pain on average, where 0 means no pain, and 10 means worst imaginable pain? 0 0 0 0 0 0 0   In general, would you say your health is: 3 3 3 3 3 3 3   In general, would you say your quality of life is: 4 4 4 4 4 4 4   In general, how would you rate your physical health? 3 3 3 3 3 3 3   In general, how would you rate your mental health, including your mood and your ability to think? 3 4 3 3 4 4 4   In general, how would you rate your satisfaction with your social activities and relationships? 4 4 5 4 5 4 4   In general, please rate how well you carry out your usual social activities and roles. (This includes activities at home, at work and in your community, and responsibilities as a parent, child, spouse, employee, friend, etc.) 4 5 5 5 4 4 4   To what extent are you able to  carry out your everyday physical activities such as walking, climbing stairs, carrying groceries, or moving a chair? 5 5 5 5 5 5 5   In the past 7 days, how often have you been bothered by emotional problems such as feeling anxious, depressed, or irritable? 3 3 3 3 3 2 3   In the past 7 days, how would you rate your fatigue on average? 3 2 3 3 2 3 3   In the past 7 days, how would you rate your pain on average, where 0 means no pain, and 10 means worst imaginable pain? 0 0 0 0 0 0 0   Global Mental Health Score 14 15 15  14  16  16  15    Global Physical Health Score 16 17 16  16  17  16  16    PROMIS TOTAL - SUBSCORES 30 32 31  30  33  32  31        Patient-reported         ASSESSMENT: Current Emotional / Mental Status (status of significant symptoms):   Risk status (Self / Other harm or suicidal ideation)   Patient denies current fears or concerns for personal safety.   Patient denies current or recent suicidal ideation or behaviors.   Patient denies current or recent homicidal ideation or behaviors.   Patient denies current or recent self injurious behavior or ideation.   Patient denies other safety concerns.   Patient reports there has been no change in risk factors since their last session.     Patient reports there has been no change in protective factors since their last session.     Recommended that patient call 911 or go to the local ED should there be a change in any of these risk factors     Appearance:   Appropriate    Eye Contact:   Good    Psychomotor Behavior: Normal    Attitude:   Cooperative  Friendly   Orientation:   All   Speech    Rate / Production: Normal/ Responsive    Volume  Normal    Mood:    Anxious  Sad  Content   Affect:    Appropriate    Thought Content:  Clear    Thought Form:  Coherent  Goal Directed  Logical    Insight:    Good      Medication Review:   No current psychiatric medications prescribed     Medication Compliance:   NA     Changes in Health Issues:   None  reported     Chemical Use Review:   Substance Use: Chemical use reviewed, no active concerns identified      Tobacco Use: No current tobacco use.      Diagnosis:  1. Adjustment disorder with mixed anxiety and depressed mood        Collateral Reports Completed:   Not Applicable    PLAN: (Patient Tasks / Therapist Tasks / Other)  Vale will continue to practice her grounding skills and challenging her cognitive distortions.     Tarah Tashi, LICSW                                                         ______________________________________________________________________    Individual Treatment Plan    Patient's Name: Vale Greenberg  YOB: 1985    Date of Creation: 8/21/2024  Date Treatment Plan Last Reviewed/Revised: 3/17/2025    DSM5 Diagnoses: Adjustment Disorders  309.28 (F43.23) With mixed anxiety and depressed mood  Psychosocial / Contextual Factors: works full time, has twin sons, good support system.  PROMIS (reviewed every 90 days):     Referral / Collaboration:  Referral to another professional/service is not indicated at this time..    Anticipated number of session for this episode of care: 9-12 sessions  Anticipation frequency of session: Every other week  Anticipated Duration of each session: 38-52 minutes  Treatment plan will be reviewed in 90 days or when goals have been changed.       MeasurableTreatment Goal(s) related to diagnosis / functional impairment(s)  Goal 1: Patient will become more aware of their anxiety and utilize the skills taught to decrease their anxiety symptoms.    I will know I've met my goal when I understand more about my anxiety and have skills to cope with it.      Objective #A (Patient Action)    Patient will use at least 4 coping skills for anxiety management in the next 12 weeks..  Status: Continued - Date(s):  3/17/2025     Intervention(s)  Therapist will teach coping skills and assign homework of practicing these.    Objective #B  Patient will use  cognitive strategies identified in therapy to challenge anxious thoughts. Patient will engage in activities that are anxiety producing for them, slowly increasing their tolerance for new activities. Patient will identify and recognize past negative experiences and subsequent beliefs they hold that contribute to their anxiety..  Status: Continued - Date(s):  3/17/2025     Intervention(s)  Therapist will will teach cognitive behavioral skills and engage client in Socratic dialogue around distorted thinking.  Therapist will provide educational materials on CBT. .    Objective #C  Patient will implement self-care into their routine to decrease anxiety, focusing on sleep hygiene, nutrition, movement, regular engagement in mindful activity, and regular socialization. .  Status: Continued - Date(s):  3/17/2025     Intervention(s)  Therapist will provide psychoeducation around the benefit of self-care and help client identify mindfulness based activities that may work for their life..    Goal 2: Patient will improve her ability to be effective in relationships as evidenced by client reporting use of three new communication skills over the next three months.    I will know I've met my goal when I can communicate with my family better.      Objective #A (Patient Action)    Status: Continued - Date(s):  3/17/2025     Patient will learn & utilize at least 2 assertive communication skills weekly.    Intervention(s)  Therapist will teach assertiveness, effective interpersonal skills and about healthy boundaries in relationships.    Objective #B  Patient will identify his values regarding interpersonal relationships and how to uphold her values while navigating interpersonal relationships.    Status: Continued - Date(s):  3/17/2025     Intervention(s)  Therapist will teach how to uphold values with DBT and provide support and feedback and teach about healthy boundaries in relationships.    Objective #C  Patient will uphold her  values while navigating interpersonal relationships.  Status: Continued - Date(s):  3/17/2025     Intervention(s)  Therapist will provide support and feedback and teach about healthy boundaries in relationships..      Patient has reviewed and agreed to the above plan.      NIK Monteor  March 17, 2025

## 2025-06-15 ENCOUNTER — HEALTH MAINTENANCE LETTER (OUTPATIENT)
Age: 40
End: 2025-06-15

## 2025-06-25 ENCOUNTER — VIRTUAL VISIT (OUTPATIENT)
Facility: CLINIC | Age: 40
End: 2025-06-25
Payer: COMMERCIAL

## 2025-06-25 DIAGNOSIS — F43.23 ADJUSTMENT DISORDER WITH MIXED ANXIETY AND DEPRESSED MOOD: Primary | ICD-10-CM

## 2025-06-25 PROCEDURE — 90837 PSYTX W PT 60 MINUTES: CPT | Mod: 95

## 2025-07-03 NOTE — PROGRESS NOTES
[facility]                                     Progress Note    Patient Name: Vale Greenberg  Date:  6/25/2025         Service Type: Individual      Session Start Time: 10:01 AM  Session End Time: 10:59 AM     Session Length: 58 Minutes    Session #: 42    Attendees: Client attended alone    Service Modality:  Video Visit:      Provider verified identity through the following two step process.  Patient provided:  Patient is known previously to provider    Telemedicine Visit: The patient's condition can be safely assessed and treated via synchronous audio and visual telemedicine encounter.      Reason for Telemedicine Visit: Patient convenience (e.g. access to timely appointments / distance to available provider)    Originating Site (Patient Location): Patient's home    Distant Site (Provider Location): Provider Remote Setting- Home Office    Consent:  The patient/guardian has verbally consented to: the potential risks and benefits of telemedicine (video visit) versus in person care; bill my insurance or make self-payment for services provided; and responsibility for payment of non-covered services.     Patient would like the video invitation sent by:  My Chart    Mode of Communication:  Video Conference via [facility]    Distant Location (Provider):  Off-site    As the provider I attest to compliance with applicable laws and regulations related to telemedicine.    DATA  Extended Session (53+ minutes): PROLONGED SERVICE IN THE OUTPATIENT SETTING REQUIRING DIRECT (FACE-TO-FACE) PATIENT CONTACT BEYOND THE USUAL SERVICE:    - Patient's presenting concerns require more intensive intervention than could be completed within the usual service  Interactive Complexity: No  Crisis: No      Progress Since Last Session (Related to Symptoms / Goals / Homework):   Symptoms: Worsening anxiety with her sons' behaviors.     Homework: Achieved / completed to satisfaction      Episode of Care Goals: Satisfactory  "progress - ACTION (Actively working towards change); Intervened by reinforcing change plan / affirming steps taken     Current / Ongoing Stressors and Concerns:   Vale is coming to therapy to learn skills to decrease her anxiety and depression. She reports increased fear of possible health concerns and worries for her future have contributed to significant distress in all aspects of her life and results in high blood pressure, irritation, avoidance and withdrawing behavior. She reports increased relationship conflicts with her family and pressure to be \"it all for everyone.\"     Treatment Objective(s) Addressed in This Session:   use cognitive strategies identified in therapy to challenge anxious thoughts, Identify negative self-talk and behaviors: challenge core beliefs, myths, and actions   practice deep breathing at least once a day     Intervention:   Therapist utilized reflected listening as patient gave brief reflection of the past few weeks. Patient reported worsening anxiety with her sons' behaviors. She processed her anxiety around her children's behaviors, her increased awareness when she is doing it and the skills she is utilizing to create change. Therapist supported patient as she processed and validated patient. Therapist engaged in cognitive restructuring/ reframing, looked at cognitive distortions and challenged distorted thoughts. Therapist reflected on the progress that came with persistence and putting good effort into changing her thinking patterns and reinforced positive behavioral choices with the utilization of her skills.    Assessments completed prior to visit:  The following assessments were completed by patient for this visit:  PHQ2:       4/11/2025     9:50 AM 1/8/2025    12:45 PM 12/20/2024     9:45 AM 12/5/2024     9:26 AM 8/21/2024    10:00 AM 5/20/2024     8:53 AM 5/9/2024    10:29 AM   PHQ-2 ( 1999 [employer])   Q1: Little interest or pleasure in doing things 0 0 0 0 0 0 0   Q2: " Feeling down, depressed or hopeless 0 0 0 0 0 0 0   PHQ-2 Score 0  0  0  0  0 0 0   Q1: Little interest or pleasure in doing things Not at all Not at all Not at all Not at all Not at all Not at all Not at all   Q2: Feeling down, depressed or hopeless Not at all Not at all Not at all Not at all Not at all Not at all Not at all   PHQ-2 Score 0 0 0 0 0 0 0       Patient-reported     PHQ9:       6/18/2019     3:26 PM 7/10/2019     3:56 PM 3/2/2020     8:30 AM 3/4/2021     2:32 PM 6/7/2023    12:01 PM   PHQ-9 SCORE   PHQ-9 Total Score MyChart     3 (Minimal depression)   PHQ-9 Total Score 9 2 2 2 3     GAD2:       4/16/2024     8:19 PM 8/16/2024     9:33 AM 11/19/2024     7:34 AM 12/5/2024     9:26 AM 12/20/2024     9:46 AM 1/8/2025    12:45 PM 4/11/2025     9:50 AM   TIM-2   Feeling nervous, anxious, or on edge 1 1 1 1 1 1 1   Not being able to stop or control worrying 1 0 1 0 1 1 1   TIM-2 Total Score 2 1 2  1  2  2  2        Patient-reported     GAD7:       2/28/2020     8:06 AM 3/2/2020     8:30 AM 2/18/2021     7:29 AM 2/25/2021    10:15 AM 3/1/2022    10:35 AM 2/20/2023     9:54 AM 4/26/2023     7:57 AM   TIM-7 SCORE   Total Score 4 (minimal anxiety)  6 (mild anxiety) 6 (mild anxiety) 3 (minimal anxiety) 12 (moderate anxiety) 3 (minimal anxiety)   Total Score 4 4 6    6 6 3 12 3     PROMIS 10-Global Health (all questions and answers displayed):       4/16/2024     8:20 PM 8/16/2024     9:34 AM 11/19/2024     7:35 AM 12/5/2024     9:27 AM 12/20/2024     9:46 AM 1/8/2025    12:46 PM 4/11/2025     9:51 AM   PROMIS 10   In general, would you say your health is: Good Good Good Good Good Good Good   In general, would you say your quality of life is: Very good Very good Very good Very good Very good Very good Very good   In general, how would you rate your physical health? Good Good Good Good Good Good Good   In general, how would you rate your mental health, including your mood and your ability to think? Good Very good  Good Good Very good Very good Very good   In general, how would you rate your satisfaction with your social activities and relationships? Very good Very good Excellent Very good Excellent Very good Very good   In general, please rate how well you carry out your usual social activities and roles Very good Excellent Excellent Excellent Very good Very good Very good   To what extent are you able to carry out your everyday physical activities such as walking, climbing stairs, carrying groceries, or moving a chair? Completely Completely Completely Completely Completely Completely Completely   In the past 7 days, how often have you been bothered by emotional problems such as feeling anxious, depressed, or irritable? Sometimes Sometimes Sometimes Sometimes Sometimes Rarely Sometimes   In the past 7 days, how would you rate your fatigue on average? Moderate Mild Moderate Moderate Mild Moderate Moderate   In the past 7 days, how would you rate your pain on average, where 0 means no pain, and 10 means worst imaginable pain? 0 0 0 0 0 0 0   In general, would you say your health is: 3 3 3 3 3 3 3   In general, would you say your quality of life is: 4 4 4 4 4 4 4   In general, how would you rate your physical health? 3 3 3 3 3 3 3   In general, how would you rate your mental health, including your mood and your ability to think? 3 4 3 3 4 4 4   In general, how would you rate your satisfaction with your social activities and relationships? 4 4 5 4 5 4 4   In general, please rate how well you carry out your usual social activities and roles. (This includes activities at home, at work and in your community, and responsibilities as a parent, child, spouse, employee, friend, etc.) 4 5 5 5 4 4 4   To what extent are you able to carry out your everyday physical activities such as walking, climbing stairs, carrying groceries, or moving a chair? 5 5 5 5 5 5 5   In the past 7 days, how often have you been bothered by emotional problems  such as feeling anxious, depressed, or irritable? 3 3 3 3 3 2 3   In the past 7 days, how would you rate your fatigue on average? 3 2 3 3 2 3 3   In the past 7 days, how would you rate your pain on average, where 0 means no pain, and 10 means worst imaginable pain? 0 0 0 0 0 0 0   Global Mental Health Score 14 15 15  14  16  16  15    Global Physical Health Score 16 17 16  16  17  16  16    PROMIS TOTAL - SUBSCORES 30 32 31  30  33  32  31        Patient-reported         ASSESSMENT: Current Emotional / Mental Status (status of significant symptoms):   Risk status (Self / Other harm or suicidal ideation)   Patient denies current fears or concerns for personal safety.   Patient denies current or recent suicidal ideation or behaviors.   Patient denies current or recent homicidal ideation or behaviors.   Patient denies current or recent self injurious behavior or ideation.   Patient denies other safety concerns.   Patient reports there has been no change in risk factors since their last session.     Patient reports there has been no change in protective factors since their last session.     Recommended that patient call 911 or go to the local ED should there be a change in any of these risk factors     Appearance:   Appropriate    Eye Contact:   Good    Psychomotor Behavior: Normal    Attitude:   Cooperative  Friendly   Orientation:   All   Speech    Rate / Production: Normal/ Responsive    Volume  Normal    Mood:    Anxious    Affect:    Appropriate    Thought Content:  Clear    Thought Form:  Coherent  Goal Directed  Logical    Insight:    Good      Medication Review:   No current psychiatric medications prescribed     Medication Compliance:   NA     Changes in Health Issues:   None reported     Chemical Use Review:   Substance Use: Chemical use reviewed, no active concerns identified      Tobacco Use: No current tobacco use.      Diagnosis:  1. Adjustment disorder with mixed anxiety and depressed mood         Collateral Reports Completed:   Not Applicable    PLAN: (Patient Tasks / Therapist Tasks / Other)  Vale will continue to practice her grounding skills and challenging her cognitive distortions.     Tarah Akins, [facility]                                                         ______________________________________________________________________    Individual Treatment Plan    Patient's Name: Vale Greenberg  YOB: 1985    Date of Creation: 8/21/2024  Date Treatment Plan Last Reviewed/Revised: 3/17/2025    DSM5 Diagnoses: Adjustment Disorders  309.28 (F43.23) With mixed anxiety and depressed mood  Psychosocial / Contextual Factors: works full time, has twin sons, good support system.  PROMIS (reviewed every 90 days):     Referral / Collaboration:  Referral to another professional/service is not indicated at this time..    Anticipated number of session for this episode of care: 9-12 sessions  Anticipation frequency of session: Every other week  Anticipated Duration of each session: 38-52 minutes  Treatment plan will be reviewed in 90 days or when goals have been changed.       MeasurableTreatment Goal(s) related to diagnosis / functional impairment(s)  Goal 1: Patient will become more aware of their anxiety and utilize the skills taught to decrease their anxiety symptoms.    I will know I've met my goal when I understand more about my anxiety and have skills to cope with it.      Objective #A (Patient Action)    Patient will use at least 4 coping skills for anxiety management in the next 12 weeks..  Status: Continued - Date(s):  6/25/2025     Intervention(s)  Therapist will teach coping skills and assign homework of practicing these.    Objective #B  Patient will use cognitive strategies identified in therapy to challenge anxious thoughts. Patient will engage in activities that are anxiety producing for them, slowly increasing their tolerance for new activities. Patient will identify and  recognize past negative experiences and subsequent beliefs they hold that contribute to their anxiety..  Status: Continued - Date(s):  6/25/2025     Intervention(s)  Therapist will will teach cognitive behavioral skills and engage client in Socratic dialogue around distorted thinking.  Therapist will provide educational materials on CBT. .    Objective #C  Patient will implement self-care into their routine to decrease anxiety, focusing on sleep hygiene, nutrition, movement, regular engagement in mindful activity, and regular socialization. .  Status: Continued - Date(s):  6/25/2025     Intervention(s)  Therapist will provide psychoeducation around the benefit of self-care and help client identify mindfulness based activities that may work for their life..    Goal 2: Patient will improve her ability to be effective in relationships as evidenced by client reporting use of three new communication skills over the next three months.    I will know I've met my goal when I can communicate with my family better.      Objective #A (Patient Action)    Status: Continued - Date(s):  6/25/2025     Patient will learn & utilize at least 2 assertive communication skills weekly.    Intervention(s)  Therapist will teach assertiveness, effective interpersonal skills and about healthy boundaries in relationships.    Objective #B  Patient will identify his values regarding interpersonal relationships and how to uphold her values while navigating interpersonal relationships.    Status: Continued - Date(s):  6/25/2025     Intervention(s)  Therapist will teach how to uphold values with DBT and provide support and feedback and teach about healthy boundaries in relationships.    Objective #C  Patient will uphold her values while navigating interpersonal relationships.  Status: Continued - Date(s):  6/25/2025     Intervention(s)  Therapist will provide support and feedback and teach about healthy boundaries in relationships..      Patient  has reviewed and agreed to the above plan.      Tarah Akins, NIK  June 25, 2025

## 2025-07-09 ENCOUNTER — VIRTUAL VISIT (OUTPATIENT)
Facility: CLINIC | Age: 40
End: 2025-07-09
Payer: COMMERCIAL

## 2025-07-09 DIAGNOSIS — F43.23 ADJUSTMENT DISORDER WITH MIXED ANXIETY AND DEPRESSED MOOD: Primary | ICD-10-CM

## 2025-07-09 PROCEDURE — 90837 PSYTX W PT 60 MINUTES: CPT | Mod: 95

## 2025-07-09 NOTE — PROGRESS NOTES
[facility]                                     Progress Note    Patient Name: Vale Greenberg  Date:  07/09/2025         Service Type: Individual      Session Start Time: 8:31 AM  Session End Time: 9:29 AM     Session Length: 58 Minutes    Session #: 43    Attendees: Client attended alone    Service Modality:  Video Visit:      Provider verified identity through the following two step process.  Patient provided:  Patient is known previously to provider    Telemedicine Visit: The patient's condition can be safely assessed and treated via synchronous audio and visual telemedicine encounter.      Reason for Telemedicine Visit: Patient convenience (e.g. access to timely appointments / distance to available provider)    Originating Site (Patient Location): Patient's place of employment    Distant Site (Provider Location): Provider Remote Setting- Home Office    Consent:  The patient/guardian has verbally consented to: the potential risks and benefits of telemedicine (video visit) versus in person care; bill my insurance or make self-payment for services provided; and responsibility for payment of non-covered services.     Patient would like the video invitation sent by:  My Chart    Mode of Communication:  Video Conference via [facility]    Distant Location (Provider):  Off-site    As the provider I attest to compliance with applicable laws and regulations related to telemedicine.    DATA  Extended Session (53+ minutes): PROLONGED SERVICE IN THE OUTPATIENT SETTING REQUIRING DIRECT (FACE-TO-FACE) PATIENT CONTACT BEYOND THE USUAL SERVICE:    - Patient's presenting concerns require more intensive intervention than could be completed within the usual service  Interactive Complexity: No  Crisis: No      Progress Since Last Session (Related to Symptoms / Goals / Homework):   Symptoms: Improving reduction in anxiety with continued skills utilization and observing her boundaries     Homework: Achieved /  "completed to satisfaction      Episode of Care Goals: Satisfactory progress - ACTION (Actively working towards change); Intervened by reinforcing change plan / affirming steps taken     Current / Ongoing Stressors and Concerns:   Vale is coming to therapy to learn skills to decrease her anxiety and depression. She reports increased fear of possible health concerns and worries for her future have contributed to significant distress in all aspects of her life and results in high blood pressure, irritation, avoidance and withdrawing behavior. She reports increased relationship conflicts with her family and pressure to be \"it all for everyone.\"     Treatment Objective(s) Addressed in This Session:   use cognitive strategies identified in therapy to challenge anxious thoughts, Identify negative self-talk and behaviors: challenge core beliefs, myths, and actions   practice deep breathing at least once a day     Intervention:   Therapist utilized reflected listening as patient gave brief reflection of the past few weeks. Patient reported reduction in anxiety with continued skills utilization and observing her boundaries. She reflected on her utilization of her skills at her sons' practice and a result findings.  She processed her feelings and honoring her boundaries when feeling baited into situations/conversations with her family. Therapist supported patient as she processed and validated patient. Therapist engaged in cognitive restructuring/ reframing, looked at cognitive distortions and challenged distorted thoughts, holding two things to be true at the same time. Therapist reflected on the progress that came with persistence and putting good effort into changing her thinking patterns and reinforced positive behavioral choices with the utilization of her skills.    Assessments completed prior to visit:  The following assessments were completed by patient for this visit:  PHQ2:       4/11/2025     9:50 AM 1/8/2025    " 12:45 PM 12/20/2024     9:45 AM 12/5/2024     9:26 AM 8/21/2024    10:00 AM 5/20/2024     8:53 AM 5/9/2024    10:29 AM   PHQ-2 ( 1999 [employer])   Q1: Little interest or pleasure in doing things 0 0 0 0 0 0 0   Q2: Feeling down, depressed or hopeless 0 0 0 0 0 0 0   PHQ-2 Score 0  0  0  0  0 0 0   Q1: Little interest or pleasure in doing things Not at all Not at all Not at all Not at all Not at all Not at all Not at all   Q2: Feeling down, depressed or hopeless Not at all Not at all Not at all Not at all Not at all Not at all Not at all   PHQ-2 Score 0 0 0 0 0 0 0       Patient-reported     PHQ9:       6/18/2019     3:26 PM 7/10/2019     3:56 PM 3/2/2020     8:30 AM 3/4/2021     2:32 PM 6/7/2023    12:01 PM   PHQ-9 SCORE   PHQ-9 Total Score MyChart     3 (Minimal depression)   PHQ-9 Total Score 9 2 2 2 3     GAD2:       4/16/2024     8:19 PM 8/16/2024     9:33 AM 11/19/2024     7:34 AM 12/5/2024     9:26 AM 12/20/2024     9:46 AM 1/8/2025    12:45 PM 4/11/2025     9:50 AM   TIM-2   Feeling nervous, anxious, or on edge 1 1 1 1 1 1 1   Not being able to stop or control worrying 1 0 1 0 1 1 1   TIM-2 Total Score 2 1 2  1  2  2  2        Patient-reported     GAD7:       2/28/2020     8:06 AM 3/2/2020     8:30 AM 2/18/2021     7:29 AM 2/25/2021    10:15 AM 3/1/2022    10:35 AM 2/20/2023     9:54 AM 4/26/2023     7:57 AM   TIM-7 SCORE   Total Score 4 (minimal anxiety)  6 (mild anxiety) 6 (mild anxiety) 3 (minimal anxiety) 12 (moderate anxiety) 3 (minimal anxiety)   Total Score 4 4 6    6 6 3 12 3     PROMIS 10-Global Health (all questions and answers displayed):       4/16/2024     8:20 PM 8/16/2024     9:34 AM 11/19/2024     7:35 AM 12/5/2024     9:27 AM 12/20/2024     9:46 AM 1/8/2025    12:46 PM 4/11/2025     9:51 AM   PROMIS 10   In general, would you say your health is: Good Good Good Good Good Good Good   In general, would you say your quality of life is: Very good Very good Very good Very good Very good Very good Very  good   In general, how would you rate your physical health? Good Good Good Good Good Good Good   In general, how would you rate your mental health, including your mood and your ability to think? Good Very good Good Good Very good Very good Very good   In general, how would you rate your satisfaction with your social activities and relationships? Very good Very good Excellent Very good Excellent Very good Very good   In general, please rate how well you carry out your usual social activities and roles Very good Excellent Excellent Excellent Very good Very good Very good   To what extent are you able to carry out your everyday physical activities such as walking, climbing stairs, carrying groceries, or moving a chair? Completely Completely Completely Completely Completely Completely Completely   In the past 7 days, how often have you been bothered by emotional problems such as feeling anxious, depressed, or irritable? Sometimes Sometimes Sometimes Sometimes Sometimes Rarely Sometimes   In the past 7 days, how would you rate your fatigue on average? Moderate Mild Moderate Moderate Mild Moderate Moderate   In the past 7 days, how would you rate your pain on average, where 0 means no pain, and 10 means worst imaginable pain? 0 0 0 0 0 0 0   In general, would you say your health is: 3 3 3 3 3 3 3   In general, would you say your quality of life is: 4 4 4 4 4 4 4   In general, how would you rate your physical health? 3 3 3 3 3 3 3   In general, how would you rate your mental health, including your mood and your ability to think? 3 4 3 3 4 4 4   In general, how would you rate your satisfaction with your social activities and relationships? 4 4 5 4 5 4 4   In general, please rate how well you carry out your usual social activities and roles. (This includes activities at home, at work and in your community, and responsibilities as a parent, child, spouse, employee, friend, etc.) 4 5 5 5 4 4 4   To what extent are you able to  carry out your everyday physical activities such as walking, climbing stairs, carrying groceries, or moving a chair? 5 5 5 5 5 5 5   In the past 7 days, how often have you been bothered by emotional problems such as feeling anxious, depressed, or irritable? 3 3 3 3 3 2 3   In the past 7 days, how would you rate your fatigue on average? 3 2 3 3 2 3 3   In the past 7 days, how would you rate your pain on average, where 0 means no pain, and 10 means worst imaginable pain? 0 0 0 0 0 0 0   Global Mental Health Score 14 15 15  14  16  16  15    Global Physical Health Score 16 17 16  16  17  16  16    PROMIS TOTAL - SUBSCORES 30 32 31  30  33  32  31        Patient-reported         ASSESSMENT: Current Emotional / Mental Status (status of significant symptoms):   Risk status (Self / Other harm or suicidal ideation)   Patient denies current fears or concerns for personal safety.   Patient denies current or recent suicidal ideation or behaviors.   Patient denies current or recent homicidal ideation or behaviors.   Patient denies current or recent self injurious behavior or ideation.   Patient denies other safety concerns.   Patient reports there has been no change in risk factors since their last session.     Patient reports there has been no change in protective factors since their last session.     Recommended that patient call 911 or go to the local ED should there be a change in any of these risk factors     Appearance:   Appropriate    Eye Contact:   Good    Psychomotor Behavior: Normal    Attitude:   Cooperative  Friendly   Orientation:   All   Speech    Rate / Production: Normal/ Responsive    Volume  Normal    Mood:    Anxious  Content   Affect:    Appropriate    Thought Content:  Clear    Thought Form:  Coherent  Goal Directed  Logical    Insight:    Good      Medication Review:   No current psychiatric medications prescribed     Medication Compliance:   NA     Changes in Health Issues:   None reported     Chemical  Use Review:   Substance Use: Chemical use reviewed, no active concerns identified      Tobacco Use: No current tobacco use.      Diagnosis:  1. Adjustment disorder with mixed anxiety and depressed mood        Collateral Reports Completed:   Not Applicable    PLAN: (Patient Tasks / Therapist Tasks / Other)  Vale will continue to practice her grounding skills and challenging her cognitive distortions.     Tarah Tashi, LICSW                                                         ______________________________________________________________________    Individual Treatment Plan    Patient's Name: Vale Greenberg  YOB: 1985    Date of Creation: 8/21/2024  Date Treatment Plan Last Reviewed/Revised: 6/25/2025    DSM5 Diagnoses: Adjustment Disorders  309.28 (F43.23) With mixed anxiety and depressed mood  Psychosocial / Contextual Factors: works full time, has twin sons, good support system.  PROMIS (reviewed every 90 days):     Referral / Collaboration:  Referral to another professional/service is not indicated at this time..    Anticipated number of session for this episode of care: 9-12 sessions  Anticipation frequency of session: Every other week  Anticipated Duration of each session: 38-52 minutes  Treatment plan will be reviewed in 90 days or when goals have been changed.       MeasurableTreatment Goal(s) related to diagnosis / functional impairment(s)  Goal 1: Patient will become more aware of their anxiety and utilize the skills taught to decrease their anxiety symptoms.    I will know I've met my goal when I understand more about my anxiety and have skills to cope with it.      Objective #A (Patient Action)    Patient will use at least 4 coping skills for anxiety management in the next 12 weeks..  Status: Continued - Date(s):  6/25/2025     Intervention(s)  Therapist will teach coping skills and assign homework of practicing these.    Objective #B  Patient will use cognitive strategies  identified in therapy to challenge anxious thoughts. Patient will engage in activities that are anxiety producing for them, slowly increasing their tolerance for new activities. Patient will identify and recognize past negative experiences and subsequent beliefs they hold that contribute to their anxiety..  Status: Continued - Date(s):  6/25/2025     Intervention(s)  Therapist will will teach cognitive behavioral skills and engage client in Socratic dialogue around distorted thinking.  Therapist will provide educational materials on CBT. .    Objective #C  Patient will implement self-care into their routine to decrease anxiety, focusing on sleep hygiene, nutrition, movement, regular engagement in mindful activity, and regular socialization. .  Status: Continued - Date(s):  6/25/2025     Intervention(s)  Therapist will provide psychoeducation around the benefit of self-care and help client identify mindfulness based activities that may work for their life..    Goal 2: Patient will improve her ability to be effective in relationships as evidenced by client reporting use of three new communication skills over the next three months.    I will know I've met my goal when I can communicate with my family better.      Objective #A (Patient Action)    Status: Continued - Date(s):  6/25/2025     Patient will learn & utilize at least 2 assertive communication skills weekly.    Intervention(s)  Therapist will teach assertiveness, effective interpersonal skills and about healthy boundaries in relationships.    Objective #B  Patient will identify his values regarding interpersonal relationships and how to uphold her values while navigating interpersonal relationships.    Status: Continued - Date(s):  6/25/2025     Intervention(s)  Therapist will teach how to uphold values with DBT and provide support and feedback and teach about healthy boundaries in relationships.    Objective #C  Patient will uphold her values while navigating  interpersonal relationships.  Status: Continued - Date(s):  6/25/2025     Intervention(s)  Therapist will provide support and feedback and teach about healthy boundaries in relationships..      Patient has reviewed and agreed to the above plan.      NIK Montero  June 25, 2025

## 2025-07-17 ENCOUNTER — OFFICE VISIT (OUTPATIENT)
Dept: FAMILY MEDICINE | Facility: CLINIC | Age: 40
End: 2025-07-17
Payer: COMMERCIAL

## 2025-07-17 VITALS
DIASTOLIC BLOOD PRESSURE: 84 MMHG | OXYGEN SATURATION: 100 % | SYSTOLIC BLOOD PRESSURE: 130 MMHG | RESPIRATION RATE: 16 BRPM | HEART RATE: 69 BPM | WEIGHT: 212 LBS | HEIGHT: 66 IN | TEMPERATURE: 98 F | BODY MASS INDEX: 34.07 KG/M2

## 2025-07-17 DIAGNOSIS — T16.2XXA FOREIGN BODY OF LEFT EAR, INITIAL ENCOUNTER: Primary | ICD-10-CM

## 2025-07-17 DIAGNOSIS — T16.2XXA FOREIGN BODY OF LEFT EAR, INITIAL ENCOUNTER: ICD-10-CM

## 2025-07-17 DIAGNOSIS — H61.21 IMPACTED CERUMEN OF RIGHT EAR: ICD-10-CM

## 2025-07-17 DIAGNOSIS — H61.21 IMPACTED CERUMEN OF RIGHT EAR: Primary | ICD-10-CM

## 2025-07-17 PROCEDURE — 1126F AMNT PAIN NOTED NONE PRSNT: CPT | Performed by: NURSE PRACTITIONER

## 2025-07-17 PROCEDURE — 99213 OFFICE O/P EST LOW 20 MIN: CPT | Mod: 25 | Performed by: NURSE PRACTITIONER

## 2025-07-17 PROCEDURE — 3079F DIAST BP 80-89 MM HG: CPT | Performed by: NURSE PRACTITIONER

## 2025-07-17 PROCEDURE — 69209 REMOVE IMPACTED EAR WAX UNI: CPT | Performed by: NURSE PRACTITIONER

## 2025-07-17 PROCEDURE — 3075F SYST BP GE 130 - 139MM HG: CPT | Performed by: NURSE PRACTITIONER

## 2025-07-17 ASSESSMENT — PAIN SCALES - GENERAL: PAINLEVEL_OUTOF10: NO PAIN (0)

## 2025-07-17 NOTE — PROGRESS NOTES
"  {PROVIDER CHARTING PREFERENCE:092317}    León Collazo is a 39 year old, presenting for the following health issues:  Ear Problem (Left ear )        7/17/2025    11:24 AM   Additional Questions   Roomed by Evelin DOMINGUEZ     History of Present Illness       Reason for visit:  Dog hair stuck inside ear, help with flushing it out  Symptom onset:  1-3 days ago  Symptoms include:  No pain, just worry it could cause an issue  Symptom intensity:  Mild  Symptom progression:  Staying the same  Had these symptoms before:  No   She is taking medications regularly.          {SUPERLIST (Optional):633186}  {additonal problems for provider to add (Optional):013010}    {ROS Picklists (Optional):158257}      Objective    /84 (BP Location: Right leg, Patient Position: Sitting, Cuff Size: Adult Large)   Pulse 69   Temp 98  F (36.7  C) (Oral)   Resp 16   Ht 1.664 m (5' 5.5\")   Wt 96.2 kg (212 lb)   LMP 06/26/2025   SpO2 100%   BMI 34.74 kg/m    Body mass index is 34.74 kg/m .  Physical Exam   {Exam List (Optional):525967}    {Diagnostic Test Results (Optional):866140}        Signed Electronically by: KIERAN Forbes CNP  {Email feedback regarding this note to [email or web address]   :712932}  "

## 2025-07-21 ENCOUNTER — VIRTUAL VISIT (OUTPATIENT)
Facility: CLINIC | Age: 40
End: 2025-07-21
Payer: COMMERCIAL

## 2025-07-21 DIAGNOSIS — F43.23 ADJUSTMENT DISORDER WITH MIXED ANXIETY AND DEPRESSED MOOD: Primary | ICD-10-CM

## 2025-07-21 PROCEDURE — 90837 PSYTX W PT 60 MINUTES: CPT | Mod: 95

## 2025-07-21 NOTE — PROGRESS NOTES
[facility]                                     Progress Note    Patient Name: Vale Greenberg  Date:  07/21/2025         Service Type: Individual      Session Start Time: 11:33 AM  Session End Time: 12:28 PM     Session Length: 55 Minutes    Session #: 44    Attendees: Client attended alone    Service Modality:  Video Visit:      Provider verified identity through the following two step process.  Patient provided:  Patient is known previously to provider    Telemedicine Visit: The patient's condition can be safely assessed and treated via synchronous audio and visual telemedicine encounter.      Reason for Telemedicine Visit: Patient convenience (e.g. access to timely appointments / distance to available provider)    Originating Site (Patient Location): Patient's place of employment    Distant Site (Provider Location): [facility] [place] [facility]    Consent:  The patient/guardian has verbally consented to: the potential risks and benefits of telemedicine (video visit) versus in person care; bill my insurance or make self-payment for services provided; and responsibility for payment of non-covered services.     Patient would like the video invitation sent by:  My Chart    Mode of Communication:  Video Conference via [employer]    Distant Location (Provider):  On-site    As the provider I attest to compliance with applicable laws and regulations related to telemedicine.    DATA  Extended Session (53+ minutes): PROLONGED SERVICE IN THE OUTPATIENT SETTING REQUIRING DIRECT (FACE-TO-FACE) PATIENT CONTACT BEYOND THE USUAL SERVICE:    - Patient's presenting concerns require more intensive intervention than could be completed within the usual service  Interactive Complexity: No  Crisis: No      Progress Since Last Session (Related to Symptoms / Goals / Homework):   Symptoms: Improving reduction in anxiety with continued skills utilization and increased  "frustration and disappointment      Homework: Achieved / completed to satisfaction      Episode of Care Goals: Satisfactory progress - ACTION (Actively working towards change); Intervened by reinforcing change plan / affirming steps taken     Current / Ongoing Stressors and Concerns:   Vale is coming to therapy to learn skills to decrease her anxiety and depression. She reports increased fear of possible health concerns and worries for her future have contributed to significant distress in all aspects of her life and results in high blood pressure, irritation, avoidance and withdrawing behavior. She reports increased relationship conflicts with her family and pressure to be \"it all for everyone.\"     Treatment Objective(s) Addressed in This Session:   use cognitive strategies identified in therapy to challenge anxious thoughts, Identify negative self-talk and behaviors: challenge core beliefs, myths, and actions   practice deep breathing at least once a day     Intervention:   Therapist utilized reflected listening as patient gave brief reflection of the past few weeks. Patient reported a continued reduction in anxiety with continued skills utilization and increased frustration and disappointment. She reflected on her utilization of her skills with her family and summer plans. She processed her feelings of frustration and disappointment with other's repeated behaviors. Therapist supported patient as she processed and validated patient. Therapist engaged in cognitive restructuring/ reframing, looked at cognitive distortions and challenged distorted thoughts, holding two things to be true at the same time. Therapist reflected on the progress that came with persistence and putting good effort into changing her thinking patterns and reinforced positive behavioral choices with the utilization of her skills.    Assessments completed prior to visit:  The following assessments were completed by patient for this " visit:  PHQ2:       7/21/2025     9:00 AM 4/11/2025     9:50 AM 1/8/2025    12:45 PM 12/20/2024     9:45 AM 12/5/2024     9:26 AM 8/21/2024    10:00 AM 5/20/2024     8:53 AM   PHQ-2 ( 1999 [employer])   Q1: Little interest or pleasure in doing things 0 0 0 0 0 0 0   Q2: Feeling down, depressed or hopeless 1 0 0 0 0 0 0   PHQ-2 Score 1  0  0  0  0  0 0   Q1: Little interest or pleasure in doing things Not at all Not at all Not at all Not at all Not at all Not at all Not at all   Q2: Feeling down, depressed or hopeless Several days Not at all Not at all Not at all Not at all Not at all Not at all   PHQ-2 Score 1 0 0 0 0 0 0       Patient-reported     PHQ9:       6/18/2019     3:26 PM 7/10/2019     3:56 PM 3/2/2020     8:30 AM 3/4/2021     2:32 PM 6/7/2023    12:01 PM   PHQ-9 SCORE   PHQ-9 Total Score MyChart     3 (Minimal depression)   PHQ-9 Total Score 9 2 2 2 3     GAD2:       8/16/2024     9:33 AM 11/19/2024     7:34 AM 12/5/2024     9:26 AM 12/20/2024     9:46 AM 1/8/2025    12:45 PM 4/11/2025     9:50 AM 7/21/2025     9:00 AM   TIM-2   Feeling nervous, anxious, or on edge 1 1 1 1 1 1 1   Not being able to stop or control worrying 0 1 0 1 1 1 1   TIM-2 Total Score 1 2  1  2  2  2  2        Patient-reported     GAD7:       2/28/2020     8:06 AM 3/2/2020     8:30 AM 2/18/2021     7:29 AM 2/25/2021    10:15 AM 3/1/2022    10:35 AM 2/20/2023     9:54 AM 4/26/2023     7:57 AM   TIM-7 SCORE   Total Score 4 (minimal anxiety)  6 (mild anxiety) 6 (mild anxiety) 3 (minimal anxiety) 12 (moderate anxiety) 3 (minimal anxiety)   Total Score 4 4 6    6 6 3 12 3     PROMIS 10-Global Health (all questions and answers displayed):       8/16/2024     9:34 AM 11/19/2024     7:35 AM 12/5/2024     9:27 AM 12/20/2024     9:46 AM 1/8/2025    12:46 PM 4/11/2025     9:51 AM 7/17/2025     6:03 AM   PROMIS 10   In general, would you say your health is: Good Good Good Good Good Good Good   In general, would you say your quality of life is: Very  good Very good Very good Very good Very good Very good Very good   In general, how would you rate your physical health? Good Good Good Good Good Good Good   In general, how would you rate your mental health, including your mood and your ability to think? Very good Good Good Very good Very good Very good Very good   In general, how would you rate your satisfaction with your social activities and relationships? Very good Excellent Very good Excellent Very good Very good Very good   In general, please rate how well you carry out your usual social activities and roles Excellent Excellent Excellent Very good Very good Very good Excellent   To what extent are you able to carry out your everyday physical activities such as walking, climbing stairs, carrying groceries, or moving a chair? Completely Completely Completely Completely Completely Completely Mostly   In the past 7 days, how often have you been bothered by emotional problems such as feeling anxious, depressed, or irritable? Sometimes Sometimes Sometimes Sometimes Rarely Sometimes Sometimes   In the past 7 days, how would you rate your fatigue on average? Mild Moderate Moderate Mild Moderate Moderate Moderate   In the past 7 days, how would you rate your pain on average, where 0 means no pain, and 10 means worst imaginable pain? 0 0 0 0 0 0 0   In general, would you say your health is: 3 3 3 3 3 3 3   In general, would you say your quality of life is: 4 4 4 4 4 4 4   In general, how would you rate your physical health? 3 3 3 3 3 3 3   In general, how would you rate your mental health, including your mood and your ability to think? 4 3 3 4 4 4 4   In general, how would you rate your satisfaction with your social activities and relationships? 4 5 4 5 4 4 4   In general, please rate how well you carry out your usual social activities and roles. (This includes activities at home, at work and in your community, and responsibilities as a parent, child, spouse, employee,  friend, etc.) 5 5 5 4 4 4 5   To what extent are you able to carry out your everyday physical activities such as walking, climbing stairs, carrying groceries, or moving a chair? 5 5 5 5 5 5 4   In the past 7 days, how often have you been bothered by emotional problems such as feeling anxious, depressed, or irritable? 3 3 3 3 2 3 3   In the past 7 days, how would you rate your fatigue on average? 2 3 3 2 3 3 3   In the past 7 days, how would you rate your pain on average, where 0 means no pain, and 10 means worst imaginable pain? 0 0 0 0 0 0 0   Global Mental Health Score 15 15  14  16  16  15  15    Global Physical Health Score 17 16  16  17  16  16  15    PROMIS TOTAL - SUBSCORES 32 31  30  33  32  31  30        Patient-reported         ASSESSMENT: Current Emotional / Mental Status (status of significant symptoms):   Risk status (Self / Other harm or suicidal ideation)   Patient denies current fears or concerns for personal safety.   Patient denies current or recent suicidal ideation or behaviors.   Patient denies current or recent homicidal ideation or behaviors.   Patient denies current or recent self injurious behavior or ideation.   Patient denies other safety concerns.   Patient reports there has been no change in risk factors since their last session.     Patient reports there has been no change in protective factors since their last session.     Recommended that patient call 911 or go to the local ED should there be a change in any of these risk factors     Appearance:   Appropriate    Eye Contact:   Good    Psychomotor Behavior: Normal    Attitude:   Cooperative  Friendly   Orientation:   All   Speech    Rate / Production: Normal/ Responsive    Volume  Normal    Mood:    Depressed  Content   Affect:    Appropriate  Subdued    Thought Content:  Clear    Thought Form:  Coherent  Goal Directed  Logical    Insight:    Good      Medication Review:   No current psychiatric medications prescribed     Medication  Compliance:   NA     Changes in Health Issues:   None reported     Chemical Use Review:   Substance Use: Chemical use reviewed, no active concerns identified      Tobacco Use: No current tobacco use.      Diagnosis:  1. Adjustment disorder with mixed anxiety and depressed mood        Collateral Reports Completed:   Not Applicable    PLAN: (Patient Tasks / Therapist Tasks / Other)  Vale will continue to practice her grounding skills and challenging her cognitive distortions.     Tarah Atshi, LICSW                                                         ______________________________________________________________________    Individual Treatment Plan    Patient's Name: Vale Greenberg  YOB: 1985    Date of Creation: 8/21/2024  Date Treatment Plan Last Reviewed/Revised: 6/25/2025    DSM5 Diagnoses: Adjustment Disorders  309.28 (F43.23) With mixed anxiety and depressed mood  Psychosocial / Contextual Factors: works full time, has twin sons, good support system.  PROMIS (reviewed every 90 days):     Referral / Collaboration:  Referral to another professional/service is not indicated at this time..    Anticipated number of session for this episode of care: 9-12 sessions  Anticipation frequency of session: Every other week  Anticipated Duration of each session: 38-52 minutes  Treatment plan will be reviewed in 90 days or when goals have been changed.       MeasurableTreatment Goal(s) related to diagnosis / functional impairment(s)  Goal 1: Patient will become more aware of their anxiety and utilize the skills taught to decrease their anxiety symptoms.    I will know I've met my goal when I understand more about my anxiety and have skills to cope with it.      Objective #A (Patient Action)    Patient will use at least 4 coping skills for anxiety management in the next 12 weeks..  Status: Continued - Date(s):  6/25/2025     Intervention(s)  Therapist will teach coping skills and assign homework of  practicing these.    Objective #B  Patient will use cognitive strategies identified in therapy to challenge anxious thoughts. Patient will engage in activities that are anxiety producing for them, slowly increasing their tolerance for new activities. Patient will identify and recognize past negative experiences and subsequent beliefs they hold that contribute to their anxiety..  Status: Continued - Date(s):  6/25/2025     Intervention(s)  Therapist will will teach cognitive behavioral skills and engage client in Socratic dialogue around distorted thinking.  Therapist will provide educational materials on CBT. .    Objective #C  Patient will implement self-care into their routine to decrease anxiety, focusing on sleep hygiene, nutrition, movement, regular engagement in mindful activity, and regular socialization. .  Status: Continued - Date(s):  6/25/2025     Intervention(s)  Therapist will provide psychoeducation around the benefit of self-care and help client identify mindfulness based activities that may work for their life..    Goal 2: Patient will improve her ability to be effective in relationships as evidenced by client reporting use of three new communication skills over the next three months.    I will know I've met my goal when I can communicate with my family better.      Objective #A (Patient Action)    Status: Continued - Date(s):  6/25/2025     Patient will learn & utilize at least 2 assertive communication skills weekly.    Intervention(s)  Therapist will teach assertiveness, effective interpersonal skills and about healthy boundaries in relationships.    Objective #B  Patient will identify his values regarding interpersonal relationships and how to uphold her values while navigating interpersonal relationships.    Status: Continued - Date(s):  6/25/2025     Intervention(s)  Therapist will teach how to uphold values with DBT and provide support and feedback and teach about healthy boundaries in  relationships.    Objective #C  Patient will uphold her values while navigating interpersonal relationships.  Status: Continued - Date(s):  6/25/2025     Intervention(s)  Therapist will provide support and feedback and teach about healthy boundaries in relationships..      Patient has reviewed and agreed to the above plan.      NIK Montero  June 25, 2025

## 2025-07-28 ENCOUNTER — ALLIED HEALTH/NURSE VISIT (OUTPATIENT)
Dept: FAMILY MEDICINE | Facility: CLINIC | Age: 40
End: 2025-07-28
Payer: COMMERCIAL

## 2025-07-28 VITALS — DIASTOLIC BLOOD PRESSURE: 84 MMHG | OXYGEN SATURATION: 99 % | SYSTOLIC BLOOD PRESSURE: 129 MMHG | HEART RATE: 81 BPM

## 2025-07-28 DIAGNOSIS — Z01.30 BP CHECK: Primary | ICD-10-CM

## 2025-07-28 PROCEDURE — 3074F SYST BP LT 130 MM HG: CPT

## 2025-07-28 PROCEDURE — 3079F DIAST BP 80-89 MM HG: CPT

## 2025-07-28 PROCEDURE — 99207 PR NO CHARGE NURSE ONLY: CPT

## 2025-07-28 NOTE — PROGRESS NOTES
I met with Vale Julien at the request of Kristen Kehr, PA-C to recheck her blood pressure.  Blood pressure medications on the med list were reviewed with patient.    Patient has taken all medications as per usual regimen: Yes  Patient reports tolerating them without any issues or concerns: Yes    Vitals:    07/28/25 0907   BP: 129/84   Pulse: 81   SpO2: 99%       Blood pressure was taken, previous encounter was reviewed, recorded blood pressure below 140/90.  Patient was discharged and the note will be sent to the provider for final review.

## 2025-08-07 ENCOUNTER — VIRTUAL VISIT (OUTPATIENT)
Facility: CLINIC | Age: 40
End: 2025-08-07
Payer: COMMERCIAL

## 2025-08-07 DIAGNOSIS — F43.23 ADJUSTMENT DISORDER WITH MIXED ANXIETY AND DEPRESSED MOOD: Primary | ICD-10-CM

## 2025-08-07 PROCEDURE — 90837 PSYTX W PT 60 MINUTES: CPT | Mod: 95

## 2025-08-19 ENCOUNTER — VIRTUAL VISIT (OUTPATIENT)
Facility: CLINIC | Age: 40
End: 2025-08-19
Payer: COMMERCIAL

## 2025-08-19 DIAGNOSIS — F43.23 ADJUSTMENT DISORDER WITH MIXED ANXIETY AND DEPRESSED MOOD: Primary | ICD-10-CM

## 2025-08-19 PROCEDURE — 90837 PSYTX W PT 60 MINUTES: CPT | Mod: 95

## (undated) DEVICE — GLOVE PROTEXIS W/NEU-THERA 6.5  2D73TE65

## (undated) DEVICE — PREP CHLORAPREP 26ML TINTED ORANGE  260815

## (undated) DEVICE — LINEN ORTHO PACK 5446

## (undated) DEVICE — SU MONOCRYL 0 CTB-1 36" YB946

## (undated) DEVICE — ESU PENCIL W/HOLSTER E2350H

## (undated) DEVICE — TUBING SUCTION MEDI-VAC 1/4"X20' N620A

## (undated) DEVICE — LINEN TOWEL PACK X5 5464

## (undated) DEVICE — SUCTION MANIFOLD DORNOCH ULTRA CART UL-CL500

## (undated) DEVICE — PREP POVIDONE IODINE SOLUTION 10% 120ML

## (undated) DEVICE — STPL SKIN 35W APPOSE 8886803712

## (undated) DEVICE — LINEN GOWN X4 5410

## (undated) DEVICE — SOL NACL 0.9% IRRIG 1000ML BOTTLE 2F7124

## (undated) DEVICE — BARRIER SEPRAFILM 5X6" SINGLE SHEET 4301-02

## (undated) DEVICE — WIPES FOLEY CARE SURESTEP PROVON DFC100

## (undated) DEVICE — SU VICRYL 0 CT-1 36" J346H

## (undated) DEVICE — SU VICRYL 2-0 CT-1 27" J339H

## (undated) DEVICE — DRAPE IOBAN C-SECTION W/POUCH 30X35" 6657

## (undated) DEVICE — PACK C-SECTION LF PL15OTA83B

## (undated) DEVICE — DRAPE MAYO STAND 23X54 8337

## (undated) DEVICE — GLOVE PROTEXIS BLUE W/NEU-THERA 7.0  2D73EB70

## (undated) DEVICE — SU PLAIN 0 TIE 54" S104H

## (undated) DEVICE — CATH TRAY FOLEY SURESTEP 16FR WDRAIN BAG STLK LATEX A300316A

## (undated) DEVICE — SU PLAIN 3-0 CT 27" 852H

## (undated) DEVICE — SOL WATER IRRIG 1000ML BOTTLE 2F7114

## (undated) DEVICE — ESU GROUND PAD UNIVERSAL W/O CORD

## (undated) DEVICE — SU PDS II 0 CTX 60" Z990G

## (undated) RX ORDER — FENTANYL CITRATE 50 UG/ML
INJECTION, SOLUTION INTRAMUSCULAR; INTRAVENOUS
Status: DISPENSED
Start: 2019-04-19

## (undated) RX ORDER — ONDANSETRON 2 MG/ML
INJECTION INTRAMUSCULAR; INTRAVENOUS
Status: DISPENSED
Start: 2019-04-19

## (undated) RX ORDER — MORPHINE SULFATE 1 MG/ML
INJECTION, SOLUTION EPIDURAL; INTRATHECAL; INTRAVENOUS
Status: DISPENSED
Start: 2019-04-19

## (undated) RX ORDER — CEFAZOLIN SODIUM 1 G/3ML
INJECTION, POWDER, FOR SOLUTION INTRAMUSCULAR; INTRAVENOUS
Status: DISPENSED
Start: 2019-04-19

## (undated) RX ORDER — KETOROLAC TROMETHAMINE 30 MG/ML
INJECTION, SOLUTION INTRAMUSCULAR; INTRAVENOUS
Status: DISPENSED
Start: 2019-04-19

## (undated) RX ORDER — PHENYLEPHRINE HCL IN 0.9% NACL 1 MG/10 ML
SYRINGE (ML) INTRAVENOUS
Status: DISPENSED
Start: 2019-04-19

## (undated) RX ORDER — PROPOFOL 10 MG/ML
INJECTION, EMULSION INTRAVENOUS
Status: DISPENSED
Start: 2019-04-19